# Patient Record
Sex: MALE | Race: WHITE | NOT HISPANIC OR LATINO | Employment: UNEMPLOYED | URBAN - METROPOLITAN AREA
[De-identification: names, ages, dates, MRNs, and addresses within clinical notes are randomized per-mention and may not be internally consistent; named-entity substitution may affect disease eponyms.]

---

## 2017-01-09 ENCOUNTER — EDUCATION (OUTPATIENT)
Dept: HEMATOLOGY/ONCOLOGY | Facility: CLINIC | Age: 57
End: 2017-01-09

## 2017-01-09 DIAGNOSIS — Z52.001 DONOR OF STEM CELL: Primary | ICD-10-CM

## 2017-01-09 NOTE — PROGRESS NOTES
"Spoke with Alexi Noguera, potential donor for Franco Noguera today via telephone. Discussed the fact that his broher... is in need of a stem cell transplant.Alexi said that he was interested in possibly being the donor for his brother's transplant. Briefly discussed the donation process. Explained to Alexi that if he would match his brother, that his commitment to donate is very important and that he has the right to change his mind. Also explained however, that a late decision not to donate can be life threatening to the patient. Explained that he should think seriously about his commitment before he has his tissue typing drawn, and if he feels uncomfortable moving forward, that it is okay for him to say "no." Also informed Alexi that if he is a perfect match for his brother, that we will contact him and ask if he is willing to donate. If he agrees to proceed, we will ask him about his health and schedule a physician appointment and more testing. He will receive a physical examination to be sure it is safe for him to donate and that his donation will provide the best possible outcome for the patient. We will follow medical guidelines that protect his health as a potential donor as well as the health of the transplant patient. Alexi was given the opportunity to ask questions. Alexi would like to have his lo res typing drawn on 01/21/17 at 8:20 am. Appt to be scheduled  "

## 2017-01-21 ENCOUNTER — LAB VISIT (OUTPATIENT)
Dept: LAB | Facility: HOSPITAL | Age: 57
End: 2017-01-21
Attending: INTERNAL MEDICINE
Payer: COMMERCIAL

## 2017-01-21 DIAGNOSIS — Z52.001 DONOR OF STEM CELL: ICD-10-CM

## 2017-01-21 PROCEDURE — 81376 HLA II TYPING 1 LOCUS LR: CPT | Mod: 91,PO

## 2017-01-21 PROCEDURE — 81373 HLA I TYPING 1 LOCUS LR: CPT | Mod: PO

## 2017-01-21 PROCEDURE — 81376 HLA II TYPING 1 LOCUS LR: CPT | Mod: PO

## 2017-01-21 PROCEDURE — 81373 HLA I TYPING 1 LOCUS LR: CPT | Mod: 91,PO

## 2017-01-25 DIAGNOSIS — R60.0 BILATERAL LEG EDEMA: ICD-10-CM

## 2017-01-25 DIAGNOSIS — I10 ESSENTIAL HYPERTENSION: ICD-10-CM

## 2017-01-25 RX ORDER — CHLORTHALIDONE 25 MG/1
TABLET ORAL
Qty: 90 TABLET | Refills: 3 | Status: SHIPPED | OUTPATIENT
Start: 2017-01-25 | End: 2018-07-20

## 2017-02-01 LAB — HLATY INTERPRETATION: NORMAL

## 2017-02-06 LAB
HLA DRB4 1: NORMAL
HLA SSO DNA TYPING CLASS I & II INTERPRETATION: NORMAL
HLA-A 1 SERO. EQUIV: 1
HLA-A 1: NORMAL
HLA-A 2 SERO. EQUIV: 2
HLA-A 2: NORMAL
HLA-B 1 SERO. EQUIV: 7
HLA-B 1: NORMAL
HLA-B 2 SERO. EQUIV: 8
HLA-B 2: NORMAL
HLA-BW 1 SERO. EQUIV: 6
HLA-BW 2 SERO. EQUIV: NORMAL
HLA-C 1: NORMAL
HLA-C 2: NORMAL
HLA-CW 1 SERO. EQUIV: 7
HLA-CW 2 SERO. EQUIV: NORMAL
HLA-DQ 1 SERO. EQUIV: 5
HLA-DQ 2 SERO. EQUIV: 6
HLA-DQB1 1: NORMAL
HLA-DQB1 2: NORMAL
HLA-DRB1 1 SERO. EQUIV: 1
HLA-DRB1 1: NORMAL
HLA-DRB1 2 SERO. EQUIV: 15
HLA-DRB1 2: NORMAL
HLA-DRB3 1: NORMAL
HLA-DRB3 2: NORMAL
HLA-DRB345 1 SERO. EQUIV: 51
HLA-DRB345 2 SERO. EQUIV: NORMAL
HLA-DRB4 2: NORMAL
HLA-DRB5 1: NORMAL
HLA-DRB5 2: NORMAL
SSDQB TESTING DATE: NORMAL
SSDRB TESTING DATE: NORMAL
SSOA TESTING DATE: NORMAL
SSOB TESTING DATE: NORMAL
SSOC TESTING DATE: NORMAL
SSODR TESTING DATE: NORMAL
TYSSO TESTING DATE: NORMAL

## 2017-02-24 ENCOUNTER — LAB VISIT (OUTPATIENT)
Dept: LAB | Facility: HOSPITAL | Age: 57
End: 2017-02-24
Attending: INTERNAL MEDICINE
Payer: MEDICARE

## 2017-02-24 DIAGNOSIS — Z52.001 STEM CELL DONOR: ICD-10-CM

## 2017-02-24 DIAGNOSIS — Z52.001 STEM CELL DONOR: Primary | ICD-10-CM

## 2017-02-24 DIAGNOSIS — D69.6 THROMBOCYTOPENIA: ICD-10-CM

## 2017-02-24 LAB
ALBUMIN SERPL BCP-MCNC: 3.7 G/DL
ALP SERPL-CCNC: 61 U/L
ALT SERPL W/O P-5'-P-CCNC: 24 U/L
ANION GAP SERPL CALC-SCNC: 11 MMOL/L
APTT BLDCRRT: 22.2 SEC
AST SERPL-CCNC: 23 U/L
BASOPHILS # BLD AUTO: 0.01 K/UL
BASOPHILS NFR BLD: 0.1 %
BILIRUB SERPL-MCNC: 0.3 MG/DL
BUN SERPL-MCNC: 20 MG/DL
CALCIUM SERPL-MCNC: 9.3 MG/DL
CHLORIDE SERPL-SCNC: 103 MMOL/L
CO2 SERPL-SCNC: 25 MMOL/L
CREAT SERPL-MCNC: 1.2 MG/DL
DIFFERENTIAL METHOD: ABNORMAL
EOSINOPHIL # BLD AUTO: 0.1 K/UL
EOSINOPHIL NFR BLD: 1.2 %
ERYTHROCYTE [DISTWIDTH] IN BLOOD BY AUTOMATED COUNT: 13.5 %
EST. GFR  (AFRICAN AMERICAN): >60 ML/MIN/1.73 M^2
EST. GFR  (NON AFRICAN AMERICAN): >60 ML/MIN/1.73 M^2
GLUCOSE SERPL-MCNC: 119 MG/DL
HCT VFR BLD AUTO: 40.7 %
HGB BLD-MCNC: 14.4 G/DL
INR PPP: 0.9
LYMPHOCYTES # BLD AUTO: 2.2 K/UL
LYMPHOCYTES NFR BLD: 26.9 %
MAGNESIUM SERPL-MCNC: 2.1 MG/DL
MCH RBC QN AUTO: 30.6 PG
MCHC RBC AUTO-ENTMCNC: 35.4 %
MCV RBC AUTO: 87 FL
MONOCYTES # BLD AUTO: 0.6 K/UL
MONOCYTES NFR BLD: 6.8 %
NEUTROPHILS # BLD AUTO: 5.2 K/UL
NEUTROPHILS NFR BLD: 64.8 %
PHOSPHATE SERPL-MCNC: 2.9 MG/DL
PLATELET # BLD AUTO: 291 K/UL
PMV BLD AUTO: 9.1 FL
POTASSIUM SERPL-SCNC: 3.1 MMOL/L
PROT SERPL-MCNC: 7.8 G/DL
PROTHROMBIN TIME: 10.1 SEC
RBC # BLD AUTO: 4.7 M/UL
SODIUM SERPL-SCNC: 139 MMOL/L
WBC # BLD AUTO: 8.08 K/UL

## 2017-02-24 PROCEDURE — 81380 HLA I TYPING 1 LOCUS HR: CPT | Mod: 91,PO

## 2017-02-24 PROCEDURE — 81382 HLA II TYPING 1 LOC HR: CPT | Mod: 91,PO

## 2017-02-24 PROCEDURE — 83020 HEMOGLOBIN ELECTROPHORESIS: CPT

## 2017-02-24 PROCEDURE — 85060 BLOOD SMEAR INTERPRETATION: CPT | Mod: ,,, | Performed by: PATHOLOGY

## 2017-02-24 PROCEDURE — 85025 COMPLETE CBC W/AUTO DIFF WBC: CPT

## 2017-02-24 PROCEDURE — 81380 HLA I TYPING 1 LOCUS HR: CPT | Mod: PO

## 2017-02-24 PROCEDURE — 85730 THROMBOPLASTIN TIME PARTIAL: CPT

## 2017-02-24 PROCEDURE — 86787 VARICELLA-ZOSTER ANTIBODY: CPT

## 2017-02-24 PROCEDURE — 81382 HLA II TYPING 1 LOC HR: CPT | Mod: PO

## 2017-02-24 PROCEDURE — 86695 HERPES SIMPLEX TYPE 1 TEST: CPT

## 2017-02-24 PROCEDURE — 82955 ASSAY OF G6PD ENZYME: CPT

## 2017-02-24 PROCEDURE — 83735 ASSAY OF MAGNESIUM: CPT

## 2017-02-24 PROCEDURE — 86709 HEPATITIS A IGM ANTIBODY: CPT

## 2017-02-24 PROCEDURE — 80053 COMPREHEN METABOLIC PANEL: CPT

## 2017-02-24 PROCEDURE — 84100 ASSAY OF PHOSPHORUS: CPT

## 2017-02-24 PROCEDURE — 36415 COLL VENOUS BLD VENIPUNCTURE: CPT

## 2017-02-24 PROCEDURE — 85610 PROTHROMBIN TIME: CPT

## 2017-02-27 DIAGNOSIS — Z52.001 STEM CELL DONOR: Primary | ICD-10-CM

## 2017-02-27 LAB
G6PD RBC-CCNT: 9.1 U/G HGB (ref 7–20.5)
HAV IGM SERPL QL IA: NEGATIVE
HGB A2 MFR BLD HPLC: 2.4 %
HGB FRACT BLD ELPH-IMP: NORMAL
HGB FRACT BLD ELPH-IMP: NORMAL
PATH REV BLD -IMP: NORMAL
PATH REV BLD -IMP: NORMAL
VARICELLA INTERPRETATION: POSITIVE
VARICELLA ZOSTER IGG: 4.01 ISR

## 2017-02-28 LAB
CHIDB RECIPIENT: NORMAL
CHIMERISM-DONOR (CHIDB): NORMAL
SPECIMEN TYPE: NORMAL

## 2017-03-01 LAB
HSV1 IGG SERPL QL IA: NEGATIVE
HSV2 IGG SERPL QL IA: POSITIVE

## 2017-03-03 ENCOUNTER — HOSPITAL ENCOUNTER (OUTPATIENT)
Dept: CARDIOLOGY | Facility: CLINIC | Age: 57
Discharge: HOME | End: 2017-03-03
Payer: MEDICARE

## 2017-03-03 ENCOUNTER — HOSPITAL ENCOUNTER (OUTPATIENT)
Dept: RADIOLOGY | Facility: HOSPITAL | Age: 57
Discharge: HOME | End: 2017-03-03
Attending: INTERNAL MEDICINE
Payer: COMMERCIAL

## 2017-03-03 ENCOUNTER — CLINICAL SUPPORT (OUTPATIENT)
Dept: HEMATOLOGY/ONCOLOGY | Facility: CLINIC | Age: 57
End: 2017-03-03
Payer: COMMERCIAL

## 2017-03-03 DIAGNOSIS — Z52.001 STEM CELL DONOR: ICD-10-CM

## 2017-03-03 DIAGNOSIS — Z52.001 STEM CELL DONOR: Primary | ICD-10-CM

## 2017-03-03 DIAGNOSIS — D69.6 THROMBOCYTOPENIA: ICD-10-CM

## 2017-03-03 PROCEDURE — 93010 ELECTROCARDIOGRAM REPORT: CPT | Mod: S$PBB,,, | Performed by: INTERNAL MEDICINE

## 2017-03-03 PROCEDURE — 71020 XR CHEST PA AND LATERAL: CPT | Mod: TC

## 2017-03-03 PROCEDURE — 71020 XR CHEST PA AND LATERAL: CPT | Mod: 26,,, | Performed by: RADIOLOGY

## 2017-03-03 NOTE — PROGRESS NOTES
Vitas and ht/wt from 3/3/17 at 0900:    Ht: 6ft 1in, 184 cm  Wt: 117.4 kg     131/79 BP  77 HR  o2 sat 99% RA

## 2017-03-10 ENCOUNTER — OFFICE VISIT (OUTPATIENT)
Dept: HEMATOLOGY/ONCOLOGY | Facility: CLINIC | Age: 57
End: 2017-03-10
Payer: MEDICARE

## 2017-03-10 VITALS
HEART RATE: 94 BPM | DIASTOLIC BLOOD PRESSURE: 78 MMHG | BODY MASS INDEX: 34.58 KG/M2 | HEIGHT: 72 IN | OXYGEN SATURATION: 94 % | RESPIRATION RATE: 20 BRPM | TEMPERATURE: 98 F | WEIGHT: 255.31 LBS | SYSTOLIC BLOOD PRESSURE: 140 MMHG

## 2017-03-10 DIAGNOSIS — Z52.001 STEM CELL DONOR: Primary | ICD-10-CM

## 2017-03-10 DIAGNOSIS — R76.8 HEPATITIS C ANTIBODY TEST POSITIVE: ICD-10-CM

## 2017-03-10 PROCEDURE — 99999 PR PBB SHADOW E&M-EST. PATIENT-LVL III: CPT | Mod: PBBFAC,,, | Performed by: INTERNAL MEDICINE

## 2017-03-10 PROCEDURE — 99213 OFFICE O/P EST LOW 20 MIN: CPT | Mod: PBBFAC | Performed by: INTERNAL MEDICINE

## 2017-03-10 PROCEDURE — 99205 OFFICE O/P NEW HI 60 MIN: CPT | Mod: S$PBB,,, | Performed by: INTERNAL MEDICINE

## 2017-03-11 NOTE — PROGRESS NOTES
SECTION OF HEMATOLOGY AND BONE MARROW TRANSPLANT  New Patient Visit   03/11/2017  Referred by:  No ref. provider found  Referred for: stem cell donor evaluation     CHIEF COMPLAINT: No chief complaint on file.      HISTORY OF PRESENT ILLNESS:   58 yo male presents for stem cell donor evaluation.  He is a 10/10 hla match for this brother.  He has pmh of htn well controlled on current medications.  He sees an [facility].  He is uptodate on all age appropriate cancer screening. Had colonscopy in 2008 that was normal per patient. We are awaiting report.    Denies any history of autoimmune disease or cancer.  He had a + hep c ab in 2016 but dna pcr was negative.  He denies any high risk sexual behavior or illicit drug use . He does not use tobacco or drink. Denies fever, chills, nightsweats, bleeding, brusing, lymphadenopathy, signs/symptoms of splenomegaly.      PAST MEDICAL HISTORY:   No past medical history on file.    PAST SURGICAL HISTORY:   No past surgical history on file.    PAST SOCIAL HISTORY:   reports that he has never smoked. He does not have any smokeless tobacco history on file. He reports that he does not drink alcohol or use illicit drugs.    FAMILY HISTORY:  Family History   Problem Relation Age of Onset    No Known Problems Mother     No Known Problems Father     No Known Problems Sister     Heart disease Brother     Diabetes Paternal Aunt     Heart disease Paternal Uncle        CURRENT MEDICATIONS:   Current Outpatient Prescriptions   Medication Sig    chlorthalidone (HYGROTEN) 25 MG Tab TAKE 1 TABLET (25 MG TOTAL) BY MOUTH ONCE DAILY.    losartan (COZAAR) 50 MG tablet TAKE 1 TABLET (50 MG TOTAL) BY MOUTH ONCE DAILY.     No current facility-administered medications for this visit.      ALLERGIES:   Review of patient's allergies indicates:  No Known Allergies          REVIEW OF SYSTEMS:   General ROS: negative  Psychological ROS: negative  Ophthalmic ROS: negative  ENT ROS: negative  Allergy  and Immunology ROS: negative  Hematological and Lymphatic ROS: negative  Endocrine ROS: negative  Respiratory ROS: negative  Cardiovascular ROS: negative  Gastrointestinal ROS: negative  Genito-Urinary ROS: negative  Musculoskeletal ROS: negative  Neurological ROS: negative  Dermatological ROS: negative    PHYSICAL EXAM:   Vitals:    03/10/17 1506   BP: (!) 140/78   Pulse: 94   Resp: 20   Temp: 97.8 °F (36.6 °C)       General - well developed, well nourished, no apparent distress  Head & Face - no sinus tenderness  Eyes - normal conjunctivae and lids   ENT - normal external auditory canals and tympanic membranes bilaterally oropharynx clear,  Normal dentition and gums  Neck - normal thyroid  Chest and Lung - normal respiratory effort, clear to auscultation bilaterally   Cardiovascular - RRR with no MGR, normal S1 and S2; no pedal edema  Abdomen -  soft, nontender, no palpable hepatomegaly or splenomegaly  Lymph - no palpable lymphadenopathy  Extremities - unremarkable nails and digits  Heme - no bruising, petechiae, pallor  Skin - no rashes or lesions  Psych - appropriate mood and affect      ECOG Performance Status: (foot note - ECOG PS provided by [employer]cology [employer]) 0 - Asymptomatic    Karnofsky Performance Score:  100%- Normal, No Complaints, No Evidence of Disease  DATA:   HLA SSO DNA Typing - Class I & II   Status:  Final result   Visible to patient:  No (Not Released) Next appt:  03/16/2017 at 08:00 AM in Chemotherapy (INJECTION, [facility] IN[facility]ION) Order: [id number]        1mo ago     HLA SSO DNA Typing Class I & II Interpretation The donor Alexi Noguera (#[id number]) is a HLA ABC/DR/DQ match with the patient Franco Noguera(#[id number]) using low resolution molecular DNA.             Lab Results   Component Value Date    WBC 8.08 02/24/2017    HGB 14.4 02/24/2017    HCT 40.7 02/24/2017    MCV 87 02/24/2017     02/24/2017     Gran #   Date Value Ref Range Status   02/24/2017 5.2 1.8 - 7.7 K/uL Final      Gran%   Date Value Ref Range Status   02/24/2017 64.8 38.0 - 73.0 % Final     Lymph #   Date Value Ref Range Status   02/24/2017 2.2 1.0 - 4.8 K/uL Final     Lymph%   Date Value Ref Range Status   02/24/2017 26.9 18.0 - 48.0 % Final     CMP  Sodium   Date Value Ref Range Status   02/24/2017 139 136 - 145 mmol/L Final     Potassium   Date Value Ref Range Status   02/24/2017 3.1 (L) 3.5 - 5.1 mmol/L Final     Chloride   Date Value Ref Range Status   02/24/2017 103 95 - 110 mmol/L Final     CO2   Date Value Ref Range Status   02/24/2017 25 23 - 29 mmol/L Final     Glucose   Date Value Ref Range Status   02/24/2017 119 (H) 70 - 110 mg/dL Final     BUN, Bld   Date Value Ref Range Status   02/24/2017 20 6 - 20 mg/dL Final     Creatinine   Date Value Ref Range Status   02/24/2017 1.2 0.5 - 1.4 mg/dL Final     Calcium   Date Value Ref Range Status   02/24/2017 9.3 8.7 - 10.5 mg/dL Final     Total Protein   Date Value Ref Range Status   02/24/2017 7.8 6.0 - 8.4 g/dL Final     Albumin   Date Value Ref Range Status   02/24/2017 3.7 3.5 - 5.2 g/dL Final     Total Bilirubin   Date Value Ref Range Status   02/24/2017 0.3 0.1 - 1.0 mg/dL Final     Comment:     For infants and newborns, interpretation of results should be based  on gestational age, weight and in agreement with clinical  observations.  Premature Infant recommended reference ranges:  Up to 24 hours.............<8.0 mg/dL  Up to 48 hours............<12.0 mg/dL  3-5 days..................<15.0 mg/dL  6-29 days.................<15.0 mg/dL       Alkaline Phosphatase   Date Value Ref Range Status   02/24/2017 61 55 - 135 U/L Final     AST   Date Value Ref Range Status   02/24/2017 23 10 - 40 U/L Final     ALT   Date Value Ref Range Status   02/24/2017 24 10 - 44 U/L Final     Anion Gap   Date Value Ref Range Status   02/24/2017 11 8 - 16 mmol/L Final     eGFR if    Date Value Ref Range Status   02/24/2017 >60.0 >60 mL/min/1.73 m^2 Final     eGFR if non     Date Value Ref Range Status   02/24/2017 >60.0 >60 mL/min/1.73 m^2 Final     Comment:     Calculation used to obtain the estimated glomerular filtration  rate (eGFR) is the CKD-EPI equation. Since race is unknown   in our information system, the eGFR values for   -American and Non--American patients are given   for each creatinine result.         Results for KATIE DUNCAN (MRN [id number]) as of 3/11/2017 16:29   Ref. Range 2/24/2017 15:27   Hep A IgM Unknown Negative   HSV 1 IgG Latest Ref Range: Negative  Negative   HSV 2 IgG Latest Ref Range: Negative  Positive (A)   Varicella IgG Latest Ref Range: 0.00 - 0.90 ISR 4.01 (H)   Varicella Interpretation Latest Ref Range: Negative  Positive (A)   Results for KATIE DUNCAN (MRN [id number]) as of 3/11/2017 16:29   Ref. Range 4/30/2016 08:49 5/21/2016 08:16 8/22/2016 12:51   HCV Log Latest Ref Range: <1.08 Log (10) IU/mL   <1.08   HCV Qualitative Result Latest Ref Range: Not detected   Not detected    HCV Quantitative Log Latest Ref Range: <1.08 Log (10) IU/mL  <1.08    HCV Quantitative Result Latest Ref Range: <12 IU/mL  <12    HCV RNA Quant PCR Latest Ref Range: <12 IU/mL   <12   HCV, Qualitative Latest Ref Range: Not detected IU/mL   Not detected   Hepatitis C Ab Unknown Positive (A)     Results for KATIE DUNCAN (MRN [id number]) as of 3/11/2017 16:29   Ref. Range 2/24/2017 15:27   G6PD Quant Latest Ref Range: 7.0 - 20.5 U/g Hgb 9.1   CXR -3/3/17  Findings:    There are no prior studies for comparison at this time.  Cardiomediastinal silhouette is within normal limits.  There is no tracheal abnormalities.  Lungs are clear.  Pulmonary vasculature within normal limits.  There is no bony thorax abnormalities.       Impression          No acute cardiopulmonary processes.     EKG - 3/3/17  Normal sinus rhythm  Normal ECG  ASSESSMENT AND PLAN:   Encounter Diagnoses   Name Primary?    Stem cell donor Yes    Hepatitis C antibody test positive       1)Stem cell donor  -for brother being treated for AML  -healthy stem cell donor with no clear contraindications to proceeding with mobilization and collection   -he is 10/10 HLA match; CMV status and ABO blood type pending; peripheral smear pending; will need to review these prior to signing off on donation   -he has prior hep c + ab however subsequent hep c dna studies were negative signifying cleared infection and no contraindication to donation   -spent 60 minutes on this case, half of which was spent face to face with patient discussing rationale and risks of mobilization (with g-csf), central line placement, and collection; discussed that in rare circumstances <1%, these risks have the potential to be serious and life threatening; he expressed understanding and all written consents obtained  -will need to get 2008 colonoscopy report to confirm he was cleared through 2018    Follow Up: per BMT coordinator  Mohit Peoples MD  Hematology/Oncology/Bone Marrow Transplant

## 2017-03-16 ENCOUNTER — INFUSION (OUTPATIENT)
Dept: INFUSION THERAPY | Facility: HOSPITAL | Age: 57
End: 2017-03-16
Attending: INTERNAL MEDICINE
Payer: MEDICARE

## 2017-03-16 DIAGNOSIS — Z52.001 STEM CELL DONOR: Primary | ICD-10-CM

## 2017-03-16 PROCEDURE — 63600175 PHARM REV CODE 636 W HCPCS: Performed by: NURSE PRACTITIONER

## 2017-03-16 PROCEDURE — 96372 THER/PROPH/DIAG INJ SC/IM: CPT

## 2017-03-16 RX ADMIN — FILGRASTIM 1260 MCG: 480 INJECTION, SOLUTION INTRAVENOUS; SUBCUTANEOUS at 07:03

## 2017-03-16 NOTE — NURSING
0742-Patient arrived ambulatory to the unit for injections.  NAD noted, no complaints from patient.  Assessment performed and no issues identified.  Patient elected to receive subcutaneous injections to abdomen, tolerated well.  Patient encouraged to contact MD with further questions/concerns, verbalized will return to clinic tomorrow for next injection.

## 2017-03-17 ENCOUNTER — INFUSION (OUTPATIENT)
Dept: INFUSION THERAPY | Facility: HOSPITAL | Age: 57
End: 2017-03-17
Attending: INTERNAL MEDICINE
Payer: MEDICARE

## 2017-03-17 DIAGNOSIS — Z52.001 STEM CELL DONOR: Primary | ICD-10-CM

## 2017-03-17 LAB
HLA HI RES SEQUNCE BASED TYPING OCH INTERPRETATION: NORMAL
HLA HI RES SEQUNCE BASED TYPING TEST DATE: NORMAL
HLA-A1 HI RES: NORMAL
HLA-A2 HI RES: NORMAL
HLA-B1 HI RES: NORMAL
HLA-B2 HI RES: NORMAL
HLA-C1 HI RES: NORMAL
HLA-C2 HI RES: NORMAL
HLA-DQB1 1 HI RES: NORMAL
HLA-DQB1 2 HI RES: NORMAL
HLA-DRB1 1 HI RES: NORMAL
HLA-DRB1 2 HI RES: NORMAL
HLA-DRB3 1 HI RES: NORMAL
HLA-DRB3 2 HI RES: NORMAL
HLA-DRB4 1 HI RES: NORMAL
HLA-DRB4 2 HI RES: NORMAL
HLA-DRB5 1 HI RES: NORMAL
HLA-DRB5 2 HI RES: NORMAL

## 2017-03-17 PROCEDURE — 96372 THER/PROPH/DIAG INJ SC/IM: CPT

## 2017-03-17 PROCEDURE — 63600175 PHARM REV CODE 636 W HCPCS: Performed by: NURSE PRACTITIONER

## 2017-03-17 RX ORDER — SODIUM CHLORIDE 9 MG/ML
500 INJECTION, SOLUTION INTRAVENOUS CONTINUOUS
Status: CANCELLED | OUTPATIENT
Start: 2017-03-17

## 2017-03-17 RX ORDER — CEFAZOLIN SODIUM 1 G/50ML
1 SOLUTION INTRAVENOUS
Status: CANCELLED | OUTPATIENT
Start: 2017-03-17 | End: 2017-03-17

## 2017-03-17 RX ADMIN — FILGRASTIM 1260 MCG: 480 INJECTION, SOLUTION INTRAVENOUS; SUBCUTANEOUS at 07:03

## 2017-03-17 NOTE — NURSING
Pt received neupogen via 2 injections in lower R. Abdomen. Tolerated well. Denies paid or joint discomfort from yesterday's injections. Reviewed appointments and saturday protocol. Will RTC tomorrow for next set of injections. No questions at this time.

## 2017-03-18 ENCOUNTER — INFUSION (OUTPATIENT)
Dept: INFUSION THERAPY | Facility: HOSPITAL | Age: 57
End: 2017-03-18
Attending: INTERNAL MEDICINE
Payer: MEDICARE

## 2017-03-18 DIAGNOSIS — Z52.001 STEM CELL DONOR: Primary | ICD-10-CM

## 2017-03-18 PROCEDURE — 63600175 PHARM REV CODE 636 W HCPCS: Performed by: NURSE PRACTITIONER

## 2017-03-18 PROCEDURE — 96372 THER/PROPH/DIAG INJ SC/IM: CPT

## 2017-03-18 RX ADMIN — FILGRASTIM 1260 MCG: 480 INJECTION, SOLUTION INTRAVENOUS; SUBCUTANEOUS at 07:03

## 2017-03-18 NOTE — NURSING
Pt. Here today for 3rd set of neupogen injections. Reports fatigue and aches. Encouraged to notify MD if it continues to worsen to see what he can take for relief, if anything. Pt. States at this time it is tolerable and does not wish to take anything. Received dose via 2 injections today and tolerated. Will RTC tomorrow for next set of injections.

## 2017-03-19 ENCOUNTER — INFUSION (OUTPATIENT)
Dept: INFUSION THERAPY | Facility: HOSPITAL | Age: 57
End: 2017-03-19
Attending: INTERNAL MEDICINE
Payer: COMMERCIAL

## 2017-03-19 DIAGNOSIS — Z52.001 STEM CELL DONOR: Primary | ICD-10-CM

## 2017-03-19 PROCEDURE — 63600175 PHARM REV CODE 636 W HCPCS: Performed by: NURSE PRACTITIONER

## 2017-03-19 PROCEDURE — 96372 THER/PROPH/DIAG INJ SC/IM: CPT

## 2017-03-19 RX ADMIN — FILGRASTIM 1260 MCG: 480 INJECTION, SOLUTION INTRAVENOUS; SUBCUTANEOUS at 08:03

## 2017-03-19 NOTE — NURSING
0908 -- Patient received Neupogen injections (2) in ABD.  Tolerated well.  RTC tomorrow for injections.

## 2017-03-20 ENCOUNTER — INFUSION (OUTPATIENT)
Dept: INFUSION THERAPY | Facility: HOSPITAL | Age: 57
End: 2017-03-20
Attending: INTERNAL MEDICINE
Payer: COMMERCIAL

## 2017-03-20 ENCOUNTER — HOSPITAL ENCOUNTER (OUTPATIENT)
Dept: TRANSFUSION MEDICINE | Facility: HOSPITAL | Age: 57
Discharge: HOME | End: 2017-03-20
Attending: INTERNAL MEDICINE
Payer: COMMERCIAL

## 2017-03-20 ENCOUNTER — HOSPITAL ENCOUNTER (OUTPATIENT)
Dept: INTERVENTIONAL RADIOLOGY/VASCULAR | Facility: HOSPITAL | Age: 57
Discharge: HOME | End: 2017-03-20
Attending: INTERNAL MEDICINE
Payer: COMMERCIAL

## 2017-03-20 VITALS
SYSTOLIC BLOOD PRESSURE: 128 MMHG | TEMPERATURE: 96 F | BODY MASS INDEX: 34.19 KG/M2 | RESPIRATION RATE: 20 BRPM | WEIGHT: 258 LBS | HEART RATE: 88 BPM | DIASTOLIC BLOOD PRESSURE: 81 MMHG | HEIGHT: 73 IN

## 2017-03-20 VITALS
SYSTOLIC BLOOD PRESSURE: 139 MMHG | RESPIRATION RATE: 20 BRPM | OXYGEN SATURATION: 96 % | HEART RATE: 109 BPM | DIASTOLIC BLOOD PRESSURE: 97 MMHG

## 2017-03-20 VITALS
RESPIRATION RATE: 20 BRPM | TEMPERATURE: 98 F | SYSTOLIC BLOOD PRESSURE: 137 MMHG | HEART RATE: 96 BPM | DIASTOLIC BLOOD PRESSURE: 91 MMHG | OXYGEN SATURATION: 96 %

## 2017-03-20 DIAGNOSIS — Z52.001 STEM CELL DONOR: Primary | ICD-10-CM

## 2017-03-20 DIAGNOSIS — Z52.001 STEM CELL DONOR: ICD-10-CM

## 2017-03-20 LAB
ALBUMIN SERPL BCP-MCNC: 3.2 G/DL
ALP SERPL-CCNC: 185 U/L
ALT SERPL W/O P-5'-P-CCNC: 14 U/L
ANION GAP SERPL CALC-SCNC: 12 MMOL/L
ANISOCYTOSIS BLD QL SMEAR: SLIGHT
AST SERPL-CCNC: 20 U/L
BASOPHILS # BLD AUTO: ABNORMAL K/UL
BASOPHILS NFR BLD: 0 %
BILIRUB SERPL-MCNC: 0.5 MG/DL
BUN SERPL-MCNC: 12 MG/DL
CA-I BLDV-SCNC: 1.13 MMOL/L
CALCIUM SERPL-MCNC: 9.8 MG/DL
CHLORIDE SERPL-SCNC: 102 MMOL/L
CO2 SERPL-SCNC: 27 MMOL/L
CREAT SERPL-MCNC: 0.9 MG/DL
DIFFERENTIAL METHOD: ABNORMAL
EOSINOPHIL # BLD AUTO: ABNORMAL K/UL
EOSINOPHIL NFR BLD: 2 %
ERYTHROCYTE [DISTWIDTH] IN BLOOD BY AUTOMATED COUNT: 14.4 %
EST. GFR  (AFRICAN AMERICAN): >60 ML/MIN/1.73 M^2
EST. GFR  (NON AFRICAN AMERICAN): >60 ML/MIN/1.73 M^2
GLUCOSE SERPL-MCNC: 87 MG/DL
HCT VFR BLD AUTO: 40.7 %
HGB BLD-MCNC: 13.6 G/DL
LYMPHOCYTES # BLD AUTO: ABNORMAL K/UL
LYMPHOCYTES NFR BLD: 9 %
MAGNESIUM SERPL-MCNC: 1.6 MG/DL
MCH RBC QN AUTO: 30.2 PG
MCHC RBC AUTO-ENTMCNC: 33.4 %
MCV RBC AUTO: 90 FL
MONOCYTES # BLD AUTO: ABNORMAL K/UL
MONOCYTES NFR BLD: 2 %
NEUTROPHILS NFR BLD: 78 %
NEUTS BAND NFR BLD MANUAL: 9 %
PHOSPHATE SERPL-MCNC: 2.5 MG/DL
PLATELET # BLD AUTO: 125 K/UL
PLATELET BLD QL SMEAR: ABNORMAL
PMV BLD AUTO: 8.7 FL
POTASSIUM SERPL-SCNC: 3.2 MMOL/L
PROT SERPL-MCNC: 6.6 G/DL
RBC # BLD AUTO: 4.51 M/UL
SODIUM SERPL-SCNC: 141 MMOL/L
WBC # BLD AUTO: 31.92 K/UL

## 2017-03-20 PROCEDURE — 77001 FLUOROGUIDE FOR VEIN DEVICE: CPT | Mod: 26,,, | Performed by: RADIOLOGY

## 2017-03-20 PROCEDURE — 77001 FLUOROGUIDE FOR VEIN DEVICE: CPT | Mod: TC

## 2017-03-20 PROCEDURE — C1752 CATH,HEMODIALYSIS,SHORT-TERM: HCPCS

## 2017-03-20 PROCEDURE — 76937 US GUIDE VASCULAR ACCESS: CPT | Mod: 26,,, | Performed by: RADIOLOGY

## 2017-03-20 PROCEDURE — 76937 US GUIDE VASCULAR ACCESS: CPT | Mod: TC

## 2017-03-20 PROCEDURE — 36556 INSERT NON-TUNNEL CV CATH: CPT | Mod: ,,, | Performed by: RADIOLOGY

## 2017-03-20 PROCEDURE — 38205 HARVEST ALLOGENEIC STEM CELL: CPT | Mod: ,,, | Performed by: PATHOLOGY

## 2017-03-20 RX ADMIN — FILGRASTIM 1260 MCG: 480 INJECTION, SOLUTION INTRAVENOUS; SUBCUTANEOUS at 09:03

## 2017-03-20 RX ADMIN — CALCIUM GLUCONATE 4000 MG: 94 INJECTION, SOLUTION INTRAVENOUS at 10:03

## 2017-03-20 NOTE — PROCEDURES
"Radiology Post-Procedure Note    Pre Op Diagnosis: Stem cell donation    Post Op Diagnosis: Same    Procedure: PICC placement    Procedure performed by: Eugenio Diaz MD    Written Informed Consent Obtained: Yes    Specimen Removed: No    Estimated Blood Loss: Minimal    Findings:   Using realtime U/S guidance a brevia catheter was placed into the right Jugular vein with tip of the catheter in the SVC.    Brevia is ready for use.     Williams Del Toro MD ([facility])  Radiology PGY-3  [phone]    "

## 2017-03-20 NOTE — H&P
Radiology History & Physical      SUBJECTIVE:     Chief Complaint: need for central line access for stem cell donation    History of Present Illness:  Alexi Noguera is a 57 y.o. male who presents for brevia placement  No past medical history on file.  No past surgical history on file.    Home Meds:   Prior to Admission medications    Medication Sig Start Date End Date Taking? Authorizing Provider   chlorthalidone (HYGROTEN) 25 MG Tab TAKE 1 TABLET (25 MG TOTAL) BY MOUTH ONCE DAILY. 1/25/17   Derrick Matthews MD   losartan (COZAAR) 50 MG tablet TAKE 1 TABLET (50 MG TOTAL) BY MOUTH ONCE DAILY. 10/28/16   Derrick Matthews MD     Anticoagulants/Antiplatelets: no anticoagulation    Allergies: Review of patient's allergies indicates:  No Known Allergies  Sedation History:  no adverse reactions    Review of Systems:   Hematological: no known coagulopathies  Respiratory: no shortness of breath  Cardiovascular: no chest pain  Gastrointestinal: no abdominal pain  Genito-Urinary: no dysuria  Musculoskeletal: negative  Neurological: no TIA or stroke symptoms         OBJECTIVE:     Vital Signs (Most Recent)  Pulse: 109 (03/20/17 0756)  Resp: 20 (03/20/17 0756)  BP: (!) 139/97 (03/20/17 0756)  SpO2: 96 % (03/20/17 0756)    Physical Exam:  ASA: 1  Mallampati: 2    General: no acute distress  Mental Status: alert and oriented to person, place and time  HEENT: normocephalic, atraumatic  Chest: unlabored breathing  Heart: regular heart rate  Abdomen: nondistended  Extremity: moves all extremities    Laboratory  Lab Results   Component Value Date    INR 1.0 03/20/2017       Lab Results   Component Value Date    WBC 48.54 (H) 03/20/2017    HGB 14.9 03/20/2017    HCT 42.9 03/20/2017    MCV 88 03/20/2017     03/20/2017      Lab Results   Component Value Date     (H) 02/24/2017     02/24/2017    K 3.1 (L) 02/24/2017     02/24/2017    CO2 25 02/24/2017    BUN 20 02/24/2017    CREATININE 1.2 02/24/2017    CALCIUM 9.3  "02/24/2017    MG 2.1 02/24/2017    ALT 24 02/24/2017    AST 23 02/24/2017    ALBUMIN 3.7 02/24/2017    BILITOT 0.3 02/24/2017       ASSESSMENT/PLAN:     Sedation Plan: local  Patient will undergo Brevia placement.    Williams Del Toro MD ([facility])  Radiology PGY-3  [phone]      "

## 2017-03-20 NOTE — IP AVS SNAPSHOT
[facility]  [address]  [facility] [address]  Phone: [phone]           Patient Discharge Instructions     Our goal is to set you up for success. This packet includes information on your condition, medications, and your home care. It will help you to care for yourself so you don't get sicker and need to go back to the hospital.     Please ask your nurse if you have any questions.        There are many details to remember when preparing to leave the hospital. Here is what you will need to do:    1. Take your medicine. If you are prescribed medications, review your Medication List in the following pages. You may have new medications to  at the pharmacy and others that you'll need to stop taking. Review the instructions for how and when to take your medications. Talk with your doctor or nurses if you are unsure of what to do.     2. Go to your follow-up appointments. Specific follow-up information is listed in the following pages. Your may be contacted by a transition nurse or clinical provider about future appointments. Be sure we have all of the phone numbers to reach you, if needed. Please contact your provider's office if you are unable to make an appointment.     3. Watch for warning signs. Your doctor or nurse will give you detailed warning signs to watch for and when to call for assistance. These instructions may also include educational information about your condition. If you experience any of warning signs to your health, call your doctor.               [facility]  Unless otherwise directed by your provider, please contact [facility], our nurse care line that is available for 24/7 assistance.     [phone] (toll-free)    Registered nurses in the [facility] provide clinical advisement, health education, appointment booking, and other advisory services.                    ** Verify the list of medication(s) below is accurate and up  to date. Carry this with you in case of emergency. If your medications have changed, please notify your healthcare provider.             Medication List      TAKE these medications        Additional Info                      chlorthalidone 25 MG Tab   Commonly known as:  HYGROTEN   Quantity:  90 tablet   Refills:  3    Instructions:  TAKE 1 TABLET (25 MG TOTAL) BY MOUTH ONCE DAILY.     Begin Date    AM    Noon    PM    Bedtime       losartan 50 MG tablet   Commonly known as:  COZAAR   Quantity:  90 tablet   Refills:  1    Instructions:  TAKE 1 TABLET (50 MG TOTAL) BY MOUTH ONCE DAILY.     Begin Date    AM    Noon    PM    Bedtime                  Please bring to all follow up appointments:    1. A copy of your discharge instructions.  2. All medicines you are currently taking in their original bottles.  3. Identification and insurance card.    Please arrive 15 minutes ahead of scheduled appointment time.    Please call 24 hours in advance if you must reschedule your appointment and/or time.        Your Scheduled Appointments     Mar 20, 2017  9:00 AM CDT   Infusion 480 Min with JULIAN CHEMO   [facility] ([facility])    [address] [address]   [phone]            Mar 20, 2017  9:00 AM CDT   Procedure with APHERESIS, [address] [facility] ([facility])    [address] [address]   [phone]            Mar 21, 2017  7:30 AM CDT   Infusion 480 Min with BRAEDEN CHEMO   [facility] ([facility])    [address] [address]   [phone]            Apr 29, 2017  8:00 AM CDT   Fasting Lab with [facility] ([place])    [address]  [place] [address]   [phone]            May 05, 2017  1:00 PM CDT   Established Patient Visit with Derrick Matthews MD   [facility] ([facility]    [address] [address]  [place] [address]   [phone]                  Discharge Instructions       [facility] MON[facility] 8 AM- 5PM [phone]. Radiology resident on call [phone].    Discharge References/Attachments     CARING FOR YOUR CENTRAL LINE, DISCHARGE INSTRUCTIONS  (ENGLISH)        Admission Information     Date & Time Provider Department CSN    3/20/2017  7:30 AM Eriberto Peoples MD [facility] [id number]      Care Providers     Provider Role Specialty Primary office phone    Eriberto Peoples MD Attending Provider Hematology and Oncology [phone]      Recent Lab Values     No lab values to display.      Allergies as of 3/20/2017     No Known Allergies      Advance Directives     An advance directive is a document which, in the event you are no longer able to make decisions for yourself, tells your healthcare team what kind of treatment you do or do not want to receive, or who you would like to make those decisions for you.  If you do not currently have an advance directive, [facility] encourages you to create one.  For more information call:  [phone]), [phone] ([phone]),  or log on to [email or web address] for Google Glass.        Language Assistance Services     ATTENTION: Language assistance services are available, free of charge. Please call [phone].      ATENCIÓN: Si habla español, tiene a alonso disposición servicios gratuitos de asistencia lingüística. Llame al [phone].     CHÚ Ý: N?u b?n nói Ti?ng Vi?t, có các d?ch v? h? tr? ngôn ng? mi?n phí dành cho b?n. G?i s? [phone].         [facility] complies with applicable Federal civil rights laws and does not discriminate on the basis of race, color, national origin, age, disability, or sex.

## 2017-03-20 NOTE — PROGRESS NOTES
Brevia placement procedure is now complete. Pt tolerated procedure well. Dressing tithe right side of neck is clean, dry and intact. Discharge instructions reviewed with pt. Pt verbalized understanding. Pt able to ambulate to the lobby with steady. Pt discharged.

## 2017-03-20 NOTE — PLAN OF CARE
Problem: Patient Care Overview (Adult)  Goal: Individualization & Mutuality  Outcome: Ongoing (interventions implemented as appropriate)  0915-Labs , hx, and medications reviewed.  Patient here for injection and for stem cell donation. Assessment completed. Discussed plan of care with patient. Patient in agreement.  Patient set up in patient room with apheresis nurse at bedside.

## 2017-03-20 NOTE — PLAN OF CARE
Problem: Patient Care Overview (Adult)  Goal: Plan of Care Review  0561-Patient tolerated treatment well, central line pulled by apheresis. Per Dr. Peoples- labs reviewed and okay, no need for replacements. Discharged without complaints or S/S of adverse event. AVS given.  Instructed to call provider for any questions or concerns.

## 2017-03-20 NOTE — MR AVS SNAPSHOT
"Patient Information     Patient Name Sex Alexi Brunner Male 1960      Visit Information        Provider Department Dept Phone Center    3/20/2017 9:00 AM [facility], [facility] [phone] Sami Villasenor      Patient Instructions     None      Your Current Medications Are     chlorthalidone (HYGROTEN) 25 MG Tab    losartan (COZAAR) 50 MG tablet      Facility-Administered Medications     calcium gluconate 4,000 mg in sodium chloride 0.9% 460 mL IVPB    filagrastim (NEUPOGEN) injection 1260 mcg kit (300 mcg + 480 mcg + 480 mcg)      Appointments for Next Year     2017  8:00 AM FASTING LAB (15 min.) [facility] - Lab LAB, [place] [facility]    1. Do not eat or drink anything for TEN HOURS (10) PRIOR TO TEST. Do not chew gum or eat candy mints, even those claiming to be sugar free. Water is allowed but do not drink any other fluids 2. Take your regular daily medicines as your doctor has ordered. If you are diabetic, do not take your insulin or other diabetic medication until your blood is drawn and you are ready to eat. Your physician may have special instructions for diabetics. Check with your doctor if you have any questions.3. Alcoholic beverages are not allowed starting at 6:00pm the evening before your appointment.    2017  1:00 PM ESTABLISHED PATIENT SHORT (20 min.) [facility] Derrick Matthews MD    Arrive at check-in approximately 15 minutes before your scheduled appointment time. Bring all outside medical records and imaging, along with a list of your current medications and insurance card.         Default Flowsheet Data (last 24 hours)      Amb Complex Vitals Baron        17 1300 17 1230 17 1200 17 1130 17 1100    Measurements    /78 120/75 121/73 120/80 111/75    Pulse 82 82 85 89 81       17 1035 17 1000 17 0930 17 0915 17 0756    Measurements    Weight   117 kg (258 lb)      Height   6' 1" " (1.854 m)      BSA (Calculated - sq m)   2.45 sq meters      BMI (Calculated)   34.1      BP (!)  90/54 112/68 122/77 (!)  137/91 (!)  139/97    Temp   98.2 °F (36.8 °C) 98.2 °F (36.8 °C)     Pulse 84 87 90 96 109    Resp   20 20 20    SpO2    96 % 96 %            Allergies     No Known Allergies      Medications You Received from 03/19/2017 1551 to 03/20/2017 1551        Date/Time Order Dose Route Action     03/20/2017 0921 filagrastim (NEUPOGEN) injection 1260 mcg kit (300 mcg + 480 mcg + 480 mcg) 1,260 mcg Subcutaneous Given      Current Discharge Medication List     Cannot display discharge medications since this is not an admission.

## 2017-03-20 NOTE — PROGRESS NOTES
Pt ambulatory to Apheresis this am for allo stem cell collection, after catheter placement.  A&O x4.  Voiced no complaints.  Timeout performed by Dr Roa and Apheresis LPN.  R IJ cath patent.  Started at 0930 without difficulty.    Pt stated no pain.  0 on scale.  Meds 4 gm Ca Glu IVPB during collection.  Tx ended at 1325.  Cath flushed with NS only.  Pt tolerated tx well.  Today concludes the HPC collection.  Catheter to be removed per Apheresis RN when post labs result, and to be discharged per Chemo RN.

## 2017-03-21 NOTE — PROCEDURES
[facility]  Pathology  Procedure Note    SUMMARY     Date of Procedure: 3/21/2017     Procedure: Hematopoietic Progenitor Cell Collection    Provider: Nehal Gayle MD     Assisting Provider: None    Pre-Procedure Diagnosis: Stem cell donor  Post-Procedure Diagnosis: Stem cell donor    Follow-up Assessment: 57 year old healthy donor presents for allogeneic stem cell donation via peripheral blood post G-CSF mobilization. A high pre-collection blood count (114.65 CD34) and high mid-run product count (~3 x 10^6 CD34 cells/recipient kg) allowed the collection to be completed in approximately 3h. The collection final count was: TNC - 20.36 x 10^8/kg, CD34 - 7.71 x 10^6/kg, CD3 - 1.77 x 10^8/kg. This exceeds the collection goal of 5 x 10^6 cells/recipient kg. No additional collection days are planned.    Pertinent Laboratory Data:   Complete Blood Count:   Lab Results   Component Value Date    HGB 13.6 (L) 03/20/2017    HCT 40.7 03/20/2017     (L) 03/20/2017    WBC 31.92 (H) 03/20/2017     Basic Metabolic Panel:   Lab Results   Component Value Date     03/20/2017    K 3.2 (L) 03/20/2017     03/20/2017    CO2 27 03/20/2017    GLU 87 03/20/2017    BUN 12 03/20/2017    CREATININE 0.9 03/20/2017    CALCIUM 9.8 03/20/2017    ANIONGAP 12 03/20/2017    ESTGFRAFRICA >60.0 03/20/2017    EGFRNONAA >60.0 03/20/2017       Pertinent Medications: None contraindicated    Review of patient's allergies indicates:  No Known Allergies    Anesthesia: None     Technical Procedures Used: Hematopoietic Progenitor Cell collection: The patient presented to the 5th floor Chemotherapy unit, details about the procedure reviewed. Any interim clinical changes from previous clinic visit - No. All pertinent labs reviewed. Human Progenitor (Stem) Cell Collection initiated by Apheresis Nurse. Current plan is to perform the procedure for 6 hours. Initial laboratory tests collected, results to Oncology Nurse.  Mid-run laboratory tests collected, results to Oncology Nurse. Included CD34 count on collection bag - results to Stem Cell Lab and BMT clinical team. Final laboratory tests collected, results to Oncology Nurse. Red blood cell or platelet transfusion - No.  Date of next procedure N/A.    Description of the Findings of the Procedure:     Please see Apheresis Nurse flowsheet for details.    The patient was evaluated and all clinical and laboratory data relevant to the treatment was reviewed, and a decision was made to proceed with the Apheresis procedure.    I was available to the clinical staff throughout the procedure.    Significant Surgical Tasks Conducted by the Assistant(s): Not applicable  Complications: None  Estimated Blood Loss (EBL): None  Implants: None   Specimens: None    Nehal Gayle MD, PhD  Section of Transfusion Medicine & Histocompatibility  Department of Pathology and Laboratory Medicine  [facility]  [phone] (HLA & Blood Bank Offices)  03/21/2017

## 2017-04-29 ENCOUNTER — LAB VISIT (OUTPATIENT)
Dept: LAB | Facility: HOSPITAL | Age: 57
End: 2017-04-29
Attending: INTERNAL MEDICINE
Payer: COMMERCIAL

## 2017-04-29 DIAGNOSIS — I10 ESSENTIAL HYPERTENSION: ICD-10-CM

## 2017-04-29 DIAGNOSIS — E87.6 HYPOKALEMIA: ICD-10-CM

## 2017-04-29 LAB
ALBUMIN SERPL BCP-MCNC: 3.8 G/DL
ALP SERPL-CCNC: 57 U/L
ALT SERPL W/O P-5'-P-CCNC: 18 U/L
ANION GAP SERPL CALC-SCNC: 11 MMOL/L
AST SERPL-CCNC: 18 U/L
BILIRUB SERPL-MCNC: 0.6 MG/DL
BUN SERPL-MCNC: 18 MG/DL
CALCIUM SERPL-MCNC: 9.3 MG/DL
CHLORIDE SERPL-SCNC: 100 MMOL/L
CO2 SERPL-SCNC: 27 MMOL/L
CREAT SERPL-MCNC: 1 MG/DL
EST. GFR  (AFRICAN AMERICAN): >60 ML/MIN/1.73 M^2
EST. GFR  (NON AFRICAN AMERICAN): >60 ML/MIN/1.73 M^2
GLUCOSE SERPL-MCNC: 93 MG/DL
POTASSIUM SERPL-SCNC: 3.8 MMOL/L
PROT SERPL-MCNC: 8.1 G/DL
SODIUM SERPL-SCNC: 138 MMOL/L

## 2017-04-29 PROCEDURE — 80053 COMPREHEN METABOLIC PANEL: CPT

## 2017-04-29 PROCEDURE — 36415 COLL VENOUS BLD VENIPUNCTURE: CPT | Mod: PO

## 2017-05-18 ENCOUNTER — TELEPHONE (OUTPATIENT)
Dept: FAMILY MEDICINE | Facility: CLINIC | Age: 57
End: 2017-05-18

## 2017-05-18 NOTE — TELEPHONE ENCOUNTER
----- Message from Daniela Meek sent at 5/18/2017  2:42 PM CDT -----  Patient is returning nurse's call/please call patient back at [phone] around 4:30

## 2017-07-25 DIAGNOSIS — I10 ESSENTIAL HYPERTENSION: ICD-10-CM

## 2017-07-25 RX ORDER — LOSARTAN POTASSIUM 50 MG/1
TABLET ORAL
Qty: 90 TABLET | Refills: 1 | OUTPATIENT
Start: 2017-07-25

## 2017-09-07 ENCOUNTER — OFFICE VISIT (OUTPATIENT)
Dept: FAMILY MEDICINE | Facility: CLINIC | Age: 57
End: 2017-09-07
Payer: COMMERCIAL

## 2017-09-07 VITALS
WEIGHT: 265.19 LBS | HEIGHT: 73 IN | BODY MASS INDEX: 35.15 KG/M2 | DIASTOLIC BLOOD PRESSURE: 91 MMHG | HEART RATE: 82 BPM | TEMPERATURE: 98 F | SYSTOLIC BLOOD PRESSURE: 157 MMHG

## 2017-09-07 DIAGNOSIS — S39.012A LUMBAR STRAIN, INITIAL ENCOUNTER: Primary | ICD-10-CM

## 2017-09-07 PROCEDURE — 3080F DIAST BP >= 90 MM HG: CPT | Mod: S$GLB,,, | Performed by: FAMILY MEDICINE

## 2017-09-07 PROCEDURE — 3008F BODY MASS INDEX DOCD: CPT | Mod: S$GLB,,, | Performed by: FAMILY MEDICINE

## 2017-09-07 PROCEDURE — 99999 PR PBB SHADOW E&M-EST. PATIENT-LVL III: CPT | Mod: PBBFAC,,, | Performed by: FAMILY MEDICINE

## 2017-09-07 PROCEDURE — 99214 OFFICE O/P EST MOD 30 MIN: CPT | Mod: S$GLB,,, | Performed by: FAMILY MEDICINE

## 2017-09-07 PROCEDURE — 3077F SYST BP >= 140 MM HG: CPT | Mod: S$GLB,,, | Performed by: FAMILY MEDICINE

## 2017-09-07 RX ORDER — HYDROCODONE BITARTRATE AND ACETAMINOPHEN 5; 325 MG/1; MG/1
1 TABLET ORAL EVERY 6 HOURS PRN
COMMUNITY
End: 2018-07-20

## 2017-09-07 RX ORDER — NAPROXEN 500 MG/1
500 TABLET ORAL 2 TIMES DAILY
COMMUNITY
End: 2018-07-20

## 2017-09-07 RX ORDER — CYCLOBENZAPRINE HCL 10 MG
10 TABLET ORAL 3 TIMES DAILY PRN
COMMUNITY
End: 2018-07-20

## 2017-09-07 NOTE — PROGRESS NOTES
Subjective:       Patient ID: Alexi Noguera is a 57 y.o. male.    Chief Complaint: Back Pain    Back Pain   This is a new problem. Episode onset: 4 days. The problem occurs constantly. The problem has been gradually improving since onset. The pain is present in the sacro-iliac and costovertebral angle. The quality of the pain is described as aching and cramping. The pain does not radiate. The pain is moderate. The symptoms are aggravated by bending and twisting. Pertinent negatives include no abdominal pain, bladder incontinence, bowel incontinence, chest pain, fever, leg pain, numbness or paresis. He has tried NSAIDs, muscle relaxant and analgesics (Seen at  3 days ago and Rx'ed meds) for the symptoms. The treatment provided moderate relief.     Review of Systems   Constitutional: Negative for fever.   Respiratory: Negative for shortness of breath.    Cardiovascular: Negative for chest pain.   Gastrointestinal: Negative for abdominal pain, bowel incontinence and nausea.   Genitourinary: Negative for bladder incontinence.   Musculoskeletal: Positive for back pain.   Skin: Negative for rash.   Neurological: Negative for numbness.   All other systems reviewed and are negative.      Objective:      Physical Exam   Constitutional: He appears well-developed. No distress.   HENT:   Mouth/Throat: Oropharynx is clear and moist.   Neck: Neck supple.   Cardiovascular: Normal rate and regular rhythm.    No murmur heard.  Pulses:       Femoral pulses are 2+ on the right side, and 2+ on the left side.       Popliteal pulses are 2+ on the right side, and 2+ on the left side.        Posterior tibial pulses are 2+ on the right side, and 2+ on the left side.   Pulmonary/Chest: Effort normal and breath sounds normal.   Abdominal: Soft. Bowel sounds are normal. He exhibits no abdominal bruit and no pulsatile midline mass.   Musculoskeletal:        Arms:  Lymphadenopathy:     He has no cervical adenopathy.   Skin: Skin is warm and  dry.       Assessment:       No diagnosis found.    Plan:         Alexi was seen today for back pain.    Diagnoses and all orders for this visit:    Lumbar strain, initial encounter    Continue meds; discussed rest and heat to area TID.

## 2018-04-25 DIAGNOSIS — I10 ESSENTIAL HYPERTENSION: ICD-10-CM

## 2018-04-25 DIAGNOSIS — R60.0 BILATERAL LEG EDEMA: ICD-10-CM

## 2018-04-25 RX ORDER — CHLORTHALIDONE 25 MG/1
TABLET ORAL
Qty: 90 TABLET | Refills: 3 | OUTPATIENT
Start: 2018-04-25

## 2018-06-18 ENCOUNTER — TELEPHONE (OUTPATIENT)
Dept: FAMILY MEDICINE | Facility: CLINIC | Age: 58
End: 2018-06-18

## 2018-06-18 DIAGNOSIS — I10 ESSENTIAL HYPERTENSION: Primary | ICD-10-CM

## 2018-06-18 NOTE — TELEPHONE ENCOUNTER
----- Message from Samia Pittman sent at 6/18/2018  1:46 PM CDT -----  Contact: patient   Patient calling to request orders for blood work. Please advise. Patient is scheduled 7/20/18 and would like to have this done prior to appointment.   Call back   Thanks!

## 2018-07-14 ENCOUNTER — LAB VISIT (OUTPATIENT)
Dept: LAB | Facility: HOSPITAL | Age: 58
End: 2018-07-14
Attending: INTERNAL MEDICINE
Payer: COMMERCIAL

## 2018-07-14 DIAGNOSIS — I10 ESSENTIAL HYPERTENSION: ICD-10-CM

## 2018-07-14 LAB
ALBUMIN SERPL BCP-MCNC: 3.9 G/DL
ALP SERPL-CCNC: 59 U/L
ALT SERPL W/O P-5'-P-CCNC: 20 U/L
ANION GAP SERPL CALC-SCNC: 11 MMOL/L
AST SERPL-CCNC: 18 U/L
BILIRUB SERPL-MCNC: 0.5 MG/DL
BUN SERPL-MCNC: 17 MG/DL
CALCIUM SERPL-MCNC: 9.6 MG/DL
CHLORIDE SERPL-SCNC: 99 MMOL/L
CHOLEST SERPL-MCNC: 251 MG/DL
CHOLEST/HDLC SERPL: 5.5 {RATIO}
CO2 SERPL-SCNC: 29 MMOL/L
CREAT SERPL-MCNC: 1.1 MG/DL
EST. GFR  (AFRICAN AMERICAN): >60 ML/MIN/1.73 M^2
EST. GFR  (NON AFRICAN AMERICAN): >60 ML/MIN/1.73 M^2
GLUCOSE SERPL-MCNC: 99 MG/DL
HDLC SERPL-MCNC: 46 MG/DL
HDLC SERPL: 18.3 %
LDLC SERPL CALC-MCNC: 164.6 MG/DL
NONHDLC SERPL-MCNC: 205 MG/DL
POTASSIUM SERPL-SCNC: 3.7 MMOL/L
PROT SERPL-MCNC: 8.1 G/DL
SODIUM SERPL-SCNC: 139 MMOL/L
TRIGL SERPL-MCNC: 202 MG/DL

## 2018-07-14 PROCEDURE — 80061 LIPID PANEL: CPT

## 2018-07-14 PROCEDURE — 80053 COMPREHEN METABOLIC PANEL: CPT

## 2018-07-14 PROCEDURE — 36415 COLL VENOUS BLD VENIPUNCTURE: CPT | Mod: PO

## 2018-07-18 ENCOUNTER — PATIENT OUTREACH (OUTPATIENT)
Dept: ADMINISTRATIVE | Facility: HOSPITAL | Age: 58
End: 2018-07-18

## 2018-07-20 ENCOUNTER — DOCUMENTATION ONLY (OUTPATIENT)
Dept: FAMILY MEDICINE | Facility: CLINIC | Age: 58
End: 2018-07-20

## 2018-07-20 ENCOUNTER — OFFICE VISIT (OUTPATIENT)
Dept: FAMILY MEDICINE | Facility: CLINIC | Age: 58
End: 2018-07-20
Payer: COMMERCIAL

## 2018-07-20 VITALS
TEMPERATURE: 98 F | HEART RATE: 74 BPM | SYSTOLIC BLOOD PRESSURE: 128 MMHG | OXYGEN SATURATION: 98 % | BODY MASS INDEX: 34.07 KG/M2 | HEIGHT: 73 IN | WEIGHT: 257.06 LBS | RESPIRATION RATE: 16 BRPM | DIASTOLIC BLOOD PRESSURE: 92 MMHG

## 2018-07-20 DIAGNOSIS — E78.5 HYPERLIPIDEMIA, UNSPECIFIED HYPERLIPIDEMIA TYPE: ICD-10-CM

## 2018-07-20 DIAGNOSIS — I10 ESSENTIAL HYPERTENSION: Primary | ICD-10-CM

## 2018-07-20 DIAGNOSIS — E66.9 OBESITY (BMI 30-39.9): ICD-10-CM

## 2018-07-20 PROCEDURE — 99213 OFFICE O/P EST LOW 20 MIN: CPT | Mod: S$GLB,,, | Performed by: INTERNAL MEDICINE

## 2018-07-20 RX ORDER — PRAVASTATIN SODIUM 40 MG/1
40 TABLET ORAL DAILY
Qty: 30 TABLET | Refills: 11 | Status: SHIPPED | OUTPATIENT
Start: 2018-07-20 | End: 2019-07-07 | Stop reason: SDUPTHER

## 2018-07-20 RX ORDER — LOSARTAN POTASSIUM 50 MG/1
50 TABLET ORAL DAILY
Qty: 90 TABLET | Refills: 1 | Status: SHIPPED | OUTPATIENT
Start: 2018-07-20 | End: 2019-01-07 | Stop reason: SDUPTHER

## 2018-07-20 NOTE — PROGRESS NOTES
Subjective:       Patient ID: Alexi Noguera is a 58 y.o. male.    Chief Complaint: Hypertension (labs)    HPI         CHIEF COMPLAINT: Hypertension  HPI:     ONSET:      QUALITY/COURSE:   Unchanged.     INTENSITY/SEVERITY:  Average blood pressure is ? .     MODIFIERS/TREATMENTS:  Taking medications: yes. .High sodium intake: no. alcohol: no      The following symptoms are positive only if BOLDED, otherwise are negative.      SYMPTOMS/RELATED: Possible medication side effects include:   Depression..  . Cough. . Constipation.    REVIEW OF SYMPTOMS: . Weight_loss . Weight_gain . Leg_cramps .Potency_problems .    TARGET ORGAN DAMAGE:: angina/ prior myocardial infarction, chronic kidney disease, heart failure, left ventricular hypertrophy, peripheral artery disease, prior coronary revascularization, retinopathy, stroke. transient ischemic attack.          CHIEF COMPLAINT: Hyperlipidemia. cholesterol screening: no.   HPI:     ONSET:    MODIFIERS/TREATMENTS: . Taking medications:  No . Non-compliance with following diet: no. .     SYMPTOMS/RELATED:Possible medication side effects include:   Myalgia: no.  .     REVIEW OF SYMPTOMS: past weights:   Wt Readings from Last 1 Encounters:   07/20/18 0934 116.6 kg (257 lb 0.9 oz)                                                     Last lipids: total   Lab Results   Component Value Date    CHOL 251 (H) 07/14/2018    CHOL 189 07/18/2015                                                                     HDL   Lab Results   Component Value Date    HDL 46 07/14/2018    HDL 36 (L) 07/18/2015                                                                     LDL   Lab Results   Component Value Date    LDLCALC 164.6 (H) 07/14/2018    LDLCALC 122.0 07/18/2015                                                                     TRIG   Lab Results   Component Value Date    TRIG 202 (H) 07/14/2018    TRIG 155 (H) 07/18/2015                                                                    "        Review of Systems   Eyes: Negative for visual disturbance.   Respiratory: Negative for chest tightness and shortness of breath.    Cardiovascular: Negative for chest pain and leg swelling.   Neurological: Negative for dizziness, syncope, weakness and headaches.   Psychiatric/Behavioral: Negative for dysphoric mood. The patient is not nervous/anxious.          Objective:      Vitals:    07/20/18 0934   BP: (!) 138/94   Pulse: 74   Resp: 16   Temp: 97.7 °F (36.5 °C)   TempSrc: Oral   SpO2: 98%   Weight: 116.6 kg (257 lb 0.9 oz)   Height: 6' 1" (1.854 m)   PainSc: 0-No pain     Physical Exam    The 10-year ASCVD risk score (Matthewsarah GUPTA Jr., et al., 2013) is: 14.8%    Values used to calculate the score:      Age: 58 years      Sex: Male      Is Non- : No      Diabetic: No      Tobacco smoker: No      Systolic Blood Pressure: 138 mmHg      Is BP treated: Yes      HDL Cholesterol: 46 mg/dL      Total Cholesterol: 251 mg/dL    Assessment:       No diagnosis found.      Plan:       There are no diagnoses linked to this encounter.  No Follow-up on file.      "

## 2018-07-20 NOTE — PATIENT INSTRUCTIONS
Lose 5 pounds.  Suggest [facility]    Low-Salt Diet  This diet removes foods that are high in salt. It also limits the amount of salt you use when cooking. It is most often used for people with high blood pressure, edema (fluid retention), and kidney, liver, or heart disease.  Table salt contains the mineral sodium. Your body needs sodium to work normally. But too much sodium can make your health problems worse. Your healthcare provider is recommending a low-salt (also called low-sodium) diet for you. Your total daily allowance of salt is 1,500 to 2,300 milligrams (mg). It is less than 1 teaspoon of table salt. This means you can have only about 500 to 700 mg of sodium at each meal. People with certain health problems should limit salt intake to the lower end of the recommended range.    When you cook, don´t add much salt. If you can cook without using salt, even better. Don´t add salt to your food at the table.  When shopping, read food labels. Salt is often called sodium on the label. Choose foods that are salt-free, low salt, or very low salt. Note that foods with reduced salt may not lower your salt intake enough.    Beans, potatoes, and pasta  Ok: Dry beans, split peas, lentils, potatoes, rice, macaroni, pasta, spaghetti without added salt  Avoid: Potato chips, tortilla chips, and similar products  Breads and cereals  Ok: Low-sodium breads, rolls, cereals, and cakes; low-salt crackers, matzo crackers  Avoid: Salted crackers, pretzels, popcorn, Spanish toast, pancakes, muffins  Dairy  Ok: Milk, chocolate milk, hot chocolate mix, low-salt cheeses, and yogurt  Avoid: Processed cheese and cheese spreads; Roquefort, Camembert, and cottage cheese; buttermilk, instant breakfast drink  Desserts  Ok: Ice cream, frozen yogurt, juice bars, gelatin, cookies and pies, sugar, honey, jelly, hard candy  Avoid: Most pies, cakes and cookies prepared or processed with salt; instant pudding  Drinks  Ok: Tea, coffee, fizzy  (carbonated) drinks, juices  Avoid: Flavored coffees, electrolyte replacement drinks, sports drinks  Meats  Ok: All fresh meat, fish, poultry, low-salt tuna, eggs, egg substitute  Avoid: Smoked, pickled, brine-cured, or salted meats and fish. This includes kelly, chipped beef, corned beef, hot dogs, deli meats, ham, kosher meats, salt pork, sausage, canned tuna, salted codfish, smoked salmon, herring, sardines, or anchovies.  Seasonings and spices  Ok: Most seasonings are okay. Good substitutes for salt include: fresh herb blends, hot sauce, lemon, garlic, rose, vinegar, dry mustard, parsley, cilantro, horseradish, tomato paste, regular margarine, mayonnaise, unsalted butter, cream cheese, vegetable oil, cream, low-salt salad dressing and gravy.  Avoid: Regular ketchup, relishes, pickles, soy sauce, teriyaki sauce, Worcestershire sauce, BBQ sauce, tartar sauce, meat tenderizer, chili sauce, regular gravy, regular salad dressing, salted butter  Soups  Ok: Low-salt soups and broths made with allowed foods  Avoid: Bouillon cubes, soups with smoked or salted meats, regular soup and broth  Vegetables  Ok: Most vegetables are okay; also low-salt tomato and vegetable juices  Avoid: Sauerkraut and other brine-soaked vegetables; pickles and other pickled vegetables; tomato juice, olives  © 6724-1622 [employer]. [address] [place] [address]. All rights reserved. This information is not intended as a substitute for professional medical care. Always follow your healthcare professional's instructions.

## 2018-08-03 ENCOUNTER — CLINICAL SUPPORT (OUTPATIENT)
Dept: FAMILY MEDICINE | Facility: CLINIC | Age: 58
End: 2018-08-03
Payer: COMMERCIAL

## 2018-08-03 VITALS — DIASTOLIC BLOOD PRESSURE: 84 MMHG | HEART RATE: 80 BPM | SYSTOLIC BLOOD PRESSURE: 138 MMHG

## 2018-08-03 NOTE — PROGRESS NOTES
In for blood pressure check.  Last blood pressure at visit was  128/92.  Last medication dose was today.  Blood pressure with home monitor 150/100.  Blood pressure today is 138/84 with pulse of 80.  Needs larger cuff

## 2018-10-13 ENCOUNTER — LAB VISIT (OUTPATIENT)
Dept: LAB | Facility: HOSPITAL | Age: 58
End: 2018-10-13
Attending: INTERNAL MEDICINE
Payer: COMMERCIAL

## 2018-10-13 DIAGNOSIS — E78.5 HYPERLIPIDEMIA, UNSPECIFIED HYPERLIPIDEMIA TYPE: ICD-10-CM

## 2018-10-13 DIAGNOSIS — I10 ESSENTIAL HYPERTENSION: ICD-10-CM

## 2018-10-13 LAB
ALBUMIN SERPL BCP-MCNC: 3.7 G/DL
ALP SERPL-CCNC: 56 U/L
ALT SERPL W/O P-5'-P-CCNC: 16 U/L
ANION GAP SERPL CALC-SCNC: 7 MMOL/L
AST SERPL-CCNC: 19 U/L
BILIRUB SERPL-MCNC: 0.5 MG/DL
BUN SERPL-MCNC: 18 MG/DL
CALCIUM SERPL-MCNC: 9.1 MG/DL
CHLORIDE SERPL-SCNC: 106 MMOL/L
CHOLEST SERPL-MCNC: 158 MG/DL
CHOLEST/HDLC SERPL: 3.9 {RATIO}
CO2 SERPL-SCNC: 27 MMOL/L
CREAT SERPL-MCNC: 1.2 MG/DL
EST. GFR  (AFRICAN AMERICAN): >60 ML/MIN/1.73 M^2
EST. GFR  (NON AFRICAN AMERICAN): >60 ML/MIN/1.73 M^2
GLUCOSE SERPL-MCNC: 99 MG/DL
HDLC SERPL-MCNC: 41 MG/DL
HDLC SERPL: 25.9 %
LDLC SERPL CALC-MCNC: 96.2 MG/DL
NONHDLC SERPL-MCNC: 117 MG/DL
POTASSIUM SERPL-SCNC: 4.2 MMOL/L
PROT SERPL-MCNC: 7.1 G/DL
SODIUM SERPL-SCNC: 140 MMOL/L
TRIGL SERPL-MCNC: 104 MG/DL

## 2018-10-13 PROCEDURE — 36415 COLL VENOUS BLD VENIPUNCTURE: CPT | Mod: PO

## 2018-10-13 PROCEDURE — 80061 LIPID PANEL: CPT

## 2018-10-13 PROCEDURE — 80053 COMPREHEN METABOLIC PANEL: CPT

## 2018-10-19 ENCOUNTER — OFFICE VISIT (OUTPATIENT)
Dept: FAMILY MEDICINE | Facility: CLINIC | Age: 58
End: 2018-10-19
Payer: COMMERCIAL

## 2018-10-19 ENCOUNTER — DOCUMENTATION ONLY (OUTPATIENT)
Dept: FAMILY MEDICINE | Facility: CLINIC | Age: 58
End: 2018-10-19

## 2018-10-19 VITALS
TEMPERATURE: 98 F | HEART RATE: 77 BPM | OXYGEN SATURATION: 96 % | DIASTOLIC BLOOD PRESSURE: 86 MMHG | SYSTOLIC BLOOD PRESSURE: 136 MMHG | BODY MASS INDEX: 34.22 KG/M2 | WEIGHT: 258.19 LBS | HEIGHT: 73 IN

## 2018-10-19 DIAGNOSIS — Z23 NEEDS FLU SHOT: ICD-10-CM

## 2018-10-19 DIAGNOSIS — I10 ESSENTIAL HYPERTENSION: ICD-10-CM

## 2018-10-19 DIAGNOSIS — E78.2 MIXED HYPERLIPIDEMIA: Primary | ICD-10-CM

## 2018-10-19 DIAGNOSIS — J06.9 UPPER RESPIRATORY TRACT INFECTION, UNSPECIFIED TYPE: ICD-10-CM

## 2018-10-19 PROCEDURE — 90471 IMMUNIZATION ADMIN: CPT | Mod: S$GLB,,, | Performed by: NURSE PRACTITIONER

## 2018-10-19 PROCEDURE — 99212 OFFICE O/P EST SF 10 MIN: CPT | Mod: 25,S$GLB,, | Performed by: NURSE PRACTITIONER

## 2018-10-19 PROCEDURE — 90686 IIV4 VACC NO PRSV 0.5 ML IM: CPT | Mod: S$GLB,,, | Performed by: NURSE PRACTITIONER

## 2018-10-19 NOTE — PROGRESS NOTES
Subjective:       Patient ID: Alexi Noguera is a 58 y.o. male.    Chief Complaint: URI    URI    This is a new problem. The current episode started 1 to 4 weeks ago (started about 2 weeks ago). The problem has been gradually improving. There has been no fever. Associated symptoms include coughing. Pertinent negatives include no chest pain, rhinorrhea or sinus pain. Associated symptoms comments: Cough is improving. Treatments tried: otc cough syrup, alkaseltzer cold medicine.  The treatment provided moderate relief.     Cholesterol is chronic and much improved. He is taking pravachol in the AM currently. He denies any myalgias.     Blood pressure is controlled on losartan, he was concerned that it might be the medication that had caused his cough in the past (lisinopril).   Review of Systems   HENT: Negative for rhinorrhea and sinus pain.    Respiratory: Positive for cough.    Cardiovascular: Negative for chest pain.       Objective:     CMP  Sodium   Date Value Ref Range Status   10/13/2018 140 136 - 145 mmol/L Final     Potassium   Date Value Ref Range Status   10/13/2018 4.2 3.5 - 5.1 mmol/L Final     Chloride   Date Value Ref Range Status   10/13/2018 106 95 - 110 mmol/L Final     CO2   Date Value Ref Range Status   10/13/2018 27 23 - 29 mmol/L Final     Glucose   Date Value Ref Range Status   10/13/2018 99 70 - 110 mg/dL Final     BUN, Bld   Date Value Ref Range Status   10/13/2018 18 6 - 20 mg/dL Final     Creatinine   Date Value Ref Range Status   10/13/2018 1.2 0.5 - 1.4 mg/dL Final     Calcium   Date Value Ref Range Status   10/13/2018 9.1 8.7 - 10.5 mg/dL Final     Total Protein   Date Value Ref Range Status   10/13/2018 7.1 6.0 - 8.4 g/dL Final     Albumin   Date Value Ref Range Status   10/13/2018 3.7 3.5 - 5.2 g/dL Final     Total Bilirubin   Date Value Ref Range Status   10/13/2018 0.5 0.1 - 1.0 mg/dL Final     Comment:     For infants and newborns, interpretation of results should be based  on  gestational age, weight and in agreement with clinical  observations.  Premature Infant recommended reference ranges:  Up to 24 hours.............<8.0 mg/dL  Up to 48 hours............<12.0 mg/dL  3-5 days..................<15.0 mg/dL  6-29 days.................<15.0 mg/dL       Alkaline Phosphatase   Date Value Ref Range Status   10/13/2018 56 55 - 135 U/L Final     AST   Date Value Ref Range Status   10/13/2018 19 10 - 40 U/L Final     ALT   Date Value Ref Range Status   10/13/2018 16 10 - 44 U/L Final     Anion Gap   Date Value Ref Range Status   10/13/2018 7 (L) 8 - 16 mmol/L Final     eGFR if    Date Value Ref Range Status   10/13/2018 >60.0 >60 mL/min/1.73 m^2 Final     eGFR if non    Date Value Ref Range Status   10/13/2018 >60.0 >60 mL/min/1.73 m^2 Final     Comment:     Calculation used to obtain the estimated glomerular filtration  rate (eGFR) is the CKD-EPI equation.        Lab Results   Component Value Date    CHOL 158 10/13/2018    CHOL 251 (H) 07/14/2018    CHOL 189 07/18/2015     Lab Results   Component Value Date    HDL 41 10/13/2018    HDL 46 07/14/2018    HDL 36 (L) 07/18/2015     Lab Results   Component Value Date    LDLCALC 96.2 10/13/2018    LDLCALC 164.6 (H) 07/14/2018    LDLCALC 122.0 07/18/2015     Lab Results   Component Value Date    TRIG 104 10/13/2018    TRIG 202 (H) 07/14/2018    TRIG 155 (H) 07/18/2015     Lab Results   Component Value Date    CHOLHDL 25.9 10/13/2018    CHOLHDL 18.3 (L) 07/14/2018    CHOLHDL 19.0 (L) 07/18/2015         Physical Exam   Constitutional: He is oriented to person, place, and time. He appears well-developed and well-nourished. No distress.   HENT:   Head: Normocephalic and atraumatic.   Eyes: Conjunctivae are normal. Right eye exhibits no discharge. Left eye exhibits no discharge. No scleral icterus.   Cardiovascular: Normal rate, regular rhythm and normal heart sounds. Exam reveals no gallop and no friction rub.   No murmur  heard.  Pulmonary/Chest: Effort normal and breath sounds normal. No respiratory distress. He has no wheezes. He has no rales.   Neurological: He is alert and oriented to person, place, and time.   Skin: Skin is warm and dry. He is not diaphoretic.   Psychiatric: He has a normal mood and affect. His behavior is normal.   Nursing note and vitals reviewed.      Assessment:       1. Mixed hyperlipidemia    2. Essential hypertension    3. Upper respiratory tract infection, unspecified type    4. Needs flu shot        Plan:       Mixed hyperlipidemia    Essential hypertension  -     CBC auto differential; Future; Expected date: 10/19/2018  -     TSH; Future; Expected date: 10/19/2018  -     Comprehensive metabolic panel; Future; Expected date: 10/19/2018  -     Microalbumin/creatinine urine ratio; Future; Expected date: 10/19/2018    Upper respiratory tract infection, unspecified type    Needs flu shot  -     Influenza - Quadrivalent (3 years & older) (PF)         Continue losartan daily. Push pravastatin back to around 12 noon when you take it and it will work even better. Drink plenty of fluid and get rest to get over cold. Work on losing 10 pounds in next 3 months.  If not better in 1 week, follow up. Otherwise 3 months with labs prior

## 2018-10-19 NOTE — PATIENT INSTRUCTIONS
Continue losartan daily. Push pravastatin back to around 12 noon when you take it and it will work even better. Drink plenty of fluid and get rest to get over cold. Work on losing 10 pounds in next 3 months.  If not better in 1 week, follow up. Otherwise 3 months

## 2018-10-19 NOTE — PROGRESS NOTES
Health Maintenance Due   Topic Date Due    Zoster Vaccine  02/21/2018    Influenza Vaccine  08/01/2018

## 2019-01-04 ENCOUNTER — TELEPHONE (OUTPATIENT)
Dept: ADMINISTRATIVE | Facility: HOSPITAL | Age: 59
End: 2019-01-04

## 2019-01-04 ENCOUNTER — PATIENT OUTREACH (OUTPATIENT)
Dept: ADMINISTRATIVE | Facility: HOSPITAL | Age: 59
End: 2019-01-04

## 2019-01-07 DIAGNOSIS — I10 ESSENTIAL HYPERTENSION: ICD-10-CM

## 2019-01-07 RX ORDER — LOSARTAN POTASSIUM 50 MG/1
TABLET ORAL
Qty: 90 TABLET | Refills: 1 | Status: SHIPPED | OUTPATIENT
Start: 2019-01-07 | End: 2019-07-07 | Stop reason: SDUPTHER

## 2019-01-12 ENCOUNTER — LAB VISIT (OUTPATIENT)
Dept: LAB | Facility: HOSPITAL | Age: 59
End: 2019-01-12
Attending: NURSE PRACTITIONER
Payer: COMMERCIAL

## 2019-01-12 DIAGNOSIS — I10 ESSENTIAL HYPERTENSION: ICD-10-CM

## 2019-01-12 LAB
ALBUMIN/CREAT UR: 6.3 UG/MG
CREAT UR-MCNC: 112 MG/DL
MICROALBUMIN UR DL<=1MG/L-MCNC: 7 UG/ML

## 2019-01-12 PROCEDURE — 82043 UR ALBUMIN QUANTITATIVE: CPT

## 2019-01-18 ENCOUNTER — OFFICE VISIT (OUTPATIENT)
Dept: FAMILY MEDICINE | Facility: CLINIC | Age: 59
End: 2019-01-18
Payer: COMMERCIAL

## 2019-01-18 ENCOUNTER — DOCUMENTATION ONLY (OUTPATIENT)
Dept: FAMILY MEDICINE | Facility: CLINIC | Age: 59
End: 2019-01-18

## 2019-01-18 VITALS
HEIGHT: 73 IN | SYSTOLIC BLOOD PRESSURE: 140 MMHG | HEART RATE: 85 BPM | OXYGEN SATURATION: 98 % | RESPIRATION RATE: 16 BRPM | TEMPERATURE: 98 F | DIASTOLIC BLOOD PRESSURE: 90 MMHG | BODY MASS INDEX: 33.84 KG/M2 | WEIGHT: 255.31 LBS

## 2019-01-18 DIAGNOSIS — J20.9 ACUTE BRONCHITIS, UNSPECIFIED ORGANISM: ICD-10-CM

## 2019-01-18 DIAGNOSIS — I10 ESSENTIAL HYPERTENSION: Primary | ICD-10-CM

## 2019-01-18 DIAGNOSIS — Z23 NEED FOR 23-POLYVALENT PNEUMOCOCCAL POLYSACCHARIDE VACCINE: ICD-10-CM

## 2019-01-18 PROCEDURE — 90732 PNEUMOCOCCAL POLYSACCHARIDE VACCINE 23-VALENT =>2YO SQ IM: ICD-10-PCS | Mod: S$GLB,,, | Performed by: INTERNAL MEDICINE

## 2019-01-18 PROCEDURE — 90471 PNEUMOCOCCAL POLYSACCHARIDE VACCINE 23-VALENT =>2YO SQ IM: ICD-10-PCS | Mod: S$GLB,,, | Performed by: INTERNAL MEDICINE

## 2019-01-18 PROCEDURE — 99213 PR OFFICE/OUTPT VISIT, EST, LEVL III, 20-29 MIN: ICD-10-PCS | Mod: 25,S$GLB,, | Performed by: INTERNAL MEDICINE

## 2019-01-18 PROCEDURE — 99213 OFFICE O/P EST LOW 20 MIN: CPT | Mod: 25,S$GLB,, | Performed by: INTERNAL MEDICINE

## 2019-01-18 PROCEDURE — 90471 IMMUNIZATION ADMIN: CPT | Mod: S$GLB,,, | Performed by: INTERNAL MEDICINE

## 2019-01-18 PROCEDURE — 90732 PPSV23 VACC 2 YRS+ SUBQ/IM: CPT | Mod: S$GLB,,, | Performed by: INTERNAL MEDICINE

## 2019-01-18 NOTE — PATIENT INSTRUCTIONS
Cool mist vaporizor.  Hot fluids. Hot tea with lemon and honey.    Lose 5 pounds.  Suggest Hailey diet    KT tape for the left shoulder.  Put ice on the shoulder or a smart temp cold pack after you work.    Low-Salt Diet  This diet removes foods that are high in salt. It also limits the amount of salt you use when cooking. It is most often used for people with high blood pressure, edema (fluid retention), and kidney, liver, or heart disease.  Table salt contains the mineral sodium. Your body needs sodium to work normally. But too much sodium can make your health problems worse. Your healthcare provider is recommending a low-salt (also called low-sodium) diet for you. Your total daily allowance of salt is 1,500 to 2,300 milligrams (mg). It is less than 1 teaspoon of table salt. This means you can have only about 500 to 700 mg of sodium at each meal. People with certain health problems should limit salt intake to the lower end of the recommended range.    When you cook, don´t add much salt. If you can cook without using salt, even better. Don´t add salt to your food at the table.  When shopping, read food labels. Salt is often called sodium on the label. Choose foods that are salt-free, low salt, or very low salt. Note that foods with reduced salt may not lower your salt intake enough.    Beans, potatoes, and pasta  Ok: Dry beans, split peas, lentils, potatoes, rice, macaroni, pasta, spaghetti without added salt  Avoid: Potato chips, tortilla chips, and similar products  Breads and cereals  Ok: Low-sodium breads, rolls, cereals, and cakes; low-salt crackers, matzo crackers  Avoid: Salted crackers, pretzels, popcorn, Urdu toast, pancakes, muffins  Dairy  Ok: Milk, chocolate milk, hot chocolate mix, low-salt cheeses, and yogurt  Avoid: Processed cheese and cheese spreads; Roquefort, Camembert, and cottage cheese; buttermilk, instant breakfast drink  Desserts  Ok: Ice cream, frozen yogurt, juice bars, gelatin,  cookies and pies, sugar, honey, jelly, hard candy  Avoid: Most pies, cakes and cookies prepared or processed with salt; instant pudding  Drinks  Ok: Tea, coffee, fizzy (carbonated) drinks, juices  Avoid: Flavored coffees, electrolyte replacement drinks, sports drinks  Meats  Ok: All fresh meat, fish, poultry, low-salt tuna, eggs, egg substitute  Avoid: Smoked, pickled, brine-cured, or salted meats and fish. This includes kelly, chipped beef, corned beef, hot dogs, deli meats, ham, kosher meats, salt pork, sausage, canned tuna, salted codfish, smoked salmon, herring, sardines, or anchovies.  Seasonings and spices  Ok: Most seasonings are okay. Good substitutes for salt include: fresh herb blends, hot sauce, lemon, garlic, rose, vinegar, dry mustard, parsley, cilantro, horseradish, tomato paste, regular margarine, mayonnaise, unsalted butter, cream cheese, vegetable oil, cream, low-salt salad dressing and gravy.  Avoid: Regular ketchup, relishes, pickles, soy sauce, teriyaki sauce, Worcestershire sauce, BBQ sauce, tartar sauce, meat tenderizer, chili sauce, regular gravy, regular salad dressing, salted butter  Soups  Ok: Low-salt soups and broths made with allowed foods  Avoid: Bouillon cubes, soups with smoked or salted meats, regular soup and broth  Vegetables  Ok: Most vegetables are okay; also low-salt tomato and vegetable juices  Avoid: Sauerkraut and other brine-soaked vegetables; pickles and other pickled vegetables; tomato juice, olives  © 0306-7117 [employer]. [address] [place] [address], [place] [address]. All rights reserved. This information is not intended as a substitute for professional medical care. Always follow your healthcare professional's instructions.

## 2019-01-18 NOTE — PROGRESS NOTES
Subjective:       Patient ID: Alexi Noguera is a 58 y.o. male.    Chief Complaint: Hypertension (labs); Cough; and Sinus Problem    HPI     Left shoulder pain, strained at work.  2 wks. Feels good at work. . Hurts when he rests 2/10.  No Numbness or weakness.     CHIEF COMPLAINT: Cough(+).  HPI:     ONSET/TIMIN wk ago    DURATION:               Paroxysmal: no .    QUALITY/COURSE:. better    INTENSITY/SEVERITY: Severity is #  2 (10 point scale)      The following symptoms/statements are positive if BOLD, negative otherwise.      CONTEXT/WHEN:  Tobacco_use. Smokers_in_home. Seasonal_pattern. Allergies/Hayfever. Sinusitis. Irritant_Exposure(smoke/dust/fumes). Exposure_to_others_with_similar_symptoms.        Similar_problems_in_past.   PAST TREATMENT OR EVALUATION:   previous PPD. Recent_previous_chest_x-ray. Recent_antibiotics.  Associated Symptoms:     sputum production: scant. copious. Hemoptysis.  Medical History: Past_pulmonary_infections.  Cardiovascular_disease.chronic_lung_disease.  tuberculosis. Asthma. AIDS. Gastroesophageal_reflux_disease .          CHIEF COMPLAINT: Hypertension  HPI:  Took NyQuil last night    ONSET:      QUALITY/COURSE:   Unchanged.     INTENSITY/SEVERITY:  Average blood pressure is  132/80.     MODIFIERS/TREATMENTS:  Taking medications: yes. .High sodium intake: no. alcohol: no      The following symptoms are positive only if BOLDED, otherwise are negative.      SYMPTOMS/RELATED: Possible medication side effects include:   Depression..  . Cough. . Constipation.    REVIEW OF SYMPTOMS: . Weight_loss . Weight_gain . Leg_cramps .Potency_problems .    TARGET ORGAN DAMAGE:: angina/ prior myocardial infarction, chronic kidney disease, heart failure, left ventricular hypertrophy, peripheral artery disease, prior coronary revascularization, retinopathy, stroke. transient ischemic attack.      Review of Systems   Constitutional: Negative for chills, diaphoresis, fever and unexpected weight  "change.   HENT: Negative for rhinorrhea, sinus pressure and sore throat.    Eyes: Negative for visual disturbance.   Respiratory: Positive for cough. Negative for chest tightness, shortness of breath and wheezing.    Cardiovascular: Negative for chest pain and leg swelling.   Musculoskeletal: Negative for myalgias.   Neurological: Negative for dizziness, syncope, weakness and headaches.   Psychiatric/Behavioral: Negative for dysphoric mood. The patient is not nervous/anxious.          Objective:      Vitals:    01/18/19 1546 01/18/19 1628   BP: (!) 142/90 (!) 140/90   Pulse: 85    Resp: 16    Temp: 98 °F (36.7 °C)    TempSrc: Oral    SpO2: 98%    Weight: 115.8 kg (255 lb 4.7 oz)    Height: 6' 1" (1.854 m)    PainSc: 0-No pain      Physical Exam   Constitutional: He appears well-developed and well-nourished.   HENT:   Right Ear: External ear normal.   Left Ear: External ear normal.   Mouth/Throat: Oropharynx is clear and moist. No oropharyngeal exudate.   Eyes: Pupils are equal, round, and reactive to light.   Cardiovascular: Normal rate, regular rhythm and normal heart sounds. Exam reveals no gallop.   No murmur heard.  Pulmonary/Chest: Effort normal and breath sounds normal. He has no wheezes. He has no rales. He exhibits no tenderness.   Abdominal: Soft. There is no tenderness.   Musculoskeletal: Normal range of motion. He exhibits no edema.   Full range of motion without tenderness of the left shoulder.   Lymphadenopathy:     He has no cervical adenopathy.   Vitals reviewed.        Assessment:       1. Essential hypertension    2. Need for 23-polyvalent pneumococcal polysaccharide vaccine    3. Acute bronchitis, unspecified organism          Plan:       Essential hypertension    Need for 23-polyvalent pneumococcal polysaccharide vaccine  -     Pneumococcal Polysaccharide Vaccine (23 Valent) (SQ/IM)    Acute bronchitis, unspecified organism      Follow-up in about 3 months (around 4/18/2019) for if you are not " better return in 2 weeks.

## 2019-01-18 NOTE — PROGRESS NOTES
Health Maintenance Due   Topic Date Due    Pneumococcal Vaccine (Medium Risk) (1 of 1 - PPSV23) 02/21/1977    Zoster Vaccine  02/21/2018

## 2019-02-04 ENCOUNTER — CLINICAL SUPPORT (OUTPATIENT)
Dept: FAMILY MEDICINE | Facility: CLINIC | Age: 59
End: 2019-02-04
Payer: COMMERCIAL

## 2019-02-04 VITALS — DIASTOLIC BLOOD PRESSURE: 90 MMHG | HEART RATE: 78 BPM | SYSTOLIC BLOOD PRESSURE: 126 MMHG

## 2019-02-04 NOTE — PROGRESS NOTES
Last BP was 140/90 on 1/18/2019. Last dose of BP medication was 2/3/19 PM. At home readings 126/89 yesterday. BP today 126/90 with a pulse of 78.

## 2019-02-18 ENCOUNTER — TELEPHONE (OUTPATIENT)
Dept: FAMILY MEDICINE | Facility: CLINIC | Age: 59
End: 2019-02-18

## 2019-02-18 NOTE — TELEPHONE ENCOUNTER
Spoke with patient explained to him we can not treat the Flu over the phone. I made him an appt next available.

## 2019-02-18 NOTE — TELEPHONE ENCOUNTER
----- Message from Lexie Morrow sent at 2/18/2019  3:18 PM CST -----  Type: Needs Medical Advice    Who Called:  Patient  Symptoms (please be specific):  Body aches/cough/flu symptoms  How long has patient had these symptoms:  3 days  Pharmacy name and phone #:    [id number] - [place] - [address]  [address] [place] [address]  Phone: [phone] Fax: [phone]    Best Call Back Number: [phone] (home)     Additional Information: Stating had the flu for 3 days, all his children have it in the home. Would like a Rx sent for Tamiflu. Please contact to advise

## 2019-02-20 ENCOUNTER — OFFICE VISIT (OUTPATIENT)
Dept: FAMILY MEDICINE | Facility: CLINIC | Age: 59
End: 2019-02-20
Payer: COMMERCIAL

## 2019-02-20 ENCOUNTER — DOCUMENTATION ONLY (OUTPATIENT)
Dept: FAMILY MEDICINE | Facility: CLINIC | Age: 59
End: 2019-02-20

## 2019-02-20 VITALS
TEMPERATURE: 99 F | WEIGHT: 248.88 LBS | OXYGEN SATURATION: 97 % | HEART RATE: 73 BPM | HEIGHT: 73 IN | DIASTOLIC BLOOD PRESSURE: 98 MMHG | SYSTOLIC BLOOD PRESSURE: 144 MMHG | BODY MASS INDEX: 32.98 KG/M2 | RESPIRATION RATE: 16 BRPM

## 2019-02-20 DIAGNOSIS — J20.9 ACUTE BRONCHITIS, UNSPECIFIED ORGANISM: Primary | ICD-10-CM

## 2019-02-20 PROCEDURE — 99213 OFFICE O/P EST LOW 20 MIN: CPT | Mod: S$GLB,,, | Performed by: INTERNAL MEDICINE

## 2019-02-20 PROCEDURE — 99213 PR OFFICE/OUTPT VISIT, EST, LEVL III, 20-29 MIN: ICD-10-PCS | Mod: S$GLB,,, | Performed by: INTERNAL MEDICINE

## 2019-02-20 RX ORDER — BENZONATATE 200 MG/1
200 CAPSULE ORAL 3 TIMES DAILY PRN
Qty: 30 CAPSULE | Refills: 1 | Status: SHIPPED | OUTPATIENT
Start: 2019-02-20 | End: 2020-01-03 | Stop reason: SDUPTHER

## 2019-02-20 RX ORDER — PROMETHAZINE HYDROCHLORIDE AND DEXTROMETHORPHAN HYDROBROMIDE 6.25; 15 MG/5ML; MG/5ML
5 SYRUP ORAL EVERY 6 HOURS PRN
Qty: 200 ML | Refills: 0 | Status: SHIPPED | OUTPATIENT
Start: 2019-02-20 | End: 2019-03-02

## 2019-02-20 NOTE — PROGRESS NOTES
"Subjective:       Patient ID: Alexi Noguera is a 58 y.o. male.    Chief Complaint: Cough; Sore Throat; Chills; and Fever    HPI         CHIEF COMPLAINT: Cough(+).  HPI:     ONSET/TIMINd  ago    DURATION:               Paroxysmal: no .    QUALITY/COURSE:. better    INTENSITY/SEVERITY: Severity is # 8 (10 point scale)      The following symptoms/statements are positive if BOLD, negative otherwise.      CONTEXT/WHEN:  Tobacco_use. Smokers_in_home. Seasonal_pattern. Allergies/Hayfever. Sinusitis. Irritant_Exposure(smoke/dust/fumes). Exposure_to_others_with_similar_symptoms.        Similar_problems_in_past.   PAST TREATMENT OR EVALUATION:   previous PPD. Recent_previous_chest_x-ray. Recent_antibiotics.  Associated Symptoms:     sputum production: scant. copious. Hemoptysis.  Medical History: Past_pulmonary_infections.  Cardiovascular_disease.chronic_lung_disease.  tuberculosis. Asthma. AIDS. Gastroesophageal_reflux_disease .      Review of Systems   Constitutional: Positive for chills, diaphoresis and fever. Negative for unexpected weight change.   HENT: Positive for sore throat. Negative for rhinorrhea and sinus pressure.    Respiratory: Positive for cough. Negative for shortness of breath and wheezing.    Cardiovascular: Negative for chest pain.   Musculoskeletal: Negative for myalgias.         Objective:      Vitals:    19 1444   BP: (!) 144/98   Pulse: 73   Resp: 16   Temp: 98.6 °F (37 °C)   TempSrc: Oral   SpO2: 97%   Weight: 112.9 kg (248 lb 14.4 oz)   Height: 6' 1" (1.854 m)   PainSc: 0-No pain     Physical Exam   Constitutional: He appears well-developed and well-nourished.   HENT:   Right Ear: External ear normal.   Left Ear: External ear normal.   Mouth/Throat: Oropharynx is clear and moist. No oropharyngeal exudate.   Voice is hoarse   Eyes: Pupils are equal, round, and reactive to light.   Cardiovascular: Normal rate, regular rhythm and normal heart sounds. Exam reveals no gallop.   No murmur " heard.  Pulmonary/Chest: Effort normal and breath sounds normal. He has no wheezes. He has no rales. He exhibits no tenderness.   Abdominal: Soft. There is no tenderness.   Musculoskeletal: He exhibits no edema.   Lymphadenopathy:     He has no cervical adenopathy.   Vitals reviewed.        Assessment:       1. Acute bronchitis, unspecified organism          Plan:       Acute bronchitis, unspecified organism  -     promethazine-dextromethorphan (PROMETHAZINE-DM) 6.25-15 mg/5 mL Syrp; Take 5 mLs by mouth every 6 (six) hours as needed.  Dispense: 200 mL; Refill: 0  -     benzonatate (TESSALON) 200 MG capsule; Take 1 capsule (200 mg total) by mouth 3 (three) times daily as needed for Cough.  Dispense: 30 capsule; Refill: 1      Follow-up for if you are not better return in 2 weeks.

## 2019-02-20 NOTE — PATIENT INSTRUCTIONS
Cool mist vaporizor.  Hot fluids. Hot tea with lemon and honey.    For best results take both the Tessalon Perles and Robitussin-SOFIYA

## 2019-03-06 ENCOUNTER — CLINICAL SUPPORT (OUTPATIENT)
Dept: FAMILY MEDICINE | Facility: CLINIC | Age: 59
End: 2019-03-06
Payer: COMMERCIAL

## 2019-03-06 VITALS — SYSTOLIC BLOOD PRESSURE: 142 MMHG | DIASTOLIC BLOOD PRESSURE: 86 MMHG | HEART RATE: 72 BPM

## 2019-03-06 NOTE — PROGRESS NOTES
In for blood pressure check.  Last blood pressure at visit was 144/98 .   Blood pressure at home 120's/70's .  Blood pressure today is 142/86.

## 2019-04-03 ENCOUNTER — PATIENT OUTREACH (OUTPATIENT)
Dept: ADMINISTRATIVE | Facility: HOSPITAL | Age: 59
End: 2019-04-03

## 2019-04-03 NOTE — LETTER
April 3, 2019    Alexi Noguera  [address] [place] [address]             [facility]  [address]  [facility] [address]  Phone: [phone] Dear Jimmy [facility] is committed to your overall health and would like to ensure that you are up to date on your recommended test and/or procedures.   Derrick Matthews MD  has found that your chart shows you may be due for the following:    COLORECTAL CANCER SCREENING      If you have had any of the above done at another facility, please let us know so that we may obtain copies from that facility.  If you have a copy of these records, please provide a copy for us to scan into your chart.  You are welcome to request that the report be faxed to us at  ([phone]).     Otherwise, please schedule these appointments at your earliest convenience by calling [phone] or going to [email or web address].    If you have an upcoming scheduled appointment for the item above, please disregard this letter.    Sincerely,  Your [facility]  MD Adrienne Rivas LPN Clinical Care Coordinator  [facility] [address] [place] [address]  Phone [phone]  Fax [phone]

## 2019-04-03 NOTE — LETTER
April 3, 2019    DR WAN ARLEEN             [facility]  [address]  [facility] [address]  Phone: [phone] April 3, 2019     Patient: Alexi Noguera    YOB: 1960   Date of Visit: 4/3/2019       To Whom It May Concern:      [facility]    We are seeing Alexi Noguera in the clinic today at [facility].  Derrick Matthews MD is their PCP.  She/He has an outstanding lab/procedure at this time when reviewing their chart.  To help with our Health Maintenance records will you please supply the following:      []  Mammogram                                                [x]  Colonoscopy   []  Pap Smear                                                  []  Outside Lab Results   []  Dexa scans                                                   []  Eye Exam   []  Foot Exam                                                     [] Other___________   []  Outside Immunizations                                               Please Fax to [facility] at [phone]          If you have any questions or concerns, please don't hesitate to call.    Sincerely,        Daria Pickett LPN

## 2019-04-05 ENCOUNTER — PATIENT OUTREACH (OUTPATIENT)
Dept: ADMINISTRATIVE | Facility: HOSPITAL | Age: 59
End: 2019-04-05

## 2019-04-17 ENCOUNTER — DOCUMENTATION ONLY (OUTPATIENT)
Dept: FAMILY MEDICINE | Facility: CLINIC | Age: 59
End: 2019-04-17

## 2019-04-17 ENCOUNTER — OFFICE VISIT (OUTPATIENT)
Dept: FAMILY MEDICINE | Facility: CLINIC | Age: 59
End: 2019-04-17
Payer: COMMERCIAL

## 2019-04-17 VITALS
HEIGHT: 73 IN | OXYGEN SATURATION: 97 % | DIASTOLIC BLOOD PRESSURE: 100 MMHG | BODY MASS INDEX: 32.96 KG/M2 | SYSTOLIC BLOOD PRESSURE: 124 MMHG | TEMPERATURE: 98 F | HEART RATE: 72 BPM | WEIGHT: 248.69 LBS | RESPIRATION RATE: 16 BRPM

## 2019-04-17 DIAGNOSIS — E78.2 MIXED HYPERLIPIDEMIA: ICD-10-CM

## 2019-04-17 DIAGNOSIS — S46.812A STRAIN OF LEFT TRAPEZIUS MUSCLE, INITIAL ENCOUNTER: Primary | ICD-10-CM

## 2019-04-17 DIAGNOSIS — I10 ESSENTIAL HYPERTENSION: ICD-10-CM

## 2019-04-17 PROCEDURE — 99214 OFFICE O/P EST MOD 30 MIN: CPT | Mod: S$GLB,,, | Performed by: INTERNAL MEDICINE

## 2019-04-17 PROCEDURE — 99214 PR OFFICE/OUTPT VISIT, EST, LEVL IV, 30-39 MIN: ICD-10-PCS | Mod: S$GLB,,, | Performed by: INTERNAL MEDICINE

## 2019-04-17 RX ORDER — AMLODIPINE BESYLATE 2.5 MG/1
2.5 TABLET ORAL DAILY
Qty: 30 TABLET | Refills: 11 | Status: SHIPPED | OUTPATIENT
Start: 2019-04-17 | End: 2019-12-04

## 2019-04-17 NOTE — PATIENT INSTRUCTIONS
Get a smart temp cold pack.  Get a back buddy jese.  Do the exercise I showed then massaged the shoulder with the back buddy jese then use the smart temp cold pack.    Motrin or Aleve for pain

## 2019-04-17 NOTE — PROGRESS NOTES
Subjective:       Patient ID: Alexi Noguera is a 59 y.o. male.    Chief Complaint: Shoulder Pain (left)    HPI         CHIEF COMPLAINT: Upper_Extremity_Problems. Pain: yes.. Weakness: no . Injury: no .  HPI:    ONSET/TIMING: . Onset 2 weeks  ago.  Gradual. Work related: no.  Similar_problems_in past: no.     DURATION: .   6-8 hours       Paroxysmal: no .    QUALITY/COURSE:  unchanged , happens mainly at night    LOCATION:  Left shoulder  .  . Radiation: no.    INTENSITY/SEVERITY:. Severity is # 6 at night 1 during the day  (10 point scale).    CONTEXT/WHEN: . Date_Expected_Work_Return is . Activity: yes. . Abduction greater than 90 degrees: no.. Inactivity: no      MODIFIERS/TREATMENTS:  Current_Limitations_are.        Taking medications: no.  Physical_Therapy: no .  Chiropractor: no . . Litigation_pending: no. . X-rays: no.. CT: no.. MRI: no.    SYMPTOMS/RELATED: . --Possible medication side effects include:.     The following symptoms are positive if BOLD, negative otherwise.       REVIEW OF SYMPTOMS:   Numbness. Weakness. Muscle_Problems. Fever. Joint_Problems. Swelling. Erythema. Weight_loss.            CHIEF COMPLAINT: Hypertension  HPI:     ONSET:      QUALITY/COURSE:   Unchanged.     INTENSITY/SEVERITY:  Average blood pressure is .     MODIFIERS/TREATMENTS:  Taking medications: yes. .High sodium intake: no. alcohol: no      The following symptoms are positive only if BOLDED, otherwise are negative.      SYMPTOMS/RELATED: Possible medication side effects include:   Depression..  . Cough. . Constipation.    REVIEW OF SYMPTOMS: . Weight_loss . Weight_gain . Leg_cramps .Potency_problems .    TARGET ORGAN DAMAGE:: angina/ prior myocardial infarction, chronic kidney disease, heart failure, left ventricular hypertrophy, peripheral artery disease, prior coronary revascularization, retinopathy, stroke. transient ischemic attack.        CHIEF COMPLAINT: Hyperlipidemia. cholesterol screening: no.   HPI:     ONSET:     "MODIFIERS/TREATMENTS: . Taking medications: yes. . Non-compliance with following diet: no. .     SYMPTOMS/RELATED:Possible medication side effects include:   Myalgia: no.  .     REVIEW OF SYMPTOMS: past weights:   Wt Readings from Last 1 Encounters:   04/17/19 1522 112.8 kg (248 lb 10.9 oz)                                                     Last lipids: total   Lab Results   Component Value Date    CHOL 158 10/13/2018    CHOL 251 (H) 07/14/2018    CHOL 189 07/18/2015                                                                     HDL   Lab Results   Component Value Date    HDL 41 10/13/2018    HDL 46 07/14/2018    HDL 36 (L) 07/18/2015                                                                     LDL   Lab Results   Component Value Date    LDLCALC 96.2 10/13/2018    LDLCALC 164.6 (H) 07/14/2018    LDLCALC 122.0 07/18/2015                                                                     TRIG   Lab Results   Component Value Date    TRIG 104 10/13/2018    TRIG 202 (H) 07/14/2018    TRIG 155 (H) 07/18/2015                                                                         Review of Systems      Objective:      Vitals:    04/17/19 1522   BP: (!) 124/100   Pulse: 72   Resp: 16   Temp: 97.8 °F (36.6 °C)   TempSrc: Oral   SpO2: 97%   Weight: 112.8 kg (248 lb 10.9 oz)   Height: 6' 1" (1.854 m)   PainSc:   6   PainLoc: Shoulder     Physical Exam   Constitutional: He appears well-developed and well-nourished.   Cardiovascular: Normal rate, regular rhythm and normal heart sounds.   Pulmonary/Chest: Effort normal and breath sounds normal.   Abdominal: Soft. There is no tenderness.   Musculoskeletal:   Left upper trapezius is palpably spasm and.  Full range of motion of the neck without any aggravation of the pain. Tinel sign negative at the wrist.  There is no in subscapular tenderness. Full range of motion of the left shoulder with abduction.   Neurological: He is alert.   Psychiatric: He has a normal mood " and affect. His behavior is normal. Thought content normal.   Nursing note and vitals reviewed.        Assessment:       1. Strain of left trapezius muscle, initial encounter    2. Essential hypertension    3. Mixed hyperlipidemia          Plan:       Strain of left trapezius muscle, initial encounter    Essential hypertension  -     Comprehensive metabolic panel; Future; Expected date: 04/17/2019  -     amLODIPine (NORVASC) 2.5 MG tablet; Take 1 tablet (2.5 mg total) by mouth once daily.  Dispense: 30 tablet; Refill: 11    Mixed hyperlipidemia  -     Lipid panel; Future; Expected date: 04/17/2019      Follow up in about 3 months (around 7/17/2019).

## 2019-04-27 ENCOUNTER — LAB VISIT (OUTPATIENT)
Dept: LAB | Facility: HOSPITAL | Age: 59
End: 2019-04-27
Attending: INTERNAL MEDICINE
Payer: COMMERCIAL

## 2019-04-27 DIAGNOSIS — I10 ESSENTIAL HYPERTENSION: ICD-10-CM

## 2019-04-27 DIAGNOSIS — E78.2 MIXED HYPERLIPIDEMIA: ICD-10-CM

## 2019-04-27 LAB
ALBUMIN SERPL BCP-MCNC: 3.7 G/DL (ref 3.5–5.2)
ALP SERPL-CCNC: 47 U/L (ref 55–135)
ALT SERPL W/O P-5'-P-CCNC: 16 U/L (ref 10–44)
ANION GAP SERPL CALC-SCNC: 7 MMOL/L (ref 8–16)
AST SERPL-CCNC: 20 U/L (ref 10–40)
BILIRUB SERPL-MCNC: 0.7 MG/DL (ref 0.1–1)
BUN SERPL-MCNC: 19 MG/DL (ref 8–23)
CALCIUM SERPL-MCNC: 8.9 MG/DL (ref 8.7–10.5)
CHLORIDE SERPL-SCNC: 107 MMOL/L (ref 95–110)
CHOLEST SERPL-MCNC: 139 MG/DL (ref 120–199)
CHOLEST/HDLC SERPL: 3.7 {RATIO} (ref 2–5)
CO2 SERPL-SCNC: 26 MMOL/L (ref 23–29)
CREAT SERPL-MCNC: 1 MG/DL (ref 0.5–1.4)
EST. GFR  (AFRICAN AMERICAN): >60 ML/MIN/1.73 M^2
EST. GFR  (NON AFRICAN AMERICAN): >60 ML/MIN/1.73 M^2
GLUCOSE SERPL-MCNC: 91 MG/DL (ref 70–110)
HDLC SERPL-MCNC: 38 MG/DL (ref 40–75)
HDLC SERPL: 27.3 % (ref 20–50)
LDLC SERPL CALC-MCNC: 85 MG/DL (ref 63–159)
NONHDLC SERPL-MCNC: 101 MG/DL
POTASSIUM SERPL-SCNC: 3.9 MMOL/L (ref 3.5–5.1)
PROT SERPL-MCNC: 7 G/DL (ref 6–8.4)
SODIUM SERPL-SCNC: 140 MMOL/L (ref 136–145)
TRIGL SERPL-MCNC: 80 MG/DL (ref 30–150)

## 2019-04-27 PROCEDURE — 36415 COLL VENOUS BLD VENIPUNCTURE: CPT | Mod: PO

## 2019-04-27 PROCEDURE — 80061 LIPID PANEL: CPT

## 2019-04-27 PROCEDURE — 80053 COMPREHEN METABOLIC PANEL: CPT

## 2019-05-01 ENCOUNTER — CLINICAL SUPPORT (OUTPATIENT)
Dept: FAMILY MEDICINE | Facility: CLINIC | Age: 59
End: 2019-05-01
Payer: COMMERCIAL

## 2019-05-01 ENCOUNTER — TELEPHONE (OUTPATIENT)
Dept: FAMILY MEDICINE | Facility: CLINIC | Age: 59
End: 2019-05-01

## 2019-05-01 DIAGNOSIS — I10 HYPERTENSION, UNSPECIFIED TYPE: Primary | ICD-10-CM

## 2019-05-01 NOTE — TELEPHONE ENCOUNTER
Patient comes in for a nurse BP check. Patient's BP was 144/88 on RA manually with a pulse of 68. Second BP reading was 132/86. Patient did not bring in any home BP logs nor his own BP machine.

## 2019-06-07 ENCOUNTER — TELEPHONE (OUTPATIENT)
Dept: FAMILY MEDICINE | Facility: CLINIC | Age: 59
End: 2019-06-07

## 2019-06-07 NOTE — TELEPHONE ENCOUNTER
----- Message from Rosie Bower sent at 6/7/2019  8:08 AM CDT -----  Type:  Same Day Appointment Request    Caller is requesting a same day appointment.  Caller declined first available appointment listed below.      Name of Caller:  patient  When is the first available appointment?  6/21/19 @ 11 am ( scheduled )   Symptoms:  Pulled shoulder muscle   Best Call Back Number:  [phone]  Additional Information:   Schedule patient on 6/21 and added to wait list - trying to see if he can see doctor today please contact patient directly

## 2019-06-21 ENCOUNTER — OFFICE VISIT (OUTPATIENT)
Dept: FAMILY MEDICINE | Facility: CLINIC | Age: 59
End: 2019-06-21
Payer: COMMERCIAL

## 2019-06-21 VITALS
HEART RATE: 77 BPM | HEIGHT: 73 IN | TEMPERATURE: 98 F | WEIGHT: 247.38 LBS | BODY MASS INDEX: 32.79 KG/M2 | DIASTOLIC BLOOD PRESSURE: 86 MMHG | OXYGEN SATURATION: 99 % | SYSTOLIC BLOOD PRESSURE: 140 MMHG | RESPIRATION RATE: 16 BRPM

## 2019-06-21 DIAGNOSIS — M75.50 SUBSCAPULAR BURSITIS: Primary | ICD-10-CM

## 2019-06-21 PROCEDURE — 99213 PR OFFICE/OUTPT VISIT, EST, LEVL III, 20-29 MIN: ICD-10-PCS | Mod: S$GLB,,, | Performed by: INTERNAL MEDICINE

## 2019-06-21 PROCEDURE — 99213 OFFICE O/P EST LOW 20 MIN: CPT | Mod: S$GLB,,, | Performed by: INTERNAL MEDICINE

## 2019-06-21 RX ORDER — CYCLOBENZAPRINE HCL 10 MG
10 TABLET ORAL NIGHTLY
Qty: 30 TABLET | Refills: 1 | Status: SHIPPED | OUTPATIENT
Start: 2019-06-21 | End: 2019-08-31 | Stop reason: SDUPTHER

## 2019-06-21 RX ORDER — PANTOPRAZOLE SODIUM 40 MG/1
40 TABLET, DELAYED RELEASE ORAL DAILY
Qty: 30 TABLET | Refills: 11 | Status: SHIPPED | OUTPATIENT
Start: 2019-06-21 | End: 2020-01-22 | Stop reason: ALTCHOICE

## 2019-06-21 RX ORDER — NAPROXEN 500 MG/1
500 TABLET ORAL 2 TIMES DAILY
Qty: 60 TABLET | Refills: 0 | Status: SHIPPED | OUTPATIENT
Start: 2019-06-21 | End: 2019-07-18 | Stop reason: SDUPTHER

## 2019-06-21 NOTE — PROGRESS NOTES
"Subjective:       Patient ID: Alexi Noguera is a 59 y.o. male.    Chief Complaint: L shoulder pain    HPI         CHIEF COMPLAINT: Upper_Extremity_Problems. Pain: yes.. Weakness: no . Injury: no .  HPI:    ONSET/TIMING: . Onset  3 weeks ago.  Gradual. Work related: no.  Similar_problems_in past: no.     DURATION: .   Hr       Paroxysmal: no .    QUALITY/COURSE:  Better, not bad during the day but the 7/10 at night    LOCATION:    .  . Radiation: no.    INTENSITY/SEVERITY:. Severity is # 5   (10 point scale).    CONTEXT/WHEN: . Date_Expected_Work_Return is . Activity: yes. . Abduction greater than 90 degrees: no.. Inactivity: no      MODIFIERS/TREATMENTS:  Current_Limitations_are.        Taking medications: no.  Physical_Therapy: no .  Chiropractor: no . . Litigation_pending: no. . X-rays: no.. CT: no.. MRI: no.    SYMPTOMS/RELATED: . --Possible medication side effects include:.     The following symptoms are positive if BOLD, negative otherwise.       REVIEW OF SYMPTOMS:   Numbness. Weakness. Muscle_Problems. Fever. Joint_Problems. Swelling. Erythema. Weight_loss.    Review of Systems      Objective:      Vitals:    06/21/19 1137   BP: (!) 140/86   Pulse: 77   Resp: 16   Temp: 97.8 °F (36.6 °C)   TempSrc: Oral   SpO2: 99%   Weight: 112.2 kg (247 lb 5.7 oz)   Height: 6' 1" (1.854 m)   PainSc:   8   PainLoc: Shoulder     Physical Exam   Constitutional: He appears well-developed and well-nourished.   Cardiovascular: Normal rate, regular rhythm and normal heart sounds.   Pulmonary/Chest: Effort normal and breath sounds normal.   Abdominal: Soft. There is no tenderness.   Musculoskeletal:   Tender over the right interscapular area around T6   Neurological: He is alert.   Psychiatric: He has a normal mood and affect. His behavior is normal. Thought content normal.   Nursing note and vitals reviewed.        Assessment:       1. Subscapular bursitis          Plan:       Subscapular bursitis  -     naproxen (NAPROSYN) 500 " MG tablet; Take 1 tablet (500 mg total) by mouth 2 (two) times daily.  Dispense: 60 tablet; Refill: 0  -     pantoprazole (PROTONIX) 40 MG tablet; Take 1 tablet (40 mg total) by mouth once daily.  Dispense: 30 tablet; Refill: 11  -     Ambulatory Referral to Physical Medicine Rehab  -     cyclobenzaprine (FLEXERIL) 10 MG tablet; Take 1 tablet (10 mg total) by mouth every evening. for 10 days  Dispense: 30 tablet; Refill: 1      Follow up for if you are not better return in one month.

## 2019-07-01 ENCOUNTER — OFFICE VISIT (OUTPATIENT)
Dept: PHYSICAL MEDICINE AND REHAB | Facility: CLINIC | Age: 59
End: 2019-07-01
Payer: COMMERCIAL

## 2019-07-01 VITALS — HEIGHT: 73 IN | BODY MASS INDEX: 32.74 KG/M2 | WEIGHT: 247 LBS

## 2019-07-01 DIAGNOSIS — G89.29 CHRONIC LEFT SHOULDER PAIN: Primary | ICD-10-CM

## 2019-07-01 DIAGNOSIS — R22.1 NECK MASS: ICD-10-CM

## 2019-07-01 DIAGNOSIS — M25.512 CHRONIC LEFT SHOULDER PAIN: Primary | ICD-10-CM

## 2019-07-01 DIAGNOSIS — M79.18 MYOFASCIAL PAIN: ICD-10-CM

## 2019-07-01 DIAGNOSIS — M75.81 RIGHT ROTATOR CUFF TENDINITIS: ICD-10-CM

## 2019-07-01 PROCEDURE — 99999 PR PBB SHADOW E&M-EST. PATIENT-LVL III: CPT | Mod: PBBFAC,,, | Performed by: PHYSICAL MEDICINE & REHABILITATION

## 2019-07-01 PROCEDURE — 20552 NJX 1/MLT TRIGGER POINT 1/2: CPT | Mod: S$GLB,,, | Performed by: PHYSICAL MEDICINE & REHABILITATION

## 2019-07-01 PROCEDURE — 99244 OFF/OP CNSLTJ NEW/EST MOD 40: CPT | Mod: 25,S$GLB,, | Performed by: PHYSICAL MEDICINE & REHABILITATION

## 2019-07-01 PROCEDURE — 99244 PR OFFICE CONSULTATION,LEVEL IV: ICD-10-PCS | Mod: 25,S$GLB,, | Performed by: PHYSICAL MEDICINE & REHABILITATION

## 2019-07-01 PROCEDURE — 99999 PR PBB SHADOW E&M-EST. PATIENT-LVL III: ICD-10-PCS | Mod: PBBFAC,,, | Performed by: PHYSICAL MEDICINE & REHABILITATION

## 2019-07-01 PROCEDURE — 20552 PR INJECT TRIGGER POINT, 1 OR 2: ICD-10-PCS | Mod: S$GLB,,, | Performed by: PHYSICAL MEDICINE & REHABILITATION

## 2019-07-01 NOTE — LETTER
July 1, 2019      Derrick Matthews MD  [address]  [place] [address]           [place]  [address] [place] [address]  [address]  [place] [address]  Phone: [phone]  Fax: [phone]          Patient: Alexi Noguera   MR Number: [id number]   YOB: 1960   Date of Visit: 7/1/2019       Dear Dr. Derrick Matthews:    Thank you for referring Alexi Noguera to me for evaluation. Attached you will find relevant portions of my assessment and plan of care.    If you have questions, please do not hesitate to call me. I look forward to following Alexi Noguera along with you.    Sincerely,    Leighton Winchester MD    Enclosure  CC:  No Recipients    If you would like to receive this communication electronically, please contact [email or web address] or [phone] to request more information on [employer] access.    For providers and/or their staff who would like to refer a patient to [facility], please contact us through our one-stop-shop provider referral line, [facility] [place], at [phone].    If you feel you have received this communication in error or would no longer like to receive these types of communications, please e-mail [email or web address]

## 2019-07-01 NOTE — PROGRESS NOTES
[facility]    Consulting Provider: Dr. Derrick Matthews    CHIEF COMPLAINT:   Chief Complaint   Patient presents with    Shoulder Pain     right shoulder pain     HISTORY OF PRESENT ILLNESS: Alexi Noguera is a 59 y.o. male who presents to me for the 1st time for evaluation and treatment of left shoulder pain.  He reports he has noticed the pain insidiously over the past few months.  He denies any injury or trauma.  He locates the pain most prominently over the upper thoracic and periscapular area.  There is some radiation into the arm but not past the elbow.  He denies any significant numbness or tingling of the left upper extremity.  He rates his pain as an 8 on a scale of 1-10.  He works as a  and  and reports the pain seems to worsen once he returns home after a day of work.  He has been taking Flexeril and naproxen in the past week which seems to help somewhat.  He does have pain at rest.  He reports the past 1 week he has noticed some swelling over the left side of his neck.  He denies any fevers, chills, night sweats.  Feels the area is somewhat firm and tender.  He has had no prior treatment for his issue.    Review of Systems   Constitutional: Negative for fever.   HENT: Negative for drooling.    Eyes: Negative for discharge.   Respiratory: Negative for choking.    Cardiovascular: Negative for chest pain.   Genitourinary: Negative for flank pain.   Skin: Negative for wound.   Allergic/Immunologic: Negative for immunocompromised state.   Neurological: Negative for tremors and syncope.   Psychiatric/Behavioral: Negative for behavioral problems.     Past Medical History:   Past Medical History:   Diagnosis Date    Hyperlipidemia     Hypertension        Past Surgical History: History reviewed. No pertinent surgical history.    Family History:   Family History   Problem Relation Age of Onset    No Known Problems Mother     No Known Problems Father     No Known Problems  Sister     Heart disease Brother     Diabetes Paternal Aunt     Heart disease Paternal Uncle        Medications:   Current Outpatient Medications on File Prior to Visit   Medication Sig Dispense Refill    amLODIPine (NORVASC) 2.5 MG tablet Take 1 tablet (2.5 mg total) by mouth once daily. 30 tablet 11    cyclobenzaprine (FLEXERIL) 10 MG tablet Take 1 tablet (10 mg total) by mouth every evening. for 10 days 30 tablet 1    losartan (COZAAR) 50 MG tablet TAKE 1 TABLET BY MOUTH EVERY DAY 90 tablet 1    naproxen (NAPROSYN) 500 MG tablet Take 1 tablet (500 mg total) by mouth 2 (two) times daily. 60 tablet 0    pantoprazole (PROTONIX) 40 MG tablet Take 1 tablet (40 mg total) by mouth once daily. 30 tablet 11    pravastatin (PRAVACHOL) 40 MG tablet Take 1 tablet (40 mg total) by mouth once daily. 30 tablet 11     No current facility-administered medications on file prior to visit.        Allergies:   Review of patient's allergies indicates:   Allergen Reactions    Lisinopril Other (See Comments)     Dry cough       Social History:   Social History     Socioeconomic History    Marital status: Legally      Spouse name: Not on file    Number of children: Not on file    Years of education: Not on file    Highest education level: Not on file   Occupational History    Not on file   Social Needs    Financial resource strain: Not on file    Food insecurity:     Worry: Not on file     Inability: Not on file    Transportation needs:     Medical: Not on file     Non-medical: Not on file   Tobacco Use    Smoking status: Never Smoker   Substance and Sexual Activity    Alcohol use: No     Alcohol/week: 0.0 oz    Drug use: No    Sexual activity: Not on file   Lifestyle    Physical activity:     Days per week: Not on file     Minutes per session: Not on file    Stress: Not on file   Relationships    Social connections:     Talks on phone: Not on file     Gets together: Not on file     Attends Hoahaoism  "service: Not on file     Active member of club or organization: Not on file     Attends meetings of clubs or organizations: Not on file     Relationship status: Not on file   Other Topics Concern    Not on file   Social History Narrative    Not on file     PHYSICAL EXAMINATION:   General    Vitals:    07/01/19 0733   Weight: 112 kg (247 lb)   Height: 6' 1" (1.854 m)     Constitutional: Oriented to person, place, and time. No apparent distress. Appears well-developed and well-nourished. Pleasant.  HENT:   Head: Normocephalic and atraumatic.   Eyes: Right eye exhibits no discharge. Left eye exhibits no discharge. No scleral icterus.   Pulmonary/Chest: Effort normal. No respiratory distress.   Abdominal: There is no guarding.   Neurological: Alert and oriented to person, place, and time.   Psychiatric: Behavior is normal.   Left Shoulder Exam     Tenderness   The patient is experiencing no tenderness.     Range of Motion   Active abduction: 150   Passive abduction: 160   External rotation: 80   Forward flexion: 160   Internal rotation 90 degrees: 60     Muscle Strength   Abduction: 5/5   Internal rotation: 5/5   External rotation: 5/5   Biceps: 5/5     Tests   Apprehension: negative  Amador test: positive  Cross arm: negative  Impingement: negative  Drop arm: negative  Sulcus: absent    Other   Erythema: absent  Scars: absent  Sensation: normal  Pulse: present     Comments:  Negative empty can test        INSPECTION: There is no swelling, ecchymoses, erythema or gross deformity about shoulder.  There is some slight erythema over the left cervical spine area.  There were no observed breaks in the skin or skin lesions over the cervical spine.  Palpation: There is some tenderness to palpation about the left cervical spine paraspinal musculature.  There is an area of firmness and tenderness over the left cervical paraspinal muscles.   Range of motion: There is approximately 45° of cervical extension with some " discomfort. There is approximately 45° of lateral bending bilaterally. There is approximately 75° of lateral rotation bilaterally.  Neurologic: Spurling's test produces axial neck pain, but no radicular symptoms. Manual muscle testing reveals 5 out of 5 strength throughout bilateral upper extremity's. Tone is normal about the bilateral upper extremity's. Reflexes are 2+ throughout bilateral upper extremities. Bernal's reflex is absent bilaterally.    ASSESSMENT:   1. Chronic left shoulder pain    2. Cervical mass    3. Myofascial pain    4. Right rotator cuff tendinitis      PLAN:     1. Time was spent reviewing the above diagnosis in depth with Alexi today, including acute management and rehabilitation.     2. Mr. Noguera appears to multiple issues at play.  In terms of his shoulder pain, his pain is most prominent over the periscapular musculature/thoracic paraspinals.  There is some tenderness of the area I recommended conservative care in the form of trigger point injections and physical therapy to address this myofascial pain.  The patient was amenable to this plan.  See separate procedure note below for trigger point injection of the left thoracic paraspinal musculature.    3.  He has weakly positive physical exam findings for rotator cuff pathology, however he does not localizes primary pain to the shoulder.  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  We will set him up here at [facility].      4.  In terms of his neck mass, I am not sure wath this is at this time.  For further evaluation of ordered MRI of the cervical spine.    5.  I will plan to contact him by phone once the MRI results returned and we will formulate a new plan at that time.    Procedure Note: Trigger point injections  Time: 09:30  Indications: Pain  Description: After 1 maximal tender point was identified in the left thoracic  "paraspinal musculature, the overlying skin was cleaned with etoh swabs.  This point was injected with 1 cc of 1% Lidocaine after negative aspiration using a 27 g 1.5" needle. A stellate pattern was then used to needle the surrounding area. Needle was removed and areas cleaned. Blood loss minimal.  Complications: None  Disposition: Pt left in good condition with no complaints.    This is a consult from Dr. Derrick Matthews. Please see the "Communications" section of Epic to see how the consulting physician received the report of today's findings and recommendations. If it's an [facility] physician, it will be forwarded to his/her "in basket".    The above note was completed, in part, with the aid of Dragon dictation software/hardware. Translation errors may be present.    "

## 2019-07-03 ENCOUNTER — HOSPITAL ENCOUNTER (OUTPATIENT)
Dept: RADIOLOGY | Facility: HOSPITAL | Age: 59
Discharge: HOME | End: 2019-07-03
Attending: PHYSICAL MEDICINE & REHABILITATION
Payer: COMMERCIAL

## 2019-07-03 DIAGNOSIS — D49.2 CERVICAL SPINE TUMOR: ICD-10-CM

## 2019-07-03 DIAGNOSIS — R22.1 NECK MASS: ICD-10-CM

## 2019-07-03 PROCEDURE — 72141 MRI CERVICAL SPINE WITHOUT CONTRAST: ICD-10-PCS | Mod: 26,,, | Performed by: RADIOLOGY

## 2019-07-03 PROCEDURE — 72141 MRI NECK SPINE W/O DYE: CPT | Mod: TC

## 2019-07-03 PROCEDURE — 72141 MRI NECK SPINE W/O DYE: CPT | Mod: 26,,, | Performed by: RADIOLOGY

## 2019-07-07 DIAGNOSIS — I10 ESSENTIAL HYPERTENSION: ICD-10-CM

## 2019-07-07 DIAGNOSIS — E78.5 HYPERLIPIDEMIA, UNSPECIFIED HYPERLIPIDEMIA TYPE: ICD-10-CM

## 2019-07-08 ENCOUNTER — TELEPHONE (OUTPATIENT)
Dept: PHYSICAL MEDICINE AND REHAB | Facility: CLINIC | Age: 59
End: 2019-07-08

## 2019-07-08 RX ORDER — PRAVASTATIN SODIUM 40 MG/1
TABLET ORAL
Qty: 90 TABLET | Refills: 3 | Status: SHIPPED | OUTPATIENT
Start: 2019-07-08 | End: 2020-01-22

## 2019-07-08 RX ORDER — LOSARTAN POTASSIUM 50 MG/1
TABLET ORAL
Qty: 90 TABLET | Refills: 1 | Status: SHIPPED | OUTPATIENT
Start: 2019-07-08 | End: 2019-08-27 | Stop reason: SDUPTHER

## 2019-07-08 NOTE — TELEPHONE ENCOUNTER
----- Message from Nathaniel Pascual sent at 7/8/2019  3:28 PM CDT -----  Contact: pt  Type:  Patient Returning Call    Who Called:  pt  Who Left Message for Patient:  Malgorzata  Does the patient know what this is regarding?:  Schedule an MRI with contrast.   Best Call Back Number:  [phone]  Additional Information:  Pt gets off of work at 4:30pm. Please call at 4:35pm if possible.

## 2019-07-08 NOTE — TELEPHONE ENCOUNTER
----- Message from Antonette Almanzar RN sent at 7/5/2019  8:58 AM CDT -----  Please see your message from Dr. Winchester.  ----- Message -----  From: Leighton Winchester MD  Sent: 7/3/2019   6:58 PM  To: Ranul[facility] SOLIMAN Staff    His C-spine MRI showed the presence of a mass pressing on the cervical cord.  I called and left a detailed message explaining the results.  The radiologist recommends an MRI with contrast which I have ordered in epic.  I have also ordered a creatinine level prior to getting contrast.  Please contact the patient to help him get scheduled for the MRI and the serum creatinine level.  He also needs to have a Neurosurgery appointment arranged following his MRI.

## 2019-07-08 NOTE — TELEPHONE ENCOUNTER
----- Message from Carrie Kirkpatrick sent at 7/8/2019  3:10 PM CDT -----  Type:  Patient Returning Call    Who Called:  Patient  Who Left Message for Patient:  Malgorzata  Does the patient know what this is regarding?:  MRI  Best Call Back Number:  [phone] (home)

## 2019-07-08 NOTE — TELEPHONE ENCOUNTER
Left msg on machine that orders have been entered for him and if he would like to give us a call to book the mri and the neurosurgery appt please call

## 2019-07-09 ENCOUNTER — TELEPHONE (OUTPATIENT)
Dept: PHYSICAL MEDICINE AND REHAB | Facility: CLINIC | Age: 59
End: 2019-07-09

## 2019-07-09 NOTE — TELEPHONE ENCOUNTER
Scheduled MRI at [facility] close to patient's home left voicemail that I booked the appt. He will also need a creatinine lab prior to the MRI which is in the computer. I left instructions that we only need a call back if that date and time do not work for him/

## 2019-07-09 NOTE — TELEPHONE ENCOUNTER
Spoke with patient advised of time set up for MRI and lab work [facility] to have that done before 530 pm

## 2019-07-09 NOTE — TELEPHONE ENCOUNTER
----- Message from Jaye Vazquez sent at 7/9/2019 12:11 PM CDT -----  Contact: patient  Type:  Patient Returning Call    Who Called:  patient  Who Left Message for Patient:  Malgorzata  Does the patient know what this is regarding?:    Best Call Back Number:  [phone]  Additional Information:

## 2019-07-11 ENCOUNTER — LAB VISIT (OUTPATIENT)
Dept: LAB | Facility: HOSPITAL | Age: 59
End: 2019-07-11
Attending: PHYSICAL MEDICINE & REHABILITATION
Payer: COMMERCIAL

## 2019-07-11 DIAGNOSIS — D49.2 CERVICAL SPINE TUMOR: ICD-10-CM

## 2019-07-11 LAB
CREAT SERPL-MCNC: 1.2 MG/DL (ref 0.5–1.4)
EST. GFR  (AFRICAN AMERICAN): >60 ML/MIN/1.73 M^2
EST. GFR  (NON AFRICAN AMERICAN): >60 ML/MIN/1.73 M^2

## 2019-07-11 PROCEDURE — 82565 ASSAY OF CREATININE: CPT

## 2019-07-11 PROCEDURE — 36415 COLL VENOUS BLD VENIPUNCTURE: CPT | Mod: PO

## 2019-07-16 ENCOUNTER — TELEPHONE (OUTPATIENT)
Dept: PHYSICAL MEDICINE AND REHAB | Facility: CLINIC | Age: 59
End: 2019-07-16

## 2019-07-17 ENCOUNTER — DOCUMENTATION ONLY (OUTPATIENT)
Dept: FAMILY MEDICINE | Facility: CLINIC | Age: 59
End: 2019-07-17

## 2019-07-18 DIAGNOSIS — M75.50 SUBSCAPULAR BURSITIS: ICD-10-CM

## 2019-07-18 RX ORDER — NAPROXEN 500 MG/1
TABLET ORAL
Qty: 60 TABLET | Refills: 0 | Status: SHIPPED | OUTPATIENT
Start: 2019-07-18 | End: 2020-01-22

## 2019-07-31 ENCOUNTER — OFFICE VISIT (OUTPATIENT)
Dept: NEUROSURGERY | Facility: CLINIC | Age: 59
End: 2019-07-31
Payer: COMMERCIAL

## 2019-07-31 ENCOUNTER — TELEPHONE (OUTPATIENT)
Dept: NEUROSURGERY | Facility: CLINIC | Age: 59
End: 2019-07-31

## 2019-07-31 VITALS
DIASTOLIC BLOOD PRESSURE: 98 MMHG | HEART RATE: 96 BPM | SYSTOLIC BLOOD PRESSURE: 177 MMHG | BODY MASS INDEX: 34.04 KG/M2 | WEIGHT: 256.81 LBS | HEIGHT: 73 IN

## 2019-07-31 DIAGNOSIS — G95.9 CERVICAL MYELOPATHY: Primary | ICD-10-CM

## 2019-07-31 DIAGNOSIS — D32.1 MENINGIOMA, SPINAL: ICD-10-CM

## 2019-07-31 PROCEDURE — 99999 PR PBB SHADOW E&M-EST. PATIENT-LVL III: CPT | Mod: PBBFAC,,, | Performed by: NEUROLOGICAL SURGERY

## 2019-07-31 PROCEDURE — 99204 PR OFFICE/OUTPT VISIT, NEW, LEVL IV, 45-59 MIN: ICD-10-PCS | Mod: S$GLB,,, | Performed by: NEUROLOGICAL SURGERY

## 2019-07-31 PROCEDURE — 99999 PR PBB SHADOW E&M-EST. PATIENT-LVL III: ICD-10-PCS | Mod: PBBFAC,,, | Performed by: NEUROLOGICAL SURGERY

## 2019-07-31 PROCEDURE — 99204 OFFICE O/P NEW MOD 45 MIN: CPT | Mod: S$GLB,,, | Performed by: NEUROLOGICAL SURGERY

## 2019-07-31 NOTE — LETTER
July 31, 2019      Leighton Winchester MD  [address] [place] [address] Dr  [address]  [place] [address]           [facility]  [address] [facility] [address] [place] [address]  Phone: [phone]  Fax: [phone]          Patient: Alexi Noguera   MR Number: [id number]   YOB: 1960   Date of Visit: 7/31/2019       Dear Dr. Leighton Winchester:    Thank you for referring Alexi Noguera to me for evaluation. Attached you will find relevant portions of my assessment and plan of care.    If you have questions, please do not hesitate to call me. I look forward to following Alexi Noguera along with you.    Sincerely,    Babita Razo, LPN    Enclosure  CC:  No Recipients    If you would like to receive this communication electronically, please contact [email or web address] or [phone] to request more information on [employer] Link access.    For providers and/or their staff who would like to refer a patient to [facility], please contact us through our one-stop-shop provider referral line, [facility], at [phone].    If you feel you have received this communication in error or would no longer like to receive these types of communications, please e-mail [email or web address]

## 2019-08-06 ENCOUNTER — HOSPITAL ENCOUNTER (OUTPATIENT)
Dept: RADIOLOGY | Facility: HOSPITAL | Age: 59
Discharge: HOME | End: 2019-08-06
Attending: NEUROLOGICAL SURGERY
Payer: COMMERCIAL

## 2019-08-06 DIAGNOSIS — N88.9 CERVICAL LESION: ICD-10-CM

## 2019-08-06 DIAGNOSIS — G95.9 CERVICAL MYELOPATHY: ICD-10-CM

## 2019-08-06 PROCEDURE — 70498 CTA NECK: ICD-10-PCS | Mod: 26,,, | Performed by: RADIOLOGY

## 2019-08-06 PROCEDURE — 70498 CT ANGIOGRAPHY NECK: CPT | Mod: TC,PO

## 2019-08-06 PROCEDURE — 70498 CT ANGIOGRAPHY NECK: CPT | Mod: 26,,, | Performed by: RADIOLOGY

## 2019-08-06 PROCEDURE — 25500020 PHARM REV CODE 255: Mod: PO | Performed by: NEUROLOGICAL SURGERY

## 2019-08-06 RX ADMIN — IOHEXOL 100 ML: 350 INJECTION, SOLUTION INTRAVENOUS at 11:08

## 2019-08-12 NOTE — PROGRESS NOTES
Neurosurgery History & Physical    Patient ID: Alexi Noguera is a 59 y.o. male.    Chief Complaint   Patient presents with    Neurologic Problem     Per patient referral: spine mass. Patient reports some history of back pain but denies any recent.        Review of Systems   Constitutional: Negative for chills, fatigue and fever.   HENT: Negative for drooling, ear pain, hearing loss and trouble swallowing.    Eyes: Negative for photophobia and visual disturbance.   Respiratory: Negative for chest tightness and shortness of breath.    Cardiovascular: Negative for chest pain and leg swelling.   Gastrointestinal: Negative for abdominal pain, nausea and vomiting.   Endocrine: Negative for cold intolerance and heat intolerance.   Genitourinary: Negative for dysuria, frequency, hematuria and urgency.   Musculoskeletal: Positive for neck pain and neck stiffness. Negative for arthralgias, back pain, gait problem and myalgias.   Skin: Negative for color change and wound.   Allergic/Immunologic: Negative for immunocompromised state.   Neurological: Negative for seizures, syncope, facial asymmetry, speech difficulty, weakness, numbness and headaches.   Psychiatric/Behavioral: Negative for agitation, confusion, hallucinations and sleep disturbance.       Past Medical History:   Diagnosis Date    Hyperlipidemia     Hypertension      No past surgical history on file.  Social History     Socioeconomic History    Marital status: Legally      Spouse name: Not on file    Number of children: Not on file    Years of education: Not on file    Highest education level: Not on file   Occupational History    Not on file   Social Needs    Financial resource strain: Not on file    Food insecurity:     Worry: Not on file     Inability: Not on file    Transportation needs:     Medical: Not on file     Non-medical: Not on file   Tobacco Use    Smoking status: Never Smoker   Substance and Sexual Activity    Alcohol use: No  "    Alcohol/week: 0.0 oz    Drug use: No    Sexual activity: Not on file   Lifestyle    Physical activity:     Days per week: Not on file     Minutes per session: Not on file    Stress: Not on file   Relationships    Social connections:     Talks on phone: Not on file     Gets together: Not on file     Attends Mandaen service: Not on file     Active member of club or organization: Not on file     Attends meetings of clubs or organizations: Not on file     Relationship status: Not on file   Other Topics Concern    Not on file   Social History Narrative    Not on file     Family History   Problem Relation Age of Onset    No Known Problems Mother     No Known Problems Father     No Known Problems Sister     Heart disease Brother     Diabetes Paternal Aunt     Heart disease Paternal Uncle      Review of patient's allergies indicates:   Allergen Reactions    Lisinopril Other (See Comments)     Dry cough       Current Outpatient Medications:     amLODIPine (NORVASC) 2.5 MG tablet, Take 1 tablet (2.5 mg total) by mouth once daily., Disp: 30 tablet, Rfl: 11    losartan (COZAAR) 50 MG tablet, TAKE 1 TABLET BY MOUTH EVERY DAY, Disp: 90 tablet, Rfl: 1    pravastatin (PRAVACHOL) 40 MG tablet, TAKE 1 TABLET BY MOUTH EVERY DAY, Disp: 90 tablet, Rfl: 3    naproxen (NAPROSYN) 500 MG tablet, TAKE 1 TABLET BY MOUTH TWICE A DAY, Disp: 60 tablet, Rfl: 0    pantoprazole (PROTONIX) 40 MG tablet, Take 1 tablet (40 mg total) by mouth once daily., Disp: 30 tablet, Rfl: 11    Vitals:    07/31/19 1400   BP: (!) 177/98   Pulse: 96   Weight: 116.5 kg (256 lb 13.4 oz)   Height: 6' 1" (1.854 m)   PainSc: 0-No pain       Physical Exam   Constitutional: He is oriented to person, place, and time. Vital signs are normal. He appears well-developed and well-nourished.   HENT:   Head: Normocephalic and atraumatic.   Right Ear: Hearing normal.   Left Ear: Hearing normal.   Nose: Nose normal.   Mouth/Throat: Uvula is midline.   Eyes: " Pupils are equal, round, and reactive to light. Conjunctivae, EOM and lids are normal.   Neck: Trachea normal, normal range of motion, full passive range of motion without pain and phonation normal. Neck supple.   Cardiovascular: Normal rate, regular rhythm, intact distal pulses and normal pulses.   Pulmonary/Chest: Effort normal and breath sounds normal.   Abdominal: Soft. Normal appearance.   Musculoskeletal: Normal range of motion.   Feet:   Right Foot:   Protective Sensation: 4 sites tested. 4 sites sensed.   Skin Integrity: Negative for ulcer.   Left Foot:   Protective Sensation: 4 sites tested. 4 sites sensed.   Skin Integrity: Negative for ulcer.   Neurological: He is alert and oriented to person, place, and time. He has normal strength and normal reflexes. No cranial nerve deficit or sensory deficit. He has a normal Finger-Nose-Finger Test, a normal Heel to Shin Test, a normal Romberg Test and a normal Tandem Gait Test. He displays a negative Romberg sign. Gait normal.   Reflex Scores:       Tricep reflexes are 2+ on the right side and 2+ on the left side.       Bicep reflexes are 2+ on the right side and 2+ on the left side.       Brachioradialis reflexes are 2+ on the right side and 2+ on the left side.       Patellar reflexes are 2+ on the right side and 2+ on the left side.       Achilles reflexes are 2+ on the right side and 2+ on the left side.  Skin: Skin is warm, dry and intact. Capillary refill takes less than 2 seconds.   Psychiatric: He has a normal mood and affect. His speech is normal and behavior is normal. Judgment and thought content normal. Cognition and memory are normal.   Nursing note and vitals reviewed.      Neurologic Exam     Mental Status   Oriented to person, place, and time.   Follows 3 step commands.   Attention: normal. Concentration: normal.   Speech: speech is normal   Level of consciousness: alert  Knowledge: good.   Able to name object.     Cranial Nerves     CN II   Visual  fields full to confrontation.   Visual acuity: normal  Right visual field deficit: none  Left visual field deficit: none     CN III, IV, VI   Pupils are equal, round, and reactive to light.  Extraocular motions are normal.   CN III: no CN III palsy  CN VI: no CN VI palsy    CN V   Facial sensation intact.   Right facial sensation deficit: none  Left facial sensation deficit: none    CN VII   Facial expression full, symmetric.   Right facial weakness: none  Left facial weakness: none    CN VIII   CN VIII normal.   Hearing: intact    CN IX, X   CN IX normal.   CN X normal.     CN XI   CN XI normal.     CN XII   CN XII normal.     Motor Exam   Muscle bulk: normal  Overall muscle tone: normal  Right arm tone: normal  Left arm tone: normal  Right arm pronator drift: absent  Left arm pronator drift: absent  Right leg tone: normal  Left leg tone: normal    Strength   Strength 5/5 throughout.   Right neck flexion: 5/5  Left neck flexion: 5/5  Right neck extension: 5/5  Left neck extension: 5/5  Right deltoid: 5/5  Left deltoid: 5/5  Right biceps: 5/5  Left biceps: 5/5  Right triceps: 5/5  Left triceps: 5/5  Right wrist flexion: 5/5  Left wrist flexion: 5/5  Right wrist extension: 5/5  Left wrist extension: 5/5  Right interossei: 5/5  Left interossei: 5/5  Right abdominals: 5/5  Left abdominals: 5/5  Right iliopsoas: 5/5  Left iliopsoas: 5/5  Right quadriceps: 5/5  Left quadriceps: 5/5  Right hamstrin/5  Left hamstrin/5  Right glutei: 5/5  Left glutei: 5/5  Right anterior tibial: 5/5  Left anterior tibial: 5/5  Right posterior tibial: 5/5  Left posterior tibial: 5/5  Right peroneal: 5/5  Left peroneal: 5/5  Right gastroc: 5/5  Left gastroc: 5/5    Sensory Exam   Light touch normal.   Right arm light touch: normal  Left arm light touch: normal  Right leg light touch: normal  Left leg light touch: normal  Vibration normal.     Gait, Coordination, and Reflexes     Gait  Gait: normal    Coordination   Romberg:  negative  Finger to nose coordination: normal  Heel to shin coordination: normal  Tandem walking coordination: normal    Tremor   Resting tremor: absent  Intention tremor: absent  Action tremor: absent    Reflexes   Right brachioradialis: 2+  Left brachioradialis: 2+  Right biceps: 2+  Left biceps: 2+  Right triceps: 2+  Left triceps: 2+  Right patellar: 2+  Left patellar: 2+  Right achilles: 2+  Left achilles: 2+  Right : 2+  Left : 2+  Right plantar: normal  Left plantar: normal  Right Bernal: absent  Left Bernal: present  Right ankle clonus: absent  Left ankle clonus: absent        Visit Diagnosis:  Cervical myelopathy  -     CTA Neck; Future; Expected date: 07/31/2019    Cervical lesion  -     CTA Neck; Future; Expected date: 07/31/2019        Provider dictation:  PHQ:  0    The patient is a very pleasant 59-year-old  male presenting today for evaluation of a spine mass that was incidentally found on MRI.  A few weeks ago he had a severe area of swelling on the left side of his neck with a not.  He had immediate pain in his left shoulder blade and pain in the left side of his neck.  However the swelling has resolved but he still has left scapular pain.  He denies weakness.  He denies headaches nausea vomiting fever or chills numbness or tingling gait disturbance.    On physical examination, he has full strength in the upper and lower extremities.  He has a mild Bernal's on the left.  He has 2+ reflexes in the bilateral lower extremities and 1+ reflexes in the uppers.  Otherwise he is neurologically intact there is no gait disturbance.    MRI of the cervical spine demonstrates a contrast-enhancing mass that is intrathecal extramedullary causing compression at C2.  He also has a C5-6 right greater than left disc herniation causing moderate foraminal stenosis.    I reviewed the imaging with the patient as well as with Dr. Thomas.  Both he and I have discussed the case with the patient.  We have  recommended surgical resection of the likely meningioma at C2 with C2 laminectomy.  He will have a CTA with 3D reconstruction preoperative if there is a need for instrumentation.  Patient expressed understanding with all risks benefits of surgical intervention and agrees to proceed with surgery as planned.    This note was done using voice recognition software. Please excuse any errors missed in proof reading.

## 2019-08-19 ENCOUNTER — TELEPHONE (OUTPATIENT)
Dept: NEUROSURGERY | Facility: CLINIC | Age: 59
End: 2019-08-19

## 2019-08-19 NOTE — TELEPHONE ENCOUNTER
Patient would like a call back regarding CTA results.    I also explained to him that we would not be scheduling his surgery until Dr. Thomas returns in September and he expressed understanding.

## 2019-08-21 ENCOUNTER — TELEPHONE (OUTPATIENT)
Dept: NEUROSURGERY | Facility: CLINIC | Age: 59
End: 2019-08-21

## 2019-08-21 NOTE — TELEPHONE ENCOUNTER
Called pt to discuss scheduling an appt with Dr. Lebron, as pt has previously planned to undergo spinal surgery with Dr. Thomas. No answer, LVM with call back number, encourage pt to contact our office to discuss further.

## 2019-08-23 ENCOUNTER — TELEPHONE (OUTPATIENT)
Dept: NEUROSURGERY | Facility: CLINIC | Age: 59
End: 2019-08-23

## 2019-08-23 NOTE — TELEPHONE ENCOUNTER
Pt returned our call regarding scheduling a surgical consult with Dr. Lebron for cervical mass. Dr. Thomas has worked pt up for neurosurgical intervention, appreciative to be seen by Dr. Lebron to continue surgical planning. Pt scheduled on 9/9/19 at [facility]. Pt v/u, appt reminder in the mail.    DISPLAY PLAN FREE TEXT

## 2019-08-27 ENCOUNTER — DOCUMENTATION ONLY (OUTPATIENT)
Dept: FAMILY MEDICINE | Facility: CLINIC | Age: 59
End: 2019-08-27

## 2019-08-27 ENCOUNTER — OFFICE VISIT (OUTPATIENT)
Dept: FAMILY MEDICINE | Facility: CLINIC | Age: 59
End: 2019-08-27
Payer: COMMERCIAL

## 2019-08-27 VITALS
WEIGHT: 258.38 LBS | SYSTOLIC BLOOD PRESSURE: 146 MMHG | TEMPERATURE: 98 F | DIASTOLIC BLOOD PRESSURE: 98 MMHG | HEIGHT: 73 IN | BODY MASS INDEX: 34.24 KG/M2 | OXYGEN SATURATION: 97 % | HEART RATE: 79 BPM | RESPIRATION RATE: 16 BRPM

## 2019-08-27 DIAGNOSIS — D49.2 CERVICAL SPINE TUMOR: ICD-10-CM

## 2019-08-27 DIAGNOSIS — I10 ESSENTIAL HYPERTENSION: Primary | ICD-10-CM

## 2019-08-27 DIAGNOSIS — E78.5 HYPERLIPIDEMIA, UNSPECIFIED HYPERLIPIDEMIA TYPE: ICD-10-CM

## 2019-08-27 PROCEDURE — 90686 IIV4 VACC NO PRSV 0.5 ML IM: CPT | Mod: S$GLB,,, | Performed by: INTERNAL MEDICINE

## 2019-08-27 PROCEDURE — 99213 OFFICE O/P EST LOW 20 MIN: CPT | Mod: 25,S$GLB,, | Performed by: INTERNAL MEDICINE

## 2019-08-27 PROCEDURE — 99213 PR OFFICE/OUTPT VISIT, EST, LEVL III, 20-29 MIN: ICD-10-PCS | Mod: 25,S$GLB,, | Performed by: INTERNAL MEDICINE

## 2019-08-27 PROCEDURE — 90471 FLU VACCINE (QUAD) GREATER THAN OR EQUAL TO 3YO PRESERVATIVE FREE IM: ICD-10-PCS | Mod: S$GLB,,, | Performed by: INTERNAL MEDICINE

## 2019-08-27 PROCEDURE — 90686 FLU VACCINE (QUAD) GREATER THAN OR EQUAL TO 3YO PRESERVATIVE FREE IM: ICD-10-PCS | Mod: S$GLB,,, | Performed by: INTERNAL MEDICINE

## 2019-08-27 PROCEDURE — 90471 IMMUNIZATION ADMIN: CPT | Mod: S$GLB,,, | Performed by: INTERNAL MEDICINE

## 2019-08-27 RX ORDER — LOSARTAN POTASSIUM 50 MG/1
100 TABLET ORAL NIGHTLY
Qty: 180 TABLET | Refills: 1 | Status: SHIPPED | OUTPATIENT
Start: 2019-08-27 | End: 2020-01-07

## 2019-08-27 NOTE — PROGRESS NOTES
Administered flu vaccine IM. Patient tolerated well. No bleeding at insertion site noted. Pain scale 0/10 with injection. 2 patient identifiers used (name, ), aseptic technique maintained.

## 2019-08-27 NOTE — PATIENT INSTRUCTIONS
Lose 5 pounds.  Suggest [facility]      Low-Salt Diet  This diet removes foods that are high in salt. It also limits the amount of salt you use when cooking. It is most often used for people with high blood pressure, edema (fluid retention), and kidney, liver, or heart disease.  Table salt contains the mineral sodium. Your body needs sodium to work normally. But too much sodium can make your health problems worse. Your healthcare provider is recommending a low-salt (also called low-sodium) diet for you. Your total daily allowance of salt is 1,500 to 2,300 milligrams (mg). It is less than 1 teaspoon of table salt. This means you can have only about 500 to 700 mg of sodium at each meal. People with certain health problems should limit salt intake to the lower end of the recommended range.    When you cook, don´t add much salt. If you can cook without using salt, even better. Don´t add salt to your food at the table.  When shopping, read food labels. Salt is often called sodium on the label. Choose foods that are salt-free, low salt, or very low salt. Note that foods with reduced salt may not lower your salt intake enough.    Beans, potatoes, and pasta  Ok: Dry beans, split peas, lentils, potatoes, rice, macaroni, pasta, spaghetti without added salt  Avoid: Potato chips, tortilla chips, and similar products  Breads and cereals  Ok: Low-sodium breads, rolls, cereals, and cakes; low-salt crackers, matzo crackers  Avoid: Salted crackers, pretzels, popcorn, Maltese toast, pancakes, muffins  Dairy  Ok: Milk, chocolate milk, hot chocolate mix, low-salt cheeses, and yogurt  Avoid: Processed cheese and cheese spreads; Roquefort, Camembert, and cottage cheese; buttermilk, instant breakfast drink  Desserts  Ok: Ice cream, frozen yogurt, juice bars, gelatin, cookies and pies, sugar, honey, jelly, hard candy  Avoid: Most pies, cakes and cookies prepared or processed with salt; instant pudding  Drinks  Ok: Tea, coffee, fizzy  (carbonated) drinks, juices  Avoid: Flavored coffees, electrolyte replacement drinks, sports drinks  Meats  Ok: All fresh meat, fish, poultry, low-salt tuna, eggs, egg substitute  Avoid: Smoked, pickled, brine-cured, or salted meats and fish. This includes kelly, chipped beef, corned beef, hot dogs, deli meats, ham, kosher meats, salt pork, sausage, canned tuna, salted codfish, smoked salmon, herring, sardines, or anchovies.  Seasonings and spices  Ok: Most seasonings are okay. Good substitutes for salt include: fresh herb blends, hot sauce, lemon, garlic, rose, vinegar, dry mustard, parsley, cilantro, horseradish, tomato paste, regular margarine, mayonnaise, unsalted butter, cream cheese, vegetable oil, cream, low-salt salad dressing and gravy.  Avoid: Regular ketchup, relishes, pickles, soy sauce, teriyaki sauce, Worcestershire sauce, BBQ sauce, tartar sauce, meat tenderizer, chili sauce, regular gravy, regular salad dressing, salted butter  Soups  Ok: Low-salt soups and broths made with allowed foods  Avoid: Bouillon cubes, soups with smoked or salted meats, regular soup and broth  Vegetables  Ok: Most vegetables are okay; also low-salt tomato and vegetable juices  Avoid: Sauerkraut and other brine-soaked vegetables; pickles and other pickled vegetables; tomato juice, olives  © 0680-8935 [employer]. [address] [place] [address]. All rights reserved. This information is not intended as a substitute for professional medical care. Always follow your healthcare professional's instructions.          Please fill out the patient experience survey.

## 2019-08-27 NOTE — PROGRESS NOTES
"Subjective:      4:01 PM     Patient ID: Alexi Noguera is a 59 y.o. male.    Chief Complaint: Hypertension (no refills needed)    HPI     Patient is due to have a a surgery next month for resection of a mass impinging on the spinal cord at C1      CHIEF COMPLAINT: Hypertension  HPI:     ONSET:      QUALITY/COURSE:   Unchanged.     INTENSITY/SEVERITY:  Average blood pressure is 138/88, but not sure, checked once. .      MODIFIERS/TREATMENTS:  Taking medications: yes. .High sodium intake: no. alcohol: no      The following symptoms are positive only if BOLDED, otherwise are negative.      SYMPTOMS/RELATED: Possible medication side effects include:   Depression..  . Cough. . Constipation.    REVIEW OF SYMPTOMS: . Weight_loss . Weight_gain . Leg_cramps .Potency_problems .    TARGET ORGAN DAMAGE:: angina/ prior myocardial infarction, chronic kidney disease, heart failure, left ventricular hypertrophy, peripheral artery disease, prior coronary revascularization, retinopathy, stroke. transient ischemic attack.      Review of Systems   Eyes: Negative for visual disturbance.   Respiratory: Negative for chest tightness and shortness of breath.    Cardiovascular: Negative for chest pain and leg swelling.   Neurological: Negative for dizziness, syncope, weakness and headaches.   Psychiatric/Behavioral: Negative for dysphoric mood. The patient is not nervous/anxious.          Objective:      Vitals:    08/27/19 1534   BP: (!) 146/98   Pulse: 79   Resp: 16   Temp: 97.9 °F (36.6 °C)   TempSrc: Oral   SpO2: 97%   Weight: 117.2 kg (258 lb 6.1 oz)   Height: 6' 1" (1.854 m)   PainSc: 0-No pain     Physical Exam   Constitutional: He appears well-developed and well-nourished.   Cardiovascular: Normal rate, regular rhythm and normal heart sounds.   Pulmonary/Chest: Effort normal and breath sounds normal.   Abdominal: Soft. There is no tenderness.   Neurological: He is alert.   Psychiatric: He has a normal mood and affect. His behavior " is normal. Thought content normal.   Nursing note and vitals reviewed.        Assessment:       1. Essential hypertension    2. Hyperlipidemia, unspecified hyperlipidemia type    3. Cervical spine tumor          Plan:       Essential hypertension  -     losartan (COZAAR) 50 MG tablet; Take 2 tablets (100 mg total) by mouth every evening.  Dispense: 180 tablet; Refill: 1    Hyperlipidemia, unspecified hyperlipidemia type  -     Lipid panel; Future; Expected date: 08/27/2019    Cervical spine tumor    Other orders  -     Influenza - Quadrivalent (3 years & older) (PF)      Follow up in about 3 months (around 11/27/2019).

## 2019-08-31 DIAGNOSIS — M75.50 SUBSCAPULAR BURSITIS: ICD-10-CM

## 2019-09-03 RX ORDER — CYCLOBENZAPRINE HCL 10 MG
10 TABLET ORAL NIGHTLY
Qty: 30 TABLET | Refills: 1 | Status: SHIPPED | OUTPATIENT
Start: 2019-09-03 | End: 2019-09-13

## 2019-09-04 ENCOUNTER — TELEPHONE (OUTPATIENT)
Dept: NEUROSURGERY | Facility: CLINIC | Age: 59
End: 2019-09-04

## 2019-09-09 ENCOUNTER — OFFICE VISIT (OUTPATIENT)
Dept: SPINE | Facility: CLINIC | Age: 59
End: 2019-09-09
Payer: COMMERCIAL

## 2019-09-09 VITALS
DIASTOLIC BLOOD PRESSURE: 98 MMHG | HEIGHT: 73 IN | WEIGHT: 255.75 LBS | RESPIRATION RATE: 18 BRPM | TEMPERATURE: 98 F | HEART RATE: 73 BPM | BODY MASS INDEX: 33.9 KG/M2 | SYSTOLIC BLOOD PRESSURE: 164 MMHG

## 2019-09-09 DIAGNOSIS — D49.7 INTRADURAL EXTRAMEDULLARY SPINAL TUMOR: Primary | ICD-10-CM

## 2019-09-09 PROCEDURE — 99999 PR PBB SHADOW E&M-EST. PATIENT-LVL III: CPT | Mod: PBBFAC,,, | Performed by: NEUROLOGICAL SURGERY

## 2019-09-09 PROCEDURE — 99214 PR OFFICE/OUTPT VISIT, EST, LEVL IV, 30-39 MIN: ICD-10-PCS | Mod: S$GLB,,, | Performed by: NEUROLOGICAL SURGERY

## 2019-09-09 PROCEDURE — 99999 PR PBB SHADOW E&M-EST. PATIENT-LVL III: ICD-10-PCS | Mod: PBBFAC,,, | Performed by: NEUROLOGICAL SURGERY

## 2019-09-09 PROCEDURE — 99214 OFFICE O/P EST MOD 30 MIN: CPT | Mod: S$GLB,,, | Performed by: NEUROLOGICAL SURGERY

## 2019-09-09 NOTE — LETTER
September 9, 2019      Pop Thomas MD  [address] [facility] [address]  [address]nd [address]  [facility] [address]           [address]  [address], [address]  [facility] [address]  Phone: [phone]  Fax: [phone]          Patient: Alexi Noguera   MR Number: [id number]   YOB: 1960   Date of Visit: 9/9/2019       Dear Dr. Pop Thomas:    Thank you for referring Alexi Noguera to me for evaluation. Attached you will find relevant portions of my assessment and plan of care.    If you have questions, please do not hesitate to call me. I look forward to following Alexi Noguera along with you.    Sincerely,    Eugenio Lebron MD    Enclosure  CC:  No Recipients    If you would like to receive this communication electronically, please contact [email or web address] or [phone] to request more information on [employer] access.    For providers and/or their staff who would like to refer a patient to [facility], please contact us through our one-stop-shop provider referral line, [facility], at [phone].    If you feel you have received this communication in error or would no longer like to receive these types of communications, please e-mail [email or web address]

## 2019-09-09 NOTE — PROGRESS NOTES
Dear Dr. Thomas, Pop LAMB MD,    Thank you for referring this patient to my clinic. The full details of my evaluation will also be forthcoming to you by letter.    CHIEF COMPLAINT:  Consult for a cervical mass.     I, Inder Coon, attest that this documentation has been prepared under the direction and in the presence of Eugenio Lebron MD.    HPI:  Alexi Noguera is a 59 y.o. asymptomatic male with HLD and HTN, who is referred to me by Dr. Thomas, for evaluation of a cervical mass. Patient reports he went to see his PCP who said he should roman for malpractice. He states that he saw a small knot on his neck prompting the physician to get an MRI who realized that he had the mass. Patient denies any symptoms at this time. Denies urinary symptoms, weakness, or paraesthesias. Patient denies any other acute symptoms at this time.    Review of patient's allergies indicates:   Allergen Reactions    Lisinopril Other (See Comments)     Dry cough       Past Medical History:   Diagnosis Date    Hyperlipidemia     Hypertension      History reviewed. No pertinent surgical history.  Family History   Problem Relation Age of Onset    No Known Problems Mother     No Known Problems Father     No Known Problems Sister     Heart disease Brother     Diabetes Paternal Aunt     Heart disease Paternal Uncle      Social History     Tobacco Use    Smoking status: Never Smoker   Substance Use Topics    Alcohol use: No     Alcohol/week: 0.0 oz    Drug use: No        Review of Systems   Constitutional: Negative.    HENT: Negative.    Eyes: Negative.    Respiratory: Negative.    Cardiovascular: Negative.    Gastrointestinal: Negative.    Endocrine: Negative.    Genitourinary: Negative.    Musculoskeletal: Positive for back pain. Negative for gait problem and neck pain.   Skin: Negative.    Allergic/Immunologic: Negative.    Neurological: Negative for weakness, light-headedness, numbness and headaches.   Hematological: Negative.     Psychiatric/Behavioral: Negative.        OBJECTIVE:   Vital Signs:  Temp: 98.1 °F (36.7 °C) (09/09/19 1510)  Pulse: 73 (09/09/19 1510)  Resp: 18 (09/09/19 1510)  BP: (!) 164/98 (09/09/19 1510)    Physical Exam:    Vital signs: All nursing notes and vital signs reviewed -- afebrile, vital signs stable.  Constitutional: Patient sitting comfortably in chair. Appears well developed and well nourished.  Skin: Exposed areas are intact without abnormal markings, rashes or other lesions.  HEENT: Normocephalic. Normal conjunctivae.  Cardiovascular: Normal rate and regular rhythm.  Respiratory: Chest wall rises and falls symmetrically, without signs of respiratory distress.  Abdomen: Soft and non-tender.  Extremities: Warm and without edema. Calves supple, non-tender.  Psych/Behavior: Normal affect.    Neurological:    Mental status: Alert and oriented. Conversational and appropriate.       Cranial Nerves: Grossly intact.     Motor:    Upper:  Deltoids Triceps Biceps WE WF     R 5/5 5/5 5/5 5/5 5/5 5/5    L 5/5 5/5 5/5 5/5 5/5 5/5      Lower:  HF KE KF DF PF EHL    R 5/5 5/5 5/5 5/5 5/5 5/5    L 5/5 5/5 5/5 5/5 5/5 5/5     Sensory: Intact sensation to light touch in all extremities. Romberg negative.    Reflexes:          DTR: 2+ symmetrically throughout.     Bernal's: Negative.     Babinski's: Negative.     Clonus: Negative.   3+ Bilateral patellar reflexes  Cerebellar: Finger-to-nose and rapid alternating movements normal. Gait stable, fluid.    Spine:    Posture: Head well aligned over pelvis in front and side views.  No focal or global spinal deformity visible on inspection. Shoulders and hips even. No obvious leg length discrepancy. No scapula winging.    Bending: Full ROM with forward, back and lateral bending. No rib prominence with forward bend.    Cervical:      ROM: Full with flexion, extension, lateral rotation and ear-to-shoulder bend.      Midline TTP: Negative.     Spurling's test: Negative.      Lhermitte's: Negative.    Thoracic:     Midline TTP: Negative    Lumbar:     Midline TTP: Negative     Straight Leg Test: Negative     Crossed Straight Leg Test: Negative     Sciatic notch tenderness: Negative.    Other:     SI joint TTP: Negative.     Greater trochanter TTP: Negative.     Tenderness with external/internal hip rotation: Negative.    Diagnostic Results:  All imaging was independently reviewed by me.  MRI C-spine, dated 7/15/2019 :  1. Dural based homogenously enhancing lesion in the ventral interdural extramedullary of C2.  2. Slight mass effect on the left ventral cord.    CTA Neck, dated 08/06/2019 :  1. Dominant left vertebral artery.  2. Left vertebral artery that ends in PICA.    ASSESSMENT/PLAN:     Alexi Noguera is a 58 y/o male with an incidentally found intradural extramedullary mass lesion at C2, which is almost certainly a meningioma. I'd first like to rule out mets and drop mets with an MRI brain and CT C A P, and see him back to discuss options. If in fact meningioma, C2 laminoplasty for tumor resection or monitoring with serial scans are appropriate. He will follow up once the images are complete.    The patient understands and agrees with the plan of care. All questions were answered.     1. MRI Brain with and w/o  2. CT AP  3. RTC pending imaging studies    I, Dr. Eugenio Lebron personally performed the services described in this documentation. All medical record entries made by the scribe, Inder Cono, were at my direction and in my presence.  I have reviewed the chart and agree that the record reflects my personal performance and is accurate and complete.      Eugenio Lebron M.D.  Department of Neurosurgery  [facility]

## 2019-09-11 ENCOUNTER — CLINICAL SUPPORT (OUTPATIENT)
Dept: FAMILY MEDICINE | Facility: CLINIC | Age: 59
End: 2019-09-11
Payer: COMMERCIAL

## 2019-09-11 VITALS — HEART RATE: 74 BPM | DIASTOLIC BLOOD PRESSURE: 84 MMHG | SYSTOLIC BLOOD PRESSURE: 118 MMHG

## 2019-09-11 NOTE — PROGRESS NOTES
In for blood pressure check.   Last medication dose was today.   Blood pressure today is 118/84 .  Patient's blood pressure is within normal protocol limits.

## 2019-09-23 ENCOUNTER — TELEPHONE (OUTPATIENT)
Dept: SPINE | Facility: CLINIC | Age: 59
End: 2019-09-23

## 2019-09-30 ENCOUNTER — TELEPHONE (OUTPATIENT)
Dept: UROLOGY | Facility: CLINIC | Age: 59
End: 2019-09-30

## 2019-09-30 ENCOUNTER — OFFICE VISIT (OUTPATIENT)
Dept: SPINE | Facility: CLINIC | Age: 59
End: 2019-09-30
Payer: COMMERCIAL

## 2019-09-30 ENCOUNTER — TELEPHONE (OUTPATIENT)
Dept: NEUROSURGERY | Facility: CLINIC | Age: 59
End: 2019-09-30

## 2019-09-30 VITALS — SYSTOLIC BLOOD PRESSURE: 142 MMHG | HEART RATE: 88 BPM | TEMPERATURE: 98 F | DIASTOLIC BLOOD PRESSURE: 90 MMHG

## 2019-09-30 DIAGNOSIS — D49.7 INTRADURAL EXTRAMEDULLARY SPINAL TUMOR: ICD-10-CM

## 2019-09-30 DIAGNOSIS — C64.2: Primary | ICD-10-CM

## 2019-09-30 PROCEDURE — 99999 PR PBB SHADOW E&M-EST. PATIENT-LVL III: ICD-10-PCS | Mod: PBBFAC,,, | Performed by: NEUROLOGICAL SURGERY

## 2019-09-30 PROCEDURE — 99213 OFFICE O/P EST LOW 20 MIN: CPT | Mod: S$GLB,,, | Performed by: NEUROLOGICAL SURGERY

## 2019-09-30 PROCEDURE — 99999 PR PBB SHADOW E&M-EST. PATIENT-LVL III: CPT | Mod: PBBFAC,,, | Performed by: NEUROLOGICAL SURGERY

## 2019-09-30 PROCEDURE — 99213 PR OFFICE/OUTPT VISIT, EST, LEVL III, 20-29 MIN: ICD-10-PCS | Mod: S$GLB,,, | Performed by: NEUROLOGICAL SURGERY

## 2019-09-30 NOTE — TELEPHONE ENCOUNTER
----- Message from Miriam Mckenzie RN sent at 9/30/2019  3:55 PM CDT -----  Good afternoon,   Dr. Lebron saw this pt in the neurosurgery clinic today, and he has large left kidney mass suspecious for malignant tumor. He also has a spinal mass. Can you help us get him an appt with the next available urologist? It does not have to be Aneta or Togami, just the next available. Thank you so much for your help!    Thanks,  CRISTIAN Cortez

## 2019-09-30 NOTE — PROGRESS NOTES
CHIEF COMPLAINT:  Follow up with new imaging    I, Dieter Roa, attest that this documentation has been prepared under the direction and in the presence of Eugenio Lebron MD.    HPI:  Alexi Noguera is a 59 y.o. asymptomatic male with HLD and HTN, who presents today for follow up evaluation of a cervical mass with new imaging. Pt denies any urinary changes in color or frequency. He also denies any left flank pain or tenderness.       Review of patient's allergies indicates:   Allergen Reactions    Lisinopril Other (See Comments)     Dry cough       Past Medical History:   Diagnosis Date    Hyperlipidemia     Hypertension      No past surgical history on file.  Family History   Problem Relation Age of Onset    No Known Problems Mother     No Known Problems Father     No Known Problems Sister     Heart disease Brother     Diabetes Paternal Aunt     Heart disease Paternal Uncle      Social History     Tobacco Use    Smoking status: Never Smoker   Substance Use Topics    Alcohol use: No     Alcohol/week: 0.0 standard drinks    Drug use: No        Review of Systems   Constitutional: Negative.    HENT: Negative.    Eyes: Negative.    Respiratory: Negative.    Cardiovascular: Negative.    Gastrointestinal: Negative.    Endocrine: Negative.    Genitourinary: Negative.    Musculoskeletal: Negative for back pain, gait problem and neck pain.   Skin: Negative.    Allergic/Immunologic: Negative.    Neurological: Negative for weakness, light-headedness, numbness and headaches.   Hematological: Negative.    Psychiatric/Behavioral: Negative.        OBJECTIVE:   Vital Signs:  Temp: 98.1 °F (36.7 °C) (09/30/19 1507)  Pulse: 88 (09/30/19 1507)  BP: (!) 142/90 (09/30/19 1507)    Physical Exam:    Vital signs: All nursing notes and vital signs reviewed -- afebrile, vital signs stable.  Constitutional: Patient sitting comfortably in chair. Appears well developed and well nourished.  Skin: Exposed areas are intact without  abnormal markings, rashes or other lesions.  HEENT: Normocephalic. Normal conjunctivae.  Cardiovascular: Normal rate and regular rhythm.  Respiratory: Chest wall rises and falls symmetrically, without signs of respiratory distress.  Abdomen: Soft and non-tender.  Extremities: Warm and without edema. Calves supple, non-tender.  Psych/Behavior: Normal affect.    Neurological:    Mental status: Alert and oriented. Conversational and appropriate.       Cranial Nerves: Grossly intact.     Motor:    Upper:  Deltoids Triceps Biceps WE WF     R 5/5 5/5 5/5 5/5 5/5 5/5    L 5/5 5/5 5/5 5/5 5/5 5/5      Lower:  HF KE KF DF PF EHL    R 5/5 5/5 5/5 5/5 5/5 5/5    L 5/5 5/5 5/5 5/5 5/5 5/5     Sensory: Intact sensation to light touch in all extremities. Romberg negative.    Reflexes:          DTR: 3+ bilateral patellar     Bernal's: Negative.     Babinski's: Negative.     Clonus: Negative.    Cerebellar: Finger-to-nose and rapid alternating movements normal. Gait stable, fluid.    Spine:    Posture: Head well aligned over pelvis in front and side views.  No focal or global spinal deformity visible on inspection. Shoulders and hips even. No obvious leg length discrepancy. No scapula winging.    Bending: Full ROM with forward, back and lateral bending. No rib prominence with forward bend.    Cervical:      ROM: Full with flexion, extension, lateral rotation and ear-to-shoulder bend.      Midline TTP: Negative.     Spurling's test: Negative.     Lhermitte's: Negative.    Thoracic:     Midline TTP: Negative    Lumbar:     Midline TTP: Negative     Straight Leg Test: Negative     Crossed Straight Leg Test: Negative     Sciatic notch tenderness: Negative.    Other:     SI joint TTP: Negative.     Greater trochanter TTP: Negative.     Tenderness with external/internal hip rotation: Negative.    Diagnostic Results:  All imaging was independently reviewed by me.    MRI Brain, dated 9/16/2019:  1. No intracranial mass lesions    CT  Chest Abdomen Pelvis, dated 9/16/2019:  1. Large left kidney mass suggestive of primary renal neoplasm    ASSESSMENT/PLAN:     Alexi Noguera is following up for evaluation of his dural based mass lesion at C2. At last visit I initiated workup to rule out metastatic lesion. A large malignant appearing left sided kidney tumor was seen. I still suspect that the C2 lesion is a benign meningioma, but a new MRI C spine is indicated to assess for tumor growth. I will send him to Urology on an urgent basis to complete the evaluation/management of this kidney mass lesion. He will follow up with me shortly after completion of studies and Urology appointment.    The patient understands and agrees with the plan of care. All questions were answered.     1. MRI C-spine W/WO  2. Referral to Urology  3. RTC pending #1-2      I, Dr. Eugenio Lebron personally performed the services described in this documentation. All medical record entries made by the scribe, Dieter Roa, were at my direction and in my presence.  I have reviewed the chart and agree that the record reflects my personal performance and is accurate and complete.      Eugenio Lebron M.D.  Department of Neurosurgery  [facility]      .

## 2019-09-30 NOTE — TELEPHONE ENCOUNTER
Pt scheduled with Dr. Cornell in urology on 10/2/19 at 2:15PM. Called pt to notify him of appt details, pt v/u, very appreciative to be seen so quickly. I let him know that the appt is on the 4th floor of the atrium at [facility]. Provided him with the number of urology is he should have any more questions regarding the location of the urology clinic. Denies further needs at this time.

## 2019-10-02 ENCOUNTER — OFFICE VISIT (OUTPATIENT)
Dept: UROLOGY | Facility: CLINIC | Age: 59
End: 2019-10-02
Payer: COMMERCIAL

## 2019-10-02 VITALS
HEART RATE: 93 BPM | HEIGHT: 73 IN | SYSTOLIC BLOOD PRESSURE: 145 MMHG | WEIGHT: 249.13 LBS | BODY MASS INDEX: 33.02 KG/M2 | DIASTOLIC BLOOD PRESSURE: 92 MMHG

## 2019-10-02 DIAGNOSIS — N28.89 RENAL MASS: Primary | ICD-10-CM

## 2019-10-02 PROCEDURE — 99244 OFF/OP CNSLTJ NEW/EST MOD 40: CPT | Mod: S$GLB,,, | Performed by: UROLOGY

## 2019-10-02 PROCEDURE — 99999 PR PBB SHADOW E&M-EST. PATIENT-LVL III: ICD-10-PCS | Mod: PBBFAC,,, | Performed by: UROLOGY

## 2019-10-02 PROCEDURE — 99999 PR PBB SHADOW E&M-EST. PATIENT-LVL III: CPT | Mod: PBBFAC,,, | Performed by: UROLOGY

## 2019-10-02 PROCEDURE — 99244 PR OFFICE CONSULTATION,LEVEL IV: ICD-10-PCS | Mod: S$GLB,,, | Performed by: UROLOGY

## 2019-10-02 NOTE — PROGRESS NOTES
CHIEF COMPLAINT:    Mr. Noguera is a 59 y.o. male presenting for a consultation at the request of Dr. Lebron. Patient presents with a L renal mass.    PRESENTING ILLNESS:    Alexi Noguera is a 59 y.o. male who was found to have a large L renal mass while being worked up for a lesion at C2.  He underwent a CT chest/abd/pelvis that I reviewed personally.  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  He has never smoked.  No hematuria.  No flank pain.     His son is running for office in [place].    REVIEW OF SYSTEMS:    Alexi Noguera denies headache, blurred vision, fever, nausea, vomiting, chills, abdominal pain, bleeding per rectum, cough, SOB, recent loss of consciousness, recent mental status changes, seizures, dizziness, or upper or lower extremity weakness.    TIFFANY  1. 5  2. 3  3. 4  4. 5  5. 4      PATIENT HISTORY:    Past Medical History:   Diagnosis Date    Hyperlipidemia     Hypertension        History reviewed. No pertinent surgical history.    Family History   Problem Relation Age of Onset    No Known Problems Mother     No Known Problems Father     No Known Problems Sister     Heart disease Brother     Diabetes Paternal Aunt     Heart disease Paternal Uncle        Social History     Socioeconomic History    Marital status: Legally      Spouse name: Not on file    Number of children: Not on file    Years of education: Not on file    Highest education level: Not on file   Occupational History    Not on file   Social Needs    Financial resource strain: Not on file    Food insecurity:     Worry: Not on file     Inability: Not on file    Transportation needs:     Medical: Not on file     Non-medical: Not on file   Tobacco Use    Smoking status: Never Smoker   Substance and Sexual Activity    Alcohol use: No     Alcohol/week: 0.0 standard drinks    Drug use: No    Sexual activity: Not on file   Lifestyle    Physical activity:      Days per week: Not on file     Minutes per session: Not on file    Stress: Not on file   Relationships    Social connections:     Talks on phone: Not on file     Gets together: Not on file     Attends Yazidism service: Not on file     Active member of club or organization: Not on file     Attends meetings of clubs or organizations: Not on file     Relationship status: Not on file   Other Topics Concern    Not on file   Social History Narrative    Not on file       Allergies:  Lisinopril    Medications:    Current Outpatient Medications:     amLODIPine (NORVASC) 2.5 MG tablet, Take 1 tablet (2.5 mg total) by mouth once daily., Disp: 30 tablet, Rfl: 11    losartan (COZAAR) 50 MG tablet, Take 2 tablets (100 mg total) by mouth every evening., Disp: 180 tablet, Rfl: 1    naproxen (NAPROSYN) 500 MG tablet, TAKE 1 TABLET BY MOUTH TWICE A DAY, Disp: 60 tablet, Rfl: 0    pantoprazole (PROTONIX) 40 MG tablet, Take 1 tablet (40 mg total) by mouth once daily., Disp: 30 tablet, Rfl: 11    pravastatin (PRAVACHOL) 40 MG tablet, TAKE 1 TABLET BY MOUTH EVERY DAY, Disp: 90 tablet, Rfl: 3    PHYSICAL EXAMINATION:    The patient generally appears in good health, is appropriately interactive, and is in no apparent distress.     Eyes: anicteric sclerae, moist conjunctivae; no lid-lag; PERRLA     HENT: Atraumatic; oropharynx clear with moist mucous membranes and no mucosal ulcerations;normal hard and soft palate.  No evidence of lymphadenopathy.    Neck: Trachea midline.  No thyromegaly.    Musculoskeletal: No abnormal gait.    Skin: No lesions.    Mental: Cooperative with normal affect.  Is oriented to time, place, and person.    Neuro: Grossly intact.    Chest: Normal inspiratory effort.   No accessory muscles.  No audible wheezes.  Respirations symmetric on inspiration and expiration.    Heart: Regular rhythm.      Abdomen:  Soft, non-tender. No masses or organomegaly. Bladder is not palpable. No evidence of flank  discomfort. No evidence of inguinal hernia.    Genitourinary: The penis is not circumcised with no evidence of plaques or induration. The urethral meatus is normal. The testes, epididymides, and cord structures are normal in size and contour bilaterally. The scrotum is normal in size and contour.    Normal anal sphincter tone. No rectal mass.    The prostate is 40 g. Normal landmarks. Lateral sulci. Median furrow intact.  No nodularity or induration. Seminal vesicles are normal.    Extremities: No clubbing, cyanosis, or edema      LABS:      Lab Results   Component Value Date    PSA 1.4 07/18/2015       IMPRESSION:    Encounter Diagnoses   Name Primary?    Renal mass Yes         PLAN:    1. Went over his CT.  Discussed that he has a large L renal mass with IVC thrombus.  Will consult Dr. Buchanan.    Copy to:

## 2019-10-03 ENCOUNTER — LAB VISIT (OUTPATIENT)
Dept: LAB | Facility: HOSPITAL | Age: 59
End: 2019-10-03
Attending: UROLOGY
Payer: COMMERCIAL

## 2019-10-03 ENCOUNTER — OFFICE VISIT (OUTPATIENT)
Dept: UROLOGY | Facility: CLINIC | Age: 59
End: 2019-10-03
Payer: COMMERCIAL

## 2019-10-03 VITALS
DIASTOLIC BLOOD PRESSURE: 96 MMHG | RESPIRATION RATE: 16 BRPM | HEIGHT: 73 IN | WEIGHT: 249 LBS | SYSTOLIC BLOOD PRESSURE: 148 MMHG | HEART RATE: 78 BPM | BODY MASS INDEX: 33 KG/M2

## 2019-10-03 DIAGNOSIS — N28.89 LEFT RENAL MASS: Primary | ICD-10-CM

## 2019-10-03 DIAGNOSIS — N28.89 LEFT RENAL MASS: ICD-10-CM

## 2019-10-03 LAB
ALBUMIN SERPL BCP-MCNC: 4.3 G/DL (ref 3.5–5.2)
ALP SERPL-CCNC: 68 U/L (ref 55–135)
ALT SERPL W/O P-5'-P-CCNC: 18 U/L (ref 10–44)
ANION GAP SERPL CALC-SCNC: 11 MMOL/L (ref 8–16)
AST SERPL-CCNC: 20 U/L (ref 10–40)
BILIRUB SERPL-MCNC: 0.3 MG/DL (ref 0.1–1)
BUN SERPL-MCNC: 18 MG/DL (ref 8–23)
CALCIUM SERPL-MCNC: 9.8 MG/DL (ref 8.7–10.5)
CHLORIDE SERPL-SCNC: 103 MMOL/L (ref 95–110)
CO2 SERPL-SCNC: 26 MMOL/L (ref 23–29)
CREAT SERPL-MCNC: 1.4 MG/DL (ref 0.5–1.4)
EST. GFR  (AFRICAN AMERICAN): >60 ML/MIN/1.73 M^2
EST. GFR  (NON AFRICAN AMERICAN): 53.8 ML/MIN/1.73 M^2
GLUCOSE SERPL-MCNC: 91 MG/DL (ref 70–110)
POTASSIUM SERPL-SCNC: 4.2 MMOL/L (ref 3.5–5.1)
PROT SERPL-MCNC: 8.1 G/DL (ref 6–8.4)
SODIUM SERPL-SCNC: 140 MMOL/L (ref 136–145)

## 2019-10-03 PROCEDURE — 36415 COLL VENOUS BLD VENIPUNCTURE: CPT

## 2019-10-03 PROCEDURE — 99999 PR PBB SHADOW E&M-EST. PATIENT-LVL III: ICD-10-PCS | Mod: PBBFAC,,, | Performed by: UROLOGY

## 2019-10-03 PROCEDURE — 99214 OFFICE O/P EST MOD 30 MIN: CPT | Mod: S$GLB,,, | Performed by: UROLOGY

## 2019-10-03 PROCEDURE — 99214 PR OFFICE/OUTPT VISIT, EST, LEVL IV, 30-39 MIN: ICD-10-PCS | Mod: S$GLB,,, | Performed by: UROLOGY

## 2019-10-03 PROCEDURE — 99999 PR PBB SHADOW E&M-EST. PATIENT-LVL III: CPT | Mod: PBBFAC,,, | Performed by: UROLOGY

## 2019-10-03 PROCEDURE — 80053 COMPREHEN METABOLIC PANEL: CPT

## 2019-10-03 NOTE — LETTER
October 3, 2019      Todd Cornell MD  [address] [facility] [address]           [facility]  [address]  [facility] [address]  Phone: [phone]          Patient: Alexi Noguera   MR Number: [id number]   YOB: 1960   Date of Visit: 10/3/2019       Dear Dr. Todd Cornell:    Thank you for referring Alexi Noguera to me for evaluation. Attached you will find relevant portions of my assessment and plan of care.    If you have questions, please do not hesitate to call me. I look forward to following Alexi Noguera along with you.    Sincerely,    José Manuel Buchanan MD    Enclosure  CC:  No Recipients    If you would like to receive this communication electronically, please contact [email or web address] or [phone] to request more information on [employer] access.    For providers and/or their staff who would like to refer a patient to [facility], please contact us through our one-stop-shop provider referral line, [facility], at [phone].    If you feel you have received this communication in error or would no longer like to receive these types of communications, please e-mail [email or web address]

## 2019-10-03 NOTE — PROGRESS NOTES
Clinic Note  10/3/2019      Subjective:         Chief Complaint:   HPI  Alexi Noguera is a 59 y.o. male recently noted to have a large left renal mass. Consult from Dr. Cornell.  , lives in [facility]. Also has cervical spine mass. ( meningioma?).    9/9/2019 CT C/A/P W/WO  11.5 cm left mass with vein thrombus, no nodes, no mets. No definite caval involvement.Lungs clear.     Past Medical History:   Diagnosis Date    Hyperlipidemia     Hypertension      Family History   Problem Relation Age of Onset    No Known Problems Mother     No Known Problems Father     No Known Problems Sister     Heart disease Brother     Diabetes Paternal Aunt     Heart disease Paternal Uncle      Social History     Socioeconomic History    Marital status: Legally      Spouse name: Not on file    Number of children: Not on file    Years of education: Not on file    Highest education level: Not on file   Occupational History    Not on file   Social Needs    Financial resource strain: Not on file    Food insecurity:     Worry: Not on file     Inability: Not on file    Transportation needs:     Medical: Not on file     Non-medical: Not on file   Tobacco Use    Smoking status: Never Smoker   Substance and Sexual Activity    Alcohol use: No     Alcohol/week: 0.0 standard drinks    Drug use: No    Sexual activity: Not on file   Lifestyle    Physical activity:     Days per week: Not on file     Minutes per session: Not on file    Stress: Not on file   Relationships    Social connections:     Talks on phone: Not on file     Gets together: Not on file     Attends Roman Catholic service: Not on file     Active member of club or organization: Not on file     Attends meetings of clubs or organizations: Not on file     Relationship status: Not on file   Other Topics Concern    Not on file   Social History Narrative    Not on file     History reviewed. No pertinent surgical history.  Patient Active Problem List  "  Diagnosis    Obesity (BMI 30-39.9)    Essential hypertension    Stem cell donor    Hyperlipidemia    Left renal mass     Review of Systems   Constitutional: Negative for appetite change, chills, fatigue, fever and unexpected weight change.   HENT: Negative for nosebleeds.    Respiratory: Negative for shortness of breath and wheezing.    Cardiovascular: Negative for chest pain, palpitations and leg swelling.   Gastrointestinal: Negative for abdominal distention, abdominal pain, constipation, diarrhea, nausea and vomiting.   Genitourinary: Negative for dysuria and hematuria.   Musculoskeletal: Negative for arthralgias and back pain.   Skin: Negative for pallor.   Neurological: Negative for dizziness, seizures and syncope.   Hematological: Negative for adenopathy.   Psychiatric/Behavioral: Negative for dysphoric mood.         Objective:      BP (!) 148/96   Pulse 78   Resp 16   Ht 6' 1" (1.854 m)   Wt 112.9 kg (249 lb)   BMI 32.85 kg/m²   Estimated body mass index is 32.85 kg/m² as calculated from the following:    Height as of this encounter: 6' 1" (1.854 m).    Weight as of this encounter: 112.9 kg (249 lb).  Physical Exam   Constitutional: He is oriented to person, place, and time. He appears well-developed and well-nourished. No distress.   HENT:   Head: Atraumatic.   Neck: No tracheal deviation present.   Cardiovascular: Normal rate.    Pulmonary/Chest: Effort normal. No respiratory distress. He has no wheezes.   Abdominal: Soft. Bowel sounds are normal. He exhibits no distension and no mass. There is no tenderness. There is no rebound and no guarding.   Neurological: He is alert and oriented to person, place, and time.   Skin: Skin is warm and dry. He is not diaphoretic.     Psychiatric: He has a normal mood and affect. His behavior is normal. Judgment and thought content normal.         Assessment and Plan:           Problem List Items Addressed This Visit     Left renal mass - Primary    "       Follow up:   MRI abdomen W/WO to assess caval involvement  CMP  Hem Onc appointment to rule out cervical lesion being metastatic ( [facility]).  Letter to Dr. Cornell.    José Manuel Buchanan

## 2019-10-07 ENCOUNTER — OFFICE VISIT (OUTPATIENT)
Dept: HEMATOLOGY/ONCOLOGY | Facility: CLINIC | Age: 59
End: 2019-10-07
Payer: COMMERCIAL

## 2019-10-07 VITALS
SYSTOLIC BLOOD PRESSURE: 141 MMHG | BODY MASS INDEX: 33.84 KG/M2 | HEART RATE: 81 BPM | HEIGHT: 73 IN | DIASTOLIC BLOOD PRESSURE: 97 MMHG | WEIGHT: 255.31 LBS | RESPIRATION RATE: 16 BRPM | TEMPERATURE: 98 F

## 2019-10-07 DIAGNOSIS — C64.2 PRIMARY RENAL CELL CARCINOMA OF NATIVE LEFT KIDNEY: Primary | ICD-10-CM

## 2019-10-07 DIAGNOSIS — I10 ESSENTIAL HYPERTENSION: ICD-10-CM

## 2019-10-07 PROCEDURE — 99999 PR PBB SHADOW E&M-EST. PATIENT-LVL III: CPT | Mod: PBBFAC,,, | Performed by: INTERNAL MEDICINE

## 2019-10-07 PROCEDURE — 99215 OFFICE O/P EST HI 40 MIN: CPT | Mod: S$GLB,,, | Performed by: INTERNAL MEDICINE

## 2019-10-07 PROCEDURE — 99999 PR PBB SHADOW E&M-EST. PATIENT-LVL III: ICD-10-PCS | Mod: PBBFAC,,, | Performed by: INTERNAL MEDICINE

## 2019-10-07 PROCEDURE — 99215 PR OFFICE/OUTPT VISIT, EST, LEVL V, 40-54 MIN: ICD-10-PCS | Mod: S$GLB,,, | Performed by: INTERNAL MEDICINE

## 2019-10-07 RX ORDER — CYCLOBENZAPRINE HCL 10 MG
10 TABLET ORAL NIGHTLY
Refills: 1 | COMMUNITY
Start: 2019-10-04 | End: 2019-10-13

## 2019-10-07 NOTE — PROGRESS NOTES
NEW ONCOLOGY VISIT-ESTABLISH CARE.     Reason for visit: New Medical Oncology visit-establish care for newly diagnosed renal cell carcinoma    Best Contact Phone Number(s): [phone] (home)      Cancer/Stage/TNM:   Cancer Staging  No matching staging information was found for the patient.     HPI:   59 y.o. male with PMH HTN presents to oncology clinic for newly diagnosed RCC.  Patient had cervical spine pain that was evaluated with MRI spine.  Found to have a cervical spine lesion concerning for meningioma.  A CT CAP was ordered to ensure this was not a metastasis and found an 11.5 cm mass in L kidney with renal vein thrombosis concerning for RCC.  He has seen Dr. Buchanan with urology who ordered a repeat MRI spine.    Practical Problems Physical Problems   : No Appearance: No   Housing: No Bathing / Dressing: No   Insurance / Financial: No Breathing: No    Transportation: No  Changes in Urination: No    Work / School: No  Constipation: No   Treatment Decisions: Yes  Diarrhea: No     Eating: No    Family Problems Fatigue: No    Dealing with Children: No Feeling Swollen: No    Dealing with Partner: No Fevers: No    Ability to Have Children: No  Getting Around: No    Family Health Issues: No  Indigestion: No     Memory / Concentration: No   Emotional Problems Mouth Sores: No    Depression: No  Nausea: No    Fears: No  Nose Dry / Congested: No    Nervousness: Yes  Pain: No    Sadness: No Sexual: No    Worry: Yes Skin Dry / Itchy: No    Loss of Interest in Usual Activities: No Sleep: No     Substance Abuse: No    Spiritual/Religions Concerns Tingling in Hands / Feet: No   Spritual / Jainism Concerns: No         Other Problems            ROS:   Review of Systems   Constitutional: Negative for activity change, chills, fatigue, fever and unexpected weight change.   HENT: Negative for mouth sores, nosebleeds and sore throat.    Respiratory: Negative for cough, shortness of breath and wheezing.     Cardiovascular: Negative for chest pain, palpitations and leg swelling.   Gastrointestinal: Negative for abdominal distention, abdominal pain and blood in stool.   Endocrine: Negative for cold intolerance and heat intolerance.   Genitourinary: Negative for dysuria, flank pain and frequency.   Musculoskeletal: Negative for arthralgias, joint swelling and myalgias.   Skin: Negative for color change, rash and wound.   Neurological: Negative for dizziness, light-headedness and headaches.   Hematological: Negative for adenopathy. Does not bruise/bleed easily.        Past Medical History:   Past Medical History:   Diagnosis Date    Hyperlipidemia     Hypertension         Past Surgical History:   No past surgical history on file.     Family History:   Family History   Problem Relation Age of Onset    No Known Problems Mother     No Known Problems Father     No Known Problems Sister     Heart disease Brother     Diabetes Paternal Aunt     Heart disease Paternal Uncle         Social History:   Social History     Tobacco Use    Smoking status: Never Smoker   Substance Use Topics    Alcohol use: No     Alcohol/week: 0.0 standard drinks        Allergies:   Review of patient's allergies indicates:   Allergen Reactions    Lisinopril Other (See Comments)     Dry cough        Medications:   Current Outpatient Medications   Medication Sig Dispense Refill    amLODIPine (NORVASC) 2.5 MG tablet Take 1 tablet (2.5 mg total) by mouth once daily. 30 tablet 11    cyclobenzaprine (FLEXERIL) 10 MG tablet Take 10 mg by mouth every evening.  1    losartan (COZAAR) 50 MG tablet Take 2 tablets (100 mg total) by mouth every evening. 180 tablet 1    naproxen (NAPROSYN) 500 MG tablet TAKE 1 TABLET BY MOUTH TWICE A DAY 60 tablet 0    pantoprazole (PROTONIX) 40 MG tablet Take 1 tablet (40 mg total) by mouth once daily. 30 tablet 11    pravastatin (PRAVACHOL) 40 MG tablet TAKE 1 TABLET BY MOUTH EVERY DAY 90 tablet 3     No current  "facility-administered medications for this visit.         Physical Exam:   BP (!) 141/97 (BP Location: Left arm, Patient Position: Sitting, BP Method: Medium (Automatic))   Pulse 81   Temp 97.9 °F (36.6 °C) (Oral)   Resp 16   Ht 6' 1" (1.854 m)   Wt 115.8 kg (255 lb 4.7 oz)   BMI 33.68 kg/m²      ECOG Performance Status: (foot note - ECOG PS provided by Eastern Cooperative Oncology Group) 0 - Asymptomatic    Physical Exam   Constitutional: He is oriented to person, place, and time. He appears well-developed and well-nourished.   HENT:   Head: Normocephalic and atraumatic.   Eyes: Pupils are equal, round, and reactive to light. Conjunctivae and EOM are normal.   Neck: Normal range of motion. Neck supple.   Cardiovascular: Normal rate and regular rhythm. Exam reveals no friction rub.   No murmur heard.  Pulmonary/Chest: Effort normal and breath sounds normal.   Abdominal: Soft. Bowel sounds are normal. There is no tenderness.   Musculoskeletal: Normal range of motion. He exhibits no edema.   Lymphadenopathy:     He has no cervical adenopathy.     He has no axillary adenopathy.   Neurological: He is alert and oriented to person, place, and time.   Skin: Skin is warm and dry.   Psychiatric: He has a normal mood and affect. His behavior is normal.         Labs:     CMP  Sodium   Date Value Ref Range Status   10/03/2019 140 136 - 145 mmol/L Final     Potassium   Date Value Ref Range Status   10/03/2019 4.2 3.5 - 5.1 mmol/L Final     Chloride   Date Value Ref Range Status   10/03/2019 103 95 - 110 mmol/L Final     CO2   Date Value Ref Range Status   10/03/2019 26 23 - 29 mmol/L Final     Glucose   Date Value Ref Range Status   10/03/2019 91 70 - 110 mg/dL Final     BUN, Bld   Date Value Ref Range Status   10/03/2019 18 8 - 23 mg/dL Final     Creatinine   Date Value Ref Range Status   10/03/2019 1.4 0.5 - 1.4 mg/dL Final     Calcium   Date Value Ref Range Status   10/03/2019 9.8 8.7 - 10.5 mg/dL Final     Total " Protein   Date Value Ref Range Status   10/03/2019 8.1 6.0 - 8.4 g/dL Final     Albumin   Date Value Ref Range Status   10/03/2019 4.3 3.5 - 5.2 g/dL Final     Total Bilirubin   Date Value Ref Range Status   10/03/2019 0.3 0.1 - 1.0 mg/dL Final     Comment:     For infants and newborns, interpretation of results should be based  on gestational age, weight and in agreement with clinical  observations.  Premature Infant recommended reference ranges:  Up to 24 hours.............<8.0 mg/dL  Up to 48 hours............<12.0 mg/dL  3-5 days..................<15.0 mg/dL  6-29 days.................<15.0 mg/dL       Alkaline Phosphatase   Date Value Ref Range Status   10/03/2019 68 55 - 135 U/L Final     AST   Date Value Ref Range Status   10/03/2019 20 10 - 40 U/L Final     ALT   Date Value Ref Range Status   10/03/2019 18 10 - 44 U/L Final     Anion Gap   Date Value Ref Range Status   10/03/2019 11 8 - 16 mmol/L Final     eGFR if    Date Value Ref Range Status   10/03/2019 >60.0 >60 mL/min/1.73 m^2 Final     eGFR if non    Date Value Ref Range Status   10/03/2019 53.8 (A) >60 mL/min/1.73 m^2 Final     Comment:     Calculation used to obtain the estimated glomerular filtration  rate (eGFR) is the CKD-EPI equation.          Imaging: I have personally reviewed patient's CT imaging showing large L renal mass with renal vein thrombosis, but not in IVC.  CT Chest Abdomen Pelvis W W/O Contrast (XPD)  Narrative: EXAMINATION:  CT CHEST ABDOMEN PELVIS W W/O CONTRAST (XPD)    CLINICAL HISTORY:  spine tumor; Neoplasm of unspecified behavior of endocrine glands and other parts of nervous system    TECHNIQUE:  Axial images were obtained of the chest, abdomen and pelvis from the thoracic inlet through the symphysis pubis before and after the administration of intravenous contrast.    COMPARISON:  No relevant prior imaging examinations are available for correlation.    FINDINGS:  Mediastinum, hilar and  axillary regions:    There are calcifications in the left anterior descending coronary artery.  There are no enlarged lymph nodes in the mediastinal, hilar or axillary regions.  There are no pleural or pericardial effusions.    Lungs:    There is a linear focus of atelectasis or parenchymal scarring in the inferior lingula portion of the left upper lobe.    There is also mild degree of atelectasis or parenchymal scarring in the posteromedial aspect of the right lower lobe.    Abdomen and pelvis:    The liver is diffusely diminished in attenuation consistent with fibrofatty change.  The spleen, pancreas and adrenal glands are unremarkable.    There is a 1.5 cm calculus in the neck of the gallbladder.  The right kidney enhances normally.    There is a large heterogeneous mass in the left kidney measuring 11.5 x 9.6 x 10.9 cm in greatest dimension.    There is distension of the left renal vein with heterogeneous soft tissue density in the left renal vein extending to the level of the inferior vena cava suggestive of tumor thrombus.    There are calcifications in the abdominal aorta and its branch vessels.  The appendix is normal.  The urinary bladder is unremarkable.    There is herniation of mesenteric fat into the inguinal canals bilaterally.    Calcifications are noted in the prostate gland which is enlarged causing mass effect on the base of the urinary bladder.    There are phleboliths in the left hemipelvis.    Diverticulosis is noted of the colon there is scattered fecal material in the colon.  There are mildly prominent lymph nodes in the left para-aortic region.    Varices are noted in the left upper quadrant lateral and medial to the left kidney.    There is a small cyst in the anterior aspect of the midportion of the left kidney.    Delayed images:    There is contrast excretion into both renal pelvocaliceal systems and contrast accumulation in the urinary bladder.    Osseous structures:    There is  osteophytic spurring along the ventral aspect of the left sacroiliac joint.  There is bilateral spondylolysis at the L5 level with slight anterolisthesis of L5 with respect to S1.  Impression: Large mass involving the left kidney measuring at least 11.5 cm in greatest dimension with soft tissue tumor thrombus extending into the left renal vein and the junction of the renal vein and inferior vena cava as described above.  This is suggestive of a primary renal neoplasm.    Small left renal cyst.    Diffuse fatty change throughout the liver.    Diverticulosis of the colon.    Varices in the left upper quadrant.    Mildly prominent lymph nodes in the left para-aortic region.    Gallstone.    This report was called to the ordering physician on call (Dr. Cat) at 20:06 on September 16, 2018.    Electronically signed by: Alberto Mata MD  Date:    09/16/2019  Time:    20:09  MRI Brain W WO Contrast  Narrative: EXAMINATION:  MRI BRAIN W WO CONTRAST    CLINICAL HISTORY:  intradural tumor; Neoplasm of unspecified behavior of endocrine glands and other parts of nervous system    TECHNIQUE:  Brain studied using sagittal and axial T1, axial T2, FLAIR, diffusion weighted images and axial and coronal T1W images with gadolinium.    COMPARISON:  None    FINDINGS:  The ventricular system and cortical sulci are normal size.  No intracranial space-occupying mass, mass effect or abnormally enhancing lesion is seen.    Several small focal nonspecific T2 hyperintensities are present in white matter and may reflect tiny nonspecific areas of gliosis or minimal microvascular ischemic change.    Noted as seen on the sagittal and coronal post gadolinium images is enhancing intraspinal intradural extramedullary mass within the ventral aspect of the central spinal canal at the C1-2 level at and to the left of midline which is better delineated on dedicated MRI cervical spine 07/15/2019.  Please refer to report of that exam for  findings.  Impression: Several small focal nonspecific T2 hyperintensities are evident in the cerebral white matter and may reflect tiny nonspecific areas of gliosis or minimal microvascular ischemic change.    Intraspinal intradural extramedullary mass within the ventral aspect of the central spinal canal C1-2 level noted as demonstrated on prior MRI cervical spine 07/15/2019.    Electronically signed by: Antonette Gomez MD  Date:    09/16/2019  Time:    15:57            Diagnoses:       1. Primary renal cell carcinoma of native left kidney    2. Essential hypertension          Assessment and Plan:         1. Stage III RCC:   -Sites of Disease: L kidney, L renal vein   Discussed with patient that cervical lesion is unlikely metastasis from RCC, but a repeat MRI is important to ensure stability before proceeding with RCC treatment.  Discussed with Dr. Buchanan, plan will be for upfront resection if MRI spine stable.  We will follow patient closely after surgery for recurrence.  No indication for systemic treatment at this time.    2. HTN  Well controlled on current regimen    3. Pain Assessment:   0/10          he will return to clinic 3 months after surgery for follow up CT scans, but knows to call in the interim if symptoms change or should a problem arise.    Acosta Pham MD  Medical Oncology [facility]

## 2019-10-10 ENCOUNTER — OFFICE VISIT (OUTPATIENT)
Dept: UROLOGY | Facility: CLINIC | Age: 59
End: 2019-10-10
Payer: COMMERCIAL

## 2019-10-10 VITALS — HEART RATE: 73 BPM | DIASTOLIC BLOOD PRESSURE: 106 MMHG | SYSTOLIC BLOOD PRESSURE: 164 MMHG

## 2019-10-10 DIAGNOSIS — C64.2 PRIMARY RENAL CELL CARCINOMA OF NATIVE LEFT KIDNEY: Primary | ICD-10-CM

## 2019-10-10 PROCEDURE — 99214 OFFICE O/P EST MOD 30 MIN: CPT | Mod: S$GLB,,, | Performed by: UROLOGY

## 2019-10-10 PROCEDURE — 99999 PR PBB SHADOW E&M-EST. PATIENT-LVL II: ICD-10-PCS | Mod: PBBFAC,,, | Performed by: UROLOGY

## 2019-10-10 PROCEDURE — 99214 PR OFFICE/OUTPT VISIT, EST, LEVL IV, 30-39 MIN: ICD-10-PCS | Mod: S$GLB,,, | Performed by: UROLOGY

## 2019-10-10 PROCEDURE — 99999 PR PBB SHADOW E&M-EST. PATIENT-LVL II: CPT | Mod: PBBFAC,,, | Performed by: UROLOGY

## 2019-10-10 NOTE — PROGRESS NOTES
Clinic Note  10/10/2019      Subjective:         Chief Complaint:   HPI  Alexi Noguera is a 59 y.o. male recently noted to have a large left renal mass. Consult from Dr. Cornell.  , lives in [facility]. Also has cervical spine mass. ( meningioma?).     9/9/2019 CT C/A/P W/WO  11.5 cm left mass with vein thrombus, no nodes, no mets. No definite caval involvement.Lungs clear.     MRI Abd- thrombus extends to vena cava without extension, several small nodes.  MRI spine- stable lesion, most consistent with meningioma.       Past Medical History:   Diagnosis Date    Hyperlipidemia     Hypertension      Family History   Problem Relation Age of Onset    No Known Problems Mother     No Known Problems Father     No Known Problems Sister     Heart disease Brother     Diabetes Paternal Aunt     Heart disease Paternal Uncle      Social History     Socioeconomic History    Marital status: Legally      Spouse name: Not on file    Number of children: Not on file    Years of education: Not on file    Highest education level: Not on file   Occupational History    Not on file   Social Needs    Financial resource strain: Not on file    Food insecurity:     Worry: Not on file     Inability: Not on file    Transportation needs:     Medical: Not on file     Non-medical: Not on file   Tobacco Use    Smoking status: Never Smoker   Substance and Sexual Activity    Alcohol use: No     Alcohol/week: 0.0 standard drinks    Drug use: No    Sexual activity: Not on file   Lifestyle    Physical activity:     Days per week: Not on file     Minutes per session: Not on file    Stress: Not on file   Relationships    Social connections:     Talks on phone: Not on file     Gets together: Not on file     Attends Sikh service: Not on file     Active member of club or organization: Not on file     Attends meetings of clubs or organizations: Not on file     Relationship status: Not on file   Other Topics  "Concern    Not on file   Social History Narrative    Not on file     History reviewed. No pertinent surgical history.  Patient Active Problem List   Diagnosis    Obesity (BMI 30-39.9)    Essential hypertension    Stem cell donor    Hyperlipidemia    Primary renal cell carcinoma of native left kidney     Review of Systems   Constitutional: Negative for appetite change, chills, fatigue, fever and unexpected weight change.   HENT: Negative for nosebleeds.    Respiratory: Negative for shortness of breath and wheezing.    Cardiovascular: Negative for chest pain, palpitations and leg swelling.   Gastrointestinal: Negative for abdominal distention, abdominal pain, constipation, diarrhea, nausea and vomiting.   Musculoskeletal: Negative for arthralgias and back pain.   Skin: Negative for pallor.   Neurological: Negative for dizziness, seizures and syncope.   Hematological: Negative for adenopathy.   Psychiatric/Behavioral: Negative for dysphoric mood.         Objective:      BP (!) 164/106   Pulse 73   Estimated body mass index is 33.68 kg/m² as calculated from the following:    Height as of 10/7/19: 6' 1" (1.854 m).    Weight as of 10/7/19: 115.8 kg (255 lb 4.7 oz).  Physical Exam      Assessment and Plan:           Problem List Items Addressed This Visit     Primary renal cell carcinoma of native left kidney - Primary          Follow up:   1. Stage III RCC:   -Sites of Disease: L kidney, L renal vein   Discussed with patient that cervical lesion is unlikely metastasis from RCC. Discussed options: neoadjuvant targeted therapy +/- immunotherpay vs resection. Discussed nephrectomy with caval thrombectomy for large left tumor. Discussed open approach, RENETTA plane block, recuperation, complications.  I spent 25 minutes with the patient of which more than half was spent in direct consultation with the patient in regards to our treatment and plan.     The patient will meet with our  Juanita today to find a date for " surgery and begin preparation for surgery. Will also receive our handout on NPO guidelines and preop hydration. Patient will also receive information and education on preoperative skin preparation and postop wound care.     José Manuel Buchanan

## 2019-10-17 ENCOUNTER — TELEPHONE (OUTPATIENT)
Dept: NEUROSURGERY | Facility: CLINIC | Age: 59
End: 2019-10-17

## 2019-10-18 ENCOUNTER — TELEPHONE (OUTPATIENT)
Dept: HEMATOLOGY/ONCOLOGY | Facility: CLINIC | Age: 59
End: 2019-10-18

## 2019-10-18 NOTE — TELEPHONE ENCOUNTER
----- Message from Sunita Sy sent at 10/18/2019 12:33 PM CDT -----  Contact: pt   Pt called to nurse Sage have some questions about scheduling Surgery   Callback#[phone]  Thank You  NANCY Sy

## 2019-10-18 NOTE — TELEPHONE ENCOUNTER
Spoke with patient. He is calling to see if renee was able to get him a surgery date with dr oliva. Nurse reviewed chart and did not see a date scheduled yet. Nurse will follow up with renee on Monday--and will be in touch with the patient then. He voiced understanding.      Message routed to renee and dr oliva's staff

## 2019-10-21 ENCOUNTER — OFFICE VISIT (OUTPATIENT)
Dept: SPINE | Facility: CLINIC | Age: 59
End: 2019-10-21
Payer: COMMERCIAL

## 2019-10-21 VITALS — DIASTOLIC BLOOD PRESSURE: 103 MMHG | TEMPERATURE: 98 F | HEART RATE: 86 BPM | SYSTOLIC BLOOD PRESSURE: 183 MMHG

## 2019-10-21 DIAGNOSIS — D49.7 INTRADURAL EXTRAMEDULLARY SPINAL TUMOR: Primary | ICD-10-CM

## 2019-10-21 PROCEDURE — 99999 PR PBB SHADOW E&M-EST. PATIENT-LVL III: ICD-10-PCS | Mod: PBBFAC,,, | Performed by: NEUROLOGICAL SURGERY

## 2019-10-21 PROCEDURE — 99999 PR PBB SHADOW E&M-EST. PATIENT-LVL III: CPT | Mod: PBBFAC,,, | Performed by: NEUROLOGICAL SURGERY

## 2019-10-21 PROCEDURE — 99212 OFFICE O/P EST SF 10 MIN: CPT | Mod: S$GLB,,, | Performed by: NEUROLOGICAL SURGERY

## 2019-10-21 PROCEDURE — 99212 PR OFFICE/OUTPT VISIT, EST, LEVL II, 10-19 MIN: ICD-10-PCS | Mod: S$GLB,,, | Performed by: NEUROLOGICAL SURGERY

## 2019-10-21 NOTE — PROGRESS NOTES
CHIEF COMPLAINT:  Follow up after imaging and Urology consult    I, Dieter Roa, attest that this documentation has been prepared under the direction and in the presence of Eugenio Lebron MD.    HPI:  Alexi Noguera is a 59 y.o.  asymptomatic male with HLD and HTN, who presents today for follow up evaluation of a cervical mass with new imaging. Pt visited Urology and later Heme/Onc with a new diagnosis of stage 3 renal cell carcinoma in the left kidney. Today pt reports that he is planning to schedule surgery with urology for possibly November. He denies any new symptoms.       Review of patient's allergies indicates:   Allergen Reactions    Lisinopril Other (See Comments)     Dry cough       Past Medical History:   Diagnosis Date    Hyperlipidemia     Hypertension      History reviewed. No pertinent surgical history.  Family History   Problem Relation Age of Onset    No Known Problems Mother     No Known Problems Father     No Known Problems Sister     Heart disease Brother     Diabetes Paternal Aunt     Heart disease Paternal Uncle      Social History     Tobacco Use    Smoking status: Never Smoker   Substance Use Topics    Alcohol use: No     Alcohol/week: 0.0 standard drinks    Drug use: No        Review of Systems   Constitutional: Negative.    HENT: Negative.    Eyes: Negative.    Respiratory: Negative.    Cardiovascular: Negative.    Gastrointestinal: Negative.    Endocrine: Negative.    Genitourinary: Negative.    Musculoskeletal: Negative for back pain, gait problem and neck pain.   Skin: Negative.    Allergic/Immunologic: Negative.    Neurological: Negative for weakness, light-headedness, numbness and headaches.   Hematological: Negative.    Psychiatric/Behavioral: Negative.        OBJECTIVE:   Vital Signs:  Temp: 97.9 °F (36.6 °C) (10/21/19 1013)  Pulse: 93 (10/21/19 1013)  BP: (!) 178/97 (10/21/19 1013)    Physical Exam:    Vital signs: All nursing notes and vital signs reviewed --  afebrile, vital signs stable.  Constitutional: Patient sitting comfortably in chair. Appears well developed and well nourished.  Skin: Exposed areas are intact without abnormal markings, rashes or other lesions.  HEENT: Normocephalic. Normal conjunctivae.  Cardiovascular: Normal rate and regular rhythm.  Respiratory: Chest wall rises and falls symmetrically, without signs of respiratory distress.  Abdomen: Soft and non-tender.  Extremities: Warm and without edema. Calves supple, non-tender.  Psych/Behavior: Normal affect.    Neurological:    Mental status: Alert and oriented. Conversational and appropriate.       Cranial Nerves: Grossly intact.     Motor:    Upper:  Deltoids Triceps Biceps WE WF     R 5/5 5/5 5/5 5/5 5/5 5/5    L 5/5 5/5 5/5 5/5 5/5 5/5      Lower:  HF KE KF DF PF EHL    R 5/5 5/5 5/5 5/5 5/5 5/5    L 5/5 5/5 5/5 5/5 5/5 5/5     Sensory: Intact sensation to light touch in all extremities. Romberg negative.    Reflexes:          DTR: 3+ bilateral patellar     Bernal's: Negative.     Babinski's: Negative.     Clonus: Negative.    Cerebellar: Finger-to-nose and rapid alternating movements normal. Gait stable, fluid.    Spine:    Posture: Head well aligned over pelvis in front and side views.  No focal or global spinal deformity visible on inspection. Shoulders and hips even. No obvious leg length discrepancy. No scapula winging.    Bending: Full ROM with forward, back and lateral bending. No rib prominence with forward bend.    Cervical:      ROM: Full with flexion, extension, lateral rotation and ear-to-shoulder bend.      Midline TTP: Negative.     Spurling's test: Negative.     Lhermitte's: Negative.    Thoracic:     Midline TTP: Negative    Lumbar:     Midline TTP: Negative     Straight Leg Test: Negative     Crossed Straight Leg Test: Negative     Sciatic notch tenderness: Negative.    Other:     SI joint TTP: Negative.     Greater trochanter TTP: Negative.     Tenderness with  external/internal hip rotation: Negative.    Diagnostic Results:  All imaging was independently reviewed by me.    MRI C-spine, dated 10/8/2019:  1. Stable C2 intradural extramedullary tumor    ASSESSMENT/PLAN:     Alexi Noguera has a new diagnosis of RCC for which surgery is planned soon. His MRI C spine shows no tumor growth at C2, which is consistent with my suspicion of a separate benign meningioma. I will order one more surveillance MRI looking for growth in 6 months.    The patient understands and agrees with the plan of care. All questions were answered.     1. RTC 6 months with MRI C-spine W/WO      I, Dr. Eugenio Lebron personally performed the services described in this documentation. All medical record entries made by the scribe, Dieter oRa, were at my direction and in my presence.  I have reviewed the chart and agree that the record reflects my personal performance and is accurate and complete.      Eugenio Lebron M.D.  Department of Neurosurgery  [facility]      .

## 2019-10-22 ENCOUNTER — TELEPHONE (OUTPATIENT)
Dept: HEMATOLOGY/ONCOLOGY | Facility: CLINIC | Age: 59
End: 2019-10-22

## 2019-10-22 NOTE — TELEPHONE ENCOUNTER
Left message letting patient know that I do not schedule surgery for Dr. Buchanan. He would need to reach out to that office and discuss this with them. Also let him know I would send this message to Dr. Buchanan's office.

## 2019-10-22 NOTE — TELEPHONE ENCOUNTER
----- Message from Jonathan De Jesus sent at 10/22/2019 12:13 PM CDT -----  Contact: Patient  Staff Message     Caller name: Pt    Reason for call: Pt wants to speak with Sage in regards to coordinating a date for surgery with Dr Buchanan.        Communication Preference: [phone]    Additional Information:

## 2019-11-01 ENCOUNTER — TELEPHONE (OUTPATIENT)
Dept: UROLOGY | Facility: CLINIC | Age: 59
End: 2019-11-01

## 2019-11-01 DIAGNOSIS — I82.220 INFERIOR VENA CAVAL THROMBOSIS: ICD-10-CM

## 2019-11-01 DIAGNOSIS — C64.2 PRIMARY RENAL CELL CARCINOMA OF NATIVE LEFT KIDNEY: Primary | ICD-10-CM

## 2019-11-14 ENCOUNTER — TELEPHONE (OUTPATIENT)
Dept: CARDIOTHORACIC SURGERY | Facility: CLINIC | Age: 59
End: 2019-11-14

## 2019-11-14 NOTE — TELEPHONE ENCOUNTER
----- Message from Juanita Hathaway MA sent at 11/14/2019 10:27 AM CST -----  Please don't for get to add your doc to this case.

## 2019-11-26 DIAGNOSIS — Z01.818 PRE-OP TESTING: Primary | ICD-10-CM

## 2019-11-26 NOTE — ANESTHESIA PAT ROS NOTE
11/26/2019  Alexi Noguera is a 59 y.o., male.      Pre-op Assessment         Review of Systems         Anesthesia Assessment: Preoperative EQUATION    Planned Procedure: Procedure(s) (LRB):  Nephrectomy OPEN (Left)  Requested Anesthesia Type:General  Surgeon: José Manuel Buchanan MD  Service: Urology  Known or anticipated Date of Surgery:12/9/2019    Surgeon notes: reviewed    Electronic QUestionnaire Assessment completed via nurse interview with patient.        Triage considerations:     The patient has no apparent active cardiac condition (No unstable coronary Syndrome such as severe unstable angina or recent [<1 month] myocardial infarction, decompensated CHF, severe valvular   disease or significant arrhythmia)    Previous anesthesia records:Not available    Last PCP note: 3-6 months ago , within [facility] , APPOINTMENT 12/4  Subspecialty notes: Hematology/Oncology, Neurosurgery    Other important co-morbidities:  Obesity (BMI 30-39.9)   Essential hypertension   Stem cell donor   Hyperlipidemia   Primary renal cell carcinoma of native left kidney       Tests already available:  10/3 [facility]   EKG 12-lead   Order: [id number]   Status:  Final result   Visible to patient:  No (Not Released) Next appt:  12/04/2019 at 09:00 AM in Urology ( [facility]) Dx:  Stem cell donor      Narrative   Performed by: [employer]   Test Reason : Z52.001  Blood Pressure : **/** mmHG  Vent. Rate : 072 BPM     Atrial Rate : 072 BPM     P-R Int : 198 ms          QRS Dur : 112 ms      QT Int : 398 ms       P-R-T Axes : 051 065 022 degrees     QTc Int : 435 ms    Normal sinus rhythm  Normal ECG  When compared with ECG of 10-JUL-2015 09:55,  No significant change was found  Confirmed by FLOR MCGREGOR MD ([id number]) on 3/3/2017 2:52:13 PM           MRI Abdomen W WO Contrast   Order: [id number]   Status:  Final result   Visible to patient:   No (Not Released)   Next appt:  12/04/2019 at 09:00 AM in Urology ( [facility])   Dx:  Left renal mass   Details     Reading Physician Reading Date Result Priority   Alberto Mata MD 10/8/2019 Routine      Narrative     EXAMINATION:  MRI ABDOMEN W WO CONTRAST    CLINICAL HISTORY:  Renal mass, normal renal function;left renal mass with vein thrombus, assess for caval involvement and cephalad extent.; Other specified disorders of kidney and ureter    TECHNIQUE:  A series of coronal and axial imaging sequences were obtained of the abdomen before and after the administration intravenous contrast.    COMPARISON:  This examination was correlated with a prior CT scan of the abdomen and pelvis from September 16, 2019.    FINDINGS:  Lower thorax:    The visualized portions of the lung bases are clear.  There are no pleural effusions.    Abdomen:    The visualized portion of the liver is normal in signal intensity.  The spleen and pancreas are unremarkable.  The adrenal glands are not enlarged.    There is a 1.4 cm low signal intensity calculus in the neck of the gallbladder.  The gallbladder wall is not abnormally thickened.    There is a small cyst in the lateral aspect of mid to upper portion of the right kidney.    There is a large mass involving the left kidney measuring at least 13.5 x 8 cm in greatest dimension with extension of tumor thrombus into the left renal vein which is distended and into the inferior vena cava at the level of the renal vein.    The abdominal aorta is normal in caliber.  There is no free fluid in the upper abdomen.    There are mildly prominent lymph nodes in the para-aortic region at the level of the renal hilum.    Osseous structures:    The visualized osseous structures are intact.      Impression       Large mass involving the left kidney with extension of tumor thrombus into a distended left renal vein and into the inferior vena cava at the level of the left renal  vein.  This is compatible with a primary renal neoplasm.    Small cyst involving the right kidney.    1.4 cm gallstone.    Mildly prominent lymph nodes in the para-aortic region at the level of the renal hilum.      Electronically signed by: Alberto Mata MD  Date: 10/08/2019  Time: 12:14           MRI Cervical Spine W WO Cont   Order: [id number]   Status:  Final result   Visible to patient:  No (Not Released) Next appt:  12/04/2019 at 09:00 AM in Urology ( [facility]) Dx:  Intradural extramedullary spinal tumo...   Details     Reading Physician Reading Date Result Priority   Antonette Gomez MD 10/8/2019 Routine      Narrative     EXAMINATION:  MRI CERVICAL SPINE W WO CONTRAST    CLINICAL HISTORY:  spine tumor;  Malignant neoplasm of left kidney, except renal pelvis    TECHNIQUE:  Cervical spine studied using sagittal and axial T1, T2 and T1 W images with gadolinium and fat suppression technique.    COMPARISON:  Comparison MRI cervical spine with contrast 07/15/2019 and MRI cervical spine without contrast 07/03/2019.    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  Effacement of the adjacent left ventral aspect of the thecal sac and moderate deformity of the left ventral margin of the adjacent cord noted unchanged.  No associated bony abnormality is seen of the C2 vertebral segment.    No other abnormal intraspinal enhancement is seen.  The remainder of the cervical cord maintains a normal contour and signal.    Cervical alignment is anatomic.  Degenerative desiccation and loss of height of the C5-6 and C6-7 discs noted with desiccation C2-3 through C4-5.    C5-6, mild posterior spondylosis and bulging of the disc are present with uncinate hypertrophy and  associated severe right and moderate left foraminal stenosis.    C6-7, mild posterior spondylosis and bulging of the disc are present asymmetric to the left posteriorly with mild uncinate hypertrophy and mild foraminal narrowing questioned.    No disc herniation, overt canal stenosis or foraminal compromise appreciated remaining cervical or visualized upper thoracic disc levels.    No focal marrow lesion appreciated.      Impression       No appreciable change diffusely enhancing intradural extramedullary mass within the left ventral aspect central spinal canal at the C2 level.  Primary considerations remain meningioma and nerve sheath tumor although possibility of metastasis remains given provided history of primary neoplasm.      Electronically signed by: Antonette Gomez MD  Date: 10/08/2019  Time: 11:48              Optimization:  Anesthesia Preop Clinic Assessment  Indicated    Medical Opinion Indicated          Plan:    Testing:  Hematology Profile and T&S 12/4   Pre-anesthesia  visit       Visit focus: concerns in complex and/or prolonged anesthesia     Consultation:Patient's PCP for a statement of optimization      Patient  has previously scheduled Medical Appointment:12/4  Patient is medically cleared for surgery     Preoperative clearance         Electronically signed by Derrick Matthews MD at 12/4/2019  4:00 P    Navigation: Tests Scheduled.              Consults scheduled.             Results will be tracked by Preop Clinic.

## 2019-12-02 ENCOUNTER — TELEPHONE (OUTPATIENT)
Dept: PREADMISSION TESTING | Facility: HOSPITAL | Age: 59
End: 2019-12-02

## 2019-12-02 NOTE — TELEPHONE ENCOUNTER
----- Message from Shyanne Perea RN sent at 11/26/2019 11:56 AM CST -----  NEEDS LAB, POC  SURG 12/9

## 2019-12-03 ENCOUNTER — TELEPHONE (OUTPATIENT)
Dept: PREADMISSION TESTING | Facility: HOSPITAL | Age: 59
End: 2019-12-03

## 2019-12-04 ENCOUNTER — ANESTHESIA EVENT (OUTPATIENT)
Dept: SURGERY | Facility: HOSPITAL | Age: 59
DRG: 656 | End: 2019-12-04
Payer: COMMERCIAL

## 2019-12-04 ENCOUNTER — RESEARCH ENCOUNTER (OUTPATIENT)
Dept: UROLOGY | Facility: CLINIC | Age: 59
End: 2019-12-04

## 2019-12-04 ENCOUNTER — HOSPITAL ENCOUNTER (OUTPATIENT)
Dept: PREADMISSION TESTING | Facility: HOSPITAL | Age: 59
Discharge: HOME | End: 2019-12-04
Attending: ANESTHESIOLOGY
Payer: COMMERCIAL

## 2019-12-04 ENCOUNTER — OFFICE VISIT (OUTPATIENT)
Dept: FAMILY MEDICINE | Facility: CLINIC | Age: 59
End: 2019-12-04
Payer: COMMERCIAL

## 2019-12-04 ENCOUNTER — DOCUMENTATION ONLY (OUTPATIENT)
Dept: FAMILY MEDICINE | Facility: CLINIC | Age: 59
End: 2019-12-04

## 2019-12-04 ENCOUNTER — OFFICE VISIT (OUTPATIENT)
Dept: UROLOGY | Facility: CLINIC | Age: 59
End: 2019-12-04
Payer: COMMERCIAL

## 2019-12-04 VITALS
SYSTOLIC BLOOD PRESSURE: 160 MMHG | RESPIRATION RATE: 18 BRPM | HEART RATE: 88 BPM | OXYGEN SATURATION: 98 % | WEIGHT: 260 LBS | HEIGHT: 73 IN | BODY MASS INDEX: 34.46 KG/M2 | TEMPERATURE: 99 F | DIASTOLIC BLOOD PRESSURE: 84 MMHG

## 2019-12-04 VITALS
DIASTOLIC BLOOD PRESSURE: 94 MMHG | BODY MASS INDEX: 34.57 KG/M2 | WEIGHT: 260.81 LBS | HEIGHT: 73 IN | HEART RATE: 84 BPM | RESPIRATION RATE: 16 BRPM | SYSTOLIC BLOOD PRESSURE: 148 MMHG | OXYGEN SATURATION: 97 % | TEMPERATURE: 98 F

## 2019-12-04 DIAGNOSIS — N28.89 LEFT KIDNEY MASS: ICD-10-CM

## 2019-12-04 DIAGNOSIS — N28.89 LEFT RENAL MASS: ICD-10-CM

## 2019-12-04 DIAGNOSIS — I10 ESSENTIAL HYPERTENSION: ICD-10-CM

## 2019-12-04 DIAGNOSIS — Z01.818 PREOPERATIVE CLEARANCE: Primary | ICD-10-CM

## 2019-12-04 PROCEDURE — 99213 OFFICE O/P EST LOW 20 MIN: CPT | Mod: S$GLB,,, | Performed by: INTERNAL MEDICINE

## 2019-12-04 PROCEDURE — 93010 ELECTROCARDIOGRAM REPORT: CPT | Mod: S$GLB,,, | Performed by: INTERNAL MEDICINE

## 2019-12-04 PROCEDURE — 93005 EKG 12-LEAD: ICD-10-PCS | Mod: S$GLB,,, | Performed by: INTERNAL MEDICINE

## 2019-12-04 PROCEDURE — 99499 UNLISTED E&M SERVICE: CPT | Mod: S$GLB,,, | Performed by: UROLOGY

## 2019-12-04 PROCEDURE — 93010 EKG 12-LEAD: ICD-10-PCS | Mod: S$GLB,,, | Performed by: INTERNAL MEDICINE

## 2019-12-04 PROCEDURE — 99499 NO LOS: ICD-10-PCS | Mod: S$GLB,,, | Performed by: UROLOGY

## 2019-12-04 PROCEDURE — 93005 ELECTROCARDIOGRAM TRACING: CPT | Mod: S$GLB,,, | Performed by: INTERNAL MEDICINE

## 2019-12-04 PROCEDURE — 99213 PR OFFICE/OUTPT VISIT, EST, LEVL III, 20-29 MIN: ICD-10-PCS | Mod: S$GLB,,, | Performed by: INTERNAL MEDICINE

## 2019-12-04 RX ORDER — ACETAMINOPHEN 500 MG
1000 TABLET ORAL
Status: CANCELLED | OUTPATIENT
Start: 2019-12-04

## 2019-12-04 RX ORDER — HEPARIN SODIUM 5000 [USP'U]/ML
5000 INJECTION, SOLUTION INTRAVENOUS; SUBCUTANEOUS EVERY 8 HOURS
Status: CANCELLED | OUTPATIENT
Start: 2019-12-04

## 2019-12-04 RX ORDER — SODIUM CHLORIDE 9 MG/ML
INJECTION, SOLUTION INTRAVENOUS CONTINUOUS
Status: CANCELLED | OUTPATIENT
Start: 2019-12-04

## 2019-12-04 RX ORDER — KETOROLAC TROMETHAMINE 30 MG/ML
30 INJECTION, SOLUTION INTRAMUSCULAR; INTRAVENOUS
Status: CANCELLED | OUTPATIENT
Start: 2019-12-04 | End: 2019-12-07

## 2019-12-04 RX ORDER — PREGABALIN 25 MG/1
150 CAPSULE ORAL
Status: CANCELLED | OUTPATIENT
Start: 2019-12-04 | End: 2019-12-04

## 2019-12-04 RX ORDER — AMLODIPINE BESYLATE 5 MG/1
5 TABLET ORAL DAILY
Qty: 30 TABLET | Refills: 11 | Status: SHIPPED | OUTPATIENT
Start: 2019-12-04 | End: 2020-01-07

## 2019-12-04 NOTE — PROGRESS NOTES
Urology ([facility]) H&P for upcoming procedure  Staff:  José Manuel Buchanan MD    Is this patient in a research study?  Yes    CC: left renal mass    HPI:  Alexi Noguera is a 59 y.o. male with a left renal mass.     He was found to have a large L renal mass extending into the IVC while being worked up for a lesion at C2 (felt to be most consistent with a meningioma.) MRI confirmed extension into the IVC.    PMH is otherwise notable for HTN and HLD. No prior abdominal surgeries.    He has never smoked.  No hematuria.  Mild left flank pain.    ROS: Negative except for as stated above    Past Medical History:   Diagnosis Date    Hyperlipidemia     Hypertension        No past surgical history on file.    Social History     Socioeconomic History    Marital status: Legally      Spouse name: Not on file    Number of children: Not on file    Years of education: Not on file    Highest education level: Not on file   Occupational History    Not on file   Social Needs    Financial resource strain: Not on file    Food insecurity:     Worry: Not on file     Inability: Not on file    Transportation needs:     Medical: Not on file     Non-medical: Not on file   Tobacco Use    Smoking status: Never Smoker   Substance and Sexual Activity    Alcohol use: No     Alcohol/week: 0.0 standard drinks    Drug use: No    Sexual activity: Not on file   Lifestyle    Physical activity:     Days per week: Not on file     Minutes per session: Not on file    Stress: Not on file   Relationships    Social connections:     Talks on phone: Not on file     Gets together: Not on file     Attends Christianity service: Not on file     Active member of club or organization: Not on file     Attends meetings of clubs or organizations: Not on file     Relationship status: Not on file   Other Topics Concern    Not on file   Social History Narrative    Not on file       Family History   Problem Relation Age of Onset    No Known Problems  Mother     No Known Problems Father     No Known Problems Sister     Heart disease Brother     Diabetes Paternal Aunt     Heart disease Paternal Uncle        Review of patient's allergies indicates:   Allergen Reactions    Lisinopril Other (See Comments)     Dry cough       Current Outpatient Medications on File Prior to Visit   Medication Sig Dispense Refill    amLODIPine (NORVASC) 2.5 MG tablet Take 1 tablet (2.5 mg total) by mouth once daily. 30 tablet 11    losartan (COZAAR) 50 MG tablet Take 2 tablets (100 mg total) by mouth every evening. 180 tablet 1    naproxen (NAPROSYN) 500 MG tablet TAKE 1 TABLET BY MOUTH TWICE A DAY 60 tablet 0    pantoprazole (PROTONIX) 40 MG tablet Take 1 tablet (40 mg total) by mouth once daily. 30 tablet 11    pravastatin (PRAVACHOL) 40 MG tablet TAKE 1 TABLET BY MOUTH EVERY DAY 90 tablet 3     No current facility-administered medications on file prior to visit.        Anticoagulation:  No    Physical Exam:  weight 260 lb/117 kg  BMI 34    AAOx4, NAD, WDWN  NC/AT, EOMI, PER, sclerae anicteric, speech normal, tongue midline  Nl effort, CTAB  RRR  Soft, non-tender, non-distended,   Scars: none  No palpable masses    Labs:    Urine dipstick today shows negative for all components.    BUN/Cr/eGFR (date: 10/3/19): 18/1.4/53.8    H/H (date: 12/4/19): 14.4/45    LFTs (date 10/3/19) - AST 20, ALT 18, ALP 68, Ca 9.8    Lab Results   Component Value Date    PSA 1.4 07/18/2015       Imaging:   CT CAP (date 9/16/19) - left renal mass, 11 cm, enhancing, extending into left renal vein and into inferior vena cava; below hepatics.  1 left renal artery/ies, 1 left renal veins, questionable few slightly enlarged left paraaortic nodes; no obvious pulmonary involvement    MRI 10/8/19 - large left renal mass, extending into left renal vein and into IVC at the level of the renal vein; no involvement of hepatics; questionable slightly enlarged left paraaortic nodes    Assessment: Alexi Noguera  is a 59 y.o. male with left renal mass, suspicious for [id number] RCC    Plan:     1. To OR on 12/9/19 for left open radical nephrectomy and caval thrombectomy  2. Consents signed   3. I have explained the risk, benefits, and alternatives of the procedure in detail. The patient voices understanding and all questions have been answered. The patient agrees to proceed as planned.   4. The risks and benefits of participating in our clinical trial have been discussed and the patient has consented for the research study here at [facility].   5.  Type and cross ordered preoperatively. Will prepare 4 units.  6. Will get clearance from patient's PCP, Dr. Derrick Matthews.  7. RENETTA catheters with surgery.   8. After discharge, postop appointment at 3 weeks.    Juan David Crow MD

## 2019-12-04 NOTE — PROGRESS NOTES
"Subjective:      3:44 PM     Patient ID: Alexi Noguera is a 59 y.o. male.    Chief Complaint: surgical clearance (kidney,no refills needed)    HPI       C.C..  Surgical Clearance    Surgery:    Left nephrectomy and vena caval thrombectomy   Date of Surgery:     12/09/2019 by .      Patient has  tolerated anesthesia without problems in the past   Yes    Hx of cad:   no  Chest pain in last 6 mo: no  Hx of lung disease :no   Pt is a smoker: no  Hx of DVT: no  Hx of PE: no    Meds and allergies reviewed.     Functional status:  Good.       A      Review of Systems      Objective:      Vitals:    12/04/19 1525   BP: (!) 148/94   Pulse: 84   Resp: 16   Temp: 98.2 °F (36.8 °C)   TempSrc: Oral   SpO2: 97%   Weight: 118.3 kg (260 lb 12.9 oz)   Height: 6' 1" (1.854 m)   PainSc: 0-No pain     Physical Exam   Constitutional: He appears well-developed and well-nourished.   Cardiovascular: Normal rate, regular rhythm and normal heart sounds.   Pulmonary/Chest: Effort normal and breath sounds normal.   Abdominal: Soft. There is no tenderness.   Neurological: He is alert.   Psychiatric: He has a normal mood and affect. His behavior is normal. Thought content normal.   Nursing note and vitals reviewed.      EKG normal  Assessment:       1. Preoperative clearance    2. Left kidney mass    3. Essential hypertension          Plan:   Patient is medically cleared for surgery    Preoperative clearance    Left kidney mass  -     CBC auto differential; Future; Expected date: 12/04/2019  -     Comprehensive metabolic panel; Future; Expected date: 12/04/2019    Essential hypertension  -     amLODIPine (NORVASC) 5 MG tablet; Take 1 tablet (5 mg total) by mouth once daily.  Dispense: 30 tablet; Refill: 11  -     Lipid panel; Future; Expected date: 12/04/2019      Follow up in about 3 months (around 3/4/2020).      "

## 2019-12-04 NOTE — H&P (VIEW-ONLY)
Urology ([facility]) H&P for upcoming procedure  Staff:  José Manuel Buchanan MD    Is this patient in a research study?  Yes    CC: left renal mass    HPI:  Alexi Noguera is a 59 y.o. male with a left renal mass.     He was found to have a large L renal mass extending into the IVC while being worked up for a lesion at C2 (felt to be most consistent with a meningioma.) MRI confirmed extension into the IVC.    PMH is otherwise notable for HTN and HLD. No prior abdominal surgeries.    He has never smoked.  No hematuria.  Mild left flank pain.    ROS: Negative except for as stated above    Past Medical History:   Diagnosis Date    Hyperlipidemia     Hypertension        No past surgical history on file.    Social History     Socioeconomic History    Marital status: Legally      Spouse name: Not on file    Number of children: Not on file    Years of education: Not on file    Highest education level: Not on file   Occupational History    Not on file   Social Needs    Financial resource strain: Not on file    Food insecurity:     Worry: Not on file     Inability: Not on file    Transportation needs:     Medical: Not on file     Non-medical: Not on file   Tobacco Use    Smoking status: Never Smoker   Substance and Sexual Activity    Alcohol use: No     Alcohol/week: 0.0 standard drinks    Drug use: No    Sexual activity: Not on file   Lifestyle    Physical activity:     Days per week: Not on file     Minutes per session: Not on file    Stress: Not on file   Relationships    Social connections:     Talks on phone: Not on file     Gets together: Not on file     Attends [employer] service: Not on file     Active member of club or organization: Not on file     Attends meetings of clubs or organizations: Not on file     Relationship status: Not on file   Other Topics Concern    Not on file   Social History Narrative    Not on file       Family History   Problem Relation Age of Onset    No Known Problems  Mother     No Known Problems Father     No Known Problems Sister     Heart disease Brother     Diabetes Paternal Aunt     Heart disease Paternal Uncle        Review of patient's allergies indicates:   Allergen Reactions    Lisinopril Other (See Comments)     Dry cough       Current Outpatient Medications on File Prior to Visit   Medication Sig Dispense Refill    amLODIPine (NORVASC) 2.5 MG tablet Take 1 tablet (2.5 mg total) by mouth once daily. 30 tablet 11    losartan (COZAAR) 50 MG tablet Take 2 tablets (100 mg total) by mouth every evening. 180 tablet 1    naproxen (NAPROSYN) 500 MG tablet TAKE 1 TABLET BY MOUTH TWICE A DAY 60 tablet 0    pantoprazole (PROTONIX) 40 MG tablet Take 1 tablet (40 mg total) by mouth once daily. 30 tablet 11    pravastatin (PRAVACHOL) 40 MG tablet TAKE 1 TABLET BY MOUTH EVERY DAY 90 tablet 3     No current facility-administered medications on file prior to visit.        Anticoagulation:  No    Physical Exam:  weight 260 lb/117 kg  BMI 34    AAOx4, NAD, WDWN  NC/AT, EOMI, PER, sclerae anicteric, speech normal, tongue midline  Nl effort, CTAB  RRR  Soft, non-tender, non-distended,   Scars: none  No palpable masses    Labs:    Urine dipstick today shows negative for all components.    BUN/Cr/eGFR (date: 10/3/19): 18/1.4/53.8    H/H (date: 12/4/19): 14.4/45    LFTs (date 10/3/19) - AST 20, ALT 18, ALP 68, Ca 9.8    Lab Results   Component Value Date    PSA 1.4 07/18/2015       Imaging:   CT CAP (date 9/16/19) - left renal mass, 11 cm, enhancing, extending into left renal vein and into inferior vena cava; below hepatics.  1 left renal artery/ies, 1 left renal veins, questionable few slightly enlarged left paraaortic nodes; no obvious pulmonary involvement    MRI 10/8/19 - large left renal mass, extending into left renal vein and into IVC at the level of the renal vein; no involvement of hepatics; questionable slightly enlarged left paraaortic nodes    Assessment: Alexi Noguera  is a 59 y.o. male with left renal mass, suspicious for [id number] RCC    Plan:     1. To OR on 12/9/19 for left open radical nephrectomy and caval thrombectomy  2. Consents signed   3. I have explained the risk, benefits, and alternatives of the procedure in detail. The patient voices understanding and all questions have been answered. The patient agrees to proceed as planned.   4. The risks and benefits of participating in our clinical trial have been discussed and the patient has consented for the research study here at [facility].   5.  Type and cross ordered preoperatively. Will prepare 4 units.  6. Will get clearance from patient's PCP, Dr. Derrick Matthews.  7. RENETTA catheters with surgery.   8. After discharge, postop appointment at 3 weeks.    Juan David Crow MD

## 2019-12-04 NOTE — PROGRESS NOTES
Participant was consented and the informed consent process was conducted by Juan David Crow. Please see scanned documents in Media tab refecting the consenting process.

## 2019-12-04 NOTE — DISCHARGE INSTRUCTIONS
Anesthesia: General Anesthesia     You are watched continuously during your procedure by your anesthesia provider.     Youre due to have surgery. During surgery, youll be given medicine called anesthesia or anesthetic. This will keep you comfortable and pain-free. Your anesthesia provider will use general anesthesia.  What is general anesthesia?  General anesthesia puts you into a state like deep sleep. It goes into the bloodstream (IV anesthetics), into the lungs (gas anesthetics), or both. You feel nothing during the procedure. You will not remember it. During the procedure, the anesthesia provider monitors you continuously. He or she checks your heart rate and rhythm, blood pressure, breathing, and blood oxygen.  · IV anesthetics. IV anesthetics are given through an IV line in your arm. Theyre often given first. This is so you are asleep before a gas anesthetic is started. Some kinds of IV anesthetics relieve pain. Others relax you. Your doctor will decide which kind is best in your case.  · Gas anesthetics. Gas anesthetics are breathed into the lungs. They are often used to keep you asleep. They can be given through a facemask or a tube placed in your larynx or trachea (breathing tube).  ? If you have a facemask, your anesthesia provider will most likely place it over your nose and mouth while youre still awake. Youll breathe oxygen through the mask as your IV anesthetic is started. Gas anesthetic may be added through the mask.  ? If you have a tube in the larynx or trachea, it will be inserted into your throat after youre asleep.  Anesthesia tools and medicines  You will likely have:  · IV anesthetics. These are put into an IV line into your bloodstream.  · Gas anesthetics. You breathe these anesthetics into your lungs, where they pass into your bloodstream.  · Pulse oximeter. This is a small clip that is attached to the end of your finger. This measures your blood oxygen level.  · Electrocardiography  leads (electrodes). These are small sticky pads that are placed on your chest. They record your heart rate and rhythm.  · Blood pressure cuff. This reads your blood pressure.  Risks and possible complications  General anesthesia has some risks. These include:  · Breathing problems  · Nausea and vomiting  · Sore throat or hoarseness (usually temporary)  · Allergic reaction to the anesthetic  · Irregular heartbeat (rare)  · Cardiac arrest (rare)   Anesthesia safety  · Follow all instructions you are given for how long not to eat or drink before your procedure.  · Be sure your doctor knows what medicines and drugs you take. This includes over-the-counter medicines, herbs, supplements, alcohol or other drugs. You will be asked when those were last taken.  · Have an adult family member or friend drive you home after the procedure.  · For the first 24 hours after your surgery:  ? Do not drive or use heavy equipment.  ? Do not make important decisions or sign legal documents. If important decisions or signing legal documents is necessary during the first 24 hours after surgery, have a trusted family member or spouse act on your behalf.  ? Avoid alcohol.  ? Have a responsible adult stay with you. He or she can watch for problems and help keep you safe.  Date Last Reviewed: 12/1/2016  © 2324-3468 [employer]. [address] [place] [address], [place] [address]. All rights reserved. This information is not intended as a substitute for professional medical care. Always follow your healthcare professional's instructions

## 2019-12-06 ENCOUNTER — TELEPHONE (OUTPATIENT)
Dept: UROLOGY | Facility: CLINIC | Age: 59
End: 2019-12-06

## 2019-12-06 NOTE — TELEPHONE ENCOUNTER
Called pt to confirm arrival time of 6am for procedure on 12/9/2019. Gave pt NPO instructions and gave pt opportunity to ask questions. Pt verbalized understanding.

## 2019-12-09 ENCOUNTER — RESEARCH ENCOUNTER (OUTPATIENT)
Dept: UROLOGY | Facility: CLINIC | Age: 59
End: 2019-12-09

## 2019-12-09 ENCOUNTER — ANESTHESIA (OUTPATIENT)
Dept: SURGERY | Facility: HOSPITAL | Age: 59
DRG: 656 | End: 2019-12-09
Payer: COMMERCIAL

## 2019-12-09 ENCOUNTER — HOSPITAL ENCOUNTER (INPATIENT)
Facility: HOSPITAL | Age: 59
LOS: 6 days | Discharge: HOME HEALTH | DRG: 656 | End: 2019-12-15
Attending: UROLOGY | Admitting: UROLOGY
Payer: COMMERCIAL

## 2019-12-09 DIAGNOSIS — N28.89 LEFT RENAL MASS: ICD-10-CM

## 2019-12-09 DIAGNOSIS — C64.2 PRIMARY RENAL CELL CARCINOMA OF NATIVE LEFT KIDNEY: ICD-10-CM

## 2019-12-09 LAB
ALLENS TEST: ABNORMAL
ALLENS TEST: ABNORMAL
ANION GAP SERPL CALC-SCNC: 16 MMOL/L (ref 8–16)
APTT BLDCRRT: 24.7 SEC (ref 21–32)
APTT BLDCRRT: <21 SEC (ref 21–32)
BASOPHILS # BLD AUTO: 0.01 K/UL (ref 0–0.2)
BASOPHILS # BLD AUTO: 0.02 K/UL (ref 0–0.2)
BASOPHILS # BLD AUTO: 0.03 K/UL (ref 0–0.2)
BASOPHILS NFR BLD: 0.1 % (ref 0–1.9)
BASOPHILS NFR BLD: 0.1 % (ref 0–1.9)
BASOPHILS NFR BLD: 0.2 % (ref 0–1.9)
BLD PROD TYP BPU: NORMAL
BLOOD UNIT EXPIRATION DATE: NORMAL
BLOOD UNIT TYPE CODE: 6200
BLOOD UNIT TYPE: NORMAL
BUN SERPL-MCNC: 17 MG/DL (ref 8–23)
CALCIUM SERPL-MCNC: 8.2 MG/DL (ref 8.7–10.5)
CHLORIDE SERPL-SCNC: 108 MMOL/L (ref 95–110)
CO2 SERPL-SCNC: 15 MMOL/L (ref 23–29)
CODING SYSTEM: NORMAL
CREAT SERPL-MCNC: 1.4 MG/DL (ref 0.5–1.4)
DELSYS: ABNORMAL
DIFFERENTIAL METHOD: ABNORMAL
DISPENSE STATUS: NORMAL
EOSINOPHIL # BLD AUTO: 0 K/UL (ref 0–0.5)
EOSINOPHIL NFR BLD: 0 % (ref 0–8)
EOSINOPHIL NFR BLD: 0.1 % (ref 0–8)
EOSINOPHIL NFR BLD: 0.1 % (ref 0–8)
ERYTHROCYTE [DISTWIDTH] IN BLOOD BY AUTOMATED COUNT: 13.6 % (ref 11.5–14.5)
ERYTHROCYTE [DISTWIDTH] IN BLOOD BY AUTOMATED COUNT: 13.7 % (ref 11.5–14.5)
ERYTHROCYTE [DISTWIDTH] IN BLOOD BY AUTOMATED COUNT: 13.7 % (ref 11.5–14.5)
ERYTHROCYTE [SEDIMENTATION RATE] IN BLOOD BY WESTERGREN METHOD: 14 MM/H
EST. GFR  (AFRICAN AMERICAN): >60 ML/MIN/1.73 M^2
EST. GFR  (NON AFRICAN AMERICAN): 53.8 ML/MIN/1.73 M^2
FIO2: 100
GLUCOSE SERPL-MCNC: 136 MG/DL (ref 70–110)
GLUCOSE SERPL-MCNC: 142 MG/DL (ref 70–110)
GLUCOSE SERPL-MCNC: 165 MG/DL (ref 70–110)
GLUCOSE SERPL-MCNC: 168 MG/DL (ref 70–110)
GLUCOSE SERPL-MCNC: 168 MG/DL (ref 70–110)
GLUCOSE SERPL-MCNC: 222 MG/DL (ref 70–110)
GLUCOSE SERPL-MCNC: 226 MG/DL (ref 70–110)
GLUCOSE SERPL-MCNC: 256 MG/DL (ref 70–110)
HCO3 UR-SCNC: 16.3 MMOL/L (ref 24–28)
HCO3 UR-SCNC: 17.1 MMOL/L (ref 24–28)
HCO3 UR-SCNC: 17.6 MMOL/L (ref 24–28)
HCO3 UR-SCNC: 20.7 MMOL/L (ref 24–28)
HCO3 UR-SCNC: 21.7 MMOL/L (ref 24–28)
HCO3 UR-SCNC: 22 MMOL/L (ref 24–28)
HCO3 UR-SCNC: 22.3 MMOL/L (ref 24–28)
HCO3 UR-SCNC: 22.4 MMOL/L (ref 24–28)
HCT VFR BLD AUTO: 27.9 % (ref 40–54)
HCT VFR BLD AUTO: 29.2 % (ref 40–54)
HCT VFR BLD AUTO: 30 % (ref 40–54)
HCT VFR BLD CALC: 24 %PCV (ref 36–54)
HCT VFR BLD CALC: 24 %PCV (ref 36–54)
HCT VFR BLD CALC: 26 %PCV (ref 36–54)
HCT VFR BLD CALC: 27 %PCV (ref 36–54)
HCT VFR BLD CALC: 28 %PCV (ref 36–54)
HCT VFR BLD CALC: 30 %PCV (ref 36–54)
HCT VFR BLD CALC: 31 %PCV (ref 36–54)
HCT VFR BLD CALC: 31 %PCV (ref 36–54)
HGB BLD-MCNC: 8.9 G/DL (ref 14–18)
HGB BLD-MCNC: 9.3 G/DL (ref 14–18)
HGB BLD-MCNC: 9.7 G/DL (ref 14–18)
IMM GRANULOCYTES # BLD AUTO: 0.08 K/UL (ref 0–0.04)
IMM GRANULOCYTES NFR BLD AUTO: 0.4 % (ref 0–0.5)
IMM GRANULOCYTES NFR BLD AUTO: 0.4 % (ref 0–0.5)
IMM GRANULOCYTES NFR BLD AUTO: 0.5 % (ref 0–0.5)
INR PPP: 1.2 (ref 0.8–1.2)
INR PPP: 1.4 (ref 0.8–1.2)
LACTATE SERPL-SCNC: 6.1 MMOL/L (ref 0.5–2.2)
LACTATE SERPL-SCNC: 7.2 MMOL/L (ref 0.5–2.2)
LACTATE SERPL-SCNC: 7.4 MMOL/L (ref 0.5–2.2)
LDH SERPL L TO P-CCNC: 6.61 MMOL/L (ref 0.36–1.25)
LYMPHOCYTES # BLD AUTO: 1 K/UL (ref 1–4.8)
LYMPHOCYTES # BLD AUTO: 1.3 K/UL (ref 1–4.8)
LYMPHOCYTES # BLD AUTO: 1.4 K/UL (ref 1–4.8)
LYMPHOCYTES NFR BLD: 6.4 % (ref 18–48)
LYMPHOCYTES NFR BLD: 7 % (ref 18–48)
LYMPHOCYTES NFR BLD: 7.5 % (ref 18–48)
MAGNESIUM SERPL-MCNC: 2.2 MG/DL (ref 1.6–2.6)
MCH RBC QN AUTO: 29.4 PG (ref 27–31)
MCH RBC QN AUTO: 29.5 PG (ref 27–31)
MCH RBC QN AUTO: 29.5 PG (ref 27–31)
MCHC RBC AUTO-ENTMCNC: 31.8 G/DL (ref 32–36)
MCHC RBC AUTO-ENTMCNC: 31.9 G/DL (ref 32–36)
MCHC RBC AUTO-ENTMCNC: 32.3 G/DL (ref 32–36)
MCV RBC AUTO: 91 FL (ref 82–98)
MCV RBC AUTO: 92 FL (ref 82–98)
MCV RBC AUTO: 92 FL (ref 82–98)
MODE: ABNORMAL
MONOCYTES # BLD AUTO: 1 K/UL (ref 0.3–1)
MONOCYTES # BLD AUTO: 1.1 K/UL (ref 0.3–1)
MONOCYTES # BLD AUTO: 1.2 K/UL (ref 0.3–1)
MONOCYTES NFR BLD: 6.3 % (ref 4–15)
MONOCYTES NFR BLD: 6.4 % (ref 4–15)
MONOCYTES NFR BLD: 6.5 % (ref 4–15)
NEUTROPHILS # BLD AUTO: 13.3 K/UL (ref 1.8–7.7)
NEUTROPHILS # BLD AUTO: 15.4 K/UL (ref 1.8–7.7)
NEUTROPHILS # BLD AUTO: 15.5 K/UL (ref 1.8–7.7)
NEUTROPHILS NFR BLD: 85.4 % (ref 38–73)
NEUTROPHILS NFR BLD: 86 % (ref 38–73)
NEUTROPHILS NFR BLD: 86.6 % (ref 38–73)
NRBC BLD-RTO: 0 /100 WBC
PCO2 BLDA: 40.2 MMHG (ref 35–45)
PCO2 BLDA: 41.4 MMHG (ref 35–45)
PCO2 BLDA: 42.6 MMHG (ref 35–45)
PCO2 BLDA: 43 MMHG (ref 35–45)
PCO2 BLDA: 43.1 MMHG (ref 35–45)
PCO2 BLDA: 44.8 MMHG (ref 35–45)
PCO2 BLDA: 45.3 MMHG (ref 35–45)
PCO2 BLDA: 46.7 MMHG (ref 35–45)
PEEP: 5
PH SMN: 7.15 [PH] (ref 7.35–7.45)
PH SMN: 7.21 [PH] (ref 7.35–7.45)
PH SMN: 7.22 [PH] (ref 7.35–7.45)
PH SMN: 7.3 [PH] (ref 7.35–7.45)
PH SMN: 7.3 [PH] (ref 7.35–7.45)
PH SMN: 7.31 [PH] (ref 7.35–7.45)
PH SMN: 7.32 [PH] (ref 7.35–7.45)
PH SMN: 7.34 [PH] (ref 7.35–7.45)
PHOSPHATE SERPL-MCNC: 6.2 MG/DL (ref 2.7–4.5)
PLATELET # BLD AUTO: 242 K/UL (ref 150–350)
PLATELET # BLD AUTO: 263 K/UL (ref 150–350)
PLATELET # BLD AUTO: 280 K/UL (ref 150–350)
PMV BLD AUTO: 9.3 FL (ref 9.2–12.9)
PMV BLD AUTO: 9.4 FL (ref 9.2–12.9)
PMV BLD AUTO: 9.4 FL (ref 9.2–12.9)
PO2 BLDA: 114 MMHG (ref 80–100)
PO2 BLDA: 120 MMHG (ref 80–100)
PO2 BLDA: 134 MMHG (ref 80–100)
PO2 BLDA: 145 MMHG (ref 80–100)
PO2 BLDA: 153 MMHG (ref 80–100)
PO2 BLDA: 161 MMHG (ref 80–100)
PO2 BLDA: 171 MMHG (ref 80–100)
PO2 BLDA: 235 MMHG (ref 80–100)
POC BE: -10 MMOL/L
POC BE: -11 MMOL/L
POC BE: -13 MMOL/L
POC BE: -3 MMOL/L
POC BE: -4 MMOL/L
POC BE: -4 MMOL/L
POC BE: -5 MMOL/L
POC BE: -5 MMOL/L
POC IONIZED CALCIUM: 0.93 MMOL/L (ref 1.06–1.42)
POC IONIZED CALCIUM: 0.94 MMOL/L (ref 1.06–1.42)
POC IONIZED CALCIUM: 0.98 MMOL/L (ref 1.06–1.42)
POC IONIZED CALCIUM: 1.07 MMOL/L (ref 1.06–1.42)
POC IONIZED CALCIUM: 1.1 MMOL/L (ref 1.06–1.42)
POC IONIZED CALCIUM: 1.14 MMOL/L (ref 1.06–1.42)
POC IONIZED CALCIUM: 1.22 MMOL/L (ref 1.06–1.42)
POC IONIZED CALCIUM: 1.74 MMOL/L (ref 1.06–1.42)
POC SATURATED O2: 100 % (ref 95–100)
POC SATURATED O2: 98 % (ref 95–100)
POC SATURATED O2: 99 % (ref 95–100)
POC TCO2: 18 MMOL/L (ref 23–27)
POC TCO2: 18 MMOL/L (ref 23–27)
POC TCO2: 19 MMOL/L (ref 23–27)
POC TCO2: 22 MMOL/L (ref 23–27)
POC TCO2: 23 MMOL/L (ref 23–27)
POC TCO2: 23 MMOL/L (ref 23–27)
POC TCO2: 24 MMOL/L (ref 23–27)
POC TCO2: 24 MMOL/L (ref 23–27)
POTASSIUM BLD-SCNC: 3.3 MMOL/L (ref 3.5–5.1)
POTASSIUM BLD-SCNC: 4.1 MMOL/L (ref 3.5–5.1)
POTASSIUM BLD-SCNC: 4.2 MMOL/L (ref 3.5–5.1)
POTASSIUM BLD-SCNC: 4.3 MMOL/L (ref 3.5–5.1)
POTASSIUM BLD-SCNC: 4.3 MMOL/L (ref 3.5–5.1)
POTASSIUM BLD-SCNC: 4.6 MMOL/L (ref 3.5–5.1)
POTASSIUM BLD-SCNC: 4.6 MMOL/L (ref 3.5–5.1)
POTASSIUM BLD-SCNC: 5.3 MMOL/L (ref 3.5–5.1)
POTASSIUM SERPL-SCNC: 4.7 MMOL/L (ref 3.5–5.1)
PROTHROMBIN TIME: 12.2 SEC (ref 9–12.5)
PROTHROMBIN TIME: 13.8 SEC (ref 9–12.5)
PS: 10
RBC # BLD AUTO: 3.02 M/UL (ref 4.6–6.2)
RBC # BLD AUTO: 3.16 M/UL (ref 4.6–6.2)
RBC # BLD AUTO: 3.29 M/UL (ref 4.6–6.2)
SAMPLE: ABNORMAL
SITE: ABNORMAL
SITE: ABNORMAL
SODIUM BLD-SCNC: 137 MMOL/L (ref 136–145)
SODIUM BLD-SCNC: 137 MMOL/L (ref 136–145)
SODIUM BLD-SCNC: 138 MMOL/L (ref 136–145)
SODIUM BLD-SCNC: 138 MMOL/L (ref 136–145)
SODIUM BLD-SCNC: 139 MMOL/L (ref 136–145)
SODIUM BLD-SCNC: 140 MMOL/L (ref 136–145)
SODIUM SERPL-SCNC: 139 MMOL/L (ref 136–145)
SP02: 100
TRANS ERYTHROCYTES VOL PATIENT: NORMAL ML
VT: 550
WBC # BLD AUTO: 15.31 K/UL (ref 3.9–12.7)
WBC # BLD AUTO: 18.02 K/UL (ref 3.9–12.7)
WBC # BLD AUTO: 18.05 K/UL (ref 3.9–12.7)

## 2019-12-09 PROCEDURE — 36620 INSERTION CATHETER ARTERY: CPT | Mod: 59,,, | Performed by: ANESTHESIOLOGY

## 2019-12-09 PROCEDURE — 83605 ASSAY OF LACTIC ACID: CPT | Mod: 91

## 2019-12-09 PROCEDURE — 85610 PROTHROMBIN TIME: CPT | Mod: 91

## 2019-12-09 PROCEDURE — 27000221 HC OXYGEN, UP TO 24 HOURS

## 2019-12-09 PROCEDURE — 84132 ASSAY OF SERUM POTASSIUM: CPT

## 2019-12-09 PROCEDURE — 88307 PR  SURG PATH,LEVEL V: ICD-10-PCS | Mod: 26,,, | Performed by: PATHOLOGY

## 2019-12-09 PROCEDURE — P9045 ALBUMIN (HUMAN), 5%, 250 ML: HCPCS | Mod: JG | Performed by: NURSE ANESTHETIST, CERTIFIED REGISTERED

## 2019-12-09 PROCEDURE — 82330 ASSAY OF CALCIUM: CPT

## 2019-12-09 PROCEDURE — 44005 PR FREEING BOWEL ADHESION,ENTEROLYSIS: ICD-10-PCS | Mod: ,,, | Performed by: SURGERY

## 2019-12-09 PROCEDURE — 84100 ASSAY OF PHOSPHORUS: CPT

## 2019-12-09 PROCEDURE — 82803 BLOOD GASES ANY COMBINATION: CPT

## 2019-12-09 PROCEDURE — 25000003 PHARM REV CODE 250

## 2019-12-09 PROCEDURE — 85014 HEMATOCRIT: CPT

## 2019-12-09 PROCEDURE — 63600175 PHARM REV CODE 636 W HCPCS: Performed by: STUDENT IN AN ORGANIZED HEALTH CARE EDUCATION/TRAINING PROGRAM

## 2019-12-09 PROCEDURE — 94002 VENT MGMT INPAT INIT DAY: CPT

## 2019-12-09 PROCEDURE — 50230 PR REMV KIDNEY,RADICAL: ICD-10-PCS | Mod: LT,,, | Performed by: UROLOGY

## 2019-12-09 PROCEDURE — 27201037 HC PRESSURE MONITORING SET UP

## 2019-12-09 PROCEDURE — 37799 UNLISTED PX VASCULAR SURGERY: CPT

## 2019-12-09 PROCEDURE — P9021 RED BLOOD CELLS UNIT: HCPCS

## 2019-12-09 PROCEDURE — 85610 PROTHROMBIN TIME: CPT

## 2019-12-09 PROCEDURE — 64463 PVB THORACIC CONT INFUSION: CPT | Performed by: STUDENT IN AN ORGANIZED HEALTH CARE EDUCATION/TRAINING PROGRAM

## 2019-12-09 PROCEDURE — 83735 ASSAY OF MAGNESIUM: CPT

## 2019-12-09 PROCEDURE — 63600175 PHARM REV CODE 636 W HCPCS: Performed by: UROLOGY

## 2019-12-09 PROCEDURE — 27200966 HC CLOSED SUCTION SYSTEM

## 2019-12-09 PROCEDURE — 85730 THROMBOPLASTIN TIME PARTIAL: CPT

## 2019-12-09 PROCEDURE — 76942 ECHO GUIDE FOR BIOPSY: CPT | Performed by: STUDENT IN AN ORGANIZED HEALTH CARE EDUCATION/TRAINING PROGRAM

## 2019-12-09 PROCEDURE — 85025 COMPLETE CBC W/AUTO DIFF WBC: CPT | Mod: 91

## 2019-12-09 PROCEDURE — 85730 THROMBOPLASTIN TIME PARTIAL: CPT | Mod: 91

## 2019-12-09 PROCEDURE — 36000709 HC OR TIME LEV III EA ADD 15 MIN: Performed by: UROLOGY

## 2019-12-09 PROCEDURE — 27201423 OPTIME MED/SURG SUP & DEVICES STERILE SUPPLY: Performed by: UROLOGY

## 2019-12-09 PROCEDURE — D9220A PRA ANESTHESIA: ICD-10-PCS | Mod: ,,, | Performed by: ANESTHESIOLOGY

## 2019-12-09 PROCEDURE — 84295 ASSAY OF SERUM SODIUM: CPT

## 2019-12-09 PROCEDURE — 25000003 PHARM REV CODE 250: Performed by: STUDENT IN AN ORGANIZED HEALTH CARE EDUCATION/TRAINING PROGRAM

## 2019-12-09 PROCEDURE — 83605 ASSAY OF LACTIC ACID: CPT

## 2019-12-09 PROCEDURE — 25000003 PHARM REV CODE 250: Performed by: ANESTHESIOLOGY

## 2019-12-09 PROCEDURE — 99291 CRITICAL CARE FIRST HOUR: CPT | Mod: ,,, | Performed by: ANESTHESIOLOGY

## 2019-12-09 PROCEDURE — 36000708 HC OR TIME LEV III 1ST 15 MIN: Performed by: UROLOGY

## 2019-12-09 PROCEDURE — 34502 PR RECONSTRUCT, VENA CAVA: ICD-10-PCS | Mod: ,,, | Performed by: SURGERY

## 2019-12-09 PROCEDURE — 25000003 PHARM REV CODE 250: Performed by: NURSE ANESTHETIST, CERTIFIED REGISTERED

## 2019-12-09 PROCEDURE — 99900035 HC TECH TIME PER 15 MIN (STAT)

## 2019-12-09 PROCEDURE — 37000009 HC ANESTHESIA EA ADD 15 MINS: Performed by: UROLOGY

## 2019-12-09 PROCEDURE — D9220A PRA ANESTHESIA: Mod: ,,, | Performed by: ANESTHESIOLOGY

## 2019-12-09 PROCEDURE — 94761 N-INVAS EAR/PLS OXIMETRY MLT: CPT

## 2019-12-09 PROCEDURE — 34502 RECONSTRUCT VENA CAVA: CPT | Mod: ,,, | Performed by: SURGERY

## 2019-12-09 PROCEDURE — 63600175 PHARM REV CODE 636 W HCPCS: Performed by: ANESTHESIOLOGY

## 2019-12-09 PROCEDURE — 63600175 PHARM REV CODE 636 W HCPCS: Performed by: NURSE ANESTHETIST, CERTIFIED REGISTERED

## 2019-12-09 PROCEDURE — 88307 TISSUE EXAM BY PATHOLOGIST: CPT | Mod: 26,,, | Performed by: PATHOLOGY

## 2019-12-09 PROCEDURE — 37000008 HC ANESTHESIA 1ST 15 MINUTES: Performed by: UROLOGY

## 2019-12-09 PROCEDURE — 80048 BASIC METABOLIC PNL TOTAL CA: CPT

## 2019-12-09 PROCEDURE — 86920 COMPATIBILITY TEST SPIN: CPT

## 2019-12-09 PROCEDURE — 99291 PR CRITICAL CARE, E/M 30-74 MINUTES: ICD-10-PCS | Mod: ,,, | Performed by: ANESTHESIOLOGY

## 2019-12-09 PROCEDURE — 64463 PVB THORACIC CONT INFUSION: CPT | Mod: 59,50,, | Performed by: ANESTHESIOLOGY

## 2019-12-09 PROCEDURE — 20000000 HC ICU ROOM

## 2019-12-09 PROCEDURE — 99900026 HC AIRWAY MAINTENANCE (STAT)

## 2019-12-09 PROCEDURE — 64463 ERECTOR SPINAE PLANE CONTINUOUS CATHETER: ICD-10-PCS | Mod: 59,50,, | Performed by: ANESTHESIOLOGY

## 2019-12-09 PROCEDURE — 50230 REMOVAL KIDNEY OPEN RADICAL: CPT | Mod: LT,,, | Performed by: UROLOGY

## 2019-12-09 PROCEDURE — 36415 COLL VENOUS BLD VENIPUNCTURE: CPT

## 2019-12-09 PROCEDURE — S0020 INJECTION, BUPIVICAINE HYDRO: HCPCS | Performed by: ANESTHESIOLOGY

## 2019-12-09 PROCEDURE — 36620 PR INSERT CATH,ART,PERCUT,SHORTTERM: ICD-10-PCS | Mod: 59,,, | Performed by: ANESTHESIOLOGY

## 2019-12-09 PROCEDURE — 44005 FREEING OF BOWEL ADHESION: CPT | Mod: ,,, | Performed by: SURGERY

## 2019-12-09 PROCEDURE — 88307 TISSUE EXAM BY PATHOLOGIST: CPT | Performed by: PATHOLOGY

## 2019-12-09 PROCEDURE — 63600175 PHARM REV CODE 636 W HCPCS

## 2019-12-09 RX ORDER — ONDANSETRON 2 MG/ML
INJECTION INTRAMUSCULAR; INTRAVENOUS
Status: DISCONTINUED | OUTPATIENT
Start: 2019-12-09 | End: 2019-12-09

## 2019-12-09 RX ORDER — PROPOFOL 10 MG/ML
VIAL (ML) INTRAVENOUS
Status: DISCONTINUED | OUTPATIENT
Start: 2019-12-09 | End: 2019-12-09

## 2019-12-09 RX ORDER — ACETAMINOPHEN 500 MG
1000 TABLET ORAL
Status: COMPLETED | OUTPATIENT
Start: 2019-12-09 | End: 2019-12-09

## 2019-12-09 RX ORDER — MAGNESIUM SULFATE HEPTAHYDRATE 40 MG/ML
4 INJECTION, SOLUTION INTRAVENOUS
Status: DISCONTINUED | OUTPATIENT
Start: 2019-12-09 | End: 2019-12-13 | Stop reason: ALTCHOICE

## 2019-12-09 RX ORDER — PREGABALIN 50 MG/1
150 CAPSULE ORAL NIGHTLY
Status: DISCONTINUED | OUTPATIENT
Start: 2019-12-09 | End: 2019-12-15 | Stop reason: HOSPADM

## 2019-12-09 RX ORDER — PREGABALIN 150 MG/1
150 CAPSULE ORAL
Status: COMPLETED | OUTPATIENT
Start: 2019-12-09 | End: 2019-12-09

## 2019-12-09 RX ORDER — MAGNESIUM SULFATE HEPTAHYDRATE 40 MG/ML
2 INJECTION, SOLUTION INTRAVENOUS
Status: DISCONTINUED | OUTPATIENT
Start: 2019-12-09 | End: 2019-12-13 | Stop reason: ALTCHOICE

## 2019-12-09 RX ORDER — ACETAMINOPHEN 10 MG/ML
1000 INJECTION, SOLUTION INTRAVENOUS ONCE
Status: COMPLETED | OUTPATIENT
Start: 2019-12-09 | End: 2019-12-09

## 2019-12-09 RX ORDER — CEFAZOLIN SODIUM 1 G/3ML
2 INJECTION, POWDER, FOR SOLUTION INTRAMUSCULAR; INTRAVENOUS
Status: COMPLETED | OUTPATIENT
Start: 2019-12-09 | End: 2019-12-09

## 2019-12-09 RX ORDER — ACETAMINOPHEN 500 MG
1000 TABLET ORAL EVERY 6 HOURS
Status: DISCONTINUED | OUTPATIENT
Start: 2019-12-09 | End: 2019-12-11

## 2019-12-09 RX ORDER — HEPARIN SODIUM 5000 [USP'U]/ML
5000 INJECTION, SOLUTION INTRAVENOUS; SUBCUTANEOUS EVERY 8 HOURS
Status: DISCONTINUED | OUTPATIENT
Start: 2019-12-09 | End: 2019-12-09 | Stop reason: HOSPADM

## 2019-12-09 RX ORDER — MORPHINE SULFATE 2 MG/ML
2 INJECTION, SOLUTION INTRAMUSCULAR; INTRAVENOUS
Status: DISCONTINUED | OUTPATIENT
Start: 2019-12-09 | End: 2019-12-10

## 2019-12-09 RX ORDER — NOREPINEPHRINE BITARTRATE/D5W 4MG/250ML
PLASTIC BAG, INJECTION (ML) INTRAVENOUS
Status: COMPLETED
Start: 2019-12-09 | End: 2019-12-09

## 2019-12-09 RX ORDER — POTASSIUM CHLORIDE 29.8 MG/ML
80 INJECTION INTRAVENOUS
Status: DISCONTINUED | OUTPATIENT
Start: 2019-12-09 | End: 2019-12-13 | Stop reason: ALTCHOICE

## 2019-12-09 RX ORDER — KETOROLAC TROMETHAMINE 30 MG/ML
30 INJECTION, SOLUTION INTRAMUSCULAR; INTRAVENOUS
Status: DISCONTINUED | OUTPATIENT
Start: 2019-12-09 | End: 2019-12-09 | Stop reason: HOSPADM

## 2019-12-09 RX ORDER — ONDANSETRON 2 MG/ML
4 INJECTION INTRAMUSCULAR; INTRAVENOUS EVERY 12 HOURS PRN
Status: DISCONTINUED | OUTPATIENT
Start: 2019-12-09 | End: 2019-12-15 | Stop reason: HOSPADM

## 2019-12-09 RX ORDER — POTASSIUM CHLORIDE 14.9 MG/ML
60 INJECTION INTRAVENOUS
Status: DISCONTINUED | OUTPATIENT
Start: 2019-12-09 | End: 2019-12-13 | Stop reason: ALTCHOICE

## 2019-12-09 RX ORDER — PROPOFOL 10 MG/ML
20 INJECTION, EMULSION INTRAVENOUS DAILY
Status: DISCONTINUED | OUTPATIENT
Start: 2019-12-09 | End: 2019-12-10

## 2019-12-09 RX ORDER — EPINEPHRINE 0.1 MG/ML
INJECTION INTRAVENOUS
Status: DISCONTINUED | OUTPATIENT
Start: 2019-12-09 | End: 2019-12-09

## 2019-12-09 RX ORDER — PROPOFOL 10 MG/ML
INJECTION, EMULSION INTRAVENOUS
Status: COMPLETED
Start: 2019-12-09 | End: 2019-12-09

## 2019-12-09 RX ORDER — AMLODIPINE BESYLATE 2.5 MG/1
2.5 TABLET ORAL DAILY
Status: ON HOLD | COMMUNITY
End: 2019-12-12

## 2019-12-09 RX ORDER — EPHEDRINE SULFATE 50 MG/ML
INJECTION, SOLUTION INTRAVENOUS
Status: DISCONTINUED | OUTPATIENT
Start: 2019-12-09 | End: 2019-12-09

## 2019-12-09 RX ORDER — BUPIVACAINE HYDROCHLORIDE 7.5 MG/ML
INJECTION, SOLUTION EPIDURAL; RETROBULBAR
Status: COMPLETED | OUTPATIENT
Start: 2019-12-09 | End: 2019-12-09

## 2019-12-09 RX ORDER — OXYCODONE HYDROCHLORIDE 10 MG/1
10 TABLET ORAL
Status: DISCONTINUED | OUTPATIENT
Start: 2019-12-09 | End: 2019-12-10

## 2019-12-09 RX ORDER — ROPIVACAINE HYDROCHLORIDE 2 MG/ML
2 INJECTION, SOLUTION EPIDURAL; INFILTRATION; PERINEURAL CONTINUOUS
Status: DISCONTINUED | OUTPATIENT
Start: 2019-12-09 | End: 2019-12-15 | Stop reason: HOSPADM

## 2019-12-09 RX ORDER — SODIUM CHLORIDE 9 MG/ML
INJECTION, SOLUTION INTRAVENOUS CONTINUOUS
Status: DISCONTINUED | OUTPATIENT
Start: 2019-12-09 | End: 2019-12-10

## 2019-12-09 RX ORDER — MIDAZOLAM HYDROCHLORIDE 1 MG/ML
INJECTION, SOLUTION INTRAMUSCULAR; INTRAVENOUS
Status: DISCONTINUED | OUTPATIENT
Start: 2019-12-09 | End: 2019-12-09

## 2019-12-09 RX ORDER — OXYCODONE HYDROCHLORIDE 5 MG/1
5 TABLET ORAL
Status: DISCONTINUED | OUTPATIENT
Start: 2019-12-09 | End: 2019-12-10

## 2019-12-09 RX ORDER — NOREPINEPHRINE BITARTRATE/D5W 4MG/250ML
0.02 PLASTIC BAG, INJECTION (ML) INTRAVENOUS CONTINUOUS
Status: DISCONTINUED | OUTPATIENT
Start: 2019-12-09 | End: 2019-12-11

## 2019-12-09 RX ORDER — POTASSIUM CHLORIDE 29.8 MG/ML
40 INJECTION INTRAVENOUS
Status: DISCONTINUED | OUTPATIENT
Start: 2019-12-09 | End: 2019-12-13 | Stop reason: ALTCHOICE

## 2019-12-09 RX ORDER — MIDAZOLAM HYDROCHLORIDE 1 MG/ML
0.5 INJECTION INTRAMUSCULAR; INTRAVENOUS
Status: DISCONTINUED | OUTPATIENT
Start: 2019-12-09 | End: 2019-12-09 | Stop reason: HOSPADM

## 2019-12-09 RX ORDER — PHENYLEPHRINE HYDROCHLORIDE 10 MG/ML
INJECTION INTRAVENOUS
Status: DISCONTINUED | OUTPATIENT
Start: 2019-12-09 | End: 2019-12-09

## 2019-12-09 RX ORDER — PANTOPRAZOLE SODIUM 40 MG/10ML
40 INJECTION, POWDER, LYOPHILIZED, FOR SOLUTION INTRAVENOUS DAILY
Status: DISCONTINUED | OUTPATIENT
Start: 2019-12-10 | End: 2019-12-11

## 2019-12-09 RX ORDER — FENTANYL CITRATE 50 UG/ML
INJECTION, SOLUTION INTRAMUSCULAR; INTRAVENOUS
Status: DISCONTINUED | OUTPATIENT
Start: 2019-12-09 | End: 2019-12-09

## 2019-12-09 RX ORDER — ALBUMIN HUMAN 50 G/1000ML
SOLUTION INTRAVENOUS CONTINUOUS PRN
Status: DISCONTINUED | OUTPATIENT
Start: 2019-12-09 | End: 2019-12-09

## 2019-12-09 RX ORDER — KETAMINE HCL IN 0.9 % NACL 50 MG/5 ML
SYRINGE (ML) INTRAVENOUS
Status: DISCONTINUED | OUTPATIENT
Start: 2019-12-09 | End: 2019-12-09

## 2019-12-09 RX ORDER — ROCURONIUM BROMIDE 10 MG/ML
INJECTION, SOLUTION INTRAVENOUS
Status: DISCONTINUED | OUTPATIENT
Start: 2019-12-09 | End: 2019-12-09

## 2019-12-09 RX ORDER — PROPOFOL 10 MG/ML
5 INJECTION, EMULSION INTRAVENOUS CONTINUOUS
Status: DISCONTINUED | OUTPATIENT
Start: 2019-12-09 | End: 2019-12-10

## 2019-12-09 RX ORDER — DEXAMETHASONE SODIUM PHOSPHATE 4 MG/ML
INJECTION, SOLUTION INTRA-ARTICULAR; INTRALESIONAL; INTRAMUSCULAR; INTRAVENOUS; SOFT TISSUE
Status: DISCONTINUED | OUTPATIENT
Start: 2019-12-09 | End: 2019-12-09

## 2019-12-09 RX ORDER — LIDOCAINE HCL/PF 100 MG/5ML
SYRINGE (ML) INTRAVENOUS
Status: DISCONTINUED | OUTPATIENT
Start: 2019-12-09 | End: 2019-12-09

## 2019-12-09 RX ORDER — SODIUM CHLORIDE 0.9 % (FLUSH) 0.9 %
10 SYRINGE (ML) INJECTION
Status: DISCONTINUED | OUTPATIENT
Start: 2019-12-09 | End: 2019-12-15 | Stop reason: HOSPADM

## 2019-12-09 RX ORDER — FENTANYL CITRATE 50 UG/ML
25 INJECTION, SOLUTION INTRAMUSCULAR; INTRAVENOUS EVERY 5 MIN PRN
Status: DISCONTINUED | OUTPATIENT
Start: 2019-12-09 | End: 2019-12-09 | Stop reason: HOSPADM

## 2019-12-09 RX ADMIN — EPINEPHRINE 30 MCG: 0.1 INJECTION, SOLUTION ENDOTRACHEAL; INTRACARDIAC; INTRAVENOUS at 03:12

## 2019-12-09 RX ADMIN — SODIUM CHLORIDE, SODIUM GLUCONATE, SODIUM ACETATE, POTASSIUM CHLORIDE, MAGNESIUM CHLORIDE, SODIUM PHOSPHATE, DIBASIC, AND POTASSIUM PHOSPHATE: .53; .5; .37; .037; .03; .012; .00082 INJECTION, SOLUTION INTRAVENOUS at 09:12

## 2019-12-09 RX ADMIN — NOREPINEPHRINE BITARTRATE 0.06 MCG/KG/MIN: 1 INJECTION, SOLUTION, CONCENTRATE INTRAVENOUS at 02:12

## 2019-12-09 RX ADMIN — ROCURONIUM BROMIDE 20 MG: 10 INJECTION, SOLUTION INTRAVENOUS at 01:12

## 2019-12-09 RX ADMIN — Medication 0.18 MCG/KG/MIN: at 05:12

## 2019-12-09 RX ADMIN — PHENYLEPHRINE HYDROCHLORIDE 100 MCG: 10 INJECTION INTRAVENOUS at 08:12

## 2019-12-09 RX ADMIN — SODIUM CHLORIDE, SODIUM LACTATE, POTASSIUM CHLORIDE, AND CALCIUM CHLORIDE 1000 ML: .6; .31; .03; .02 INJECTION, SOLUTION INTRAVENOUS at 10:12

## 2019-12-09 RX ADMIN — DEXAMETHASONE SODIUM PHOSPHATE 8 MG: 4 INJECTION, SOLUTION INTRAMUSCULAR; INTRAVENOUS at 08:12

## 2019-12-09 RX ADMIN — SODIUM CHLORIDE, SODIUM GLUCONATE, SODIUM ACETATE, POTASSIUM CHLORIDE, MAGNESIUM CHLORIDE, SODIUM PHOSPHATE, DIBASIC, AND POTASSIUM PHOSPHATE: .53; .5; .37; .037; .03; .012; .00082 INJECTION, SOLUTION INTRAVENOUS at 10:12

## 2019-12-09 RX ADMIN — CEFAZOLIN 2 G: 330 INJECTION, POWDER, FOR SOLUTION INTRAMUSCULAR; INTRAVENOUS at 12:12

## 2019-12-09 RX ADMIN — FENTANYL CITRATE 25 MCG: 50 INJECTION, SOLUTION INTRAMUSCULAR; INTRAVENOUS at 04:12

## 2019-12-09 RX ADMIN — FENTANYL CITRATE 100 MCG: 50 INJECTION, SOLUTION INTRAMUSCULAR; INTRAVENOUS at 08:12

## 2019-12-09 RX ADMIN — SODIUM CHLORIDE, SODIUM GLUCONATE, SODIUM ACETATE, POTASSIUM CHLORIDE, MAGNESIUM CHLORIDE, SODIUM PHOSPHATE, DIBASIC, AND POTASSIUM PHOSPHATE: .53; .5; .37; .037; .03; .012; .00082 INJECTION, SOLUTION INTRAVENOUS at 01:12

## 2019-12-09 RX ADMIN — PROPOFOL 35 MCG/KG/MIN: 10 INJECTION, EMULSION INTRAVENOUS at 05:12

## 2019-12-09 RX ADMIN — CALCIUM CHLORIDE 1000 MG: 100 INJECTION, SOLUTION INTRAVENOUS at 04:12

## 2019-12-09 RX ADMIN — PROPOFOL 200 MG: 10 INJECTION, EMULSION INTRAVENOUS at 08:12

## 2019-12-09 RX ADMIN — EPINEPHRINE 10 MCG: 0.1 INJECTION, SOLUTION ENDOTRACHEAL; INTRACARDIAC; INTRAVENOUS at 02:12

## 2019-12-09 RX ADMIN — SODIUM CHLORIDE, SODIUM LACTATE, POTASSIUM CHLORIDE, AND CALCIUM CHLORIDE 1000 ML: .6; .31; .03; .02 INJECTION, SOLUTION INTRAVENOUS at 09:12

## 2019-12-09 RX ADMIN — EPHEDRINE SULFATE 5 MG: 50 INJECTION, SOLUTION INTRAMUSCULAR; INTRAVENOUS; SUBCUTANEOUS at 09:12

## 2019-12-09 RX ADMIN — SODIUM CHLORIDE, SODIUM GLUCONATE, SODIUM ACETATE, POTASSIUM CHLORIDE, MAGNESIUM CHLORIDE, SODIUM PHOSPHATE, DIBASIC, AND POTASSIUM PHOSPHATE: .53; .5; .37; .037; .03; .012; .00082 INJECTION, SOLUTION INTRAVENOUS at 08:12

## 2019-12-09 RX ADMIN — ROCURONIUM BROMIDE 20 MG: 10 INJECTION, SOLUTION INTRAVENOUS at 09:12

## 2019-12-09 RX ADMIN — EPHEDRINE SULFATE 10 MG: 50 INJECTION, SOLUTION INTRAMUSCULAR; INTRAVENOUS; SUBCUTANEOUS at 02:12

## 2019-12-09 RX ADMIN — KETOROLAC TROMETHAMINE 30 MG: 30 INJECTION, SOLUTION INTRAMUSCULAR; INTRAVENOUS at 06:12

## 2019-12-09 RX ADMIN — SODIUM CHLORIDE: 0.9 INJECTION, SOLUTION INTRAVENOUS at 06:12

## 2019-12-09 RX ADMIN — FENTANYL CITRATE 50 MCG: 50 INJECTION, SOLUTION INTRAMUSCULAR; INTRAVENOUS at 09:12

## 2019-12-09 RX ADMIN — CEFAZOLIN 2 G: 330 INJECTION, POWDER, FOR SOLUTION INTRAMUSCULAR; INTRAVENOUS at 08:12

## 2019-12-09 RX ADMIN — LIDOCAINE HYDROCHLORIDE 100 MG: 20 INJECTION, SOLUTION INTRAVENOUS at 08:12

## 2019-12-09 RX ADMIN — EPHEDRINE SULFATE 15 MG: 50 INJECTION, SOLUTION INTRAMUSCULAR; INTRAVENOUS; SUBCUTANEOUS at 02:12

## 2019-12-09 RX ADMIN — CALCIUM CHLORIDE 500 MG: 100 INJECTION, SOLUTION INTRAVENOUS at 11:12

## 2019-12-09 RX ADMIN — EPINEPHRINE 20 MCG: 0.1 INJECTION, SOLUTION ENDOTRACHEAL; INTRACARDIAC; INTRAVENOUS at 02:12

## 2019-12-09 RX ADMIN — MIDAZOLAM HYDROCHLORIDE 1 MG: 1 INJECTION, SOLUTION INTRAMUSCULAR; INTRAVENOUS at 08:12

## 2019-12-09 RX ADMIN — SODIUM CHLORIDE, SODIUM LACTATE, POTASSIUM CHLORIDE, AND CALCIUM CHLORIDE 1000 ML: .6; .31; .03; .02 INJECTION, SOLUTION INTRAVENOUS at 05:12

## 2019-12-09 RX ADMIN — SODIUM CHLORIDE, SODIUM GLUCONATE, SODIUM ACETATE, POTASSIUM CHLORIDE, MAGNESIUM CHLORIDE, SODIUM PHOSPHATE, DIBASIC, AND POTASSIUM PHOSPHATE: .53; .5; .37; .037; .03; .012; .00082 INJECTION, SOLUTION INTRAVENOUS at 11:12

## 2019-12-09 RX ADMIN — PHENYLEPHRINE HYDROCHLORIDE 200 MCG: 10 INJECTION INTRAVENOUS at 08:12

## 2019-12-09 RX ADMIN — ROPIVACAINE HYDROCHLORIDE 2 ML/HR: 2 INJECTION, SOLUTION EPIDURAL; INFILTRATION at 05:12

## 2019-12-09 RX ADMIN — HEPARIN SODIUM 5000 UNITS: 5000 INJECTION, SOLUTION INTRAVENOUS; SUBCUTANEOUS at 07:12

## 2019-12-09 RX ADMIN — PREGABALIN 150 MG: 75 CAPSULE ORAL at 10:12

## 2019-12-09 RX ADMIN — SODIUM CHLORIDE, SODIUM LACTATE, POTASSIUM CHLORIDE, AND CALCIUM CHLORIDE 1000 ML: .6; .31; .03; .02 INJECTION, SOLUTION INTRAVENOUS at 11:12

## 2019-12-09 RX ADMIN — FENTANYL CITRATE 50 MCG: 50 INJECTION INTRAMUSCULAR; INTRAVENOUS at 07:12

## 2019-12-09 RX ADMIN — FENTANYL CITRATE 50 MCG: 50 INJECTION, SOLUTION INTRAMUSCULAR; INTRAVENOUS at 02:12

## 2019-12-09 RX ADMIN — ACETAMINOPHEN 1000 MG: 10 INJECTION, SOLUTION INTRAVENOUS at 06:12

## 2019-12-09 RX ADMIN — ROCURONIUM BROMIDE 20 MG: 10 INJECTION, SOLUTION INTRAVENOUS at 12:12

## 2019-12-09 RX ADMIN — ALBUMIN (HUMAN): 12.5 SOLUTION INTRAVENOUS at 11:12

## 2019-12-09 RX ADMIN — ACETAMINOPHEN 1000 MG: 500 TABLET ORAL at 06:12

## 2019-12-09 RX ADMIN — Medication 10 MG: at 09:12

## 2019-12-09 RX ADMIN — SODIUM CHLORIDE, SODIUM GLUCONATE, SODIUM ACETATE, POTASSIUM CHLORIDE, MAGNESIUM CHLORIDE, SODIUM PHOSPHATE, DIBASIC, AND POTASSIUM PHOSPHATE: .53; .5; .37; .037; .03; .012; .00082 INJECTION, SOLUTION INTRAVENOUS at 02:12

## 2019-12-09 RX ADMIN — MIDAZOLAM HYDROCHLORIDE 2 MG: 1 INJECTION, SOLUTION INTRAMUSCULAR; INTRAVENOUS at 07:12

## 2019-12-09 RX ADMIN — ROCURONIUM BROMIDE 20 MG: 10 INJECTION, SOLUTION INTRAVENOUS at 08:12

## 2019-12-09 RX ADMIN — ACETAMINOPHEN 1000 MG: 500 TABLET ORAL at 07:12

## 2019-12-09 RX ADMIN — PROPOFOL 40 MCG/KG/MIN: 10 INJECTION, EMULSION INTRAVENOUS at 08:12

## 2019-12-09 RX ADMIN — ROCURONIUM BROMIDE 20 MG: 10 INJECTION, SOLUTION INTRAVENOUS at 10:12

## 2019-12-09 RX ADMIN — PREGABALIN 150 MG: 150 CAPSULE ORAL at 06:12

## 2019-12-09 RX ADMIN — ROCURONIUM BROMIDE 20 MG: 10 INJECTION, SOLUTION INTRAVENOUS at 11:12

## 2019-12-09 RX ADMIN — SODIUM CHLORIDE, SODIUM LACTATE, POTASSIUM CHLORIDE, AND CALCIUM CHLORIDE 1000 ML: .6; .31; .03; .02 INJECTION, SOLUTION INTRAVENOUS at 07:12

## 2019-12-09 RX ADMIN — SUGAMMADEX 400 MG: 100 INJECTION, SOLUTION INTRAVENOUS at 04:12

## 2019-12-09 RX ADMIN — ROCURONIUM BROMIDE 50 MG: 10 INJECTION, SOLUTION INTRAVENOUS at 08:12

## 2019-12-09 RX ADMIN — SODIUM CHLORIDE 0.25 MCG/KG/MIN: 9 INJECTION, SOLUTION INTRAVENOUS at 08:12

## 2019-12-09 RX ADMIN — Medication 30 MG: at 08:12

## 2019-12-09 RX ADMIN — CALCIUM CHLORIDE 1000 MG: 100 INJECTION, SOLUTION INTRAVENOUS at 01:12

## 2019-12-09 RX ADMIN — ROCURONIUM BROMIDE 20 MG: 10 INJECTION, SOLUTION INTRAVENOUS at 02:12

## 2019-12-09 RX ADMIN — ONDANSETRON 4 MG: 2 INJECTION INTRAMUSCULAR; INTRAVENOUS at 04:12

## 2019-12-09 RX ADMIN — Medication 10 MG: at 12:12

## 2019-12-09 RX ADMIN — BUPIVACAINE HYDROCHLORIDE 30 ML: 7.5 INJECTION, SOLUTION EPIDURAL; RETROBULBAR at 08:12

## 2019-12-09 RX ADMIN — SODIUM CHLORIDE, SODIUM LACTATE, POTASSIUM CHLORIDE, AND CALCIUM CHLORIDE 1000 ML: .6; .31; .03; .02 INJECTION, SOLUTION INTRAVENOUS at 08:12

## 2019-12-09 NOTE — NURSING
"Report received from Anesthesia. Pt transported to SICU [id number] with portable telemetryAmbubag" in use. Pt connected to ICU monitor Ventilator. SICU called and made aware of patient arrival. New orders received and implemented. Pt assessed, immediate needs met. Family brought to bedside, updated on the patient's current condition and PoC for remainder of shift. Family also given ICU Welcome packet and educated on visiting hours. All questions answered, emotional support provided.     Admit Skin Note: No skin break down noted at this time.     "

## 2019-12-09 NOTE — PLAN OF CARE
12/09/19 1421   Discharge Assessment   Assessment Type Discharge Planning Assessment   Confirmed/corrected address and phone number on facesheet? Yes   Assessment information obtained from? Medical Record   Expected Length of Stay (days) 4   Communicated expected length of stay with patient/caregiver yes   Prior to hospitilization cognitive status: Alert/Oriented   Prior to hospitalization functional status: Independent   Current cognitive status: Unable to Assess  (Pt is currently in OR)   Current Functional Status: Completely Dependent   Facility Arrived From: home for planned surgery   Lives With other (see comments)  (Pt is legally  on face sheet. TBD)   Able to Return to Prior Arrangements yes   Is patient able to care for self after discharge? Unable to determine at this time (comments)   Who are your caregiver(s) and their phone number(s)? дмитрий Gregory . [phone]   Patient's perception of discharge disposition home or selfcare   Readmission Within the Last 30 Days no previous admission in last 30 days   Patient currently being followed by outpatient case management? No   Patient currently receives any other outside agency services? No   Equipment Currently Used at Home none   Do you have any problems affording any of your prescribed medications? TBD   Is the patient taking medications as prescribed? yes   Does the patient have transportation home? Yes   Transportation Anticipated family or friend will provide   Does the patient receive services at the [facility]mad[facility] Clinic? No  (IVC thrombus pt may require AC on discharge)   Discharge Plan A Home with family   Discharge Plan B Home with family;Home Health   DME Needed Upon Discharge  other (see comments)  (TBD)   Patient/Family in Agreement with Plan unable to assess  (Pt is currently in OR)     Pt admitted today for Open Radical Left Nephrectomy with IVC thrombus removal.  Assessment completed per chart review. Will follow up with patient and  caregiver upon arrival to floor. Pt lives in [place], [place]. Has family support. Was independent with ADL's prior to admission.        [id number] - [place], [place] - [address]  [address]  [place] [address]  Phone: [phone] Fax: [phone]    PCP  Derrick Matthews MD   [phone] phone  [phone] fax    Payor: [employer] / Plan: [employer] / Product Type: Commercial /     Current insurance plan per auth/cert does not require clinical authorization.

## 2019-12-09 NOTE — INTERVAL H&P NOTE
The patient has been examined and the H&P has been reviewed:    I concur with the findings and no changes have occurred since H&P was written.     To OR today for left nephrectomy with IVC thrombectomy     Anesthesia/Surgery risks, benefits and alternative options discussed and understood by patient/family.          Active Hospital Problems    Diagnosis  POA    Left renal mass [N28.89]  Yes      Resolved Hospital Problems   No resolved problems to display.

## 2019-12-09 NOTE — BRIEF OP NOTE
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

## 2019-12-09 NOTE — ANESTHESIA PREPROCEDURE EVALUATION
12/09/2019  Alexi Noguera is a 59 y.o., male.    Anesthesia Evaluation    I have reviewed the Patient Summary Reports.     I have reviewed the Nursing Notes.   I have reviewed the Medications.     Review of Systems  Anesthesia Hx:  No problems with previous Anesthesia Denies Hx of Anesthetic complications  Denies Family Hx of Anesthesia complications.   Denies Personal Hx of Anesthesia complications.   Social:  Non-Smoker, No Alcohol Use    Cardiovascular:   Hypertension, well controlled    Renal/:   Chronic Renal Disease renal calculi        Physical Exam  General:  Well nourished, Obesity    Airway/Jaw/Neck:  Airway Findings: Mouth Opening: Normal Tongue: Normal  General Airway Assessment: Adult  Mallampati: II  TM Distance: Normal, at least 6 cm  Jaw/Neck Findings:  Neck ROM: Normal ROM      Dental:  Dental Findings: In tact   Chest/Lungs:  Chest/Lungs Findings: Clear to auscultation, Normal Respiratory Rate     Heart/Vascular:  Heart Findings: Rate: Normal  Rhythm: Regular Rhythm        Mental Status:  Mental Status Findings:  Cooperative, Alert and Oriented         Anesthesia Plan  Type of Anesthesia, risks & benefits discussed:  Anesthesia Type:  general, regional  Patient's Preference:   Intra-op Monitoring Plan: standard ASA monitors and arterial line  Intra-op Monitoring Plan Comments:   Post Op Pain Control Plan: multimodal analgesia, IV/PO Opioids PRN and per primary service following discharge from PACU  Post Op Pain Control Plan Comments:   Induction:   IV  Beta Blocker:  Patient is not currently on a Beta-Blocker (No further documentation required).       Informed Consent: Patient understands risks and agrees with Anesthesia plan.  Questions answered. Anesthesia consent signed with patient.  ASA Score: 3     Day of Surgery Review of History & Physical:    H&P update referred to the surgeon.          Ready For Surgery From Anesthesia Perspective.

## 2019-12-09 NOTE — ANESTHESIA PROCEDURE NOTES
Arterial    Diagnosis: Left kidney tumor  Doctor requesting consult: óscar    Patient location during procedure: done in OR  Procedure start time: 12/9/2019 8:21 AM  Timeout: 12/9/2019 8:21 AM  Procedure end time: 12/9/2019 8:27 AM    Staffing  Authorizing Provider: Nomi Garrison MD  Performing Provider: Nomi Garrison MD    Anesthesiologist was present at the time of the procedure.    Preanesthetic Checklist  Completed: patient identified, site marked, surgical consent, pre-op evaluation, timeout performed, IV checked, risks and benefits discussed, monitors and equipment checked and anesthesia consent givenArterial  Skin Prep: chlorhexidine gluconate  Local Infiltration: none  Orientation: right  Location: radial  Catheter Size: 20 G  Catheter placement by Anatomical landmarks. Heme positive aspiration all ports.Insertion Attempts: 2  Assessment  Dressing: secured with tape and tegaderm  Patient: Tolerated well

## 2019-12-09 NOTE — ANESTHESIA PROCEDURE NOTES
Erector Spinae Plane Continuous Catheter    Patient location during procedure: pre-op   Block not for primary anesthetic.  Reason for block: at surgeon's request and post-op pain management   Post-op Pain Location: Abdominal pain  Start time: 12/9/2019 7:30 AM  Timeout: 12/9/2019 7:29 AM   End time: 12/9/2019 8:00 AM    Staffing  Authorizing Provider: Henrry Burch MD  Performing Provider: Joe Rodriguez MD    Preanesthetic Checklist  Completed: patient identified, site marked, surgical consent, pre-op evaluation, timeout performed, IV checked, risks and benefits discussed and monitors and equipment checked  Peripheral Block  Patient position: sitting  Prep: ChloraPrep  Patient monitoring: heart rate, cardiac monitor, continuous pulse ox, continuous capnometry and frequent blood pressure checks  Block type: erector spinae plane (Erector Spinae Plane)  Laterality: bilateral  Injection technique: continuous  Location: T8-9  Needle  Needle type: Tuohy   Needle gauge: 17 G  Needle length: 3.5 in  Needle localization: anatomical landmarks and ultrasound guidance  Catheter type: spring wound  Catheter size: 19 G  Test dose: lidocaine 1.5% with Epi 1-to-200,000 and negative   -ultrasound image captured on disc.  Assessment  Injection assessment: negative aspiration, negative parasthesia and local visualized surrounding nerve  Paresthesia pain: none  Heart rate change: no  Slow fractionated injection: yes  Additional Notes  Patient tolerated very well.  Vital signs stable.  See [facility] RN charting for vital signs. Administered 30mL 0.375% bupivacaine bilaterally.

## 2019-12-09 NOTE — TRANSFER OF CARE
Anesthesia Transfer of Care Note    Patient: Alexi Noguera    Procedure(s) Performed: Procedure(s) (LRB):  Nephrectomy OPEN (Left)  LYSIS, ADHESIONS (N/A)  THROMBECTOMY, VENA CAVA  THROMBECTOMY, VENA CAVA  REPAIR of IVC    Patient location: PACU    Anesthesia Type: general    Transport from OR: Transported from OR intubated on 100% O2 by AMBU with assisted ventilation. Upon arrival to PACU/ICU, patient attached to ventilator and auscultated to confirm bilateral breath sounds and adequate TV. Continuous ECG monitoring in transport. Continuous SpO2 monitoring in transport. Continuos invasive BP monitoring in transport    Post pain: adequate analgesia    Post assessment: no apparent anesthetic complications    Post vital signs: stable    Level of consciousness: sedated    Nausea/Vomiting: no nausea/vomiting    Complications: other (see comments), Intraoperative blood loss. 7 liters     Transfer of care protocol was followedComments: Patient transferred to ICU in stable condition Intubated and on Levophed. Report given to RN and Physician.       Last vitals:   Visit Vitals  /80 (BP Location: Right arm, Patient Position: Sitting)   Pulse 102   Temp (P) 36.7 °C (98.1 °F) (Oral)   Resp 19   Wt 117.9 kg (260 lb)   SpO2 100%   BMI 34.30 kg/m²

## 2019-12-09 NOTE — CARE UPDATE
Pt came up from the OR s/p nephrectomy intubated with a 7.5 ETT secured 23cm at the lip. Pt was placed on the vent at the documented settings. Post op ABG obtained. Will continue to monitor.

## 2019-12-09 NOTE — OP NOTE
[facility]  Surgery Department  Operative Note       Date of Procedure: 12/9/2019       Surgeon(s):    Panel 2:     * Tr Jorgensen MD - Primary    Pre-Operative Diagnosis:   1. Primary renal cell carcinoma of native left kidney [C64.2]  2. 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 Same     Anesthesia: General    Operative Findings:   1. Intraoperative consult to mobilize pancreatic tail / spleen    Procedures:  1. Enterolysis    Estimated Blood Loss (EBL):  Per urology note     Specimen(s):    Specimen (12h ago, onward)    None                 Indications:  Alexi Noguera presents for the above procedures.  Risks and benefits were reviewed including bleeding, infection, damage to local structures, need for additional procedures, death, and imponderables.  He understands and gave informed consent to proceed.    Details:  This patient was undergoing left nephrectomy for a locally advanced renal mass with caval thrombosis by Dr. Buchanan and his team.  I was asked to join and help with the mobilization of the pancreas and spleen.     The field was examined.  The left colon had been mobilized and some saprophytic vessels coursing over the left kidney had been ligated.  This patient was quite deep and the exposure was difficult.  We entered the lesser sac through the gastrocolic ligament and  the stomach from the pancreatic tail.  There were significant retrogastric adhesions which were carefully divided until we had the body and tail of the pancreas exposed.  The pancreas and spleen were elevated superiorly off the left kidney, placed under laps and retracted gently with the Bookwalter.  We did not have to mobilize the spleen out of its bed.  Lateral retroperitoneal attachments of the kidney were taken with the Ligasure up to the level of the left adrenal.  The case was turned back over to Dr. Buchanan at this point.      Attestation: I performed the  procedure.

## 2019-12-09 NOTE — OP NOTE
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    Obesity (BMI 30-39.9) 05/05/2016    Essential hypertension 05/05/2016         Post-Op Diagnosis: same    Procedure(s) Performed:   left radical nephrectomy with cavotomy and IVC thrombectomy- open    Reason for 22 modifier:  This case took 300% the expected time due to multiple factors, including:  Extremely difficult left hilar dissection due to adherent tissue planes, very large saprophytic veins surrounding the entire kidney, obesity causing the surgical field to be very deep within the abdomen and retroperitoneum, and adhesions at the upper pole of the kidney near the pancreas and spleen necessitating intraoperative consultation by surgical oncology.      Specimen(s):  1.  left kidney and caval thrombus    Staff Surgeon: José Manuel Buchanan MD    Assistant Surgeon: Petar Saravia MD; Iesha Irizarry MD    Surgical consultants:  Dr. Jorgensen-- surgical oncology;  Dr. Mcclure-- vascular surgery    Anesthesia: General endotracheal anesthesia    Indications: Alexi Noguera is a 59 y.o. male HTN and HLD with incidentally discovered left renal mass with level I caval tumor thrombus concerning for cT3b N0 M0 renal cell carcinoma.        Findings:   - large left renal mass with caval thrombus with tip palpable in IVC within 1-2 cm of renal vein ostium  - left renal hilar dissection difficult due to the tissue being very adherent in this location  - upper pole adherent in area of pancreas and spleen, required intraoperative consultation by Dr. Jorgensen with surgical oncology   - very large saprophytic veins surrounding the entire  kidney, anastomosing with splenic vein as well as gonadal vein    Estimated Blood Loss: 7 Liters    Fluids:  9 Liters cyrstalloid, 1 Liter albumin    Blood product:  4 u PRBC    Drains:   16 fr larsen catheter    Procedure in Detail: After informed consent was obtained, the patient was brought to the operating suite and placed in the supine position. SCDs were applied and working. General anesthesia was administered. A larsen cathter was placed in the standard fashion. The patient was prepped and draped in the usual sterile fashion. Timeout was performed and preoperative antibiotics were confirmed.     A midline laparotomy was created.  The line of Toldt was incised and the left colon was dissected away from Gerota's fascia and the retroperitoneum.  The gonadal vein and ureter were transected with ligasure.  We then began the dissection of the kidney itself, beginning at the lower pole.  This was very difficult due to numerous saprophytic veins surrounding the kidney, most prominently at the lateral aspect of the upper pole.  Some of these required division with a stapler with vascular loads, others were ligated and divided, some were taken with ligasure.  We proceeded to the hilar dissection, although this was very difficult and time consuming given the extent of adhesion in this location.  We were eventually able to separate the artery from the vein, and fire a stapler with a vascular load across the artery, ligating and dividing it.  This was hemostatic.  The upper pole was very adherent, in the area of the large saprophytic veins, as well as the pancreas and spleen. At this point we consulted Dr. Jorgensen with surgical oncology for assistance in mobilizing the pancreas away from the kidney.  Please see his operative note for details.  Once the pancreas was safely away from the upper pole of the kidney, and the upper pole was mobilized, the remainder of the attachments of the kidney in the retroperitoneum were  divided.      At this point, a large hole was created in the colonic mesentery through which the left kidney was delivered.  The left renal vein was then further dissected until the IVC was identified and exposed.  The renal vein was adherent to the aorta, and had to be carefully dissected away from this structure.  The tumor thrombus was palpable with the tip in the IVC within 1-2 cm of the renal vein ostium.      At this point, Dr. Mcclure, the vascular surgeon, was called in.  The following was performed by him:  The suprarenal IVC, infrarenal IVC, and contralateral renal veins were dissected, and Ayanna tourniquets were placed around the infrarenal IVC and contralateral renal vein.   The tourniquets were then tightened down, a vascular clamp was placed around the suprarenal IVC, and a cavotomy was created with an 11 blade scalpel at the left renal vein ostium. This was extended using Gu scissors until the renal vein was completely  from the cava, and the tumor thrombus was removed from the cava intact.  The cavotomy was then repaired with running 3-0 prolene.  There was significant bleeding during this portion of the procedure, as expected.  Blood and fluids were administered by anesthesia, and pressors were initiated.  Once the caval repair was complete, the clamps were removed. The repair was hemostatic.      The kidney and tumor thrombus was removed en bloc and sent to pathology.      The bowel was run.  There was one small area on the ileum with a minor serosal tear. This was oversewn with interrupted 3-0 silk.  The hole in the colonic mesentery was repaired with interrupted absorbable sutures.      The retroperitoneum was then inspected and hemostasis was achieved.  Surgicel and zachariah were applied.  The descending colon was replaced over the left retroperitoneum.  All laps were removed and all counts were correct.  The abdomen was closed with 0 looped PDS on the rectus fascia, running 3-0  vicryl deep dermal, and running 4-0 monocryl subcuticular. Dermabond was applied.     The patient was transferred to the SICU intubated and on pressors, although pressors had been weaned significantly during closing.      Disposition:  The patient will be managed by critical care surgery in the SICU.  They will wean pressors as tolerated, and wean to extubate.  There will be ongoing resuscitative efforts.     Petar Saravia MD

## 2019-12-10 LAB
ALBUMIN SERPL BCP-MCNC: 2 G/DL (ref 3.5–5.2)
ALLENS TEST: ABNORMAL
ALP SERPL-CCNC: 21 U/L (ref 55–135)
ALT SERPL W/O P-5'-P-CCNC: 19 U/L (ref 10–44)
ANION GAP SERPL CALC-SCNC: 5 MMOL/L (ref 8–16)
ANION GAP SERPL CALC-SCNC: 7 MMOL/L (ref 8–16)
APTT BLDCRRT: 23.4 SEC (ref 21–32)
APTT BLDCRRT: 24.6 SEC (ref 21–32)
APTT BLDCRRT: 25 SEC (ref 21–32)
AST SERPL-CCNC: 35 U/L (ref 10–40)
BASOPHILS # BLD AUTO: 0 K/UL (ref 0–0.2)
BASOPHILS # BLD AUTO: 0.01 K/UL (ref 0–0.2)
BASOPHILS NFR BLD: 0 % (ref 0–1.9)
BASOPHILS NFR BLD: 0.1 % (ref 0–1.9)
BILIRUB SERPL-MCNC: 0.3 MG/DL (ref 0.1–1)
BUN SERPL-MCNC: 15 MG/DL (ref 8–23)
BUN SERPL-MCNC: 22 MG/DL (ref 8–23)
CA-I BLDV-SCNC: 1.08 MMOL/L (ref 1.06–1.42)
CA-I BLDV-SCNC: 1.1 MMOL/L (ref 1.06–1.42)
CALCIUM SERPL-MCNC: 6.9 MG/DL (ref 8.7–10.5)
CALCIUM SERPL-MCNC: 7.3 MG/DL (ref 8.7–10.5)
CHLORIDE SERPL-SCNC: 108 MMOL/L (ref 95–110)
CHLORIDE SERPL-SCNC: 109 MMOL/L (ref 95–110)
CO2 SERPL-SCNC: 19 MMOL/L (ref 23–29)
CO2 SERPL-SCNC: 22 MMOL/L (ref 23–29)
CREAT SERPL-MCNC: 1.2 MG/DL (ref 0.5–1.4)
CREAT SERPL-MCNC: 1.5 MG/DL (ref 0.5–1.4)
DELSYS: ABNORMAL
DIFFERENTIAL METHOD: ABNORMAL
EOSINOPHIL # BLD AUTO: 0 K/UL (ref 0–0.5)
EOSINOPHIL NFR BLD: 0 % (ref 0–8)
ERYTHROCYTE [DISTWIDTH] IN BLOOD BY AUTOMATED COUNT: 13.8 % (ref 11.5–14.5)
ERYTHROCYTE [DISTWIDTH] IN BLOOD BY AUTOMATED COUNT: 13.9 % (ref 11.5–14.5)
ERYTHROCYTE [DISTWIDTH] IN BLOOD BY AUTOMATED COUNT: 13.9 % (ref 11.5–14.5)
ERYTHROCYTE [DISTWIDTH] IN BLOOD BY AUTOMATED COUNT: 14.2 % (ref 11.5–14.5)
ERYTHROCYTE [SEDIMENTATION RATE] IN BLOOD BY WESTERGREN METHOD: 16 MM/H
EST. GFR  (AFRICAN AMERICAN): 57.3 ML/MIN/1.73 M^2
EST. GFR  (AFRICAN AMERICAN): >60 ML/MIN/1.73 M^2
EST. GFR  (NON AFRICAN AMERICAN): 49.5 ML/MIN/1.73 M^2
EST. GFR  (NON AFRICAN AMERICAN): >60 ML/MIN/1.73 M^2
FIO2: 30
FIO2: 40
FIO2: 40
FLOW: 13
FLOW: 5
GLUCOSE SERPL-MCNC: 137 MG/DL (ref 70–110)
GLUCOSE SERPL-MCNC: 168 MG/DL (ref 70–110)
HCO3 UR-SCNC: 20.1 MMOL/L (ref 24–28)
HCO3 UR-SCNC: 21 MMOL/L (ref 24–28)
HCO3 UR-SCNC: 22.4 MMOL/L (ref 24–28)
HCO3 UR-SCNC: 23.6 MMOL/L (ref 24–28)
HCT VFR BLD AUTO: 23 % (ref 40–54)
HCT VFR BLD AUTO: 24.7 % (ref 40–54)
HCT VFR BLD AUTO: 25.6 % (ref 40–54)
HCT VFR BLD AUTO: 26.3 % (ref 40–54)
HGB BLD-MCNC: 7.7 G/DL (ref 14–18)
HGB BLD-MCNC: 8.2 G/DL (ref 14–18)
HGB BLD-MCNC: 8.3 G/DL (ref 14–18)
HGB BLD-MCNC: 8.4 G/DL (ref 14–18)
IMM GRANULOCYTES # BLD AUTO: 0.03 K/UL (ref 0–0.04)
IMM GRANULOCYTES # BLD AUTO: 0.04 K/UL (ref 0–0.04)
IMM GRANULOCYTES # BLD AUTO: 0.04 K/UL (ref 0–0.04)
IMM GRANULOCYTES # BLD AUTO: 0.09 K/UL (ref 0–0.04)
IMM GRANULOCYTES NFR BLD AUTO: 0.3 % (ref 0–0.5)
IMM GRANULOCYTES NFR BLD AUTO: 0.3 % (ref 0–0.5)
IMM GRANULOCYTES NFR BLD AUTO: 0.4 % (ref 0–0.5)
IMM GRANULOCYTES NFR BLD AUTO: 0.5 % (ref 0–0.5)
INR PPP: 1 (ref 0.8–1.2)
INR PPP: 1.1 (ref 0.8–1.2)
INR PPP: 1.1 (ref 0.8–1.2)
LACTATE SERPL-SCNC: 1.9 MMOL/L (ref 0.5–2.2)
LACTATE SERPL-SCNC: 2.2 MMOL/L (ref 0.5–2.2)
LACTATE SERPL-SCNC: 2.7 MMOL/L (ref 0.5–2.2)
LACTATE SERPL-SCNC: 3.2 MMOL/L (ref 0.5–2.2)
LACTATE SERPL-SCNC: 3.4 MMOL/L (ref 0.5–2.2)
LACTATE SERPL-SCNC: 4.9 MMOL/L (ref 0.5–2.2)
LDH SERPL L TO P-CCNC: 3 MMOL/L (ref 0.36–1.25)
LYMPHOCYTES # BLD AUTO: 1.2 K/UL (ref 1–4.8)
LYMPHOCYTES # BLD AUTO: 1.2 K/UL (ref 1–4.8)
LYMPHOCYTES # BLD AUTO: 1.3 K/UL (ref 1–4.8)
LYMPHOCYTES # BLD AUTO: 1.6 K/UL (ref 1–4.8)
LYMPHOCYTES NFR BLD: 11.9 % (ref 18–48)
LYMPHOCYTES NFR BLD: 12.2 % (ref 18–48)
LYMPHOCYTES NFR BLD: 9.6 % (ref 18–48)
LYMPHOCYTES NFR BLD: 9.9 % (ref 18–48)
MAGNESIUM SERPL-MCNC: 1.6 MG/DL (ref 1.6–2.6)
MCH RBC QN AUTO: 29 PG (ref 27–31)
MCH RBC QN AUTO: 29.3 PG (ref 27–31)
MCH RBC QN AUTO: 29.8 PG (ref 27–31)
MCH RBC QN AUTO: 29.8 PG (ref 27–31)
MCHC RBC AUTO-ENTMCNC: 31.9 G/DL (ref 32–36)
MCHC RBC AUTO-ENTMCNC: 32.4 G/DL (ref 32–36)
MCHC RBC AUTO-ENTMCNC: 33.2 G/DL (ref 32–36)
MCHC RBC AUTO-ENTMCNC: 33.5 G/DL (ref 32–36)
MCV RBC AUTO: 89 FL (ref 82–98)
MCV RBC AUTO: 90 FL (ref 82–98)
MCV RBC AUTO: 91 FL (ref 82–98)
MCV RBC AUTO: 91 FL (ref 82–98)
MODE: ABNORMAL
MONOCYTES # BLD AUTO: 0.8 K/UL (ref 0.3–1)
MONOCYTES # BLD AUTO: 0.8 K/UL (ref 0.3–1)
MONOCYTES # BLD AUTO: 0.9 K/UL (ref 0.3–1)
MONOCYTES # BLD AUTO: 1 K/UL (ref 0.3–1)
MONOCYTES NFR BLD: 6 % (ref 4–15)
MONOCYTES NFR BLD: 7.1 % (ref 4–15)
MONOCYTES NFR BLD: 7.6 % (ref 4–15)
MONOCYTES NFR BLD: 8.4 % (ref 4–15)
NEUTROPHILS # BLD AUTO: 14.2 K/UL (ref 1.8–7.7)
NEUTROPHILS # BLD AUTO: 7.7 K/UL (ref 1.8–7.7)
NEUTROPHILS # BLD AUTO: 8.5 K/UL (ref 1.8–7.7)
NEUTROPHILS # BLD AUTO: 9.9 K/UL (ref 1.8–7.7)
NEUTROPHILS NFR BLD: 79 % (ref 38–73)
NEUTROPHILS NFR BLD: 80.6 % (ref 38–73)
NEUTROPHILS NFR BLD: 82.1 % (ref 38–73)
NEUTROPHILS NFR BLD: 83.8 % (ref 38–73)
NRBC BLD-RTO: 0 /100 WBC
PCO2 BLDA: 35.8 MMHG (ref 35–45)
PCO2 BLDA: 37.1 MMHG (ref 35–45)
PCO2 BLDA: 39.2 MMHG (ref 35–45)
PCO2 BLDA: 39.2 MMHG (ref 35–45)
PEEP: 5
PEEP: 5
PH SMN: 7.34 [PH] (ref 7.35–7.45)
PH SMN: 7.34 [PH] (ref 7.35–7.45)
PH SMN: 7.39 [PH] (ref 7.35–7.45)
PH SMN: 7.4 [PH] (ref 7.35–7.45)
PHOSPHATE SERPL-MCNC: 3.2 MG/DL (ref 2.7–4.5)
PLATELET # BLD AUTO: 188 K/UL (ref 150–350)
PLATELET # BLD AUTO: 215 K/UL (ref 150–350)
PLATELET # BLD AUTO: 225 K/UL (ref 150–350)
PLATELET # BLD AUTO: 238 K/UL (ref 150–350)
PMV BLD AUTO: 9.5 FL (ref 9.2–12.9)
PMV BLD AUTO: 9.6 FL (ref 9.2–12.9)
PMV BLD AUTO: 9.7 FL (ref 9.2–12.9)
PMV BLD AUTO: 9.7 FL (ref 9.2–12.9)
PO2 BLDA: 61 MMHG (ref 80–100)
PO2 BLDA: 69 MMHG (ref 80–100)
PO2 BLDA: 76 MMHG (ref 80–100)
PO2 BLDA: 80 MMHG (ref 80–100)
POC BE: -1 MMOL/L
POC BE: -2 MMOL/L
POC BE: -5 MMOL/L
POC BE: -6 MMOL/L
POC SATURATED O2: 89 % (ref 95–100)
POC SATURATED O2: 93 % (ref 95–100)
POC SATURATED O2: 95 % (ref 95–100)
POC SATURATED O2: 96 % (ref 95–100)
POC TCO2: 21 MMOL/L (ref 23–27)
POC TCO2: 22 MMOL/L (ref 23–27)
POC TCO2: 23 MMOL/L (ref 23–27)
POC TCO2: 25 MMOL/L (ref 23–27)
POCT GLUCOSE: 155 MG/DL (ref 70–110)
POTASSIUM SERPL-SCNC: 4.5 MMOL/L (ref 3.5–5.1)
POTASSIUM SERPL-SCNC: 4.9 MMOL/L (ref 3.5–5.1)
PROT SERPL-MCNC: 3.5 G/DL (ref 6–8.4)
PROTHROMBIN TIME: 10.6 SEC (ref 9–12.5)
PROTHROMBIN TIME: 11.2 SEC (ref 9–12.5)
PROTHROMBIN TIME: 11.4 SEC (ref 9–12.5)
PS: 10
PS: 10
RBC # BLD AUTO: 2.58 M/UL (ref 4.6–6.2)
RBC # BLD AUTO: 2.75 M/UL (ref 4.6–6.2)
RBC # BLD AUTO: 2.83 M/UL (ref 4.6–6.2)
RBC # BLD AUTO: 2.9 M/UL (ref 4.6–6.2)
SAMPLE: ABNORMAL
SITE: ABNORMAL
SODIUM SERPL-SCNC: 135 MMOL/L (ref 136–145)
SODIUM SERPL-SCNC: 135 MMOL/L (ref 136–145)
SP02: 90
SP02: 97
SP02: 97
SPONT RATE: 18
VT: 550
WBC # BLD AUTO: 10.51 K/UL (ref 3.9–12.7)
WBC # BLD AUTO: 12.08 K/UL (ref 3.9–12.7)
WBC # BLD AUTO: 16.95 K/UL (ref 3.9–12.7)
WBC # BLD AUTO: 9.71 K/UL (ref 3.9–12.7)

## 2019-12-10 PROCEDURE — 85610 PROTHROMBIN TIME: CPT | Mod: 91

## 2019-12-10 PROCEDURE — 99291 CRITICAL CARE FIRST HOUR: CPT | Mod: ,,, | Performed by: ANESTHESIOLOGY

## 2019-12-10 PROCEDURE — 63600175 PHARM REV CODE 636 W HCPCS: Performed by: STUDENT IN AN ORGANIZED HEALTH CARE EDUCATION/TRAINING PROGRAM

## 2019-12-10 PROCEDURE — 84100 ASSAY OF PHOSPHORUS: CPT

## 2019-12-10 PROCEDURE — 94003 VENT MGMT INPAT SUBQ DAY: CPT

## 2019-12-10 PROCEDURE — 83735 ASSAY OF MAGNESIUM: CPT

## 2019-12-10 PROCEDURE — 63600175 PHARM REV CODE 636 W HCPCS: Performed by: ANESTHESIOLOGY

## 2019-12-10 PROCEDURE — 94761 N-INVAS EAR/PLS OXIMETRY MLT: CPT

## 2019-12-10 PROCEDURE — 85730 THROMBOPLASTIN TIME PARTIAL: CPT | Mod: 91

## 2019-12-10 PROCEDURE — 83605 ASSAY OF LACTIC ACID: CPT | Mod: 91

## 2019-12-10 PROCEDURE — 25000003 PHARM REV CODE 250: Performed by: STUDENT IN AN ORGANIZED HEALTH CARE EDUCATION/TRAINING PROGRAM

## 2019-12-10 PROCEDURE — 85730 THROMBOPLASTIN TIME PARTIAL: CPT

## 2019-12-10 PROCEDURE — 99231 SBSQ HOSP IP/OBS SF/LOW 25: CPT | Mod: ,,, | Performed by: ANESTHESIOLOGY

## 2019-12-10 PROCEDURE — 20000000 HC ICU ROOM

## 2019-12-10 PROCEDURE — 82330 ASSAY OF CALCIUM: CPT

## 2019-12-10 PROCEDURE — 85610 PROTHROMBIN TIME: CPT

## 2019-12-10 PROCEDURE — 27200188 HC TRANSDUCER, ART ADULT/PEDS

## 2019-12-10 PROCEDURE — 25000242 PHARM REV CODE 250 ALT 637 W/ HCPCS

## 2019-12-10 PROCEDURE — 99900035 HC TECH TIME PER 15 MIN (STAT)

## 2019-12-10 PROCEDURE — 27200966 HC CLOSED SUCTION SYSTEM

## 2019-12-10 PROCEDURE — 80053 COMPREHEN METABOLIC PANEL: CPT

## 2019-12-10 PROCEDURE — 99900026 HC AIRWAY MAINTENANCE (STAT)

## 2019-12-10 PROCEDURE — 27100171 HC OXYGEN HIGH FLOW UP TO 24 HOURS

## 2019-12-10 PROCEDURE — 99291 PR CRITICAL CARE, E/M 30-74 MINUTES: ICD-10-PCS | Mod: ,,, | Performed by: ANESTHESIOLOGY

## 2019-12-10 PROCEDURE — 27000221 HC OXYGEN, UP TO 24 HOURS

## 2019-12-10 PROCEDURE — 83605 ASSAY OF LACTIC ACID: CPT

## 2019-12-10 PROCEDURE — C9113 INJ PANTOPRAZOLE SODIUM, VIA: HCPCS | Performed by: STUDENT IN AN ORGANIZED HEALTH CARE EDUCATION/TRAINING PROGRAM

## 2019-12-10 PROCEDURE — 94640 AIRWAY INHALATION TREATMENT: CPT

## 2019-12-10 PROCEDURE — 36415 COLL VENOUS BLD VENIPUNCTURE: CPT

## 2019-12-10 PROCEDURE — 80048 BASIC METABOLIC PNL TOTAL CA: CPT

## 2019-12-10 PROCEDURE — 99900017 HC EXTUBATION W/PARAMETERS (STAT)

## 2019-12-10 PROCEDURE — 37799 UNLISTED PX VASCULAR SURGERY: CPT

## 2019-12-10 PROCEDURE — 82803 BLOOD GASES ANY COMBINATION: CPT

## 2019-12-10 PROCEDURE — 99231 PR SUBSEQUENT HOSPITAL CARE,LEVL I: ICD-10-PCS | Mod: ,,, | Performed by: ANESTHESIOLOGY

## 2019-12-10 PROCEDURE — 85025 COMPLETE CBC W/AUTO DIFF WBC: CPT | Mod: 91

## 2019-12-10 PROCEDURE — 27100092 HC HIGH FLOW DELIVERY CANNULA

## 2019-12-10 RX ORDER — HYDROMORPHONE HCL IN 0.9% NACL 6 MG/30 ML
PATIENT CONTROLLED ANALGESIA SYRINGE INTRAVENOUS CONTINUOUS
Status: DISCONTINUED | OUTPATIENT
Start: 2019-12-10 | End: 2019-12-11

## 2019-12-10 RX ORDER — NALOXONE HCL 0.4 MG/ML
0.02 VIAL (ML) INJECTION
Status: DISCONTINUED | OUTPATIENT
Start: 2019-12-10 | End: 2019-12-15 | Stop reason: HOSPADM

## 2019-12-10 RX ORDER — HEPARIN SODIUM 5000 [USP'U]/ML
5000 INJECTION, SOLUTION INTRAVENOUS; SUBCUTANEOUS EVERY 8 HOURS
Status: DISCONTINUED | OUTPATIENT
Start: 2019-12-10 | End: 2019-12-15 | Stop reason: HOSPADM

## 2019-12-10 RX ORDER — IPRATROPIUM BROMIDE AND ALBUTEROL SULFATE 2.5; .5 MG/3ML; MG/3ML
SOLUTION RESPIRATORY (INHALATION)
Status: COMPLETED
Start: 2019-12-10 | End: 2019-12-10

## 2019-12-10 RX ORDER — IPRATROPIUM BROMIDE AND ALBUTEROL SULFATE 2.5; .5 MG/3ML; MG/3ML
3 SOLUTION RESPIRATORY (INHALATION) ONCE
Status: DISCONTINUED | OUTPATIENT
Start: 2019-12-10 | End: 2019-12-11

## 2019-12-10 RX ORDER — METHOCARBAMOL 500 MG/1
500 TABLET, FILM COATED ORAL 4 TIMES DAILY
Status: DISCONTINUED | OUTPATIENT
Start: 2019-12-10 | End: 2019-12-15 | Stop reason: HOSPADM

## 2019-12-10 RX ADMIN — CALCIUM GLUCONATE 1 G: 98 INJECTION, SOLUTION INTRAVENOUS at 08:12

## 2019-12-10 RX ADMIN — PANTOPRAZOLE SODIUM 40 MG: 40 INJECTION, POWDER, FOR SOLUTION INTRAVENOUS at 09:12

## 2019-12-10 RX ADMIN — ROPIVACAINE HYDROCHLORIDE 2 ML/HR: 2 INJECTION, SOLUTION EPIDURAL; INFILTRATION at 12:12

## 2019-12-10 RX ADMIN — SODIUM CHLORIDE, SODIUM LACTATE, POTASSIUM CHLORIDE, AND CALCIUM CHLORIDE 1000 ML: .6; .31; .03; .02 INJECTION, SOLUTION INTRAVENOUS at 04:12

## 2019-12-10 RX ADMIN — OXYCODONE HYDROCHLORIDE 10 MG: 10 TABLET ORAL at 12:12

## 2019-12-10 RX ADMIN — SODIUM CHLORIDE, SODIUM LACTATE, POTASSIUM CHLORIDE, AND CALCIUM CHLORIDE 1000 ML: .6; .31; .03; .02 INJECTION, SOLUTION INTRAVENOUS at 07:12

## 2019-12-10 RX ADMIN — CALCIUM GLUCONATE 1 G: 98 INJECTION, SOLUTION INTRAVENOUS at 10:12

## 2019-12-10 RX ADMIN — MORPHINE SULFATE 2 MG: 2 INJECTION, SOLUTION INTRAMUSCULAR; INTRAVENOUS at 12:12

## 2019-12-10 RX ADMIN — Medication 0.06 MCG/KG/MIN: at 12:12

## 2019-12-10 RX ADMIN — ONDANSETRON 4 MG: 2 INJECTION INTRAMUSCULAR; INTRAVENOUS at 10:12

## 2019-12-10 RX ADMIN — HEPARIN SODIUM 5000 UNITS: 5000 INJECTION, SOLUTION INTRAVENOUS; SUBCUTANEOUS at 09:12

## 2019-12-10 RX ADMIN — PROPOFOL 40 MCG/KG/MIN: 10 INJECTION, EMULSION INTRAVENOUS at 03:12

## 2019-12-10 RX ADMIN — ACETAMINOPHEN 1000 MG: 500 TABLET ORAL at 12:12

## 2019-12-10 RX ADMIN — MAGNESIUM SULFATE IN WATER 2 G: 40 INJECTION, SOLUTION INTRAVENOUS at 05:12

## 2019-12-10 RX ADMIN — IPRATROPIUM BROMIDE AND ALBUTEROL SULFATE 3 ML: .5; 3 SOLUTION RESPIRATORY (INHALATION) at 03:12

## 2019-12-10 RX ADMIN — HEPARIN SODIUM 5000 UNITS: 5000 INJECTION, SOLUTION INTRAVENOUS; SUBCUTANEOUS at 02:12

## 2019-12-10 RX ADMIN — SODIUM CHLORIDE, SODIUM LACTATE, POTASSIUM CHLORIDE, AND CALCIUM CHLORIDE 1000 ML: .6; .31; .03; .02 INJECTION, SOLUTION INTRAVENOUS at 02:12

## 2019-12-10 RX ADMIN — PREGABALIN 150 MG: 75 CAPSULE ORAL at 08:12

## 2019-12-10 RX ADMIN — METHOCARBAMOL TABLETS 500 MG: 500 TABLET, COATED ORAL at 08:12

## 2019-12-10 RX ADMIN — ACETAMINOPHEN 1000 MG: 500 TABLET ORAL at 05:12

## 2019-12-10 RX ADMIN — ACETAMINOPHEN 1000 MG: 500 TABLET ORAL at 11:12

## 2019-12-10 RX ADMIN — VASOPRESSIN 0.04 UNITS/MIN: 20 INJECTION INTRAVENOUS at 05:12

## 2019-12-10 RX ADMIN — PROPOFOL 50 MCG/KG/MIN: 10 INJECTION, EMULSION INTRAVENOUS at 12:12

## 2019-12-10 RX ADMIN — MORPHINE SULFATE 2 MG: 2 INJECTION, SOLUTION INTRAMUSCULAR; INTRAVENOUS at 10:12

## 2019-12-10 RX ADMIN — Medication: at 02:12

## 2019-12-10 RX ADMIN — METHOCARBAMOL TABLETS 500 MG: 500 TABLET, COATED ORAL at 05:12

## 2019-12-10 RX ADMIN — PROPOFOL 40 MCG/KG/MIN: 10 INJECTION, EMULSION INTRAVENOUS at 06:12

## 2019-12-10 NOTE — HPI
59 YOM with HTN and HLD with incidentally discovered left renal mass with level I caval tumor thrombus taken to the OR 12/9/2019 left radical nephrectomy (adrenal sparing) and IVC thrombectomy.      Surgery was successful, although blood loss was 7 liters, with a significant portion of that occurring during the cavotomy portion.  The patient was continuously resuscitated in the OR, receiving 9 L cyrstalloid, 1 L albumin, and 4 u PRBC. He was left intubated on transfer to the SICU.

## 2019-12-10 NOTE — SUBJECTIVE & OBJECTIVE
Interval History/Significant Events: Received total of 9L of crystalloids overnight. Extubated to HFNC. Lactates trending down to 1.9. Continues to require norepi @ 0.08    Follow-up For: Procedure(s) (LRB):  Nephrectomy OPEN (Left)  LYSIS, ADHESIONS (N/A)  THROMBECTOMY, VENA CAVA  THROMBECTOMY, VENA CAVA  REPAIR of IVC    Post-Operative Day: 1 Day Post-Op    Objective:     Vital Signs (Most Recent):  Temp: 97 °F (36.1 °C) (12/10/19 1100)  Pulse: (!) 120 (12/10/19 1600)  Resp: (!) 22 (12/10/19 1530)  BP: (!) 100/57 (12/10/19 1600)  SpO2: 95 % (12/10/19 1600) Vital Signs (24h Range):  Temp:  [97 °F (36.1 °C)-98.7 °F (37.1 °C)] 97 °F (36.1 °C)  Pulse:  [] 120  Resp:  [0-52] 22  SpO2:  [91 %-100 %] 95 %  BP: ()/(50-71) 100/57  Arterial Line BP: ()/(39-67) 107/62     Weight: 132.4 kg (291 lb 14.2 oz)  Body mass index is 38.51 kg/m².      Intake/Output Summary (Last 24 hours) at 12/10/2019 1608  Last data filed at 12/10/2019 1600  Gross per 24 hour   Intake 10873 ml   Output 3265 ml   Net 9466 ml       Physical Exam   Constitutional: He is oriented to person, place, and time. He appears well-developed and well-nourished.   HENT:   Head: Normocephalic and atraumatic.   Eyes: Pupils are equal, round, and reactive to light.   Neck: Neck supple. No tracheal deviation present. No thyromegaly present.   Cardiovascular: Normal rate, regular rhythm and intact distal pulses.   No murmur heard.  Pulmonary/Chest: Tachypnea noted. He has no decreased breath sounds. He has no wheezes.   Abdominal: Soft. He exhibits no distension and no mass.   Surgical incision vertical midline of abdomen, without drainage, fluctuance or ecchymosis.     Musculoskeletal: He exhibits edema.   Neurological: He is alert and oriented to person, place, and time.   Skin: Skin is warm and dry. He is not diaphoretic. No erythema. No pallor.   Psychiatric: He has a normal mood and affect. His behavior is normal.   Nursing note and vitals  reviewed.        Lines/Drains/Airways     Central Venous Catheter Line                 Trialysis (Dialysis) Catheter 03/20/17 0850 right internal jugular 995 days         Percutaneous Central Line Insertion/Assessment - triple lumen  12/09/19 1830 right internal jugular less than 1 day          Drain                 Urethral Catheter 12/09/19 0822 Non-latex 16 Fr. 1 day         NG/OG Tube 12/09/19 1900 Center mouth less than 1 day          Epidural Line                 Perineural Analgesia/Anesthesia Assessment (using dermatomes) 12/09/19 0730 1 day          Arterial Line                 Arterial Line 12/09/19 0821 Right Radial 1 day          Peripheral Intravenous Line                 Peripheral IV - Single Lumen 12/09/19 1704 18 G Left Hand less than 1 day                Significant Labs:    CBC/Anemia Profile:  Recent Labs   Lab 12/09/19  2337 12/10/19  0334 12/10/19  0736   WBC 12.08 10.51 9.71   HGB 8.4* 8.3* 7.7*   HCT 26.3* 25.6* 23.0*    215 188   MCV 91 91 89   RDW 13.9 13.8 13.9        Chemistries:  Recent Labs   Lab 12/09/19  1646 12/10/19  0334    135*   K 4.7 4.9    109   CO2 15* 19*   BUN 17 15   CREATININE 1.4 1.2   CALCIUM 8.2* 6.9*   ALBUMIN  --  2.0*   PROT  --  3.5*   BILITOT  --  0.3   ALKPHOS  --  21*   ALT  --  19   AST  --  35   MG 2.2  --    PHOS 6.2*  --        ABGs:   Recent Labs   Lab 12/10/19  1551   PH 7.337*   PCO2 39.2   HCO3 21.0*   POCSATURATED 89*   BE -5     Lactic Acid:   Recent Labs   Lab 12/10/19  0502 12/10/19  0646 12/10/19  0915   LACTATE 2.7* 2.2 1.9     POCT Glucose:   Recent Labs   Lab 12/10/19  0754   POCTGLUCOSE 155*     All pertinent labs within the past 24 hours have been reviewed.      Significant Imaging:  I have reviewed all pertinent imaging results/findings within the past 24 hours.

## 2019-12-10 NOTE — HPI
Mr. Alexi Noguera is a 59 year old man with pmhx of HTN, HLD and no previous surgical history that is admitted to Surgical ICU after undergoing a radical L nephrectomy with intra-inferior vena cava mass removal.  Surgery was complicated by a 7 liter blood loss intraoperatively, per anesthesia roughly 4 liters of blood were lost rapidly towards the latter part of the procedure.  He received 4 units of pRBC, 9 liters of crystalloid and 1 liter of albumin intraoperatively.  He arrives to the surgical ICU intubated on levo and vaso pressor drips.  Has sense been extubated and off of pressors, clinically improving daily.

## 2019-12-10 NOTE — SUBJECTIVE & OBJECTIVE
Follow-up For: Procedure(s) (LRB):  Nephrectomy OPEN (Left)  LYSIS, ADHESIONS (N/A)  THROMBECTOMY, VENA CAVA  THROMBECTOMY, VENA CAVA  REPAIR of IVC    Post-Operative Day: Day of Surgery     Past Medical History:   Diagnosis Date    Hyperlipidemia     Hypertension        History reviewed. No pertinent surgical history.    Review of patient's allergies indicates:   Allergen Reactions    Lisinopril Other (See Comments)     Dry cough       Family History     Problem Relation (Age of Onset)    Diabetes Paternal Aunt    Heart disease Brother, Paternal Uncle    No Known Problems Mother, Father, Sister        Tobacco Use    Smoking status: Never Smoker   Substance and Sexual Activity    Alcohol use: No     Alcohol/week: 0.0 standard drinks    Drug use: No    Sexual activity: Not on file      Review of Systems   Unable to perform ROS: Intubated     Objective:     Vital Signs (Most Recent):  Temp: 97.9 °F (36.6 °C) (12/09/19 1900)  Pulse: 81 (12/09/19 1845)  Resp: (!) 21 (12/09/19 1845)  BP: (!) 82/52 (12/09/19 1700)  SpO2: 99 % (12/09/19 1845) Vital Signs (24h Range):  Temp:  [97.9 °F (36.6 °C)-98.6 °F (37 °C)] 97.9 °F (36.6 °C)  Pulse:  [] 81  Resp:  [17-23] 21  SpO2:  [95 %-100 %] 99 %  BP: ()/() 82/52  Arterial Line BP: ()/(39-61) 116/61     Weight: 117.9 kg (260 lb)  Body mass index is 34.3 kg/m².      Intake/Output Summary (Last 24 hours) at 12/9/2019 1938  Last data filed at 12/9/2019 1900  Gross per 24 hour   Intake 58444 ml   Output 8353 ml   Net 2525 ml       Physical Exam   Constitutional: He appears well-developed and well-nourished.   HENT:   Head: Normocephalic and atraumatic.   Eyes: Right eye exhibits no discharge. Left eye exhibits no discharge.   Neck: Neck supple. No tracheal deviation present. No thyromegaly present.   Cardiovascular: Normal rate, regular rhythm and intact distal pulses.   No murmur heard.  Pulmonary/Chest: He has no wheezes.   Mechanical breath sounds on  ventilation.     Abdominal: Soft. He exhibits no distension and no mass.   Surgical incision vertical midline of abdomen, without drainage, fluctuance or ecchymosis.     Neurological:   Sedated and intubated    Skin: Skin is warm and dry. He is not diaphoretic. No erythema. No pallor.       Vents:  Vent Mode: SIMV (12/09/19 1646)  Ventilator Initiated: Yes (12/09/19 1641)  Set Rate: 14 bmp (12/09/19 1646)  Vt Set: 550 mL (12/09/19 1646)  Pressure Support: 10 cmH20 (12/09/19 1646)  PEEP/CPAP: 5 cmH20 (12/09/19 1646)  Oxygen Concentration (%): 50 (12/09/19 1845)  Peak Airway Pressure: 14 cmH2O (12/09/19 1646)  Plateau Pressure: 0 cmH20 (12/09/19 1646)  Total Ve: 11.6 mL (12/09/19 1646)  F/VT Ratio<105 (RSBI): (!) 25.71 (12/09/19 1641)    Lines/Drains/Airways     Central Venous Catheter Line                 Trialysis (Dialysis) Catheter 03/20/17 0850 right internal jugular 994 days         Percutaneous Central Line Insertion/Assessment - triple lumen  12/09/19 1830 right internal jugular less than 1 day          Drain                 Urethral Catheter 12/09/19 0822 Non-latex 16 Fr. less than 1 day          Airway                 Airway - Non-Surgical 12/09/19 0820 Endotracheal Tube less than 1 day          Epidural Line                 Perineural Analgesia/Anesthesia Assessment (using dermatomes) 12/09/19 0730 less than 1 day          Arterial Line                 Arterial Line 12/09/19 0821 Right Radial less than 1 day          Peripheral Intravenous Line                 Peripheral IV - Single Lumen 10/08/19 0945 24 G Right Antecubital 62 days         Peripheral IV - Single Lumen 12/09/19 0646 18 G Left Forearm less than 1 day         Peripheral IV - Single Lumen 12/09/19 1704 18 G Left Hand less than 1 day                Significant Labs:    CBC/Anemia Profile:  Recent Labs   Lab 12/09/19  1538 12/09/19  1547 12/09/19  1646 12/09/19 1646   WBC 18.02*  --  18.05*  --    HGB 9.3*  --  9.7*  --    HCT 29.2* 24* 30.0*  26*     --  280  --    MCV 92  --  91  --    RDW 13.7  --  13.7  --         Chemistries:  Recent Labs   Lab 12/09/19  1646      K 4.7      CO2 15*   BUN 17   CREATININE 1.4   CALCIUM 8.2*   MG 2.2   PHOS 6.2*       All pertinent labs within the past 24 hours have been reviewed.    Significant Imaging: I have reviewed all pertinent imaging results/findings within the past 24 hours.

## 2019-12-10 NOTE — ANESTHESIA POST-OP PAIN MANAGEMENT
Acute Pain Service Progress Note    Alexi Noguera is a 59 y.o., male, [id number].    Surgery:  Nephrectomy OPEN (Left Abdomen) - 4hrs GEN WITH REGIONAL      THROMBECTOMY, VENA CAVA (Chest)      LYSIS, ADHESIONS (N/A Abdomen)      THROMBECTOMY, VENA CAVA (Chest)      REPAIR of IVC (Abdomen)    Post Op Day #: 1    Catheter type: perineural  RENETTA bilat    Infusion type: Ropivacaine 0.2%  2 basal 8 bolus    Problem List:    Active Hospital Problems    Diagnosis  POA    Left renal mass [N28.89]  Yes      Resolved Hospital Problems   No resolved problems to display.       Subjective:    Patient sedated, intubated unable to answer questions.     Objective:    Catheters clean, dry, intact         Vitals   Vitals:    12/10/19 1130   BP:    Pulse: 102   Resp: (!) 52   Temp:         Labs    No results displayed because visit has over 200 results.           Meds   Current Facility-Administered Medications   Medication Dose Route Frequency Provider Last Rate Last Dose    0.9%  NaCl infusion   Intravenous Continuous Juan David Crow MD   Stopped at 12/09/19 0832    acetaminophen tablet 1,000 mg  1,000 mg Oral Q6H Joe Rodriguez MD   Stopped at 12/10/19 0000    calcium gluconate 1g in dextrose 5% 100mL (ready to mix system)  1 g Intravenous PRN Stepan Benjamin, DO   1 g at 12/10/19 1020    calcium gluconate 1g in dextrose 5% 100mL (ready to mix system)  1 g Intravenous PRN Stepan Benjamin, DO        calcium gluconate 1g in dextrose 5% 100mL (ready to mix system)  1 g Intravenous PRN Stepan Benjamin, DO        heparin (porcine) injection 5,000 Units  5,000 Units Subcutaneous Q8H Alexsander Bravo MD        magnesium sulfate 2g in water 50mL IVPB (premix)  2 g Intravenous PRN Stepan Benjamin, DO        magnesium sulfate 2g in water 50mL IVPB (premix)  4 g Intravenous PRN Stepan Benjamin, DO        morphine injection 2 mg  2 mg Intravenous Q1H PRN Joe Rodriguez MD        morphine injection 2 mg  2 mg Intravenous  Q1H PRN Joe Rodriguez MD   2 mg at 12/10/19 1028    norepinephrine 4 mg in dextrose 5% 250 mL infusion (premix) (titrating)  0.02 mcg/kg/min Intravenous Continuous Erin wKon MD 30.9 mL/hr at 12/10/19 1100 0.07 mcg/kg/min at 12/10/19 1100    ondansetron injection 4 mg  4 mg Intravenous Q12H PRN Joe Rodriguez MD   4 mg at 12/10/19 1032    oxyCODONE immediate release tablet 10 mg  10 mg Oral Q3H PRN Joe Rodriguez MD        oxyCODONE immediate release tablet 5 mg  5 mg Oral Q3H PRN Joe Rodriguez MD        pantoprazole injection 40 mg  40 mg Intravenous Daily Stepan Benjamin DO   40 mg at 12/10/19 0906    potassium chloride 40 mEq in 100 mL IVPB (FOR CENTRAL LINE ADMINISTRATION ONLY)  40 mEq Intravenous PRN Stepan Benjamin DO        And    potassium chloride 20 mEq in 100 mL IVPB (FOR CENTRAL LINE ADMINISTRATION ONLY)  60 mEq Intravenous PRN Stepan Benjamin DO        And    potassium chloride 40 mEq in 100 mL IVPB (FOR CENTRAL LINE ADMINISTRATION ONLY)  80 mEq Intravenous PRN Stepan Benjamin DO        pregabalin capsule 150 mg  150 mg Oral QHS Joe Rodriguez MD   150 mg at 12/09/19 2200    promethazine (PHENERGAN) 6.25 mg in dextrose 5 % 50 mL IVPB  6.25 mg Intravenous Q6H PRN Joe Rodriguez MD        propofol (DIPRIVAN) 10 mg/mL infusion  20 mcg/kg/min Intravenous Daily Stepan Benjamin DO        propofol (DIPRIVAN) 10 mg/mL infusion  5 mcg/kg/min Intravenous Continuous Chandan Segura MD   Stopped at 12/10/19 0900    ropivacaine (PF) 2 mg/ml (0.2%) infusion  2 mL/hr Perineural Continuous Joe Rodriguez MD 2 mL/hr at 12/09/19 1759 2 mL/hr at 12/09/19 1759    ropivacaine (PF) 2 mg/ml (0.2%) infusion  2 mL/hr Perineural Continuous Joe Rodriguez MD 2 mL/hr at 12/09/19 1759 2 mL/hr at 12/09/19 1759    sodium chloride 0.9% flush 10 mL  10 mL Intravenous PRN Stepan Benjamin,         sodium phosphate 15 mmol in dextrose 5 % 250 mL IVPB  15 mmol  Intravenous PRN Stepan Benjamin,         sodium phosphate 20.01 mmol in dextrose 5 % 250 mL IVPB  20.01 mmol Intravenous PRN Stepan Benjamin, DO        sodium phosphate 30 mmol in dextrose 5 % 250 mL IVPB  30 mmol Intravenous PRN Stepan Benjamin, DO                Assessment:     Pain control unable to assess    Plan:     Patient doing well, continue present treatment.    Evaluator Venu Wright

## 2019-12-10 NOTE — PROGRESS NOTES
[facility]  Urology  Progress Note    Patient Name: Alexi Noguera  MRN: [id number]  Admission Date: 12/9/2019  Hospital Length of Stay: 1 days  Code Status: Full Code   Attending Provider: José Manuel Buchanan MD   Primary Care Physician: Derrick Matthews MD    Subjective:     HPI:  59 YOM with HTN and HLD with incidentally discovered left renal mass with level I caval tumor thrombus taken to the OR 12/9/2019 left radical nephrectomy (adrenal sparing) and IVC thrombectomy.      Surgery was successful, although blood loss was 7 liters, with a significant portion of that occurring during the cavotomy portion.  The patient was continuously resuscitated in the OR, receiving 9 L cyrstalloid, 1 L albumin, and 4 u PRBC. He was left intubated on transfer to the SICU.         Interval History:   7L blood loss yesterday  Received 4 units PRBCs, 9L crystalloid, 1L albumin intraop yesterday  10L crystalloid overnight  Remains intubated, on 50% and 5 peep  On 0.06 levo this AM     Review of Systems  Objective:     Temp:  [97.6 °F (36.4 °C)-98.5 °F (36.9 °C)] 98.5 °F (36.9 °C)  Pulse:  [] 92  Resp:  [16-23] 18  SpO2:  [95 %-100 %] 100 %  BP: ()/() 85/56  Arterial Line BP: ()/(39-67) 113/67     Body mass index is 38.51 kg/m².           Drains     Drain                 Trialysis (Dialysis) Catheter 03/20/17 0850 right internal jugular 994 days         NG/OG Tube 12/09/19 1900 Center mouth less than 1 day         Urethral Catheter 12/09/19 0822 Non-latex 16 Fr. less than 1 day                Physical Exam   Nursing note and vitals reviewed.  Constitutional: He appears well-developed and well-nourished.   HENT:   Head: Normocephalic and atraumatic.   Intubated sedated   Eyes: Conjunctivae are normal.   Cardiovascular: Normal rate.    Pulmonary/Chest: No accessory muscle usage.   Intubated   Abdominal: Soft. He exhibits no distension. There is no tenderness.   Midline incision CDI   Genitourinary:    Genitourinary Comments: Copeland with clear yellow urine   Musculoskeletal: He exhibits no edema.   Neurological:   Sedated   Skin: Skin is warm and dry.         Significant Labs:    BMP:  Recent Labs   Lab 12/09/19  1646 12/10/19  0334    135*   K 4.7 4.9    109   CO2 15* 19*   BUN 17 15   CREATININE 1.4 1.2   CALCIUM 8.2* 6.9*       CBC:   Recent Labs   Lab 12/09/19  2022 12/09/19  2337 12/10/19  0334   WBC 15.31* 12.08 10.51   HGB 8.9* 8.4* 8.3*   HCT 27.9* 26.3* 25.6*    225 215       Coagulation:   Recent Labs   Lab 12/09/19  1505 12/09/19  1646 12/09/19  2337   INR 1.4* 1.2 1.1   APTT 24.7 <21.0 25.0     Urine Studies: No results for input(s): COLORU, APPEARANCEUA, PHUR, SPECGRAV, PROTEINUA, GLUCUA, KETONESU, BILIRUBINUA, OCCULTUA, NITRITE, UROBILINOGEN, LEUKOCYTESUR, RBCUA, WBCUA, BACTERIA, SQUAMEPITHEL, HYALINECASTS in the last 168 hours.    Invalid input(s): WRIGHTSUR  All pertinent labs results from the past 24 hours have been reviewed.    Significant Imaging:  All pertinent imaging results/findings from the past 24 hours have been reviewed.                  Assessment/Plan:     Left renal mass  ICU Care per SICU  Lactate trending down  Wean levo as tolerated, goal MAP >65  SBT today if possible  Maintain Copeland catheter  Trend CBCs  SCDs, TEDS, recommend adding chemical DVT prophylaxis          VTE Risk Mitigation (From admission, onward)         Ordered     Place sequential compression device  Until discontinued      12/09/19 1653     IP VTE HIGH RISK PATIENT  Once      12/09/19 1653     Place sequential compression device  Until discontinued      12/09/19 0548                Alexsander Bravo MD  Urology  [facility]

## 2019-12-10 NOTE — ASSESSMENT & PLAN NOTE
ICU Care per SICU  Lactate trending down  Wean levo as tolerated, goal MAP >65  SBT today if possible  Maintain Copeland catheter  Trend CBCs  SCDs, TEDS, recommend adding chemical DVT prophylaxis

## 2019-12-10 NOTE — SUBJECTIVE & OBJECTIVE
Interval History:   7L blood loss yesterday  Received 4 units PRBCs, 9L crystalloid, 1L albumin intraop yesterday  10L crystalloid overnight  Remains intubated, on 50% and 5 peep  On 0.06 levo this AM     Review of Systems  Objective:     Temp:  [97.6 °F (36.4 °C)-98.5 °F (36.9 °C)] 98.5 °F (36.9 °C)  Pulse:  [] 92  Resp:  [16-23] 18  SpO2:  [95 %-100 %] 100 %  BP: ()/() 85/56  Arterial Line BP: ()/(39-67) 113/67     Body mass index is 38.51 kg/m².           Drains     Drain                 Trialysis (Dialysis) Catheter 03/20/17 0850 right internal jugular 994 days         NG/OG Tube 12/09/19 1900 Center mouth less than 1 day         Urethral Catheter 12/09/19 0822 Non-latex 16 Fr. less than 1 day                Physical Exam   Nursing note and vitals reviewed.  Constitutional: He appears well-developed and well-nourished.   HENT:   Head: Normocephalic and atraumatic.   Intubated sedated   Eyes: Conjunctivae are normal.   Cardiovascular: Normal rate.    Pulmonary/Chest: No accessory muscle usage.   Intubated   Abdominal: Soft. He exhibits no distension. There is no tenderness.   Midline incision CDI   Genitourinary:   Genitourinary Comments: [place] with clear yellow urine   Musculoskeletal: He exhibits no edema.   Neurological:   Sedated   Skin: Skin is warm and dry.         Significant Labs:    BMP:  Recent Labs   Lab 12/09/19  1646 12/10/19  0334    135*   K 4.7 4.9    109   CO2 15* 19*   BUN 17 15   CREATININE 1.4 1.2   CALCIUM 8.2* 6.9*       CBC:   Recent Labs   Lab 12/09/19  2022 12/09/19  2337 12/10/19  0334   WBC 15.31* 12.08 10.51   HGB 8.9* 8.4* 8.3*   HCT 27.9* 26.3* 25.6*    225 215       Coagulation:   Recent Labs   Lab 12/09/19  1505 12/09/19  1646 12/09/19  2337   INR 1.4* 1.2 1.1   APTT 24.7 <21.0 25.0     Urine Studies: No results for input(s): COLORU, APPEARANCEUA, PHUR, SPECGRAV, PROTEINUA, GLUCUA, KETONESU, BILIRUBINUA, OCCULTUA, NITRITE, UROBILINOGEN,  LEUKOCYTESUR, RBCUA, WBCUA, BACTERIA, SQUAMEPITHEL, HYALINECASTS in the last 168 hours.    Invalid input(s): WRIGHTSUR  All pertinent labs results from the past 24 hours have been reviewed.    Significant Imaging:  All pertinent imaging results/findings from the past 24 hours have been reviewed.

## 2019-12-10 NOTE — ANESTHESIA POSTPROCEDURE EVALUATION
Anesthesia Post Evaluation    Patient: Alexi Noguera    Procedure(s) Performed: Procedure(s) (LRB):  Nephrectomy OPEN (Left)  LYSIS, ADHESIONS (N/A)  THROMBECTOMY, VENA CAVA  THROMBECTOMY, VENA CAVA  REPAIR of IVC    Final Anesthesia Type: general    Patient location during evaluation: ICU  Patient participation: Yes- Able to Participate  Level of consciousness: awake and alert  Post-procedure vital signs: reviewed and stable  Pain management: adequate  Airway patency: patent    PONV status at discharge: No PONV  Anesthetic complications: no      Cardiovascular status: blood pressure returned to baseline  Respiratory status: unassisted and nasal cannula  Hydration status: euvolemic  Follow-up not needed.          Vitals Value Taken Time   /62 12/10/2019  1:02 PM   Temp 36.1 °C (97 °F) 12/10/2019 11:00 AM   Pulse 105 12/10/2019  1:17 PM   Resp 0 12/10/2019  1:17 PM   SpO2 95 % 12/10/2019  1:17 PM   Vitals shown include unvalidated device data.      No case tracking events are documented in the log.      Pain/Radha Score: Pain Rating Prior to Med Admin: 10 (12/10/2019 12:53 PM)  Pain Rating Post Med Admin: 4 (12/10/2019 10:58 AM)  Radha Score: 9 (12/9/2019  8:00 AM)

## 2019-12-10 NOTE — PROGRESS NOTES
IRB# [id number]  [facility] Renal Protocol  Date: December 9,2019  Specimen was retrieved from OR 24  Specimen was brought to pathology where a slice of normal and tumor was taken per [facility] protocol and brought to Dr. Penny's lab.      My Aleman RN

## 2019-12-10 NOTE — CARE UPDATE
SBT passed and cuff leak present. Pt extubated per MD order to 3 L nasal cannula. Respiratory effort unlabored and pt tolerating well at this time. Will continue to monitor closely.

## 2019-12-10 NOTE — OP NOTE
DATE OF PROCEDURE:  12/09/2019    PREOPERATIVE DIAGNOSIS:  Left renal cell carcinoma with extension into the IVC.    POSTOPERATIVE DIAGNOSIS:  Left renal cell carcinoma with extension into the IVC.    PROCEDURE PERFORMED:  Caval thrombectomy and primary repair, pararenal cava.    SURGEON:  KATERINE Atkins III, M.D.    ASSISTANT:  José Manuel Buchanan M.D.    ANESTHESIA:  General.    INDICATIONS:  A 59-year-old male with a left-sided renal cell carcinoma with   tumor thrombus extending into the left renal vein and slightly protruding into   the cava.    PROCEDURE IN DETAIL:  See Dr. Buchanan's note for the beginning of this surgery.    At that time I scrubbed in, the renal vein was partially exposed, but I was not   able to feel the end of the thrombus through the initial exposure.    Therefore, Dr. Buchanan's team mobilized the right colon and eviscerated the small   bowel superiorly providing better exposure of the cava.  That being said, the   area of the suprarenal cava expose was very limited and tight.  This was slotted   for clamp placement.  The right renal vein was encircled with umbilical tape   and Rumel as was the infrarenal cava.  The tumor thrombus could be palpated.  It   is extending into the cava by approximately 1 cm.  The real tourniquet to the   IVC and right renal vein were taken down, and a Zanger clamp was used to clamp   the suprarenal cava.  A longitudinal venotomy was performed at the level of the   left renal vein.  There was fairly significant venous bleeding that had to be   controlled with suction through this portion of the case.  The left renal vein   was sharply divided anteriorly and then with some difficulty posteriorly   underneath the tumor and the left renal vein and tumor were successfully removed   in their entirety.    The caval venotomy was then closed with a 3-0 running Prolene suture, starting   on each end.  After this was completed, flow was restored and there was good    hemostasis with no need for further sutures.    See Dr. Buchanan's operative note for the remainder of the case.      WS/IN  dd: 12/09/2019 15:35:20 (CST)  td: 12/09/2019 18:42:47 (CST)  Doc ID   #[id number]  Job ID #[id number]    CC:

## 2019-12-10 NOTE — PROGRESS NOTES
[facility]  Critical Care - Surgery  Progress Note    Patient Name: Alexi Noguera  MRN: [id number]  Admission Date: 12/9/2019  Hospital Length of Stay: 1 days  Code Status: Full Code  Attending Provider: José Manuel Buchanan MD  Primary Care Provider: Derrick Matthews MD   Principal Problem: Primary renal cell carcinoma of native left kidney    Subjective:     Hospital/ICU Course:  12/9 - Arrives to surgical ICU intubated on levo @ 0.3, CVC placed. Resuscitated with additional 7L of crystalloids.    Interval History/Significant Events: Received total of 9L of crystalloids overnight. Extubated to HFNC. Lactates trending down to 1.9. Continues to require norepi @ 0.08    Follow-up For: Procedure(s) (LRB):  Nephrectomy OPEN (Left)  LYSIS, ADHESIONS (N/A)  THROMBECTOMY, VENA CAVA  THROMBECTOMY, VENA CAVA  REPAIR of IVC    Post-Operative Day: 1 Day Post-Op    Objective:     Vital Signs (Most Recent):  Temp: 97 °F (36.1 °C) (12/10/19 1100)  Pulse: (!) 120 (12/10/19 1600)  Resp: (!) 22 (12/10/19 1530)  BP: (!) 100/57 (12/10/19 1600)  SpO2: 95 % (12/10/19 1600) Vital Signs (24h Range):  Temp:  [97 °F (36.1 °C)-98.7 °F (37.1 °C)] 97 °F (36.1 °C)  Pulse:  [] 120  Resp:  [0-52] 22  SpO2:  [91 %-100 %] 95 %  BP: ()/(50-71) 100/57  Arterial Line BP: ()/(39-67) 107/62     Weight: 132.4 kg (291 lb 14.2 oz)  Body mass index is 38.51 kg/m².      Intake/Output Summary (Last 24 hours) at 12/10/2019 1608  Last data filed at 12/10/2019 1600  Gross per 24 hour   Intake 22258 ml   Output 3265 ml   Net 9466 ml       Physical Exam   Constitutional: He is oriented to person, place, and time. He appears well-developed and well-nourished.   HENT:   Head: Normocephalic and atraumatic.   Eyes: Pupils are equal, round, and reactive to light.   Neck: Neck supple. No tracheal deviation present. No thyromegaly present.   Cardiovascular: Normal rate, regular rhythm and intact distal pulses.   No murmur  heard.  Pulmonary/Chest: Tachypnea noted. He has no decreased breath sounds. He has no wheezes.   Abdominal: Soft. He exhibits no distension and no mass.   Surgical incision vertical midline of abdomen, without drainage, fluctuance or ecchymosis.     Musculoskeletal: He exhibits edema.   Neurological: He is alert and oriented to person, place, and time.   Skin: Skin is warm and dry. He is not diaphoretic. No erythema. No pallor.   Psychiatric: He has a normal mood and affect. His behavior is normal.   Nursing note and vitals reviewed.        Lines/Drains/Airways     Central Venous Catheter Line                 Trialysis (Dialysis) Catheter 03/20/17 0850 right internal jugular 995 days         Percutaneous Central Line Insertion/Assessment - triple lumen  12/09/19 1830 right internal jugular less than 1 day          Drain                 Urethral Catheter 12/09/19 0822 Non-latex 16 Fr. 1 day         NG/OG Tube 12/09/19 1900 Center mouth less than 1 day          Epidural Line                 Perineural Analgesia/Anesthesia Assessment (using dermatomes) 12/09/19 0730 1 day          Arterial Line                 Arterial Line 12/09/19 0821 Right Radial 1 day          Peripheral Intravenous Line                 Peripheral IV - Single Lumen 12/09/19 1704 18 G Left Hand less than 1 day                Significant Labs:    CBC/Anemia Profile:  Recent Labs   Lab 12/09/19  2337 12/10/19  0334 12/10/19  0736   WBC 12.08 10.51 9.71   HGB 8.4* 8.3* 7.7*   HCT 26.3* 25.6* 23.0*    215 188   MCV 91 91 89   RDW 13.9 13.8 13.9        Chemistries:  Recent Labs   Lab 12/09/19  1646 12/10/19  0334    135*   K 4.7 4.9    109   CO2 15* 19*   BUN 17 15   CREATININE 1.4 1.2   CALCIUM 8.2* 6.9*   ALBUMIN  --  2.0*   PROT  --  3.5*   BILITOT  --  0.3   ALKPHOS  --  21*   ALT  --  19   AST  --  35   MG 2.2  --    PHOS 6.2*  --        ABGs:   Recent Labs   Lab 12/10/19  1551   PH 7.337*   PCO2 39.2   HCO3 21.0*   POCSATURATED  89*   BE -5     Lactic Acid:   Recent Labs   Lab 12/10/19  0502 12/10/19  0646 12/10/19  0915   LACTATE 2.7* 2.2 1.9     POCT Glucose:   Recent Labs   Lab 12/10/19  0754   POCTGLUCOSE 155*     All pertinent labs within the past 24 hours have been reviewed.      Significant Imaging:  I have reviewed all pertinent imaging results/findings within the past 24 hours.    Assessment/Plan:     * Primary renal cell carcinoma of native left kidney  60 yo man admitted to SICU s/p radical left nephrectomy and IVC thrombectomy complicated by significant EBL of 7L.      Neuro:  - Pain: bilateral RENETTA catheters and hydromorphone PCA; APS following  - AAO x4    Pulm:  - Extubated to HFNC  - Wean O2 as tolerated    CV:  - Drips: Levo @ 0.08; will add vasopressor if needed  - Wean pressors as tolerated  - s/p fluid resuscitation with total 18L of crystalloids, 1L of albumin, and 4 units PRBC  - Lactate normalized    Renal:  - s/p L nephrectomy with significant blood loss and fluid shift  - Trend BUN/Cr  - Monitor UOP    FEN/GI:  - Passed bedside swallow  - Clear liquids; ADAT   - Replace lytes PRN    Hem:  - H/H stable  - No signs of active bleeding    ID:  - Afebrile, no leukocytosis  - Monitor for signs of infection    Endo:  - Accuchecks  - No issues      PPx:  - Famotidine  - SQH      Dispo:   - Continued SICU care    Primary: Urology         Critical care was time spent personally by me on the following activities: development of treatment plan with patient or surrogate and bedside caregivers, discussions with consultants, evaluation of patient's response to treatment, examination of patient, ordering and performing treatments and interventions, ordering and review of laboratory studies, ordering and review of radiographic studies, pulse oximetry, re-evaluation of patient's condition.  This critical care time did not overlap with that of any other provider or involve time for any procedures.       Erin Kwon MD  Critical  Care - Surgery  [facility]

## 2019-12-10 NOTE — PROGRESS NOTES
Called to pt bedside.  RN was ambulating pt which caused increased SOB and wheezing.  Gave pt a one time DUONeb tx.  MD approved and will put in a PRN tx for the pt.  Will continue to monitor

## 2019-12-10 NOTE — PROGRESS NOTES
Notified Dr. Babin and Dr. Kwon of Levophed at 0.1 mcg/kg/min, abg results, pt placed on 10L HF, uop 10cc.  Pt oob to chair, oriented x3, following commands.  Denies sob.

## 2019-12-10 NOTE — ASSESSMENT & PLAN NOTE
60 yo man admitted to SICU s/p radical left nephrectomy and IVC mass removal with significant blood loss.    #Neuro  -Sedated on propofol and precedex gtt  -No other acute issues.  -Wean sedation in morning AT    #Pulm  -Intubated  -Vent settings AC 450tv, 5PEEP, 40% FiO2  -Wean vent AT in morning    #CV  -Hemodynamically unstable 2/2 significant blood loss.  -On levophed gtt, weaning down.  0.26 on arrival, down to 0.06.  -Wean pressors at  -Q4H CBC.  Replace blood products as necessary.  -Fluid resuscitation.  10 additional liters overnight.    -Trend lactate q2H until <2    #FENGI  -NPO  -Replace lytes PRN    #Renal  -S/p nephrectomy with significant blood loss and fluid shift.  -Q4H BMP overnight.  Well space out in morning given stability.    #ID  -No acute issues  -Trend CBC    Endo  -Accuchecks achs     Ppx:  Protonix, SCD's.    Dispo: Continued SICU care.

## 2019-12-10 NOTE — H&P
[facility]  Critical Care - Surgery  History & Physical    Patient Name: Alexi Noguera  MRN: [id number]  Admission Date: 12/9/2019  Code Status: Full Code  Attending Physician: José Manuel Buchanan MD   Primary Care Provider: Derrick Matthews MD   Principal Problem: <principal problem not specified>    Subjective:     HPI:  Mr. Alexi Noguera is a 59 year old man with pmhx of HTN, HLD and no previous surgical history that is admitted to Surgical ICU after undergoing a radical L nephrectomy with intra-inferior vena cava mass removal.  Surgery was complicated by a 7 liter blood loss intraoperatively, per anesthesia roughly 4 liters of blood were lost rapidly towards the latter part of the procedure.  He received 4 units of pRBC, 9 liters of crystalloid and 1 liter of albumin intraoperatively.  He arrives to the surgical ICU intubated on levo and vaso pressor drips.      Hospital/ICU Course:  12/9 - Arrives to surgical ICU intubated on pressor drips, hemodynamically unstable.      Follow-up For: Procedure(s) (LRB):  Nephrectomy OPEN (Left)  LYSIS, ADHESIONS (N/A)  THROMBECTOMY, VENA CAVA  THROMBECTOMY, VENA CAVA  REPAIR of IVC    Post-Operative Day: Day of Surgery     Past Medical History:   Diagnosis Date    Hyperlipidemia     Hypertension        History reviewed. No pertinent surgical history.    Review of patient's allergies indicates:   Allergen Reactions    Lisinopril Other (See Comments)     Dry cough       Family History     Problem Relation (Age of Onset)    Diabetes Paternal Aunt    Heart disease Brother, Paternal Uncle    No Known Problems Mother, Father, Sister        Tobacco Use    Smoking status: Never Smoker   Substance and Sexual Activity    Alcohol use: No     Alcohol/week: 0.0 standard drinks    Drug use: No    Sexual activity: Not on file      Review of Systems   Unable to perform ROS: Intubated     Objective:     Vital Signs (Most Recent):  Temp: 97.9 °F (36.6 °C) (12/09/19  1900)  Pulse: 81 (12/09/19 1845)  Resp: (!) 21 (12/09/19 1845)  BP: (!) 82/52 (12/09/19 1700)  SpO2: 99 % (12/09/19 1845) Vital Signs (24h Range):  Temp:  [97.9 °F (36.6 °C)-98.6 °F (37 °C)] 97.9 °F (36.6 °C)  Pulse:  [] 81  Resp:  [17-23] 21  SpO2:  [95 %-100 %] 99 %  BP: ()/() 82/52  Arterial Line BP: ()/(39-61) 116/61     Weight: 117.9 kg (260 lb)  Body mass index is 34.3 kg/m².      Intake/Output Summary (Last 24 hours) at 12/9/2019 1938  Last data filed at 12/9/2019 1900  Gross per 24 hour   Intake 29218 ml   Output 8353 ml   Net 2525 ml       Physical Exam   Constitutional: He appears well-developed and well-nourished.   HENT:   Head: Normocephalic and atraumatic.   Eyes: Right eye exhibits no discharge. Left eye exhibits no discharge.   Neck: Neck supple. No tracheal deviation present. No thyromegaly present.   Cardiovascular: Normal rate, regular rhythm and intact distal pulses.   No murmur heard.  Pulmonary/Chest: He has no wheezes.   Mechanical breath sounds on ventilation.     Abdominal: Soft. He exhibits no distension and no mass.   Surgical incision vertical midline of abdomen, without drainage, fluctuance or ecchymosis.     Neurological:   Sedated and intubated    Skin: Skin is warm and dry. He is not diaphoretic. No erythema. No pallor.       Vents:  Vent Mode: SIMV (12/09/19 1646)  Ventilator Initiated: Yes (12/09/19 1641)  Set Rate: 14 bmp (12/09/19 1646)  Vt Set: 550 mL (12/09/19 1646)  Pressure Support: 10 cmH20 (12/09/19 1646)  PEEP/CPAP: 5 cmH20 (12/09/19 1646)  Oxygen Concentration (%): 50 (12/09/19 1845)  Peak Airway Pressure: 14 cmH2O (12/09/19 1646)  Plateau Pressure: 0 cmH20 (12/09/19 1646)  Total Ve: 11.6 mL (12/09/19 1646)  F/VT Ratio<105 (RSBI): (!) 25.71 (12/09/19 1641)    Lines/Drains/Airways     Central Venous Catheter Line                 Trialysis (Dialysis) Catheter 03/20/17 0850 right internal jugular 994 days         Percutaneous Central Line  Insertion/Assessment - triple lumen  12/09/19 1830 right internal jugular less than 1 day          Drain                 Urethral Catheter 12/09/19 0822 Non-latex 16 Fr. less than 1 day          Airway                 Airway - Non-Surgical 12/09/19 0820 Endotracheal Tube less than 1 day          Epidural Line                 Perineural Analgesia/Anesthesia Assessment (using dermatomes) 12/09/19 0730 less than 1 day          Arterial Line                 Arterial Line 12/09/19 0821 Right Radial less than 1 day          Peripheral Intravenous Line                 Peripheral IV - Single Lumen 10/08/19 0945 24 G Right Antecubital 62 days         Peripheral IV - Single Lumen 12/09/19 0646 18 G Left Forearm less than 1 day         Peripheral IV - Single Lumen 12/09/19 1704 18 G Left Hand less than 1 day                Significant Labs:    CBC/Anemia Profile:  Recent Labs   Lab 12/09/19  1538 12/09/19  1547 12/09/19  1646 12/09/19  1646   WBC 18.02*  --  18.05*  --    HGB 9.3*  --  9.7*  --    HCT 29.2* 24* 30.0* 26*     --  280  --    MCV 92  --  91  --    RDW 13.7  --  13.7  --         Chemistries:  Recent Labs   Lab 12/09/19  1646      K 4.7      CO2 15*   BUN 17   CREATININE 1.4   CALCIUM 8.2*   MG 2.2   PHOS 6.2*       All pertinent labs within the past 24 hours have been reviewed.    Significant Imaging: I have reviewed all pertinent imaging results/findings within the past 24 hours.    Assessment/Plan:     Left renal mass  60 yo man admitted to SICU s/p radical left nephrectomy and IVC mass removal with significant blood loss.    #Neuro  -Sedated on propofol and precedex gtt  -No other acute issues.  -Wean sedation in morning AT    #Pulm  -Intubated  -Vent settings AC 450tv, 5PEEP, 40% FiO2  -Wean vent AT in morning    #CV  -Hemodynamically unstable 2/2 significant blood loss.  -On levophed gtt, weaning down.  0.26 on arrival, down to 0.06.  -Wean pressors at  -Q4H CBC.  Replace blood products as  necessary.  -Fluid resuscitation.  10 additional liters overnight.    -Trend lactate q2H until <2    #FENGI  -NPO  -Replace lytes PRN    #Renal  -S/p nephrectomy with significant blood loss and fluid shift.  -Q4H BMP overnight.  Well space out in morning given stability.    #ID  -No acute issues  -Trend CBC    Endo  -Accuchecks achs     Ppx:  Protonix, SCD's.    Dispo: Continued SICU care.             Critical care was time spent personally by me on the following activities: development of treatment plan with patient or surrogate and bedside caregivers, discussions with consultants, evaluation of patient's response to treatment, examination of patient, ordering and performing treatments and interventions, ordering and review of laboratory studies, ordering and review of radiographic studies, pulse oximetry, re-evaluation of patient's condition.  This critical care time did not overlap with that of any other provider or involve time for any procedures.     Stepan Benjamin,   Critical Care - Surgery  [facility]

## 2019-12-10 NOTE — HOSPITAL COURSE
12/9 - Arrives to surgical ICU intubated on levo @ 0.3, CVC placed. Resuscitated with additional 7L of crystalloids.  12/11 - Overnight pt H&H dropped two points, received 2u pRBC.  Otherwise NAEON.  Pt remains extubated, now off of pressors.  Pt able to ambulate and sit up in chair at bedside.

## 2019-12-10 NOTE — ASSESSMENT & PLAN NOTE
60 yo man admitted to SICU s/p radical left nephrectomy and IVC thrombectomy complicated by significant EBL of 7L.      Neuro:  - Pain: bilateral RENETTA catheters and hydromorphone PCA; APS following  - AAO x4    Pulm:  - Extubated to HFNC  - Wean O2 as tolerated    CV:  - Drips: Levo @ 0.08; will add vasopressor if needed  - Wean pressors as tolerated  - s/p fluid resuscitation with total 18L of crystalloids, 1L of albumin, and 4 units PRBC  - Lactate normalized    Renal:  - s/p L nephrectomy with significant blood loss and fluid shift  - Trend BUN/Cr  - Monitor UOP    FEN/GI:  - Passed bedside swallow  - Clear liquids; ADAT   - Replace lytes PRN    Hem:  - H/H stable  - No signs of active bleeding    ID:  - Afebrile, no leukocytosis  - Monitor for signs of infection    Endo:  - Accuchecks  - No issues      PPx:  - Famotidine  - SQH      Dispo:   - Continued SICU care    Primary: Urology

## 2019-12-11 LAB
ABO + RH BLD: NORMAL
ALBUMIN SERPL BCP-MCNC: 2 G/DL (ref 3.5–5.2)
ALBUMIN SERPL BCP-MCNC: 2.1 G/DL (ref 3.5–5.2)
ALLENS TEST: NORMAL
ALP SERPL-CCNC: 26 U/L (ref 55–135)
ALP SERPL-CCNC: 31 U/L (ref 55–135)
ALT SERPL W/O P-5'-P-CCNC: 22 U/L (ref 10–44)
ALT SERPL W/O P-5'-P-CCNC: 22 U/L (ref 10–44)
ANION GAP SERPL CALC-SCNC: 4 MMOL/L (ref 8–16)
ANION GAP SERPL CALC-SCNC: 5 MMOL/L (ref 8–16)
ANION GAP SERPL CALC-SCNC: 5 MMOL/L (ref 8–16)
APTT BLDCRRT: 25.6 SEC (ref 21–32)
AST SERPL-CCNC: 55 U/L (ref 10–40)
AST SERPL-CCNC: 57 U/L (ref 10–40)
BASOPHILS # BLD AUTO: 0.01 K/UL (ref 0–0.2)
BASOPHILS # BLD AUTO: 0.01 K/UL (ref 0–0.2)
BASOPHILS # BLD AUTO: 0.02 K/UL (ref 0–0.2)
BASOPHILS # BLD AUTO: 0.02 K/UL (ref 0–0.2)
BASOPHILS # BLD AUTO: ABNORMAL K/UL (ref 0–0.2)
BASOPHILS NFR BLD: 0 % (ref 0–1.9)
BASOPHILS NFR BLD: 0.1 % (ref 0–1.9)
BASOPHILS NFR BLD: 0.2 % (ref 0–1.9)
BILIRUB SERPL-MCNC: 0.4 MG/DL (ref 0.1–1)
BILIRUB SERPL-MCNC: 0.4 MG/DL (ref 0.1–1)
BLD GP AB SCN CELLS X3 SERPL QL: NORMAL
BLD PROD TYP BPU: NORMAL
BLOOD UNIT EXPIRATION DATE: NORMAL
BLOOD UNIT TYPE CODE: 6200
BLOOD UNIT TYPE: NORMAL
BUN SERPL-MCNC: 23 MG/DL (ref 8–23)
BUN SERPL-MCNC: 25 MG/DL (ref 8–23)
BUN SERPL-MCNC: 26 MG/DL (ref 8–23)
CA-I BLDV-SCNC: 1 MMOL/L (ref 1.06–1.42)
CA-I BLDV-SCNC: 1.06 MMOL/L (ref 1.06–1.42)
CALCIUM SERPL-MCNC: 7.3 MG/DL (ref 8.7–10.5)
CALCIUM SERPL-MCNC: 7.3 MG/DL (ref 8.7–10.5)
CALCIUM SERPL-MCNC: 7.4 MG/DL (ref 8.7–10.5)
CHLORIDE SERPL-SCNC: 106 MMOL/L (ref 95–110)
CHLORIDE SERPL-SCNC: 107 MMOL/L (ref 95–110)
CHLORIDE SERPL-SCNC: 108 MMOL/L (ref 95–110)
CO2 SERPL-SCNC: 23 MMOL/L (ref 23–29)
CO2 SERPL-SCNC: 24 MMOL/L (ref 23–29)
CO2 SERPL-SCNC: 25 MMOL/L (ref 23–29)
CODING SYSTEM: NORMAL
CREAT SERPL-MCNC: 1.2 MG/DL (ref 0.5–1.4)
CREAT SERPL-MCNC: 1.4 MG/DL (ref 0.5–1.4)
CREAT SERPL-MCNC: 1.6 MG/DL (ref 0.5–1.4)
DELSYS: NORMAL
DIFFERENTIAL METHOD: ABNORMAL
DISPENSE STATUS: NORMAL
EOSINOPHIL # BLD AUTO: 0 K/UL (ref 0–0.5)
EOSINOPHIL # BLD AUTO: ABNORMAL K/UL (ref 0–0.5)
EOSINOPHIL NFR BLD: 0 % (ref 0–8)
EOSINOPHIL NFR BLD: 0.1 % (ref 0–8)
EOSINOPHIL NFR BLD: 0.1 % (ref 0–8)
EOSINOPHIL NFR BLD: 0.2 % (ref 0–8)
EOSINOPHIL NFR BLD: 0.2 % (ref 0–8)
ERYTHROCYTE [DISTWIDTH] IN BLOOD BY AUTOMATED COUNT: 14.2 % (ref 11.5–14.5)
ERYTHROCYTE [DISTWIDTH] IN BLOOD BY AUTOMATED COUNT: 14.2 % (ref 11.5–14.5)
ERYTHROCYTE [DISTWIDTH] IN BLOOD BY AUTOMATED COUNT: 14.5 % (ref 11.5–14.5)
ERYTHROCYTE [DISTWIDTH] IN BLOOD BY AUTOMATED COUNT: 14.6 % (ref 11.5–14.5)
ERYTHROCYTE [DISTWIDTH] IN BLOOD BY AUTOMATED COUNT: 14.6 % (ref 11.5–14.5)
ERYTHROCYTE [SEDIMENTATION RATE] IN BLOOD BY WESTERGREN METHOD: 20 MM/H
EST. GFR  (AFRICAN AMERICAN): 53 ML/MIN/1.73 M^2
EST. GFR  (AFRICAN AMERICAN): >60 ML/MIN/1.73 M^2
EST. GFR  (AFRICAN AMERICAN): >60 ML/MIN/1.73 M^2
EST. GFR  (NON AFRICAN AMERICAN): 45.8 ML/MIN/1.73 M^2
EST. GFR  (NON AFRICAN AMERICAN): 53.8 ML/MIN/1.73 M^2
EST. GFR  (NON AFRICAN AMERICAN): >60 ML/MIN/1.73 M^2
FIO2: 50
FLOW: 30
GLUCOSE SERPL-MCNC: 103 MG/DL (ref 70–110)
GLUCOSE SERPL-MCNC: 113 MG/DL (ref 70–110)
GLUCOSE SERPL-MCNC: 118 MG/DL (ref 70–110)
HCO3 UR-SCNC: 25.1 MMOL/L (ref 24–28)
HCT VFR BLD AUTO: 19.8 % (ref 40–54)
HCT VFR BLD AUTO: 20.1 % (ref 40–54)
HCT VFR BLD AUTO: 21.7 % (ref 40–54)
HCT VFR BLD AUTO: 22 % (ref 40–54)
HCT VFR BLD AUTO: 22.2 % (ref 40–54)
HGB BLD-MCNC: 6.4 G/DL (ref 14–18)
HGB BLD-MCNC: 6.4 G/DL (ref 14–18)
HGB BLD-MCNC: 6.9 G/DL (ref 14–18)
HGB BLD-MCNC: 7 G/DL (ref 14–18)
HGB BLD-MCNC: 7.2 G/DL (ref 14–18)
HYPOCHROMIA BLD QL SMEAR: ABNORMAL
HYPOCHROMIA BLD QL SMEAR: ABNORMAL
IMM GRANULOCYTES # BLD AUTO: 0.08 K/UL (ref 0–0.04)
IMM GRANULOCYTES # BLD AUTO: 0.09 K/UL (ref 0–0.04)
IMM GRANULOCYTES # BLD AUTO: ABNORMAL K/UL (ref 0–0.04)
IMM GRANULOCYTES NFR BLD AUTO: 0.6 % (ref 0–0.5)
IMM GRANULOCYTES NFR BLD AUTO: ABNORMAL % (ref 0–0.5)
INR PPP: 1 (ref 0.8–1.2)
LYMPHOCYTES # BLD AUTO: 0.7 K/UL (ref 1–4.8)
LYMPHOCYTES # BLD AUTO: 0.8 K/UL (ref 1–4.8)
LYMPHOCYTES # BLD AUTO: 1.1 K/UL (ref 1–4.8)
LYMPHOCYTES # BLD AUTO: 1.1 K/UL (ref 1–4.8)
LYMPHOCYTES # BLD AUTO: ABNORMAL K/UL (ref 1–4.8)
LYMPHOCYTES NFR BLD: 5.4 % (ref 18–48)
LYMPHOCYTES NFR BLD: 5.4 % (ref 18–48)
LYMPHOCYTES NFR BLD: 7 % (ref 18–48)
LYMPHOCYTES NFR BLD: 7.8 % (ref 18–48)
LYMPHOCYTES NFR BLD: 8.2 % (ref 18–48)
MAGNESIUM SERPL-MCNC: 2.1 MG/DL (ref 1.6–2.6)
MAGNESIUM SERPL-MCNC: 2.2 MG/DL (ref 1.6–2.6)
MAGNESIUM SERPL-MCNC: 2.2 MG/DL (ref 1.6–2.6)
MCH RBC QN AUTO: 28.8 PG (ref 27–31)
MCH RBC QN AUTO: 28.9 PG (ref 27–31)
MCH RBC QN AUTO: 29 PG (ref 27–31)
MCH RBC QN AUTO: 29.3 PG (ref 27–31)
MCH RBC QN AUTO: 29.5 PG (ref 27–31)
MCHC RBC AUTO-ENTMCNC: 31.8 G/DL (ref 32–36)
MCHC RBC AUTO-ENTMCNC: 32.3 G/DL (ref 32–36)
MCHC RBC AUTO-ENTMCNC: 32.4 G/DL (ref 32–36)
MCV RBC AUTO: 90 FL (ref 82–98)
MCV RBC AUTO: 90 FL (ref 82–98)
MCV RBC AUTO: 91 FL (ref 82–98)
MODE: NORMAL
MONOCYTES # BLD AUTO: 0.7 K/UL (ref 0.3–1)
MONOCYTES # BLD AUTO: 1 K/UL (ref 0.3–1)
MONOCYTES # BLD AUTO: 1 K/UL (ref 0.3–1)
MONOCYTES # BLD AUTO: 1.1 K/UL (ref 0.3–1)
MONOCYTES # BLD AUTO: ABNORMAL K/UL (ref 0.3–1)
MONOCYTES NFR BLD: 5 % (ref 4–15)
MONOCYTES NFR BLD: 5.4 % (ref 4–15)
MONOCYTES NFR BLD: 6.3 % (ref 4–15)
MONOCYTES NFR BLD: 7.5 % (ref 4–15)
MONOCYTES NFR BLD: 7.8 % (ref 4–15)
NEUTROPHILS # BLD AUTO: 11.4 K/UL (ref 1.8–7.7)
NEUTROPHILS # BLD AUTO: 11.4 K/UL (ref 1.8–7.7)
NEUTROPHILS # BLD AUTO: 12 K/UL (ref 1.8–7.7)
NEUTROPHILS # BLD AUTO: 13.2 K/UL (ref 1.8–7.7)
NEUTROPHILS NFR BLD: 83.5 % (ref 38–73)
NEUTROPHILS NFR BLD: 83.6 % (ref 38–73)
NEUTROPHILS NFR BLD: 87.4 % (ref 38–73)
NEUTROPHILS NFR BLD: 88 % (ref 38–73)
NEUTROPHILS NFR BLD: 88.2 % (ref 38–73)
NRBC BLD-RTO: 0 /100 WBC
PCO2 BLDA: 39.5 MMHG (ref 35–45)
PH SMN: 7.41 [PH] (ref 7.35–7.45)
PHOSPHATE SERPL-MCNC: 2.3 MG/DL (ref 2.7–4.5)
PHOSPHATE SERPL-MCNC: 3.2 MG/DL (ref 2.7–4.5)
PLATELET # BLD AUTO: 140 K/UL (ref 150–350)
PLATELET # BLD AUTO: 144 K/UL (ref 150–350)
PLATELET # BLD AUTO: 145 K/UL (ref 150–350)
PLATELET # BLD AUTO: 152 K/UL (ref 150–350)
PLATELET # BLD AUTO: 161 K/UL (ref 150–350)
PMV BLD AUTO: 10 FL (ref 9.2–12.9)
PMV BLD AUTO: 10 FL (ref 9.2–12.9)
PMV BLD AUTO: 9.4 FL (ref 9.2–12.9)
PMV BLD AUTO: 9.4 FL (ref 9.2–12.9)
PMV BLD AUTO: 9.5 FL (ref 9.2–12.9)
PO2 BLDA: 80 MMHG (ref 80–100)
POC BE: 0 MMOL/L
POC SATURATED O2: 96 % (ref 95–100)
POC TCO2: 26 MMOL/L (ref 23–27)
POCT GLUCOSE: 104 MG/DL (ref 70–110)
POCT GLUCOSE: 99 MG/DL (ref 70–110)
POLYCHROMASIA BLD QL SMEAR: ABNORMAL
POLYCHROMASIA BLD QL SMEAR: ABNORMAL
POTASSIUM SERPL-SCNC: 4.1 MMOL/L (ref 3.5–5.1)
POTASSIUM SERPL-SCNC: 4.6 MMOL/L (ref 3.5–5.1)
POTASSIUM SERPL-SCNC: 4.8 MMOL/L (ref 3.5–5.1)
POTASSIUM SERPL-SCNC: 4.9 MMOL/L (ref 3.5–5.1)
PROT SERPL-MCNC: 4.2 G/DL (ref 6–8.4)
PROT SERPL-MCNC: 4.3 G/DL (ref 6–8.4)
PROTHROMBIN TIME: 10.5 SEC (ref 9–12.5)
RBC # BLD AUTO: 2.17 M/UL (ref 4.6–6.2)
RBC # BLD AUTO: 2.21 M/UL (ref 4.6–6.2)
RBC # BLD AUTO: 2.4 M/UL (ref 4.6–6.2)
RBC # BLD AUTO: 2.42 M/UL (ref 4.6–6.2)
RBC # BLD AUTO: 2.46 M/UL (ref 4.6–6.2)
SAMPLE: NORMAL
SITE: NORMAL
SODIUM SERPL-SCNC: 135 MMOL/L (ref 136–145)
SODIUM SERPL-SCNC: 135 MMOL/L (ref 136–145)
SODIUM SERPL-SCNC: 137 MMOL/L (ref 136–145)
SP02: 97
TRANS ERYTHROCYTES VOL PATIENT: NORMAL ML
WBC # BLD AUTO: 12.94 K/UL (ref 3.9–12.7)
WBC # BLD AUTO: 13.67 K/UL (ref 3.9–12.7)
WBC # BLD AUTO: 13.69 K/UL (ref 3.9–12.7)
WBC # BLD AUTO: 14.37 K/UL (ref 3.9–12.7)
WBC # BLD AUTO: 15.13 K/UL (ref 3.9–12.7)

## 2019-12-11 PROCEDURE — 85025 COMPLETE CBC W/AUTO DIFF WBC: CPT | Mod: 91

## 2019-12-11 PROCEDURE — 99291 CRITICAL CARE FIRST HOUR: CPT | Mod: ,,, | Performed by: ANESTHESIOLOGY

## 2019-12-11 PROCEDURE — 27100171 HC OXYGEN HIGH FLOW UP TO 24 HOURS

## 2019-12-11 PROCEDURE — 20000000 HC ICU ROOM

## 2019-12-11 PROCEDURE — 63600175 PHARM REV CODE 636 W HCPCS: Performed by: STUDENT IN AN ORGANIZED HEALTH CARE EDUCATION/TRAINING PROGRAM

## 2019-12-11 PROCEDURE — 80048 BASIC METABOLIC PNL TOTAL CA: CPT

## 2019-12-11 PROCEDURE — 83735 ASSAY OF MAGNESIUM: CPT

## 2019-12-11 PROCEDURE — 82330 ASSAY OF CALCIUM: CPT | Mod: 91

## 2019-12-11 PROCEDURE — 25000003 PHARM REV CODE 250: Performed by: STUDENT IN AN ORGANIZED HEALTH CARE EDUCATION/TRAINING PROGRAM

## 2019-12-11 PROCEDURE — 84132 ASSAY OF SERUM POTASSIUM: CPT

## 2019-12-11 PROCEDURE — 84100 ASSAY OF PHOSPHORUS: CPT | Mod: 91

## 2019-12-11 PROCEDURE — 86920 COMPATIBILITY TEST SPIN: CPT

## 2019-12-11 PROCEDURE — P9021 RED BLOOD CELLS UNIT: HCPCS

## 2019-12-11 PROCEDURE — 37799 UNLISTED PX VASCULAR SURGERY: CPT

## 2019-12-11 PROCEDURE — 99900035 HC TECH TIME PER 15 MIN (STAT)

## 2019-12-11 PROCEDURE — 36430 TRANSFUSION BLD/BLD COMPNT: CPT

## 2019-12-11 PROCEDURE — 82330 ASSAY OF CALCIUM: CPT

## 2019-12-11 PROCEDURE — 86901 BLOOD TYPING SEROLOGIC RH(D): CPT

## 2019-12-11 PROCEDURE — 85610 PROTHROMBIN TIME: CPT

## 2019-12-11 PROCEDURE — 80053 COMPREHEN METABOLIC PANEL: CPT | Mod: 91

## 2019-12-11 PROCEDURE — C9113 INJ PANTOPRAZOLE SODIUM, VIA: HCPCS | Performed by: STUDENT IN AN ORGANIZED HEALTH CARE EDUCATION/TRAINING PROGRAM

## 2019-12-11 PROCEDURE — 83735 ASSAY OF MAGNESIUM: CPT | Mod: 91

## 2019-12-11 PROCEDURE — 84100 ASSAY OF PHOSPHORUS: CPT

## 2019-12-11 PROCEDURE — 80053 COMPREHEN METABOLIC PANEL: CPT

## 2019-12-11 PROCEDURE — 82803 BLOOD GASES ANY COMBINATION: CPT

## 2019-12-11 PROCEDURE — 94761 N-INVAS EAR/PLS OXIMETRY MLT: CPT

## 2019-12-11 PROCEDURE — 27100092 HC HIGH FLOW DELIVERY CANNULA

## 2019-12-11 PROCEDURE — 85730 THROMBOPLASTIN TIME PARTIAL: CPT

## 2019-12-11 PROCEDURE — 99291 PR CRITICAL CARE, E/M 30-74 MINUTES: ICD-10-PCS | Mod: ,,, | Performed by: ANESTHESIOLOGY

## 2019-12-11 RX ORDER — ACETAMINOPHEN 325 MG/1
650 TABLET ORAL EVERY 6 HOURS
Status: DISCONTINUED | OUTPATIENT
Start: 2019-12-11 | End: 2019-12-15 | Stop reason: HOSPADM

## 2019-12-11 RX ORDER — MORPHINE SULFATE 2 MG/ML
2 INJECTION, SOLUTION INTRAMUSCULAR; INTRAVENOUS
Status: DISCONTINUED | OUTPATIENT
Start: 2019-12-11 | End: 2019-12-15 | Stop reason: HOSPADM

## 2019-12-11 RX ORDER — HYDROCODONE BITARTRATE AND ACETAMINOPHEN 500; 5 MG/1; MG/1
TABLET ORAL
Status: DISCONTINUED | OUTPATIENT
Start: 2019-12-11 | End: 2019-12-12

## 2019-12-11 RX ORDER — OXYCODONE HYDROCHLORIDE 5 MG/1
5 TABLET ORAL
Status: DISCONTINUED | OUTPATIENT
Start: 2019-12-11 | End: 2019-12-15 | Stop reason: HOSPADM

## 2019-12-11 RX ORDER — OXYCODONE HYDROCHLORIDE 5 MG/1
5 TABLET ORAL
Status: DISCONTINUED | OUTPATIENT
Start: 2019-12-11 | End: 2019-12-11

## 2019-12-11 RX ORDER — FUROSEMIDE 10 MG/ML
40 INJECTION INTRAMUSCULAR; INTRAVENOUS ONCE
Status: COMPLETED | OUTPATIENT
Start: 2019-12-11 | End: 2019-12-11

## 2019-12-11 RX ORDER — MORPHINE SULFATE 2 MG/ML
4 INJECTION, SOLUTION INTRAMUSCULAR; INTRAVENOUS
Status: DISCONTINUED | OUTPATIENT
Start: 2019-12-11 | End: 2019-12-11

## 2019-12-11 RX ORDER — PANTOPRAZOLE SODIUM 40 MG/1
40 TABLET, DELAYED RELEASE ORAL DAILY
Status: DISCONTINUED | OUTPATIENT
Start: 2019-12-12 | End: 2019-12-15 | Stop reason: HOSPADM

## 2019-12-11 RX ORDER — ACETAMINOPHEN 325 MG/1
650 TABLET ORAL EVERY 8 HOURS
Status: DISCONTINUED | OUTPATIENT
Start: 2019-12-11 | End: 2019-12-11

## 2019-12-11 RX ORDER — MORPHINE SULFATE 2 MG/ML
4 INJECTION, SOLUTION INTRAMUSCULAR; INTRAVENOUS
Status: DISCONTINUED | OUTPATIENT
Start: 2019-12-11 | End: 2019-12-15 | Stop reason: HOSPADM

## 2019-12-11 RX ORDER — OXYCODONE HYDROCHLORIDE 10 MG/1
10 TABLET ORAL
Status: DISCONTINUED | OUTPATIENT
Start: 2019-12-11 | End: 2019-12-11

## 2019-12-11 RX ORDER — AMOXICILLIN 250 MG
1 CAPSULE ORAL 2 TIMES DAILY
Status: DISCONTINUED | OUTPATIENT
Start: 2019-12-11 | End: 2019-12-12

## 2019-12-11 RX ORDER — MORPHINE SULFATE 2 MG/ML
2 INJECTION, SOLUTION INTRAMUSCULAR; INTRAVENOUS
Status: DISCONTINUED | OUTPATIENT
Start: 2019-12-11 | End: 2019-12-11

## 2019-12-11 RX ORDER — OXYCODONE HYDROCHLORIDE 10 MG/1
10 TABLET ORAL
Status: DISCONTINUED | OUTPATIENT
Start: 2019-12-11 | End: 2019-12-15 | Stop reason: HOSPADM

## 2019-12-11 RX ADMIN — CALCIUM GLUCONATE 1 G: 98 INJECTION, SOLUTION INTRAVENOUS at 10:12

## 2019-12-11 RX ADMIN — HEPARIN SODIUM 5000 UNITS: 5000 INJECTION, SOLUTION INTRAVENOUS; SUBCUTANEOUS at 09:12

## 2019-12-11 RX ADMIN — METHOCARBAMOL TABLETS 500 MG: 500 TABLET, COATED ORAL at 08:12

## 2019-12-11 RX ADMIN — SENNOSIDES AND DOCUSATE SODIUM 1 TABLET: 8.6; 5 TABLET ORAL at 08:12

## 2019-12-11 RX ADMIN — METHOCARBAMOL TABLETS 500 MG: 500 TABLET, COATED ORAL at 12:12

## 2019-12-11 RX ADMIN — OXYCODONE HYDROCHLORIDE 10 MG: 10 TABLET ORAL at 06:12

## 2019-12-11 RX ADMIN — HEPARIN SODIUM 5000 UNITS: 5000 INJECTION, SOLUTION INTRAVENOUS; SUBCUTANEOUS at 05:12

## 2019-12-11 RX ADMIN — OXYCODONE HYDROCHLORIDE 10 MG: 10 TABLET ORAL at 12:12

## 2019-12-11 RX ADMIN — ACETAMINOPHEN 1000 MG: 500 TABLET ORAL at 05:12

## 2019-12-11 RX ADMIN — ROPIVACAINE HYDROCHLORIDE 2 ML/HR: 2 INJECTION, SOLUTION EPIDURAL; INFILTRATION at 10:12

## 2019-12-11 RX ADMIN — SODIUM PHOSPHATE, MONOBASIC, MONOHYDRATE 15 MMOL: 276; 142 INJECTION, SOLUTION INTRAVENOUS at 06:12

## 2019-12-11 RX ADMIN — HEPARIN SODIUM 5000 UNITS: 5000 INJECTION, SOLUTION INTRAVENOUS; SUBCUTANEOUS at 02:12

## 2019-12-11 RX ADMIN — CALCIUM GLUCONATE 1 G: 98 INJECTION, SOLUTION INTRAVENOUS at 06:12

## 2019-12-11 RX ADMIN — MORPHINE SULFATE 2 MG: 2 INJECTION, SOLUTION INTRAMUSCULAR; INTRAVENOUS at 01:12

## 2019-12-11 RX ADMIN — METHOCARBAMOL TABLETS 500 MG: 500 TABLET, COATED ORAL at 05:12

## 2019-12-11 RX ADMIN — ACETAMINOPHEN 650 MG: 325 TABLET ORAL at 02:12

## 2019-12-11 RX ADMIN — FUROSEMIDE 40 MG: 10 INJECTION, SOLUTION INTRAMUSCULAR; INTRAVENOUS at 05:12

## 2019-12-11 RX ADMIN — PREGABALIN 150 MG: 75 CAPSULE ORAL at 08:12

## 2019-12-11 RX ADMIN — SENNOSIDES AND DOCUSATE SODIUM 1 TABLET: 8.6; 5 TABLET ORAL at 02:12

## 2019-12-11 RX ADMIN — ACETAMINOPHEN 650 MG: 325 TABLET ORAL at 11:12

## 2019-12-11 RX ADMIN — PANTOPRAZOLE SODIUM 40 MG: 40 INJECTION, POWDER, FOR SOLUTION INTRAVENOUS at 08:12

## 2019-12-11 NOTE — SUBJECTIVE & OBJECTIVE
Interval History:     Extubated yesterday  Weaned off pressors  OOBTC yesterday  Pain controlled with PNCs, has only pressed button on PCA 3 times  Tolerating clear liquids without nausea or emesis  Passing flatus, no BM  NG tube removed  Hemoglobin dropped this AM slightly to 6.4, transfusing 2 u PRBC  No further fluid administration overnight  No issues with larsen    Review of Systems  Objective:     Temp:  [97 °F (36.1 °C)-99.3 °F (37.4 °C)] 98.3 °F (36.8 °C)  Pulse:  [] 99  Resp:  [0-52] 25  SpO2:  [85 %-100 %] 97 %  BP: ()/(53-80) 125/67  Arterial Line BP: ()/(44-92) 126/69     Body mass index is 39.64 kg/m².    Date 12/11/19 0700 - 12/12/19 0659   Shift 9424-8443 0741-8820 3571-2420 24 Hour Total   INTAKE   P.O. 100   100   Shift Total(mL/kg) 100(0.7)   100(0.7)   OUTPUT   Urine(mL/kg/hr) 180   180   Shift Total(mL/kg) 180(1.3)   180(1.3)   Weight (kg) 136.3 136.3 136.3 136.3          Drains     Drain                 Trialysis (Dialysis) Catheter 03/20/17 0850 right internal jugular 996 days         Urethral Catheter 12/09/19 0822 Non-latex 16 Fr. 2 days                Physical Exam   Constitutional: He is oriented to person, place, and time. He appears well-developed and well-nourished. No distress.   HENT:   Head: Normocephalic and atraumatic.   R IJ central line   Neck: Normal range of motion.   Cardiovascular: Normal rate.    Pulmonary/Chest: Effort normal. No respiratory distress.   Abdominal: Soft. He exhibits distension. There is tenderness. There is no guarding.   Incision c/d/i  Appropriately tender to palpation  Slightly distended  [place] draining clear yellow urine   Musculoskeletal: Normal range of motion. He exhibits edema.   Neurological: He is alert and oriented to person, place, and time.   Skin: Skin is warm and dry. He is not diaphoretic.     Psychiatric: He has a normal mood and affect.       Significant Labs:    BMP:  Recent Labs   Lab 12/10/19  1554 12/11/19  0301  12/11/19  0411   * 135* 135*   K 4.5 4.9 4.8    106 107   CO2 22* 24 23   BUN 22 26* 25*   CREATININE 1.5* 1.6* 1.4   CALCIUM 7.3* 7.3* 7.3*       CBC:   Recent Labs   Lab 12/10/19  1301 12/11/19  0300 12/11/19 0411   WBC 16.95* 14.37* 13.67*   HGB 8.2* 6.4* 6.4*   HCT 24.7* 20.1* 19.8*    152 161       All pertinent labs results from the past 24 hours have been reviewed.    Significant Imaging:  All pertinent imaging results/findings from the past 24 hours have been reviewed.

## 2019-12-11 NOTE — PROGRESS NOTES
[facility]  Urology  Progress Note    Patient Name: Alexi Noguera  MRN: [id number]  Admission Date: 12/9/2019  Hospital Length of Stay: 2 days  Code Status: Full Code   Attending Provider: José Manuel Buchanan MD   Primary Care Physician: Derrick Matthews MD    Subjective:     HPI:  59 YOM with HTN and HLD with incidentally discovered left renal mass with level I caval tumor thrombus taken to the OR 12/9/2019 left radical nephrectomy (adrenal sparing) and IVC thrombectomy.      Surgery was successful, although blood loss was 7 liters, with a significant portion of that occurring during the cavotomy portion.  The patient was continuously resuscitated in the OR, receiving 9 L cyrstalloid, 1 L albumin, and 4 u PRBC. He was left intubated on transfer to the SICU.         Interval History:     Extubated yesterday  Weaned off pressors  OOBTC yesterday  Pain controlled with PNCs, has only pressed button on PCA 3 times  Tolerating clear liquids without nausea or emesis  Passing flatus, no BM  NG tube removed  Hemoglobin dropped this AM slightly to 6.4, transfusing 2 u PRBC  No further fluid administration overnight  No issues with larsen    Review of Systems  Objective:     Temp:  [97 °F (36.1 °C)-99.3 °F (37.4 °C)] 98.3 °F (36.8 °C)  Pulse:  [] 99  Resp:  [0-52] 25  SpO2:  [85 %-100 %] 97 %  BP: ()/(53-80) 125/67  Arterial Line BP: ()/(44-92) 126/69     Body mass index is 39.64 kg/m².    Date 12/11/19 0700 - 12/12/19 0659   Shift 6817-4680 1503-8006 3881-8309 24 Hour Total   INTAKE   P.O. 100   100   Shift Total(mL/kg) 100(0.7)   100(0.7)   OUTPUT   Urine(mL/kg/hr) 180   180   Shift Total(mL/kg) 180(1.3)   180(1.3)   Weight (kg) 136.3 136.3 136.3 136.3          Drains     Drain                 Trialysis (Dialysis) Catheter 03/20/17 0850 right internal jugular 996 days         Urethral Catheter 12/09/19 0822 Non-latex 16 Fr. 2 days                Physical Exam   Constitutional: He is oriented  to person, place, and time. He appears well-developed and well-nourished. No distress.   HENT:   Head: Normocephalic and atraumatic.   R IJ central line   Neck: Normal range of motion.   Cardiovascular: Normal rate.    Pulmonary/Chest: Effort normal. No respiratory distress.   Abdominal: Soft. He exhibits distension. There is tenderness. There is no guarding.   Incision c/d/i  Appropriately tender to palpation  Slightly distended  [place] draining clear yellow urine   Musculoskeletal: Normal range of motion. He exhibits edema.   Neurological: He is alert and oriented to person, place, and time.   Skin: Skin is warm and dry. He is not diaphoretic.     Psychiatric: He has a normal mood and affect.       Significant Labs:    BMP:  Recent Labs   Lab 12/10/19  1554 12/11/19  0300 12/11/19  0411   * 135* 135*   K 4.5 4.9 4.8    106 107   CO2 22* 24 23   BUN 22 26* 25*   CREATININE 1.5* 1.6* 1.4   CALCIUM 7.3* 7.3* 7.3*       CBC:   Recent Labs   Lab 12/10/19  1301 12/11/19  0300 12/11/19  0411   WBC 16.95* 14.37* 13.67*   HGB 8.2* 6.4* 6.4*   HCT 24.7* 20.1* 19.8*    152 161       All pertinent labs results from the past 24 hours have been reviewed.    Significant Imaging:  All pertinent imaging results/findings from the past 24 hours have been reviewed.                  Assessment/Plan:     Left renal mass      Neuro  -- pain control per Anesthesia pain management, currently with perineural catheters and PCA  -- likely can d/c PCA and switch to oral agents PRN    ENT  -- N/A    Pulmonary  -- on comfort flow, CXR clearing, hopefully will wean as patient diureses     Cardiac  -- HDS off pressors  -- follow lactate    ID  -- no infection suspected at this time    Heme  -- transfusing 2 u PRBC today for post-op anemia  -- do not suspect significant bleeding, patient still leveling out from intraoperative blood loss and resuscitation    Renal  -- Cr stabilizing at 1.4 s/p left radical nephrectomy  -- trend  BMP    GI/FEN  -- okay for regular diet    Endo  -- per ICU    GI PPx:  PPI  DVT:  Sub-Q heparin  Dispo:  Continue SICU care, likely stepdown tomorrow if stable     Code Status: Full Code                VTE Risk Mitigation (From admission, onward)         Ordered     heparin (porcine) injection 5,000 Units  Every 8 hours      12/10/19 0810     Place sequential compression device  Until discontinued      12/09/19 1653     IP VTE HIGH RISK PATIENT  Once      12/09/19 1653     Place sequential compression device  Until discontinued      12/09/19 0548                Petar Saravia MD  Urology  [facility]

## 2019-12-11 NOTE — PLAN OF CARE
"Dx: Primary renal cell carcinoma of native left kidney    Shift Events: Desaturation early in shift. Pt placed on comfort flow. Vaso and levo off.     Neuro: AAO x4, Follows Commands and Moves All Extremities    Vital Signs: /77 (BP Location: Left arm, Patient Position: Lying)   Pulse 99   Temp 99.3 °F (37.4 °C) (Oral)   Resp (!) 26   Ht 6' 1" (1.854 m)   Wt (!) 136.3 kg (300 lb 7.8 oz)   SpO2 95%   BMI 39.64 kg/m²   CVPs 11, 14, 15  Comfort flow 30L, 60%    Diet: Sips with Meds and Clear Liquid    Gtts: PCA, KVO    Urine Output: Urinary Catheter 30-85 cc/hour    Drains: None    Perineural catheters (L&R): both infusing ropivacaine at 2cc/hr with an 8cc bolus every hour      Labs/Accuchecks: Daily labs. Electrolytes replaced as needed.    Skin: No skin tears or pressure injuries noted. Repositioned frequently.      "

## 2019-12-11 NOTE — ASSESSMENT & PLAN NOTE
58 yo man admitted to SICU s/p radical left nephrectomy and IVC thrombectomy complicated by significant EBL of 7L.      Neuro:  - APS following, transition to oral opioid today from PCA.    - AAO x4    Pulm:  - Extubated to HFNC  - Wean O2 as tolerated      CV:  - Off all pressors now.    - s/p fluid resuscitation with total 18L of crystalloids, 1L of albumin, and 4 units PRBC  - Lactate normalized    Renal:  - s/p L nephrectomy with significant blood loss and fluid shift  - Trend BUN/Cr.  Creatinine 1.4 today.    - Monitor UOP, continues to be stable.      FEN/GI:  - Passed bedside swallow  - Clear liquids; ADAT   - Replace lytes PRN    Hem:  - H/H stable  - No signs of active bleeding    ID:  - Afebrile, no leukocytosis  - Monitor for signs of infection    Endo:  - Accuchecks  - No issues      PPx:  - Famotidine  - SQH      Dispo:   - Continued SICU care, possible step down tomorrow.      Primary: Urology

## 2019-12-11 NOTE — ANESTHESIA POST-OP PAIN MANAGEMENT
Acute Pain Service Progress Note    Alexi Noguera is a 59 y.o., male, [id number].    Surgery:  Nephrectomy OPEN (Left Abdomen) - 4hrs GEN WITH REGIONAL      THROMBECTOMY, VENA CAVA (Chest)      LYSIS, ADHESIONS (N/A Abdomen)      THROMBECTOMY, VENA CAVA (Chest)      REPAIR of IVC (Abdomen)    Post Op Day #: 2    Catheter type: perineural  RENETTA bilat    Infusion type: Ropivacaine 0.2%  2 basal 8 bolus    Problem List:    Active Hospital Problems    Diagnosis  POA    *Primary renal cell carcinoma of native left kidney [C64.2]  Yes    Left renal mass [N28.89]  Yes      Resolved Hospital Problems   No resolved problems to display.       Subjective:    Patient reports good pain control, 5/10 at rest with 8/10 with movement with minimal opiate use. He reports he was able to work with PT. He reports pushing his PCA button only 3-4 times.    Objective:    Catheters clean, dry, intact         Vitals   Vitals:    12/11/19 0800   BP: 125/67   Pulse: 99   Resp: (!) 25   Temp:         Labs    No results displayed because visit has over 200 results.           Meds   Current Facility-Administered Medications   Medication Dose Route Frequency Provider Last Rate Last Dose    0.9%  NaCl infusion (for blood administration)   Intravenous Q24H PRN Erin Kwon MD        acetaminophen tablet 650 mg  650 mg Oral Q8H Venu Wright MD        calcium gluconate 1g in dextrose 5% 100mL (ready to mix system)  1 g Intravenous PRN Stepan Benjamin DO   1 g at 12/11/19 0621    calcium gluconate 1g in dextrose 5% 100mL (ready to mix system)  1 g Intravenous PRN Stepan Benjamin DO        calcium gluconate 1g in dextrose 5% 100mL (ready to mix system)  1 g Intravenous PRN Stepan Benjamin DO        heparin (porcine) injection 5,000 Units  5,000 Units Subcutaneous Q8H Alexsander Bravo MD   5,000 Units at 12/11/19 0516    HYDROmorphone PCA in 0.9 % NaCl 6 Mg/30 mL (0.2 mg/mL)   Intravenous Continuous Venu Wright MD         magnesium sulfate 2g in water 50mL IVPB (premix)  2 g Intravenous PRN Stepan Benjamin DO   2 g at 12/10/19 1720    magnesium sulfate 2g in water 50mL IVPB (premix)  4 g Intravenous PRN Stepan Benjamin DO        methocarbamol tablet 500 mg  500 mg Oral QID Venu Wright MD   500 mg at 12/11/19 0801    naloxone 0.4 mg/mL injection 0.02 mg  0.02 mg Intravenous PRN Venu Wright MD        norepinephrine 4 mg in dextrose 5% 250 mL infusion (premix) (titrating)  0.02 mcg/kg/min Intravenous Continuous Erin Kwon MD   Stopped at 12/10/19 2317    ondansetron injection 4 mg  4 mg Intravenous Q12H PRN Joe Rodriguez MD   4 mg at 12/10/19 1032    pantoprazole injection 40 mg  40 mg Intravenous Daily Stepan Benjamin DO   40 mg at 12/11/19 0801    potassium chloride 40 mEq in 100 mL IVPB (FOR CENTRAL LINE ADMINISTRATION ONLY)  40 mEq Intravenous PRN Stepan Benjamin DO        And    potassium chloride 20 mEq in 100 mL IVPB (FOR CENTRAL LINE ADMINISTRATION ONLY)  60 mEq Intravenous PRN Stepan Benjamin DO        And    potassium chloride 40 mEq in 100 mL IVPB (FOR CENTRAL LINE ADMINISTRATION ONLY)  80 mEq Intravenous PRN Stepan Benjamin DO        pregabalin capsule 150 mg  150 mg Oral QHS Joe Rodriguez MD   150 mg at 12/10/19 2018    promethazine (PHENERGAN) 6.25 mg in dextrose 5 % 50 mL IVPB  6.25 mg Intravenous Q6H PRN Joe Rodriguez MD        ropivacaine (PF) 2 mg/ml (0.2%) infusion  2 mL/hr Perineural Continuous Joe Rodriguez MD 2 mL/hr at 12/10/19 1250 2 mL/hr at 12/10/19 1250    ropivacaine (PF) 2 mg/ml (0.2%) infusion  2 mL/hr Perineural Continuous Joe Rodriguez MD 2 mL/hr at 12/09/19 1759 2 mL/hr at 12/09/19 1759    sodium chloride 0.9% flush 10 mL  10 mL Intravenous PRN Stepan M. Sourav, DO        sodium phosphate 15 mmol in dextrose 5 % 250 mL IVPB  15 mmol Intravenous PRN Stepan Benjamin, DO        sodium phosphate 20.01 mmol in dextrose 5 % 250 mL IVPB   20.01 mmol Intravenous PRN Stepan Benjamin,         sodium phosphate 30 mmol in dextrose 5 % 250 mL IVPB  30 mmol Intravenous PRN Stepan Benjamin DO        vasopressin (PITRESSIN) 0.2 Units/mL in dextrose 5 % 100 mL infusion  0.04 Units/min Intravenous Continuous Erin Kwon MD   Stopped at 12/10/19 2000            Assessment:     Pain control adequate    Plan:    - d/c PCA: used 3 times over last 12 hours for 0.6 mg hydromorphone. Given such low opiate requirements, will add on oxys 5/10/15s for breakthrough pain and D/C PCA.  Reordered APAP 650 mg q 6 hours OTC. Continue multimodals. Continue ESPs.      Evaluator Venu Wright

## 2019-12-11 NOTE — ADDENDUM NOTE
Addendum  created 12/11/19 0916 by Venu Wright MD    Order list changed, Order sets accessed, Sign clinical note

## 2019-12-11 NOTE — PROGRESS NOTES
[facility]  Critical Care - Surgery  Progress Note    Patient Name: Alexi Noguera  MRN: [id number]  Admission Date: 12/9/2019  Hospital Length of Stay: 2 days  Code Status: Full Code  Attending Provider: José Manuel Buchanan MD  Primary Care Provider: Derrick Matthews MD   Principal Problem: Primary renal cell carcinoma of native left kidney    Subjective:     Hospital/ICU Course:  12/9 - Arrives to surgical ICU intubated on levo @ 0.3, CVC placed. Resuscitated with additional 7L of crystalloids.  12/11 - Overnight pt H&H dropped two points, received 2u pRBC.  Otherwise NAEON.  Pt remains extubated, now off of pressors.  Pt able to ambulate and sit up in chair at bedside.      Interval History/Significant Events: Received total of 9L of crystalloids overnight. Extubated to HFNC. Lactates trending down to 1.9. Continues to require norepi @ 0.08    Follow-up For: Procedure(s) (LRB):  Nephrectomy OPEN (Left)  LYSIS, ADHESIONS (N/A)  THROMBECTOMY, VENA CAVA  THROMBECTOMY, VENA CAVA  REPAIR of IVC    Post-Operative Day: 1 Day Post-Op    Objective:     Vital Signs (Most Recent):  Temp: 98.3 °F (36.8 °C) (12/11/19 1345)  Pulse: 94 (12/11/19 1445)  Resp: (!) 22 (12/11/19 1445)  BP: 102/72 (12/11/19 1200)  SpO2: 98 % (12/11/19 1445) Vital Signs (24h Range):  Temp:  [97.7 °F (36.5 °C)-99.3 °F (37.4 °C)] 98.3 °F (36.8 °C)  Pulse:  [] 94  Resp:  [12-37] 22  SpO2:  [85 %-100 %] 98 %  BP: (102-139)/(59-80) 102/72  Arterial Line BP: ()/(44-92) 112/55     Weight: (!) 136.3 kg (300 lb 7.8 oz)  Body mass index is 39.64 kg/m².      Intake/Output Summary (Last 24 hours) at 12/11/2019 1611  Last data filed at 12/11/2019 1500  Gross per 24 hour   Intake 2201 ml   Output 1600 ml   Net 601 ml       Physical Exam   Constitutional: He is oriented to person, place, and time. He appears well-developed and well-nourished.   HENT:   Head: Normocephalic and atraumatic.   Eyes: Pupils are equal, round, and reactive to  light.   Neck: Neck supple. No tracheal deviation present. No thyromegaly present.   Cardiovascular: Normal rate, regular rhythm and intact distal pulses.   No murmur heard.  Pulmonary/Chest: Tachypnea noted. He has no decreased breath sounds. He has no wheezes.   Abdominal: Soft. He exhibits no distension and no mass.   Surgical incision vertical midline of abdomen, without drainage, fluctuance or ecchymosis.     Musculoskeletal: He exhibits edema.   Neurological: He is alert and oriented to person, place, and time.   Skin: Skin is warm and dry. He is not diaphoretic. No erythema. No pallor.   Psychiatric: He has a normal mood and affect. His behavior is normal.   Nursing note and vitals reviewed.        Lines/Drains/Airways     Central Venous Catheter Line                 Trialysis (Dialysis) Catheter 03/20/17 0850 right internal jugular 996 days         Percutaneous Central Line Insertion/Assessment - triple lumen  12/09/19 1830 right internal jugular 1 day          Drain                 Urethral Catheter 12/09/19 0822 Non-latex 16 Fr. 2 days          Epidural Line                 Perineural Analgesia/Anesthesia Assessment (using dermatomes) 12/09/19 0730 2 days         Perineural Analgesia/Anesthesia Assessment (using dermatomes) 12/09/19 0730 2 days          Arterial Line                 Arterial Line 12/09/19 0821 Right Radial 2 days          Peripheral Intravenous Line                 Peripheral IV - Single Lumen 12/09/19 1704 18 G Left Hand 1 day                Significant Labs:    CBC/Anemia Profile:  Recent Labs   Lab 12/11/19  0300 12/11/19  0411 12/11/19  1533   WBC 14.37* 13.67* 15.13*   HGB 6.4* 6.4* 7.2*   HCT 20.1* 19.8* 22.2*    161 140*   MCV 91 91 90   RDW 14.2 14.2 14.6*        Chemistries:  Recent Labs   Lab 12/09/19  1646 12/10/19  0334 12/10/19  1554 12/11/19  0300 12/11/19  0411    135* 135* 135* 135*   K 4.7 4.9 4.5 4.9 4.8    109 108 106 107   CO2 15* 19* 22* 24 23   BUN  17 15 22 26* 25*   CREATININE 1.4 1.2 1.5* 1.6* 1.4   CALCIUM 8.2* 6.9* 7.3* 7.3* 7.3*   ALBUMIN  --  2.0*  --  2.1* 2.0*   PROT  --  3.5*  --  4.3* 4.2*   BILITOT  --  0.3  --  0.4 0.4   ALKPHOS  --  21*  --  26* 31*   ALT  --  19  --  22 22   AST  --  35  --  57* 55*   MG 2.2  --  1.6 2.1  --    PHOS 6.2*  --  3.2 3.2  --        ABGs:   Recent Labs   Lab 12/11/19  1033   PH 7.410   PCO2 39.5   HCO3 25.1   POCSATURATED 96   BE 0     Lactic Acid:   Recent Labs   Lab 12/10/19  0646 12/10/19  0915 12/10/19  1606   LACTATE 2.2 1.9 3.4*     POCT Glucose:   Recent Labs   Lab 12/10/19  0754 12/11/19  1033 12/11/19  1538   POCTGLUCOSE 155* 99 104     All pertinent labs within the past 24 hours have been reviewed.      Significant Imaging:  I have reviewed all pertinent imaging results/findings within the past 24 hours.    Assessment/Plan:     * Primary renal cell carcinoma of native left kidney  58 yo man admitted to SICU s/p radical left nephrectomy and IVC thrombectomy complicated by significant EBL of 7L.      Neuro:  - APS following, transition to oral opioid today from PCA.    - AAO x4    Pulm:  - Extubated to HFNC  - Wean O2 as tolerated      CV:  - Off all pressors now.    - s/p fluid resuscitation with total 18L of crystalloids, 1L of albumin, and 4 units PRBC  - Lactate normalized    Renal:  - s/p L nephrectomy with significant blood loss and fluid shift  - Trend BUN/Cr.  Creatinine 1.4 today.    - Monitor UOP, continues to be stable.      FEN/GI:  - Passed bedside swallow  - Clear liquids; ADAT   - Replace lytes PRN    Hem:  - H/H stable  - No signs of active bleeding    ID:  - Afebrile, no leukocytosis  - Monitor for signs of infection    Endo:  - Accuchecks  - No issues      PPx:  - Famotidine  - SQH      Dispo:   - Continued SICU care, possible step down tomorrow.      Primary: Urology         Stepan Benjamin DO  Critical Care - Surgery  [facility]

## 2019-12-11 NOTE — SUBJECTIVE & OBJECTIVE
Interval History/Significant Events: Received total of 9L of crystalloids overnight. Extubated to HFNC. Lactates trending down to 1.9. Continues to require norepi @ 0.08    Follow-up For: Procedure(s) (LRB):  Nephrectomy OPEN (Left)  LYSIS, ADHESIONS (N/A)  THROMBECTOMY, VENA CAVA  THROMBECTOMY, VENA CAVA  REPAIR of IVC    Post-Operative Day: 1 Day Post-Op    Objective:     Vital Signs (Most Recent):  Temp: 98.3 °F (36.8 °C) (12/11/19 1345)  Pulse: 94 (12/11/19 1445)  Resp: (!) 22 (12/11/19 1445)  BP: 102/72 (12/11/19 1200)  SpO2: 98 % (12/11/19 1445) Vital Signs (24h Range):  Temp:  [97.7 °F (36.5 °C)-99.3 °F (37.4 °C)] 98.3 °F (36.8 °C)  Pulse:  [] 94  Resp:  [12-37] 22  SpO2:  [85 %-100 %] 98 %  BP: (102-139)/(59-80) 102/72  Arterial Line BP: ()/(44-92) 112/55     Weight: (!) 136.3 kg (300 lb 7.8 oz)  Body mass index is 39.64 kg/m².      Intake/Output Summary (Last 24 hours) at 12/11/2019 1611  Last data filed at 12/11/2019 1500  Gross per 24 hour   Intake 2201 ml   Output 1600 ml   Net 601 ml       Physical Exam   Constitutional: He is oriented to person, place, and time. He appears well-developed and well-nourished.   HENT:   Head: Normocephalic and atraumatic.   Eyes: Pupils are equal, round, and reactive to light.   Neck: Neck supple. No tracheal deviation present. No thyromegaly present.   Cardiovascular: Normal rate, regular rhythm and intact distal pulses.   No murmur heard.  Pulmonary/Chest: Tachypnea noted. He has no decreased breath sounds. He has no wheezes.   Abdominal: Soft. He exhibits no distension and no mass.   Surgical incision vertical midline of abdomen, without drainage, fluctuance or ecchymosis.     Musculoskeletal: He exhibits edema.   Neurological: He is alert and oriented to person, place, and time.   Skin: Skin is warm and dry. He is not diaphoretic. No erythema. No pallor.   Psychiatric: He has a normal mood and affect. His behavior is normal.   Nursing note and vitals  reviewed.        Lines/Drains/Airways     Central Venous Catheter Line                 Trialysis (Dialysis) Catheter 03/20/17 0850 right internal jugular 996 days         Percutaneous Central Line Insertion/Assessment - triple lumen  12/09/19 1830 right internal jugular 1 day          Drain                 Urethral Catheter 12/09/19 0822 Non-latex 16 Fr. 2 days          Epidural Line                 Perineural Analgesia/Anesthesia Assessment (using dermatomes) 12/09/19 0730 2 days         Perineural Analgesia/Anesthesia Assessment (using dermatomes) 12/09/19 0730 2 days          Arterial Line                 Arterial Line 12/09/19 0821 Right Radial 2 days          Peripheral Intravenous Line                 Peripheral IV - Single Lumen 12/09/19 1704 18 G Left Hand 1 day                Significant Labs:    CBC/Anemia Profile:  Recent Labs   Lab 12/11/19  0300 12/11/19  0411 12/11/19  1533   WBC 14.37* 13.67* 15.13*   HGB 6.4* 6.4* 7.2*   HCT 20.1* 19.8* 22.2*    161 140*   MCV 91 91 90   RDW 14.2 14.2 14.6*        Chemistries:  Recent Labs   Lab 12/09/19  1646 12/10/19  0334 12/10/19  1554 12/11/19  0300 12/11/19  0411    135* 135* 135* 135*   K 4.7 4.9 4.5 4.9 4.8    109 108 106 107   CO2 15* 19* 22* 24 23   BUN 17 15 22 26* 25*   CREATININE 1.4 1.2 1.5* 1.6* 1.4   CALCIUM 8.2* 6.9* 7.3* 7.3* 7.3*   ALBUMIN  --  2.0*  --  2.1* 2.0*   PROT  --  3.5*  --  4.3* 4.2*   BILITOT  --  0.3  --  0.4 0.4   ALKPHOS  --  21*  --  26* 31*   ALT  --  19  --  22 22   AST  --  35  --  57* 55*   MG 2.2  --  1.6 2.1  --    PHOS 6.2*  --  3.2 3.2  --        ABGs:   Recent Labs   Lab 12/11/19  1033   PH 7.410   PCO2 39.5   HCO3 25.1   POCSATURATED 96   BE 0     Lactic Acid:   Recent Labs   Lab 12/10/19  0646 12/10/19  0915 12/10/19  1606   LACTATE 2.2 1.9 3.4*     POCT Glucose:   Recent Labs   Lab 12/10/19  0754 12/11/19  1033 12/11/19  1538   POCTGLUCOSE 155* 99 104     All pertinent labs within the past 24 hours  have been reviewed.      Significant Imaging:  I have reviewed all pertinent imaging results/findings within the past 24 hours.

## 2019-12-11 NOTE — ASSESSMENT & PLAN NOTE
Neuro  -- pain control per Anesthesia pain management, currently with perineural catheters and PCA  -- likely can d/c PCA and switch to oral agents PRN    ENT  -- N/A    Pulmonary  -- on comfort flow, CXR clearing, hopefully will wean as patient diureses     Cardiac  -- HDS off pressors  -- follow lactate    ID  -- no infection suspected at this time    Heme  -- transfusing 2 u PRBC today for post-op anemia  -- do not suspect significant bleeding, patient still leveling out from intraoperative blood loss and resuscitation    Renal  -- Cr stabilizing at 1.4 s/p left radical nephrectomy  -- trend BMP    GI/FEN  -- okay for regular diet    Endo  -- per ICU    GI PPx:  PPI  DVT:  Sub-Q heparin  Dispo:  Continue SICU care, likely stepdown tomorrow if stable     Code Status: Full Code

## 2019-12-12 LAB
ALBUMIN SERPL BCP-MCNC: 1.9 G/DL (ref 3.5–5.2)
ALBUMIN SERPL BCP-MCNC: 1.9 G/DL (ref 3.5–5.2)
ALBUMIN SERPL BCP-MCNC: 2 G/DL (ref 3.5–5.2)
ALP SERPL-CCNC: 36 U/L (ref 55–135)
ALP SERPL-CCNC: 49 U/L (ref 55–135)
ALP SERPL-CCNC: 62 U/L (ref 55–135)
ALT SERPL W/O P-5'-P-CCNC: 21 U/L (ref 10–44)
ALT SERPL W/O P-5'-P-CCNC: 23 U/L (ref 10–44)
ALT SERPL W/O P-5'-P-CCNC: 24 U/L (ref 10–44)
ANION GAP SERPL CALC-SCNC: 3 MMOL/L (ref 8–16)
ANION GAP SERPL CALC-SCNC: 5 MMOL/L (ref 8–16)
ANION GAP SERPL CALC-SCNC: 7 MMOL/L (ref 8–16)
ANISOCYTOSIS BLD QL SMEAR: SLIGHT
APTT BLDCRRT: 25.9 SEC (ref 21–32)
AST SERPL-CCNC: 40 U/L (ref 10–40)
AST SERPL-CCNC: 45 U/L (ref 10–40)
AST SERPL-CCNC: 46 U/L (ref 10–40)
BASOPHILS # BLD AUTO: 0 K/UL (ref 0–0.2)
BASOPHILS # BLD AUTO: 0.01 K/UL (ref 0–0.2)
BASOPHILS NFR BLD: 0 % (ref 0–1.9)
BASOPHILS NFR BLD: 0.1 % (ref 0–1.9)
BILIRUB SERPL-MCNC: 0.3 MG/DL (ref 0.1–1)
BILIRUB SERPL-MCNC: 0.4 MG/DL (ref 0.1–1)
BILIRUB SERPL-MCNC: 0.7 MG/DL (ref 0.1–1)
BUN SERPL-MCNC: 18 MG/DL (ref 8–23)
BUN SERPL-MCNC: 21 MG/DL (ref 8–23)
BUN SERPL-MCNC: 22 MG/DL (ref 8–23)
CA-I BLDV-SCNC: 1.02 MMOL/L (ref 1.06–1.42)
CALCIUM SERPL-MCNC: 7.5 MG/DL (ref 8.7–10.5)
CALCIUM SERPL-MCNC: 7.5 MG/DL (ref 8.7–10.5)
CALCIUM SERPL-MCNC: 7.8 MG/DL (ref 8.7–10.5)
CHLORIDE SERPL-SCNC: 101 MMOL/L (ref 95–110)
CHLORIDE SERPL-SCNC: 102 MMOL/L (ref 95–110)
CHLORIDE SERPL-SCNC: 103 MMOL/L (ref 95–110)
CO2 SERPL-SCNC: 26 MMOL/L (ref 23–29)
CO2 SERPL-SCNC: 27 MMOL/L (ref 23–29)
CO2 SERPL-SCNC: 29 MMOL/L (ref 23–29)
CREAT SERPL-MCNC: 1.2 MG/DL (ref 0.5–1.4)
DIFFERENTIAL METHOD: ABNORMAL
DIFFERENTIAL METHOD: ABNORMAL
EOSINOPHIL # BLD AUTO: 0.1 K/UL (ref 0–0.5)
EOSINOPHIL # BLD AUTO: 0.1 K/UL (ref 0–0.5)
EOSINOPHIL NFR BLD: 0.5 % (ref 0–8)
EOSINOPHIL NFR BLD: 0.8 % (ref 0–8)
ERYTHROCYTE [DISTWIDTH] IN BLOOD BY AUTOMATED COUNT: 14.7 % (ref 11.5–14.5)
ERYTHROCYTE [DISTWIDTH] IN BLOOD BY AUTOMATED COUNT: 14.9 % (ref 11.5–14.5)
EST. GFR  (AFRICAN AMERICAN): >60 ML/MIN/1.73 M^2
EST. GFR  (NON AFRICAN AMERICAN): >60 ML/MIN/1.73 M^2
GIANT PLATELETS BLD QL SMEAR: PRESENT
GLUCOSE SERPL-MCNC: 145 MG/DL (ref 70–110)
GLUCOSE SERPL-MCNC: 146 MG/DL (ref 70–110)
GLUCOSE SERPL-MCNC: 93 MG/DL (ref 70–110)
HCT VFR BLD AUTO: 22.5 % (ref 40–54)
HCT VFR BLD AUTO: 23 % (ref 40–54)
HGB BLD-MCNC: 7.3 G/DL (ref 14–18)
HGB BLD-MCNC: 7.5 G/DL (ref 14–18)
HYPOCHROMIA BLD QL SMEAR: ABNORMAL
IMM GRANULOCYTES # BLD AUTO: 0.06 K/UL (ref 0–0.04)
IMM GRANULOCYTES # BLD AUTO: 0.07 K/UL (ref 0–0.04)
IMM GRANULOCYTES NFR BLD AUTO: 0.6 % (ref 0–0.5)
IMM GRANULOCYTES NFR BLD AUTO: 0.6 % (ref 0–0.5)
INR PPP: 0.9 (ref 0.8–1.2)
LYMPHOCYTES # BLD AUTO: 0.7 K/UL (ref 1–4.8)
LYMPHOCYTES # BLD AUTO: 0.7 K/UL (ref 1–4.8)
LYMPHOCYTES NFR BLD: 6.5 % (ref 18–48)
LYMPHOCYTES NFR BLD: 6.9 % (ref 18–48)
MAGNESIUM SERPL-MCNC: 2 MG/DL (ref 1.6–2.6)
MAGNESIUM SERPL-MCNC: 2.1 MG/DL (ref 1.6–2.6)
MAGNESIUM SERPL-MCNC: 2.2 MG/DL (ref 1.6–2.6)
MCH RBC QN AUTO: 29 PG (ref 27–31)
MCH RBC QN AUTO: 29.1 PG (ref 27–31)
MCHC RBC AUTO-ENTMCNC: 32.4 G/DL (ref 32–36)
MCHC RBC AUTO-ENTMCNC: 32.6 G/DL (ref 32–36)
MCV RBC AUTO: 89 FL (ref 82–98)
MCV RBC AUTO: 90 FL (ref 82–98)
MONOCYTES # BLD AUTO: 0.6 K/UL (ref 0.3–1)
MONOCYTES # BLD AUTO: 0.7 K/UL (ref 0.3–1)
MONOCYTES NFR BLD: 5.8 % (ref 4–15)
MONOCYTES NFR BLD: 6.5 % (ref 4–15)
NEUTROPHILS # BLD AUTO: 9.1 K/UL (ref 1.8–7.7)
NEUTROPHILS # BLD AUTO: 9.4 K/UL (ref 1.8–7.7)
NEUTROPHILS NFR BLD: 85.2 % (ref 38–73)
NEUTROPHILS NFR BLD: 86.5 % (ref 38–73)
NRBC BLD-RTO: 0 /100 WBC
NRBC BLD-RTO: 0 /100 WBC
PHOSPHATE SERPL-MCNC: 2.1 MG/DL (ref 2.7–4.5)
PHOSPHATE SERPL-MCNC: 2.5 MG/DL (ref 2.7–4.5)
PHOSPHATE SERPL-MCNC: 2.6 MG/DL (ref 2.7–4.5)
PLATELET # BLD AUTO: 160 K/UL (ref 150–350)
PLATELET # BLD AUTO: 167 K/UL (ref 150–350)
PLATELET BLD QL SMEAR: ABNORMAL
PMV BLD AUTO: 9.6 FL (ref 9.2–12.9)
PMV BLD AUTO: 9.8 FL (ref 9.2–12.9)
POIKILOCYTOSIS BLD QL SMEAR: SLIGHT
POLYCHROMASIA BLD QL SMEAR: ABNORMAL
POTASSIUM SERPL-SCNC: 3.4 MMOL/L (ref 3.5–5.1)
POTASSIUM SERPL-SCNC: 3.5 MMOL/L (ref 3.5–5.1)
POTASSIUM SERPL-SCNC: 4.1 MMOL/L (ref 3.5–5.1)
PROT SERPL-MCNC: 4.7 G/DL (ref 6–8.4)
PROT SERPL-MCNC: 5.1 G/DL (ref 6–8.4)
PROT SERPL-MCNC: 5.2 G/DL (ref 6–8.4)
PROTHROMBIN TIME: 9.6 SEC (ref 9–12.5)
RBC # BLD AUTO: 2.51 M/UL (ref 4.6–6.2)
RBC # BLD AUTO: 2.59 M/UL (ref 4.6–6.2)
SODIUM SERPL-SCNC: 133 MMOL/L (ref 136–145)
SODIUM SERPL-SCNC: 134 MMOL/L (ref 136–145)
SODIUM SERPL-SCNC: 136 MMOL/L (ref 136–145)
WBC # BLD AUTO: 10.65 K/UL (ref 3.9–12.7)
WBC # BLD AUTO: 10.85 K/UL (ref 3.9–12.7)

## 2019-12-12 PROCEDURE — 25000003 PHARM REV CODE 250: Performed by: STUDENT IN AN ORGANIZED HEALTH CARE EDUCATION/TRAINING PROGRAM

## 2019-12-12 PROCEDURE — 97166 OT EVAL MOD COMPLEX 45 MIN: CPT

## 2019-12-12 PROCEDURE — 85610 PROTHROMBIN TIME: CPT

## 2019-12-12 PROCEDURE — 63600175 PHARM REV CODE 636 W HCPCS: Performed by: STUDENT IN AN ORGANIZED HEALTH CARE EDUCATION/TRAINING PROGRAM

## 2019-12-12 PROCEDURE — 20000000 HC ICU ROOM

## 2019-12-12 PROCEDURE — 27100171 HC OXYGEN HIGH FLOW UP TO 24 HOURS

## 2019-12-12 PROCEDURE — 85025 COMPLETE CBC W/AUTO DIFF WBC: CPT | Mod: 91

## 2019-12-12 PROCEDURE — 83735 ASSAY OF MAGNESIUM: CPT

## 2019-12-12 PROCEDURE — 99291 CRITICAL CARE FIRST HOUR: CPT | Mod: ,,, | Performed by: ANESTHESIOLOGY

## 2019-12-12 PROCEDURE — 99900035 HC TECH TIME PER 15 MIN (STAT)

## 2019-12-12 PROCEDURE — 85730 THROMBOPLASTIN TIME PARTIAL: CPT

## 2019-12-12 PROCEDURE — 27100092 HC HIGH FLOW DELIVERY CANNULA

## 2019-12-12 PROCEDURE — 80053 COMPREHEN METABOLIC PANEL: CPT

## 2019-12-12 PROCEDURE — 97161 PT EVAL LOW COMPLEX 20 MIN: CPT

## 2019-12-12 PROCEDURE — 99291 PR CRITICAL CARE, E/M 30-74 MINUTES: ICD-10-PCS | Mod: ,,, | Performed by: ANESTHESIOLOGY

## 2019-12-12 PROCEDURE — 84100 ASSAY OF PHOSPHORUS: CPT | Mod: 91

## 2019-12-12 PROCEDURE — 82330 ASSAY OF CALCIUM: CPT

## 2019-12-12 PROCEDURE — 94761 N-INVAS EAR/PLS OXIMETRY MLT: CPT

## 2019-12-12 PROCEDURE — 27000221 HC OXYGEN, UP TO 24 HOURS

## 2019-12-12 PROCEDURE — 97535 SELF CARE MNGMENT TRAINING: CPT

## 2019-12-12 PROCEDURE — 83735 ASSAY OF MAGNESIUM: CPT | Mod: 91

## 2019-12-12 PROCEDURE — 80053 COMPREHEN METABOLIC PANEL: CPT | Mod: 91

## 2019-12-12 RX ORDER — FUROSEMIDE 10 MG/ML
40 INJECTION INTRAMUSCULAR; INTRAVENOUS ONCE
Status: COMPLETED | OUTPATIENT
Start: 2019-12-12 | End: 2019-12-12

## 2019-12-12 RX ORDER — AMLODIPINE BESYLATE 5 MG/1
5 TABLET ORAL DAILY
Status: DISCONTINUED | OUTPATIENT
Start: 2019-12-12 | End: 2019-12-15 | Stop reason: HOSPADM

## 2019-12-12 RX ORDER — POTASSIUM CHLORIDE 20 MEQ/1
40 TABLET, EXTENDED RELEASE ORAL ONCE
Status: COMPLETED | OUTPATIENT
Start: 2019-12-12 | End: 2019-12-12

## 2019-12-12 RX ORDER — PRAVASTATIN SODIUM 40 MG/1
40 TABLET ORAL NIGHTLY
Status: DISCONTINUED | OUTPATIENT
Start: 2019-12-12 | End: 2019-12-15 | Stop reason: HOSPADM

## 2019-12-12 RX ORDER — AMOXICILLIN 250 MG
1 CAPSULE ORAL DAILY PRN
Status: DISCONTINUED | OUTPATIENT
Start: 2019-12-12 | End: 2019-12-15 | Stop reason: HOSPADM

## 2019-12-12 RX ADMIN — AMLODIPINE BESYLATE 5 MG: 5 TABLET ORAL at 11:12

## 2019-12-12 RX ADMIN — ACETAMINOPHEN 650 MG: 325 TABLET ORAL at 05:12

## 2019-12-12 RX ADMIN — FUROSEMIDE 40 MG: 10 INJECTION, SOLUTION INTRAMUSCULAR; INTRAVENOUS at 11:12

## 2019-12-12 RX ADMIN — PANTOPRAZOLE SODIUM 40 MG: 40 TABLET, DELAYED RELEASE ORAL at 08:12

## 2019-12-12 RX ADMIN — OXYCODONE HYDROCHLORIDE 10 MG: 10 TABLET ORAL at 07:12

## 2019-12-12 RX ADMIN — HEPARIN SODIUM 5000 UNITS: 5000 INJECTION, SOLUTION INTRAVENOUS; SUBCUTANEOUS at 02:12

## 2019-12-12 RX ADMIN — HEPARIN SODIUM 5000 UNITS: 5000 INJECTION, SOLUTION INTRAVENOUS; SUBCUTANEOUS at 06:12

## 2019-12-12 RX ADMIN — ACETAMINOPHEN 650 MG: 325 TABLET ORAL at 06:12

## 2019-12-12 RX ADMIN — METHOCARBAMOL TABLETS 500 MG: 500 TABLET, COATED ORAL at 08:12

## 2019-12-12 RX ADMIN — METHOCARBAMOL TABLETS 500 MG: 500 TABLET, COATED ORAL at 05:12

## 2019-12-12 RX ADMIN — POTASSIUM CHLORIDE 40 MEQ: 1500 TABLET, EXTENDED RELEASE ORAL at 05:12

## 2019-12-12 RX ADMIN — CALCIUM GLUCONATE 1 G: 98 INJECTION, SOLUTION INTRAVENOUS at 08:12

## 2019-12-12 RX ADMIN — POTASSIUM CHLORIDE 60 MEQ: 14.9 INJECTION, SOLUTION INTRAVENOUS at 10:12

## 2019-12-12 RX ADMIN — PRAVASTATIN SODIUM 40 MG: 40 TABLET ORAL at 08:12

## 2019-12-12 RX ADMIN — SODIUM PHOSPHATE, MONOBASIC, MONOHYDRATE 15 MMOL: 276; 142 INJECTION, SOLUTION INTRAVENOUS at 09:12

## 2019-12-12 RX ADMIN — ROPIVACAINE HYDROCHLORIDE 2 ML/HR: 2 INJECTION, SOLUTION EPIDURAL; INFILTRATION at 06:12

## 2019-12-12 RX ADMIN — ACETAMINOPHEN 650 MG: 325 TABLET ORAL at 11:12

## 2019-12-12 RX ADMIN — OXYCODONE HYDROCHLORIDE 10 MG: 10 TABLET ORAL at 11:12

## 2019-12-12 RX ADMIN — HEPARIN SODIUM 5000 UNITS: 5000 INJECTION, SOLUTION INTRAVENOUS; SUBCUTANEOUS at 10:12

## 2019-12-12 RX ADMIN — PREGABALIN 150 MG: 75 CAPSULE ORAL at 08:12

## 2019-12-12 RX ADMIN — SODIUM PHOSPHATE, MONOBASIC, MONOHYDRATE 20.01 MMOL: 276; 142 INJECTION, SOLUTION INTRAVENOUS at 10:12

## 2019-12-12 RX ADMIN — FUROSEMIDE 40 MG: 10 INJECTION, SOLUTION INTRAMUSCULAR; INTRAVENOUS at 05:12

## 2019-12-12 NOTE — SUBJECTIVE & OBJECTIVE
Interval History:   Not requiring pressors  OOBTC yesterday  Pain controlled with PNCs  Tolerating clear liquids without nausea or emesis  +BM yesterday  Received 2 u PRBC, hgb increased slightly to 7.3  Copeland removed this AM  On 25L comfort flow    Review of Systems    Objective:     Temp:  [98.2 °F (36.8 °C)-99.2 °F (37.3 °C)] 98.2 °F (36.8 °C)  Pulse:  [] 98  Resp:  [12-44] 18  SpO2:  [94 %-100 %] 97 %  BP: (100-136)/(62-80) 136/73  Arterial Line BP: ()/(50-80) 116/52     Body mass index is 39.64 kg/m².           Drains     Drain                 Trialysis (Dialysis) Catheter 03/20/17 0850 right internal jugular 996 days         Urethral Catheter 12/09/19 0822 Non-latex 16 Fr. 2 days                Physical Exam   Constitutional: He is oriented to person, place, and time. He appears well-developed and well-nourished. No distress.   HENT:   Head: Normocephalic and atraumatic.   R IJ central line   Neck: Normal range of motion.   Cardiovascular: Normal rate.    Pulmonary/Chest: Effort normal. No respiratory distress.   Abdominal: Soft. He exhibits distension. There is tenderness. There is no guarding.   Incision c/d/i  Appropriately tender to palpation  Slightly distended  Copeland removed this AM   Musculoskeletal: Normal range of motion. He exhibits edema.   Neurological: He is alert and oriented to person, place, and time.   Skin: Skin is warm and dry. He is not diaphoretic.     Psychiatric: He has a normal mood and affect.       Significant Labs:    BMP:  Recent Labs   Lab 12/11/19  0411 12/11/19  1533 12/11/19 1918 12/12/19  0355   * 137  --  133*   K 4.8 4.6 4.1 4.1    108  --  103   CO2 23 25  --  27   BUN 25* 23  --  22   CREATININE 1.4 1.2  --  1.2   CALCIUM 7.3* 7.4*  --  7.5*       CBC:   Recent Labs   Lab 12/11/19  1610 12/11/19 1918 12/12/19  0355   WBC 13.69* 12.94* 10.65   HGB 7.0* 6.9* 7.3*   HCT 22.0* 21.7* 22.5*   * 145* 160       All pertinent labs results from the  past 24 hours have been reviewed.    Significant Imaging:  All pertinent imaging results/findings from the past 24 hours have been reviewed.

## 2019-12-12 NOTE — PT/OT/SLP EVAL
Occupational Therapy   Evaluation    Name: Alexi Noguera  MRN: [id number]  Admitting Diagnosis:  Primary renal cell carcinoma of native left kidney 3 Days Post-Op    Recommendations:     Discharge Recommendations: home health OT  Discharge Equipment Recommendations:  none  Barriers to discharge:  None    Assessment:     Alexi Noguera is a 59 y.o. male with a medical diagnosis of Primary renal cell carcinoma of native left kidney.  He presents with the following performance deficits affecting function: impaired endurance, impaired self care skills, impaired functional mobilty, impaired cardiopulmonary response to activity, impaired coordination, impaired fine motor.  Patient participated well with therapy. Patient completed functional transfers with CGA - minimal assist. Patient with VSS through out session, reporting shortness of breath with completion of transfers. Patient would benefit from continued skilled OT services, four times a week, and would benefit from continued skilled acute therapy to maximize functional independence and address deficits listed above. Therapist educated patient regarding progression and safety with mobility.     At this time, patient is safe to participate in step transfers to bedside chair and bedside commode with nursing with contact guard-minimal level of assistance within patient's level of activity tolerance.     Rehab Prognosis: Good; patient would benefit from acute skilled OT services to address these deficits and reach maximum level of function.       Plan:     Patient to be seen 4 x/week to address the above listed problems via self-care/home management, therapeutic activities, therapeutic exercises  · Plan of Care Expires: 01/10/20  · Plan of Care Reviewed with: patient, family    Subjective     Chief Complaint: Edema in bilateral upper extremities  Patient/Family Comments/goals: Return to PLOF    Occupational Profile:  Type of Home Single story home   Steps to Enter Zero  steps to enter   Bathroom Set-Up Tub/shower   Prior Level of Function Independent   Working/Driving Driving, not returning to work   Roles/Responsibilities Work around the house   Assistance Upon Discharge Daughter-in-law will be available 24/7   DME Used at Home None       Pain/Comfort:  Pain Rating 1: 0/10  Pain Rating Post-Intervention 1: 0/10    Patients cultural, spiritual, Taoism conflicts given the current situation: no    Objective:     Communicated with: NSg prior to session.  Patient found up in chair with arterial line, blood pressure cuff, telemetry, pulse ox (continuous), oxygen, central line upon OT entry to room.    General Precautions: Standard, fall   Orthopedic Precautions:N/A   Braces: N/A     Occupational Performance:    Bed Mobility:    · NT - patient in bedside chair upon arrival    Functional Mobility/Transfers:  · Patient completed Sit <> Stand Transfer with minimum assistance  with  hand-held assist   · Patient completed Toilet Transfer Step Transfer technique with contact guard assistance with  hand-held assist  · Functional Mobility: Side steps from bedside commode to bedside chair, forward <> backward 3 steps with CGA    Activities of Daily Living:  · Feeding:  minimum assistance for cutting food due to impaired BUE integration from edema  · Grooming: stand by assistance seated in bedside chair  · Upper Body Dressing: minimum assistance seated in bedside chair due to line mgm  · Lower Body Dressing: maximal assistance seated in bedside chair, due to impaired BUE coordination/edema  · Toileting: minimum assistance for [place]-area hygiene    Cognitive/Visual Perceptual:  Cognitive/Psychosocial Skills:     -       Oriented to: Person, Place, Time and Situation   -       Follows Commands/attention:Follows multistep  commands  -       Safety awareness/insight to disability: intact   -       Mood/Affect/Coping skills/emotional control: Appropriate to situation and Cooperative    Physical  Exam:  Postural examination/scapula alignment:    -       No postural abnormalities identified  Upper Extremity Range of Motion:     -       Right Upper Extremity: WFL  -       Left Upper Extremity: WFL  Upper Extremity Strength:    -       Right Upper Extremity: WFL  -       Left Upper Extremity: WFL   Strength:    -       Right Upper Extremity: Deficits: 3+/5  -       Left Upper Extremity: Deficits: 3+/5  Fine Motor Coordination:    -       Impaired  Left hand thumb/finger opposition skills impaired, Right hand thumb/finger opposition skills impaired, Left hand, manipulation of objects impaired and Right hand, manipulation of objects impaired    AMPAC 6 Click ADL:  AMPAC Total Score: 17    Treatment & Education:  -Evaluation completed, plan of care established.  -Patient and family educated on roles/goals of OT and POC.  -White board updated.  -Patient educated on completion of BUE coordination therex and opposition activities, as well as positioning to address edema.  -Therapist provided time for questions and answered within scope of practice.  -Patient educated on importance of EOB/OOB activity to maximize recovery.  Education:    Patient left up in chair with all lines intact, call button in reach and nsg notified    GOALS:   Multidisciplinary Problems     Occupational Therapy Goals        Problem: Occupational Therapy Goal    Goal Priority Disciplines Outcome Interventions   Occupational Therapy Goal     OT, PT/OT Ongoing, Progressing                Patient will perform LB dressing with modified independence.  Pt will complete commode transfer with mod I.  Pt will complete UB dressing with mod I.  Pt will perform GH tasks with mod I.  Pt will complete all fx'l transfer with modified independence.    History:     Past Medical History:   Diagnosis Date    Hyperlipidemia     Hypertension          Past Surgical History:   Procedure Laterality Date    LYSIS OF ADHESIONS N/A 12/9/2019    Procedure: LYSIS,  ADHESIONS;  Surgeon: Tr Jorgensen MD;  Location: [facility] OR 2ND FLR;  Service: General;  Laterality: N/A;    NEPHRECTOMY Left 12/9/2019    Procedure: Nephrectomy OPEN;  Surgeon: José Manuel Buchanan MD;  [facility];  Service: Urology;  Laterality: Left;  4hrs GEN WITH REGIONAL    THROMBECTOMY OF VENA CAVA  12/9/2019    Procedure: THROMBECTOMY, VENA CAVA;  Surgeon: José Manuel Buchanan MD;  [facility];  Service: Urology;;    THROMBECTOMY OF VENA CAVA  12/9/2019    Procedure: THROMBECTOMY, VENA CAVA;  Surgeon: KIRSTEN Atkins III, MD;  [facility];  Service: Peripheral Vascular;;       Time Tracking:     OT Date of Treatment: 12/12/19  OT Start Time: 1307  OT Stop Time: 1345  OT Total Time (min): 38 min    Billable Minutes:Evaluation 10  Self Care/Home Management 28    Erika Pagan, PATEL  12/12/2019

## 2019-12-12 NOTE — PT/OT/SLP EVAL
Physical Therapy Evaluation    Patient Name:  Alexi Noguera   MRN:  [id number]    Recommendations:     Discharge Recommendations:  home health PT   Discharge Equipment Recommendations: none   Barriers to discharge: respiratory status     Assessment:     Alexi Noguera is a 59 y.o. male admitted with a medical diagnosis of Primary renal cell carcinoma of native left kidney.  He presents with the following impairments/functional limitations:  impaired endurance, impaired functional mobilty, impaired balance, gait instability, impaired cardiopulmonary response to activity. Pt tolerated activity with mobility primarily limited by impaired endurance and respiratory status. Pt on comfort flow SpO2 maintained above 90%, however, pt presenting with dyspnea, reporting SOB with light headedness. Pt returned to sitting, SOB improved. Pt would continue to benefit from acute skilled therapy intervention to address deficits and progress toward prior level of function.       Rehab Prognosis: Good; patient would benefit from acute skilled PT services to address these deficits and reach maximum level of function.    Recent Surgery: Procedure(s) (LRB):  Nephrectomy OPEN (Left)  LYSIS, ADHESIONS (N/A)  THROMBECTOMY, VENA CAVA  THROMBECTOMY, VENA CAVA  REPAIR of IVC 3 Days Post-Op    Plan:     During this hospitalization, patient to be seen 4 x/week to address the identified rehab impairments via gait training, therapeutic activities, therapeutic exercises, neuromuscular re-education and progress toward the following goals:    · Plan of Care Expires:  01/12/20    Subjective     Chief Complaint: Pt c/o SOB   Patient/Family Comments/goals: to get better and returned home   Pain/Comfort:  · Pain Rating 1: 0/10  · Pain Rating Post-Intervention 1: 0/10    Patients cultural, spiritual, Lutheran conflicts given the current situation: no    Living Environment:  Pt lives with son and daughter in law in a [facility] with a threshold K[place].   Prior to  admission, patients level of function was independent with mobility and ADLs.  Equipment used at home: none.  DME owned (not currently used): none.  Upon discharge, patient will have assistance from family.    Objective:     Communicated with RN prior to session.  Patient found up in chair with arterial line, blood pressure cuff, pulse ox (continuous), telemetry, oxygen, central line(nerve block)  upon PT entry to room.    General Precautions: Standard, fall   Orthopedic Precautions:N/A   Braces: N/A     Exams:  · Cognitive Exam:  Patient is AAOx4, followed all commands, communicates clearly and fluently  · Gross Motor Coordination:  WFL  · RUE ROM: WFL  · RUE Strength: WFL  · LUE ROM: WFL  · LUE Strength: WFL  · RLE ROM: WFL  · RLE Strength: WFL  · LLE ROM: WFL  · LLE Strength: WFL    Functional Mobility:  · Bed Mobility:  Not assessed 2/2 pt up in chair upon arrival.   · Transfers:     · Sit to Stand:  stand by assistance with no AD with use of arm rests   · Gait: pt ambulated 2x 4 feet forward and backward with no AD and stand by assistance. Distance limited by comfort flow line. Pt with one slight LOB, able to recover without assistance. Pt then performed 10x B marching in place. SpO2 dropped to 90%, pt reported SOB and dizziness, resolved with sitting.       Therapeutic Activities and Exercises:   Pt educated on role of PT/POC. Pt verbalized understanding.   Pt encouraged to only perform OOB mobility with assistance from nursing/therapy. Pt agreeable.   Pt educated on seated exercises to perform 20x, 3x/day. Exercises included bilateral LAQ, marching, ankle DF/PF, shoulder flexion/extension, elbow flexion/extension.       AM-PAC 6 CLICK MOBILITY  Total Score:21     Patient left up in chair with all lines intact, call button in reach and RN notified.    GOALS:   Multidisciplinary Problems     Physical Therapy Goals        Problem: Physical Therapy Goal    Goal Priority Disciplines Outcome Goal Variances  Interventions   Physical Therapy Goal     PT, PT/OT Ongoing, Progressing     Description:  Goals to be met by: 2019     Patient will increase functional independence with mobility by performin. Supine to sit with Modified [place]  2. Sit to stand transfer with [place]  3. Gait  x 200 feet with Supervision using no AD                      History:     Past Medical History:   Diagnosis Date    Hyperlipidemia     Hypertension        Past Surgical History:   Procedure Laterality Date    LYSIS OF ADHESIONS N/A 2019    Procedure: LYSIS, ADHESIONS;  Surgeon: Tr Jorgensen MD;  [facility];  Service: General;  Laterality: N/A;    NEPHRECTOMY Left 2019    Procedure: Nephrectomy OPEN;  Surgeon: José Manuel Buchanan MD;  Location: [facility] OR [address] [place] [address];  Service: Urology;  Laterality: Left;  4hrs GEN WITH REGIONAL    THROMBECTOMY OF VENA CAVA  2019    Procedure: THROMBECTOMY, VENA CAVA;  Surgeon: José Manuel Buchanan MD;  Location: [facility] OR [address] [place] [address];  Service: Urology;;    THROMBECTOMY OF VENA CAVA  2019    Procedure: THROMBECTOMY, VENA CAVA;  Surgeon: KIRSTEN Atkins III, MD;  Location: [facility] OR [address] [place] [address];  Service: Peripheral Vascular;;       Time Tracking:     PT Received On: 19  PT Start Time: 922     PT Stop Time: 931  PT Total Time (min): 9 min     Billable Minutes: Evaluation 9 mins      Deidra Gates, PT  2019

## 2019-12-12 NOTE — ASSESSMENT & PLAN NOTE
58 yo man admitted to SICU s/p radical left nephrectomy and IVC thrombectomy complicated by significant EBL of 7L.      Neuro:  - Pain control: Anesthesia  - AAO x4    Pulm:  - Extubated to HFNC  - Wean O2 as tolerated  - Improved aeration CXR    CV:  - Off all pressors now.    - s/p fluid resuscitation with total 18L of crystalloids, 1L of albumin, and 4 units PRBC  - Normal lactate  - Inadequate response to 2 URBC, trending CBC     Renal:  - s/p L nephrectomy with significant blood loss and fluid shift  - Trend BUN/Cr.  Creatinine 1.2 today.    - Monitor UOP, continues to be stable.    - Received 40mg of lasix, adequate response    FEN/GI:  - Passed bedside swallow  - Regular diet   - Replace lytes PRN    Hem:  - H/H down trending   - No signs of active bleeding    ID:  - Afebrile, no leukocytosis  - Monitor for signs of infection    Endo:  - Accuchecks  - No issues      PPx:  - Famotidine  - SQH    Dispo:   - Continued SICU care    Primary: Urology

## 2019-12-12 NOTE — ANESTHESIA POST-OP PAIN MANAGEMENT
Acute Pain Service Progress Note    Alexi Noguera is a 59 y.o., male, [id number].    Surgery:  Nephrectomy OPEN (Left Abdomen) - 4hrs GEN WITH REGIONAL      THROMBECTOMY, VENA CAVA (Chest)      LYSIS, ADHESIONS (N/A Abdomen)      THROMBECTOMY, VENA CAVA (Chest)      REPAIR of IVC (Abdomen)    Post Op Day #: 3    Catheter type: perineural  RENETTA bilat    Infusion type: Ropivacaine 0.2%  2 basal 8 bolus    Problem List:    Active Hospital Problems    Diagnosis  POA    *Primary renal cell carcinoma of native left kidney [C64.2]  Yes    Left renal mass [N28.89]  Yes      Resolved Hospital Problems   No resolved problems to display.       Subjective:    Patient reports good pain control, 5/10 at rest with 8/10 with movement with minimal opiate use. He reports he was able to work with PT. Using minimal PO opioids.     Objective:    Catheters clean, dry, intact         Vitals   Vitals:    12/12/19 0645   BP:    Pulse: 101   Resp:    Temp:         Labs    No results displayed because visit has over 200 results.           Meds   Current Facility-Administered Medications   Medication Dose Route Frequency Provider Last Rate Last Dose    0.9%  NaCl infusion (for blood administration)   Intravenous Q24H PRN Erin Kwon MD        0.9%  NaCl infusion (for blood administration)   Intravenous Q24H PRN Alberto Gregorio MD        acetaminophen tablet 650 mg  650 mg Oral Q6H Venu Wright MD   650 mg at 12/12/19 0605    calcium gluconate 1g in dextrose 5% 100mL (ready to mix system)  1 g Intravenous PRN Stepan Benjamin DO   1 g at 12/11/19 0621    calcium gluconate 1g in dextrose 5% 100mL (ready to mix system)  1 g Intravenous PRN Stepan Benjamin DO   1 g at 12/11/19 2230    calcium gluconate 1g in dextrose 5% 100mL (ready to mix system)  1 g Intravenous PRN Stepan Benjamin DO        heparin (porcine) injection 5,000 Units  5,000 Units Subcutaneous Q8H Alexsander Bravo MD   5,000 Units at 12/12/19 0605     magnesium sulfate 2g in water 50mL IVPB (premix)  2 g Intravenous PRN Stepan Benjamin DO   2 g at 12/10/19 1720    magnesium sulfate 2g in water 50mL IVPB (premix)  4 g Intravenous PRN Stepan Benjamin DO        methocarbamol tablet 500 mg  500 mg Oral QID Venu Wright MD   500 mg at 12/11/19 2025    oxyCODONE immediate release tablet 5 mg  5 mg Oral Q3H PRN Venu Wright MD        And    oxyCODONE immediate release tablet Tab 10 mg  10 mg Oral Q3H PRN Venu Wright MD   10 mg at 12/11/19 1801    And    morphine injection 2 mg  2 mg Intravenous Q1H PRN Venu Wright MD   2 mg at 12/11/19 1358    And    morphine injection 4 mg  4 mg Intravenous Q1H PRN Venu Wright MD        naloxone 0.4 mg/mL injection 0.02 mg  0.02 mg Intravenous PRN Venu Wright MD        ondansetron injection 4 mg  4 mg Intravenous Q12H PRN Joe Rodriguez MD   4 mg at 12/10/19 1032    pantoprazole EC tablet 40 mg  40 mg Oral Daily Jasvir Miranda MD        potassium chloride 40 mEq in 100 mL IVPB (FOR CENTRAL LINE ADMINISTRATION ONLY)  40 mEq Intravenous PRN Stepan Benjamin DO        And    potassium chloride 20 mEq in 100 mL IVPB (FOR CENTRAL LINE ADMINISTRATION ONLY)  60 mEq Intravenous PRN Stepan Benjamin DO        And    potassium chloride 40 mEq in 100 mL IVPB (FOR CENTRAL LINE ADMINISTRATION ONLY)  80 mEq Intravenous PRN Stepan Benjamin DO        pregabalin capsule 150 mg  150 mg Oral QHS Joe Rodriguez MD   150 mg at 12/11/19 2025    promethazine (PHENERGAN) 6.25 mg in dextrose 5 % 50 mL IVPB  6.25 mg Intravenous Q6H PRN Joe Rodriguez MD        ropivacaine (PF) 2 mg/ml (0.2%) infusion  2 mL/hr Perineural Continuous Joe Rodriguez MD 2 mL/hr at 12/12/19 0600 2 mL/hr at 12/12/19 0600    ropivacaine (PF) 2 mg/ml (0.2%) infusion  2 mL/hr Perineural Continuous Joe Rodriguez MD 2 mL/hr at 12/12/19 0600 2 mL/hr at 12/12/19 0600    senna-docusate 8.6-50 mg per tablet 1 tablet  1  tablet Oral BID Stepan Benjamin, DO   1 tablet at 12/11/19 2025    sodium chloride 0.9% flush 10 mL  10 mL Intravenous PRN Stepan Benjamin,         sodium phosphate 15 mmol in dextrose 5 % 250 mL IVPB  15 mmol Intravenous PRN Stepan Benjamin, DO 83.3 mL/hr at 12/11/19 1829 15 mmol at 12/11/19 1829    sodium phosphate 20.01 mmol in dextrose 5 % 250 mL IVPB  20.01 mmol Intravenous PRN Stepan Benjamin, DO        sodium phosphate 30 mmol in dextrose 5 % 250 mL IVPB  30 mmol Intravenous PRN Stepan Benjamin, DO                Assessment:     Pain control adequate    Plan:    - continue APAP, multimodals and breakthrough oxy 5/10s for moderate severe pain. Continues to progress from pain prospective. Potentially pause and pull Sunday.      Evaluator Venu Wright

## 2019-12-12 NOTE — PROGRESS NOTES
[facility]  Urology  Progress Note    Patient Name: Alexi Noguera  MRN: [id number]  Admission Date: 12/9/2019  Hospital Length of Stay: 3 days  Code Status: Full Code   Attending Provider: José Manuel Buchanan MD   Primary Care Physician: Derrick Matthews MD    Subjective:     HPI:  59 YOM with HTN and HLD with incidentally discovered left renal mass with level I caval tumor thrombus taken to the OR 12/9/2019 left radical nephrectomy (adrenal sparing) and IVC thrombectomy.      Surgery was successful, although blood loss was 7 liters, with a significant portion of that occurring during the cavotomy portion.  The patient was continuously resuscitated in the OR, receiving 9 L cyrstalloid, 1 L albumin, and 4 u PRBC. He was left intubated on transfer to the SICU.         Interval History:   Not requiring pressors  OOBTC yesterday  Pain controlled with PNCs  Tolerating clear liquids without nausea or emesis  +BM yesterday  Received 2 u PRBC, hgb increased slightly to 7.3  Copeland removed this AM  On 25L comfort flow    Review of Systems    Objective:     Temp:  [98.2 °F (36.8 °C)-99.2 °F (37.3 °C)] 98.2 °F (36.8 °C)  Pulse:  [] 98  Resp:  [12-44] 18  SpO2:  [94 %-100 %] 97 %  BP: (100-136)/(62-80) 136/73  Arterial Line BP: ()/(50-80) 116/52     Body mass index is 39.64 kg/m².           Drains     Drain                 Trialysis (Dialysis) Catheter 03/20/17 0850 right internal jugular 996 days         Urethral Catheter 12/09/19 0822 Non-latex 16 Fr. 2 days                Physical Exam   Constitutional: He is oriented to person, place, and time. He appears well-developed and well-nourished. No distress.   HENT:   Head: Normocephalic and atraumatic.   R IJ central line   Neck: Normal range of motion.   Cardiovascular: Normal rate.    Pulmonary/Chest: Effort normal. No respiratory distress.   Abdominal: Soft. He exhibits distension. There is tenderness. There is no guarding.   Incision  c/d/i  Appropriately tender to palpation  Slightly distended  Copeland removed this AM   Musculoskeletal: Normal range of motion. He exhibits edema.   Neurological: He is alert and oriented to person, place, and time.   Skin: Skin is warm and dry. He is not diaphoretic.     Psychiatric: He has a normal mood and affect.       Significant Labs:    BMP:  Recent Labs   Lab 12/11/19  0411 12/11/19  1533 12/11/19 1918 12/12/19  0355   * 137  --  133*   K 4.8 4.6 4.1 4.1    108  --  103   CO2 23 25  --  27   BUN 25* 23  --  22   CREATININE 1.4 1.2  --  1.2   CALCIUM 7.3* 7.4*  --  7.5*       CBC:   Recent Labs   Lab 12/11/19  1610 12/11/19 1918 12/12/19  0355   WBC 13.69* 12.94* 10.65   HGB 7.0* 6.9* 7.3*   HCT 22.0* 21.7* 22.5*   * 145* 160       All pertinent labs results from the past 24 hours have been reviewed.    Significant Imaging:  All pertinent imaging results/findings from the past 24 hours have been reviewed.                  Assessment/Plan:     Left renal mass      Neuro  -- pain control per Anesthesia pain management, currently with perineural catheters and PCA  -- likely can d/c PCA and switch to oral agents PRN    ENT  -- N/A    Pulmonary  -- on comfort flow, CXR clearing, hopefully will wean as patient diureses     Cardiac  -- HDS off pressors  -- follow lactate    ID  -- no infection suspected at this time    Heme  -- stable s/p 2 u PRBC 12/11 for post-op anemia (increased to 7.3), trend CBCs  -- do not suspect significant bleeding, patient still leveling out from intraoperative blood loss and resuscitation    Renal  -- Cr stabilizing at 1.2 s/p left radical nephrectomy  -- trend BMP    GI/FEN  -- okay for regular diet    Endo  -- per ICU    GI PPx:  PPI  DVT:  Sub-Q heparin  Dispo:  Continue SICU care, likely stepdown when weaned from O2 req    Code Status: Full Code                VTE Risk Mitigation (From admission, onward)         Ordered     heparin (porcine) injection 5,000 Units   Every 8 hours      12/10/19 0810     Place sequential compression device  Until discontinued      12/09/19 1653     IP VTE HIGH RISK PATIENT  Once      12/09/19 1653     Place sequential compression device  Until discontinued      12/09/19 0548                Alexsander Bravo MD  Urology  [facility]

## 2019-12-12 NOTE — ASSESSMENT & PLAN NOTE
Neuro  -- pain control per Anesthesia pain management, currently with perineural catheters and PCA  -- likely can d/c PCA and switch to oral agents PRN    ENT  -- N/A    Pulmonary  -- on comfort flow, CXR clearing, hopefully will wean as patient diureses     Cardiac  -- HDS off pressors  -- follow lactate    ID  -- no infection suspected at this time    Heme  -- stable s/p 2 u PRBC 12/11 for post-op anemia (increased to 7.3), trend CBCs  -- do not suspect significant bleeding, patient still leveling out from intraoperative blood loss and resuscitation    Renal  -- Cr stabilizing at 1.2 s/p left radical nephrectomy  -- trend BMP    GI/FEN  -- okay for regular diet    Endo  -- per ICU    GI PPx:  PPI  DVT:  Sub-Q heparin  Dispo:  Continue SICU care, likely stepdown when weaned from O2 req    Code Status: Full Code

## 2019-12-12 NOTE — SUBJECTIVE & OBJECTIVE
Interval History/Significant Events:   Off pressors   OOBTc this morning   Copeland was removed by urology  Inadequate response to blood transfusion, primary service do not suspect significant bleeding.   Diuresis yesterday 40mg of laxis UOP 2888    Follow-up For: Procedure(s) (LRB):  Nephrectomy OPEN (Left)  LYSIS, ADHESIONS (N/A)  THROMBECTOMY, VENA CAVA  THROMBECTOMY, VENA CAVA  REPAIR of IVC      Objective:     Vital Signs (Most Recent):  Temp: 98.2 °F (36.8 °C) (12/12/19 0315)  Pulse: 98 (12/12/19 0630)  Resp: 18 (12/12/19 0545)  BP: 136/73 (12/12/19 0600)  SpO2: 97 % (12/12/19 0630) Vital Signs (24h Range):  Temp:  [98.2 °F (36.8 °C)-99.2 °F (37.3 °C)] 98.2 °F (36.8 °C)  Pulse:  [] 98  Resp:  [12-44] 18  SpO2:  [94 %-100 %] 97 %  BP: (100-136)/(62-80) 136/73  Arterial Line BP: ()/(50-80) 116/52     Weight: (!) 136.3 kg (300 lb 7.8 oz)  Body mass index is 39.64 kg/m².      Intake/Output Summary (Last 24 hours) at 12/12/2019 0645  Last data filed at 12/12/2019 0600  Gross per 24 hour   Intake 1420 ml   Output 3185 ml   Net -1765 ml       Physical Exam   Constitutional: He is oriented to person, place, and time. He appears well-developed and well-nourished.   HENT:   Head: Normocephalic and atraumatic.   Eyes: Pupils are equal, round, and reactive to light.   Neck: Neck supple. No tracheal deviation present. No thyromegaly present.   Cardiovascular: Normal rate, regular rhythm and intact distal pulses.   No murmur heard.  Pulmonary/Chest: Tachypnea noted. He has no decreased breath sounds. He has no wheezes.   Abdominal: Soft. He exhibits no distension and no mass.   Surgical incision vertical midline of abdomen, without drainage, fluctuance or ecchymosis.     Musculoskeletal: He exhibits edema.   Neurological: He is alert and oriented to person, place, and time.   Skin: Skin is warm and dry. He is not diaphoretic. No erythema. No pallor.   Psychiatric: He has a normal mood and affect. His behavior is  normal.   Nursing note and vitals reviewed.        Lines/Drains/Airways     Central Venous Catheter Line                 Trialysis (Dialysis) Catheter 03/20/17 0850 right internal jugular 996 days         Percutaneous Central Line Insertion/Assessment - triple lumen  12/09/19 1830 right internal jugular 2 days          Epidural Line                 Perineural Analgesia/Anesthesia Assessment (using dermatomes) 12/09/19 0730 2 days         Perineural Analgesia/Anesthesia Assessment (using dermatomes) 12/09/19 0730 2 days          Arterial Line                 Arterial Line 12/09/19 0821 Right Radial 2 days                Significant Labs:    CBC/Anemia Profile:  Recent Labs   Lab 12/11/19  1610 12/11/19 1918 12/12/19  0355   WBC 13.69* 12.94* 10.65   HGB 7.0* 6.9* 7.3*   HCT 22.0* 21.7* 22.5*   * 145* 160   MCV 91 90 90   RDW 14.5 14.6* 14.7*        Chemistries:  Recent Labs   Lab 12/11/19  0300 12/11/19  0411 12/11/19  1533 12/11/19  1918 12/12/19  0355   * 135* 137  --  133*   K 4.9 4.8 4.6 4.1 4.1    107 108  --  103   CO2 24 23 25  --  27   BUN 26* 25* 23  --  22   CREATININE 1.6* 1.4 1.2  --  1.2   CALCIUM 7.3* 7.3* 7.4*  --  7.5*   ALBUMIN 2.1* 2.0*  --   --  1.9*   PROT 4.3* 4.2*  --   --  4.7*   BILITOT 0.4 0.4  --   --  0.7   ALKPHOS 26* 31*  --   --  36*   ALT 22 22  --   --  21   AST 57* 55*  --   --  45*   MG 2.1  --  2.2 2.2 2.2   PHOS 3.2  --  2.3*  --  2.5*       ABGs:   Recent Labs   Lab 12/11/19  1033   PH 7.410   PCO2 39.5   HCO3 25.1   POCSATURATED 96   BE 0     Lactic Acid:   Recent Labs   Lab 12/10/19  0646 12/10/19  0915 12/10/19  1606   LACTATE 2.2 1.9 3.4*     POCT Glucose:   Recent Labs   Lab 12/10/19  0754 12/11/19  1033 12/11/19  1538   POCTGLUCOSE 155* 99 104     All pertinent labs within the past 24 hours have been reviewed.      Significant Imaging:  I have reviewed all pertinent imaging results/findings within the past 24 hours.

## 2019-12-12 NOTE — PLAN OF CARE
Problem: Physical Therapy Goal  Goal: Physical Therapy Goal  Description  Goals to be met by: 2019     Patient will increase functional independence with mobility by performin. Supine to sit with Modified [place]  2. Sit to stand transfer with [place]  3. Gait  x 200 feet with Supervision using no AD     Outcome: Ongoing, Progressing     Pt evaluated and appropriate goals established.     Deidra Gates, PT, DPT  2019  [phone]

## 2019-12-12 NOTE — PLAN OF CARE
Oob to chair  Weaned vaso and levophed gtt over night  Transfused 2 units RBC  Possible removal of larsen per urology order tomorrow  40 mg lasix iv today

## 2019-12-12 NOTE — PROGRESS NOTES
[facility]  Critical Care - Surgery  Progress Note    Patient Name: Alexi Noguera  MRN: [id number]  Admission Date: 12/9/2019  Hospital Length of Stay: 3 days  Code Status: Full Code  Attending Provider: José Manuel Buchanan MD  Primary Care Provider: Derrick Matthews MD   Principal Problem: Primary renal cell carcinoma of native left kidney    Subjective:     Hospital/ICU Course:  12/9 - Arrives to surgical ICU intubated on levo @ 0.3, CVC placed. Resuscitated with additional 7L of crystalloids.  12/11 - Overnight pt H&H dropped two points, received 2u pRBC.  Otherwise NAEON.  Pt remains extubated, now off of pressors.  Pt able to ambulate and sit up in chair at bedside.      Interval History/Significant Events:   Off pressors   OOBTc this morning   Copeland was removed by urology  Inadequate response to blood transfusion, primary service do not suspect significant bleeding.   Diuresis yesterday 40mg of laxis UOP 2888    Follow-up For: Procedure(s) (LRB):  Nephrectomy OPEN (Left)  LYSIS, ADHESIONS (N/A)  THROMBECTOMY, VENA CAVA  THROMBECTOMY, VENA CAVA  REPAIR of IVC      Objective:     Vital Signs (Most Recent):  Temp: 98.2 °F (36.8 °C) (12/12/19 0315)  Pulse: 98 (12/12/19 0630)  Resp: 18 (12/12/19 0545)  BP: 136/73 (12/12/19 0600)  SpO2: 97 % (12/12/19 0630) Vital Signs (24h Range):  Temp:  [98.2 °F (36.8 °C)-99.2 °F (37.3 °C)] 98.2 °F (36.8 °C)  Pulse:  [] 98  Resp:  [12-44] 18  SpO2:  [94 %-100 %] 97 %  BP: (100-136)/(62-80) 136/73  Arterial Line BP: ()/(50-80) 116/52     Weight: (!) 136.3 kg (300 lb 7.8 oz)  Body mass index is 39.64 kg/m².      Intake/Output Summary (Last 24 hours) at 12/12/2019 0645  Last data filed at 12/12/2019 0600  Gross per 24 hour   Intake 1420 ml   Output 3185 ml   Net -1765 ml       Physical Exam   Constitutional: He is oriented to person, place, and time. He appears well-developed and well-nourished.   HENT:   Head: Normocephalic and atraumatic.   Eyes: Pupils  are equal, round, and reactive to light.   Neck: Neck supple. No tracheal deviation present. No thyromegaly present.   Cardiovascular: Normal rate, regular rhythm and intact distal pulses.   No murmur heard.  Pulmonary/Chest: Tachypnea noted. He has no decreased breath sounds. He has no wheezes.   Abdominal: Soft. He exhibits no distension and no mass.   Surgical incision vertical midline of abdomen, without drainage, fluctuance or ecchymosis.     Musculoskeletal: He exhibits edema.   Neurological: He is alert and oriented to person, place, and time.   Skin: Skin is warm and dry. He is not diaphoretic. No erythema. No pallor.   Psychiatric: He has a normal mood and affect. His behavior is normal.   Nursing note and vitals reviewed.        Lines/Drains/Airways     Central Venous Catheter Line                 Trialysis (Dialysis) Catheter 03/20/17 0850 right internal jugular 996 days         Percutaneous Central Line Insertion/Assessment - triple lumen  12/09/19 1830 right internal jugular 2 days          Epidural Line                 Perineural Analgesia/Anesthesia Assessment (using dermatomes) 12/09/19 0730 2 days         Perineural Analgesia/Anesthesia Assessment (using dermatomes) 12/09/19 0730 2 days          Arterial Line                 Arterial Line 12/09/19 0821 Right Radial 2 days                Significant Labs:    CBC/Anemia Profile:  Recent Labs   Lab 12/11/19  1610 12/11/19 1918 12/12/19  0355   WBC 13.69* 12.94* 10.65   HGB 7.0* 6.9* 7.3*   HCT 22.0* 21.7* 22.5*   * 145* 160   MCV 91 90 90   RDW 14.5 14.6* 14.7*        Chemistries:  Recent Labs   Lab 12/11/19  0300 12/11/19  0411 12/11/19  1533 12/11/19  1918 12/12/19  0355   * 135* 137  --  133*   K 4.9 4.8 4.6 4.1 4.1    107 108  --  103   CO2 24 23 25  --  27   BUN 26* 25* 23  --  22   CREATININE 1.6* 1.4 1.2  --  1.2   CALCIUM 7.3* 7.3* 7.4*  --  7.5*   ALBUMIN 2.1* 2.0*  --   --  1.9*   PROT 4.3* 4.2*  --   --  4.7*   BILITOT  0.4 0.4  --   --  0.7   ALKPHOS 26* 31*  --   --  36*   ALT 22 22  --   --  21   AST 57* 55*  --   --  45*   MG 2.1  --  2.2 2.2 2.2   PHOS 3.2  --  2.3*  --  2.5*       ABGs:   Recent Labs   Lab 12/11/19  1033   PH 7.410   PCO2 39.5   HCO3 25.1   POCSATURATED 96   BE 0     Lactic Acid:   Recent Labs   Lab 12/10/19  0646 12/10/19  0915 12/10/19  1606   LACTATE 2.2 1.9 3.4*     POCT Glucose:   Recent Labs   Lab 12/10/19  0754 12/11/19  1033 12/11/19  1538   POCTGLUCOSE 155* 99 104     All pertinent labs within the past 24 hours have been reviewed.      Significant Imaging:  I have reviewed all pertinent imaging results/findings within the past 24 hours.    Assessment/Plan:     * Primary renal cell carcinoma of native left kidney  60 yo man admitted to SICU s/p radical left nephrectomy and IVC thrombectomy complicated by significant EBL of 7L.      Neuro:  - Pain control: Anesthesia  - AAO x4    Pulm:  - Extubated to HFNC  - Wean O2 as tolerated  - Improved aeration CXR    CV:  - Off all pressors now.    - s/p fluid resuscitation with total 18L of crystalloids, 1L of albumin, and 4 units PRBC  - Normal lactate  - Inadequate response to 2 URBC, trending CBC     Renal:  - s/p L nephrectomy with significant blood loss and fluid shift  - Trend BUN/Cr.  Creatinine 1.2 today.    - Monitor UOP, continues to be stable.    - Received 40mg of lasix, adequate response  -Lasix this morning     FEN/GI:  - Passed bedside swallow  - Regular diet   - Replace lytes PRN    Hem:  - H/H down trending   - No signs of active bleeding    ID:  - Afebrile, no leukocytosis  - Monitor for signs of infection    Endo:  - Accuchecks  - No issues      PPx:  - Famotidine  - SQH    Dispo:   - Continued SICU care    Primary: Urology      Critical care was time spent personally by me on the following activities: development of treatment plan with patient or surrogate and bedside caregivers, discussions with consultants, evaluation of patient's response  to treatment, examination of patient, ordering and performing treatments and interventions, ordering and review of laboratory studies, ordering and review of radiographic studies, pulse oximetry, re-evaluation of patient's condition.  This critical care time did not overlap with that of any other provider or involve time for any procedures.     Bhargav Munoz MD  Critical Care - Surgery  [facility]

## 2019-12-12 NOTE — PLAN OF CARE
Evaluation completed, plan of care established.    Patient will perform LB dressing with modified independence.  Pt will complete commode transfer with mod I.  Pt will complete UB dressing with mod I.  Pt will perform GH tasks with mod I.  Pt will complete all fx'l transfer with modified independence.    Problem: Occupational Therapy Goal  Goal: Occupational Therapy Goal  Outcome: Ongoing, Progressing

## 2019-12-13 LAB
ALBUMIN SERPL BCP-MCNC: 1.8 G/DL (ref 3.5–5.2)
ALP SERPL-CCNC: 60 U/L (ref 55–135)
ALT SERPL W/O P-5'-P-CCNC: 22 U/L (ref 10–44)
ANION GAP SERPL CALC-SCNC: 5 MMOL/L (ref 8–16)
APTT BLDCRRT: 26.8 SEC (ref 21–32)
AST SERPL-CCNC: 36 U/L (ref 10–40)
BASOPHILS # BLD AUTO: 0.01 K/UL (ref 0–0.2)
BASOPHILS # BLD AUTO: 0.01 K/UL (ref 0–0.2)
BASOPHILS NFR BLD: 0.1 % (ref 0–1.9)
BASOPHILS NFR BLD: 0.1 % (ref 0–1.9)
BILIRUB SERPL-MCNC: 0.4 MG/DL (ref 0.1–1)
BUN SERPL-MCNC: 18 MG/DL (ref 8–23)
CA-I BLDV-SCNC: 1.02 MMOL/L (ref 1.06–1.42)
CALCIUM SERPL-MCNC: 7.3 MG/DL (ref 8.7–10.5)
CHLORIDE SERPL-SCNC: 103 MMOL/L (ref 95–110)
CO2 SERPL-SCNC: 28 MMOL/L (ref 23–29)
CREAT SERPL-MCNC: 1.1 MG/DL (ref 0.5–1.4)
DIFFERENTIAL METHOD: ABNORMAL
DIFFERENTIAL METHOD: ABNORMAL
EOSINOPHIL # BLD AUTO: 0.1 K/UL (ref 0–0.5)
EOSINOPHIL # BLD AUTO: 0.1 K/UL (ref 0–0.5)
EOSINOPHIL NFR BLD: 1 % (ref 0–8)
EOSINOPHIL NFR BLD: 1.4 % (ref 0–8)
ERYTHROCYTE [DISTWIDTH] IN BLOOD BY AUTOMATED COUNT: 15.1 % (ref 11.5–14.5)
ERYTHROCYTE [DISTWIDTH] IN BLOOD BY AUTOMATED COUNT: 15.2 % (ref 11.5–14.5)
EST. GFR  (AFRICAN AMERICAN): >60 ML/MIN/1.73 M^2
EST. GFR  (NON AFRICAN AMERICAN): >60 ML/MIN/1.73 M^2
GLUCOSE SERPL-MCNC: 101 MG/DL (ref 70–110)
HCT VFR BLD AUTO: 21.4 % (ref 40–54)
HCT VFR BLD AUTO: 23.1 % (ref 40–54)
HGB BLD-MCNC: 6.9 G/DL (ref 14–18)
HGB BLD-MCNC: 7.3 G/DL (ref 14–18)
IMM GRANULOCYTES # BLD AUTO: 0.07 K/UL (ref 0–0.04)
IMM GRANULOCYTES # BLD AUTO: 0.08 K/UL (ref 0–0.04)
IMM GRANULOCYTES NFR BLD AUTO: 0.7 % (ref 0–0.5)
IMM GRANULOCYTES NFR BLD AUTO: 0.8 % (ref 0–0.5)
INR PPP: 0.9 (ref 0.8–1.2)
LYMPHOCYTES # BLD AUTO: 0.6 K/UL (ref 1–4.8)
LYMPHOCYTES # BLD AUTO: 0.7 K/UL (ref 1–4.8)
LYMPHOCYTES NFR BLD: 6.3 % (ref 18–48)
LYMPHOCYTES NFR BLD: 7.2 % (ref 18–48)
MAGNESIUM SERPL-MCNC: 2.1 MG/DL (ref 1.6–2.6)
MCH RBC QN AUTO: 28.9 PG (ref 27–31)
MCH RBC QN AUTO: 29.2 PG (ref 27–31)
MCHC RBC AUTO-ENTMCNC: 31.6 G/DL (ref 32–36)
MCHC RBC AUTO-ENTMCNC: 32.2 G/DL (ref 32–36)
MCV RBC AUTO: 91 FL (ref 82–98)
MCV RBC AUTO: 91 FL (ref 82–98)
MONOCYTES # BLD AUTO: 0.8 K/UL (ref 0.3–1)
MONOCYTES # BLD AUTO: 0.8 K/UL (ref 0.3–1)
MONOCYTES NFR BLD: 7.9 % (ref 4–15)
MONOCYTES NFR BLD: 8 % (ref 4–15)
NEUTROPHILS # BLD AUTO: 8.1 K/UL (ref 1.8–7.7)
NEUTROPHILS # BLD AUTO: 8.5 K/UL (ref 1.8–7.7)
NEUTROPHILS NFR BLD: 83 % (ref 38–73)
NEUTROPHILS NFR BLD: 83.5 % (ref 38–73)
NRBC BLD-RTO: 0 /100 WBC
NRBC BLD-RTO: 0 /100 WBC
PHOSPHATE SERPL-MCNC: 2.4 MG/DL (ref 2.7–4.5)
PLATELET # BLD AUTO: 166 K/UL (ref 150–350)
PLATELET # BLD AUTO: 173 K/UL (ref 150–350)
PMV BLD AUTO: 8.9 FL (ref 9.2–12.9)
PMV BLD AUTO: 9.2 FL (ref 9.2–12.9)
POTASSIUM SERPL-SCNC: 3.8 MMOL/L (ref 3.5–5.1)
PROT SERPL-MCNC: 5.1 G/DL (ref 6–8.4)
PROTHROMBIN TIME: 9.4 SEC (ref 9–12.5)
RBC # BLD AUTO: 2.36 M/UL (ref 4.6–6.2)
RBC # BLD AUTO: 2.53 M/UL (ref 4.6–6.2)
SODIUM SERPL-SCNC: 136 MMOL/L (ref 136–145)
WBC # BLD AUTO: 10.2 K/UL (ref 3.9–12.7)
WBC # BLD AUTO: 9.75 K/UL (ref 3.9–12.7)

## 2019-12-13 PROCEDURE — 25000003 PHARM REV CODE 250: Performed by: STUDENT IN AN ORGANIZED HEALTH CARE EDUCATION/TRAINING PROGRAM

## 2019-12-13 PROCEDURE — 83735 ASSAY OF MAGNESIUM: CPT

## 2019-12-13 PROCEDURE — 63600175 PHARM REV CODE 636 W HCPCS: Performed by: STUDENT IN AN ORGANIZED HEALTH CARE EDUCATION/TRAINING PROGRAM

## 2019-12-13 PROCEDURE — 11000001 HC ACUTE MED/SURG PRIVATE ROOM

## 2019-12-13 PROCEDURE — 80053 COMPREHEN METABOLIC PANEL: CPT

## 2019-12-13 PROCEDURE — 97530 THERAPEUTIC ACTIVITIES: CPT

## 2019-12-13 PROCEDURE — 85025 COMPLETE CBC W/AUTO DIFF WBC: CPT

## 2019-12-13 PROCEDURE — 99232 SBSQ HOSP IP/OBS MODERATE 35: CPT | Mod: ,,, | Performed by: ANESTHESIOLOGY

## 2019-12-13 PROCEDURE — 85730 THROMBOPLASTIN TIME PARTIAL: CPT

## 2019-12-13 PROCEDURE — 97535 SELF CARE MNGMENT TRAINING: CPT

## 2019-12-13 PROCEDURE — 99232 PR SUBSEQUENT HOSPITAL CARE,LEVL II: ICD-10-PCS | Mod: ,,, | Performed by: ANESTHESIOLOGY

## 2019-12-13 PROCEDURE — 85610 PROTHROMBIN TIME: CPT

## 2019-12-13 PROCEDURE — 84100 ASSAY OF PHOSPHORUS: CPT

## 2019-12-13 PROCEDURE — 82330 ASSAY OF CALCIUM: CPT

## 2019-12-13 RX ORDER — LANOLIN ALCOHOL/MO/W.PET/CERES
800 CREAM (GRAM) TOPICAL
Status: DISCONTINUED | OUTPATIENT
Start: 2019-12-13 | End: 2019-12-15 | Stop reason: HOSPADM

## 2019-12-13 RX ORDER — SODIUM,POTASSIUM PHOSPHATES 280-250MG
2 POWDER IN PACKET (EA) ORAL
Status: DISCONTINUED | OUTPATIENT
Start: 2019-12-13 | End: 2019-12-15 | Stop reason: HOSPADM

## 2019-12-13 RX ORDER — POTASSIUM CHLORIDE 20 MEQ/15ML
40 SOLUTION ORAL
Status: DISCONTINUED | OUTPATIENT
Start: 2019-12-13 | End: 2019-12-15 | Stop reason: HOSPADM

## 2019-12-13 RX ORDER — POTASSIUM CHLORIDE 20 MEQ/15ML
60 SOLUTION ORAL
Status: DISCONTINUED | OUTPATIENT
Start: 2019-12-13 | End: 2019-12-15 | Stop reason: HOSPADM

## 2019-12-13 RX ADMIN — POTASSIUM CHLORIDE 40 MEQ: 20 SOLUTION ORAL at 05:12

## 2019-12-13 RX ADMIN — ROPIVACAINE HYDROCHLORIDE 2 ML/HR: 2 INJECTION, SOLUTION EPIDURAL; INFILTRATION at 10:12

## 2019-12-13 RX ADMIN — METHOCARBAMOL TABLETS 500 MG: 500 TABLET, COATED ORAL at 08:12

## 2019-12-13 RX ADMIN — PRAVASTATIN SODIUM 40 MG: 40 TABLET ORAL at 09:12

## 2019-12-13 RX ADMIN — OXYCODONE HYDROCHLORIDE 10 MG: 10 TABLET ORAL at 06:12

## 2019-12-13 RX ADMIN — ROPIVACAINE HYDROCHLORIDE 2 ML/HR: 2 INJECTION, SOLUTION EPIDURAL; INFILTRATION at 02:12

## 2019-12-13 RX ADMIN — METHOCARBAMOL TABLETS 500 MG: 500 TABLET, COATED ORAL at 01:12

## 2019-12-13 RX ADMIN — CALCIUM GLUCONATE 1 G: 98 INJECTION, SOLUTION INTRAVENOUS at 05:12

## 2019-12-13 RX ADMIN — HEPARIN SODIUM 5000 UNITS: 5000 INJECTION, SOLUTION INTRAVENOUS; SUBCUTANEOUS at 01:12

## 2019-12-13 RX ADMIN — PREGABALIN 150 MG: 75 CAPSULE ORAL at 09:12

## 2019-12-13 RX ADMIN — HEPARIN SODIUM 5000 UNITS: 5000 INJECTION, SOLUTION INTRAVENOUS; SUBCUTANEOUS at 05:12

## 2019-12-13 RX ADMIN — AMLODIPINE BESYLATE 5 MG: 5 TABLET ORAL at 08:12

## 2019-12-13 RX ADMIN — ACETAMINOPHEN 650 MG: 325 TABLET ORAL at 01:12

## 2019-12-13 RX ADMIN — METHOCARBAMOL TABLETS 500 MG: 500 TABLET, COATED ORAL at 06:12

## 2019-12-13 RX ADMIN — PANTOPRAZOLE SODIUM 40 MG: 40 TABLET, DELAYED RELEASE ORAL at 08:12

## 2019-12-13 RX ADMIN — ACETAMINOPHEN 650 MG: 325 TABLET ORAL at 06:12

## 2019-12-13 RX ADMIN — POTASSIUM & SODIUM PHOSPHATES POWDER PACK 280-160-250 MG 2 PACKET: 280-160-250 PACK at 05:12

## 2019-12-13 RX ADMIN — HEPARIN SODIUM 5000 UNITS: 5000 INJECTION, SOLUTION INTRAVENOUS; SUBCUTANEOUS at 09:12

## 2019-12-13 NOTE — PLAN OF CARE
Problem: Occupational Therapy Goal  Goal: Occupational Therapy Goal  Description  Goals to be met by: 12/26/19     Patient will increase functional independence with ADLs by performing:    Patient will perform LB dressing with modified independence.  Pt will complete commode transfer with mod I.  Pt will complete UB dressing with mod I.  Pt will perform GH tasks with mod I.  Pt will complete all fx'l transfer with modified independence.     Problem: Occupational Therapy Goal  Goal: Occupational Therapy Goal  Outcome: Ongoing, Progressing     Outcome: Ongoing, Progressing

## 2019-12-13 NOTE — SUBJECTIVE & OBJECTIVE
Interval History:   OOBTC, ambulating  Pain controlled with PNCs  Tolerating diet without nausea or emesis  +BMs  hgb stable, slightly down, recheck today  Voiding well  On 4L O2  Got 80mg lasix yesterday, good UOP    Review of Systems    Objective:     Temp:  [97.9 °F (36.6 °C)-98.6 °F (37 °C)] 98.6 °F (37 °C)  Pulse:  [] 94  Resp:  [13-29] 19  SpO2:  [94 %-99 %] 99 %  BP: (117-197)/(60-97) 152/97  Arterial Line BP: (114-182)/(54-95) 136/65     Body mass index is 38.25 kg/m².           Drains     Drain                 Trialysis (Dialysis) Catheter 03/20/17 0850 right internal jugular 996 days         Urethral Catheter 12/09/19 0822 Non-latex 16 Fr. 2 days                Physical Exam   Constitutional: He is oriented to person, place, and time. He appears well-developed and well-nourished. No distress.   HENT:   Head: Normocephalic and atraumatic.   Neck: Normal range of motion.   Cardiovascular: Normal rate.    Pulmonary/Chest: Effort normal. No respiratory distress.   Abdominal: Soft. He exhibits distension. There is tenderness. There is no guarding.   Incision c/d/i  Appropriately tender to palpation       Genitourinary:   Genitourinary Comments: penoscrotal edema present   Musculoskeletal: Normal range of motion. He exhibits edema.   Neurological: He is alert and oriented to person, place, and time.   Skin: Skin is warm and dry. He is not diaphoretic.     Psychiatric: He has a normal mood and affect.       Significant Labs:    BMP:  Recent Labs   Lab 12/12/19  1400 12/12/19  2100 12/13/19  0400   * 136 136   K 3.5 3.4* 3.8    102 103   CO2 26 29 28   BUN 21 18 18   CREATININE 1.2 1.2 1.1   CALCIUM 7.8* 7.5* 7.3*       CBC:   Recent Labs   Lab 12/12/19  0355 12/12/19  1400 12/13/19  0400   WBC 10.65 10.85 9.75   HGB 7.3* 7.5* 6.9*   HCT 22.5* 23.0* 21.4*    167 173       All pertinent labs results from the past 24 hours have been reviewed.    Significant Imaging:  All pertinent imaging  results/findings from the past 24 hours have been reviewed.

## 2019-12-13 NOTE — ASSESSMENT & PLAN NOTE
Neuro  -- pain control per Anesthesia pain management, currently with perineural catheters  -- oral agents PRN    ENT  -- N/A    Pulmonary  -- on 4L O2, will likely continue to wean as patient diureses     Cardiac  -- HDS off pressors    ID  -- no infection suspected at this time    Heme  -- stable s/p 2 u PRBC 12/11 for post-op anemia (6.9 this AM), trend CBCs  -- do not suspect significant bleeding, patient still leveling out from intraoperative blood loss and resuscitation  -- repeat CBC this AM    Renal  -- Cr stabilizing at 1.2 s/p left radical nephrectomy  -- trend BMP  -- Possibly additional diuresis today    GI/FEN  -- regular diet    Endo  -- per ICU    GI PPx:  PPI  DVT:  Sub-Q heparin  Dispo:  Continue SICU care, likely stepdown today    Code Status: Full Code

## 2019-12-13 NOTE — ASSESSMENT & PLAN NOTE
60 yo man admitted to SICU s/p radical left nephrectomy and IVC thrombectomy complicated by significant EBL of 7L.      Neuro:  - Pain control: per APS  - AAO x4    Pulm:  - On 4L NC  - Wean O2 as tolerated  - Improved aeration CXR    CV:  - Off all pressors  - s/p fluid resuscitation with total 19L of crystalloids, 1L of albumin, and 4 units PRBC  - Normal lactate    Renal:  - s/p L nephrectomy with significant blood loss and fluid shift  - Trend BUN/Cr 18/1.1  - Monitor UOP, continues to be stable.    FEN/GI:  - Regular diet   - Replace lytes PRN    Hem:  - H/H stable  - No signs of active bleeding    ID:  - Afebrile, no leukocytosis  - Monitor for signs of infection    Endo:  - Accuchecks  - No issues      PPx:  - Famotidine  - SQH    Dispo:   - Stepdown to the floor today    Primary: Urology

## 2019-12-13 NOTE — NURSING TRANSFER
Nursing Transfer Note      12/13/2019     Transfer To: [address]    Transfer via wheelchair    Transfer with cardiac monitoring    Transported by DOUGLAS Rubio RN    Medicines sent: none    Chart send with patient: Yes    Notified: son    Patient reassessed at: 1/3/2020, 1:24 PM      Upon arrival to floor: cardiac monitor applied, patient oriented to room, call bell in reach and bed in lowest position

## 2019-12-13 NOTE — PLAN OF CARE
Patient AAOX4, up in chair with wheels locked, call light and belongings in reach.  Surgical abdomen c/d/I, pnc sites c/d/I infusing with no concerns.  Patient pain controlled with current pain regimen. Oxygen level remains 94% on room air which is above goal of >92%.  No complaint of n/v and tolerating diet with no concerns.  Patient ambulates to bathroom with x1 person assist.  Voiding with no concerns, per ICU nurse had several loose Bms this shift, since arrival to floor no Bms.  Patient remains free from falls and safety maintained this shift.

## 2019-12-13 NOTE — PROGRESS NOTES
[facility]  Urology  Progress Note    Patient Name: Alexi Noguera  MRN: [id number]  Admission Date: 12/9/2019  Hospital Length of Stay: 4 days  Code Status: Full Code   Attending Provider: José Manuel Buchanan MD   Primary Care Physician: Derrick Matthews MD    Subjective:     HPI:  59 YOM with HTN and HLD with incidentally discovered left renal mass with level I caval tumor thrombus taken to the OR 12/9/2019 left radical nephrectomy (adrenal sparing) and IVC thrombectomy.      Surgery was successful, although blood loss was 7 liters, with a significant portion of that occurring during the cavotomy portion.  The patient was continuously resuscitated in the OR, receiving 9 L cyrstalloid, 1 L albumin, and 4 u PRBC. He was left intubated on transfer to the SICU.         Interval History:   OOBTC, ambulating  Pain controlled with PNCs  Tolerating diet without nausea or emesis  +BMs  hgb stable, slightly down, recheck today  Voiding well  On 4L O2  Got 80mg lasix yesterday, good UOP    Review of Systems    Objective:     Temp:  [97.9 °F (36.6 °C)-98.6 °F (37 °C)] 98.6 °F (37 °C)  Pulse:  [] 94  Resp:  [13-29] 19  SpO2:  [94 %-99 %] 99 %  BP: (117-197)/(60-97) 152/97  Arterial Line BP: (114-182)/(54-95) 136/65     Body mass index is 38.25 kg/m².           Drains     Drain                 Trialysis (Dialysis) Catheter 03/20/17 0850 right internal jugular 996 days         Urethral Catheter 12/09/19 0822 Non-latex 16 Fr. 2 days                Physical Exam   Constitutional: He is oriented to person, place, and time. He appears well-developed and well-nourished. No distress.   HENT:   Head: Normocephalic and atraumatic.   Neck: Normal range of motion.   Cardiovascular: Normal rate.    Pulmonary/Chest: Effort normal. No respiratory distress.   Abdominal: Soft. He exhibits distension. There is tenderness. There is no guarding.   Incision c/d/i  Appropriately tender to palpation       Genitourinary:    Genitourinary Comments: penoscrotal edema present   Musculoskeletal: Normal range of motion. He exhibits edema.   Neurological: He is alert and oriented to person, place, and time.   Skin: Skin is warm and dry. He is not diaphoretic.     Psychiatric: He has a normal mood and affect.       Significant Labs:    BMP:  Recent Labs   Lab 12/12/19  1400 12/12/19  2100 12/13/19  0400   * 136 136   K 3.5 3.4* 3.8    102 103   CO2 26 29 28   BUN 21 18 18   CREATININE 1.2 1.2 1.1   CALCIUM 7.8* 7.5* 7.3*       CBC:   Recent Labs   Lab 12/12/19  0355 12/12/19  1400 12/13/19  0400   WBC 10.65 10.85 9.75   HGB 7.3* 7.5* 6.9*   HCT 22.5* 23.0* 21.4*    167 173       All pertinent labs results from the past 24 hours have been reviewed.    Significant Imaging:  All pertinent imaging results/findings from the past 24 hours have been reviewed.                  Assessment/Plan:     Left renal mass      Neuro  -- pain control per Anesthesia pain management, currently with perineural catheters  -- oral agents PRN    ENT  -- N/A    Pulmonary  -- on 4L O2, will likely continue to wean as patient diureses     Cardiac  -- HDS off pressors    ID  -- no infection suspected at this time    Heme  -- stable s/p 2 u PRBC 12/11 for post-op anemia (6.9 this AM), trend CBCs  -- do not suspect significant bleeding, patient still leveling out from intraoperative blood loss and resuscitation  -- repeat CBC this AM    Renal  -- Cr stabilizing at 1.2 s/p left radical nephrectomy  -- trend BMP  -- Possibly additional diuresis today    GI/FEN  -- regular diet    Endo  -- per ICU    GI PPx:  PPI  DVT:  Sub-Q heparin  Dispo:  Continue SICU care, likely stepdown today    Code Status: Full Code                VTE Risk Mitigation (From admission, onward)         Ordered     heparin (porcine) injection 5,000 Units  Every 8 hours      12/10/19 0810     Place sequential compression device  Until discontinued      12/09/19 1653     IP VTE HIGH  RISK PATIENT  Once      12/09/19 5874                Alexsander Bravo MD  Urology  [facility]

## 2019-12-13 NOTE — PT/OT/SLP PROGRESS
Occupational Therapy  Treatment    Alexi Noguera   MRN: [id number]   Admitting Diagnosis: Primary renal cell carcinoma of native left kidney    OT Date of Treatment: 12/13/19       Billable Minutes:  Self Care/Home Management 12 and Therapeutic Activity 13    General Precautions: Standard, fall  Orthopedic Precautions: N/A  Braces: N/A    Spiritual, Cultural Beliefs, Anglican Practices, Values that Affect Care: no    Subjective:  Communicated with nurse prior to session.      Pain/Comfort  Pain Rating 1: 0/10  Pain Rating Post-Intervention 1: 0/10    Objective:  Patient found with: arterial line, blood pressure cuff, telemetry, pulse ox (continuous), central line    Occupational Performance:    Bed Mobility:    · Pt seated in bedside chair at onset of therapy session.    Functional Mobility/Transfers:  · Patient completed Sit <> Stand Transfer with contact guard assistance  with  no assistive device   · Patient completed Bed <> Chair Transfer using Stand Pivot technique with contact guard assistance with hand-held assist      Activities of Daily Living:  · Grooming: supervision with set up provided and Pt washing face, hands and performing oral care seated in bedside chair.  · Lower Body Dressing: minimum assistance with Pt presenting with increased edema in abdomen and (B) LEs  with hip kit introduced to compensate. Min (A) needed to prpoerly position reacher for doffing of socks. Sock aide used to don socks with V/Cs to complet task. Pants not yet attempted.    [facility] 6 Click:  [facility] Total Score: 17    OT Exercises: Pt performed dowel exercises with 2 pound dowel 2 sets 10 reps performing shld Flex/ Extn, seated rows,  and biceps curls. (S) provided with rest breaks required between sets.      Patient left up in chair with all lines intact, call button in reach and nurse notified    ASSESSMENT:  Alexi Noguera is a 59 y.o. male with a medical diagnosis of Primary renal cell carcinoma of native left kidney .  Pt  tolerated Tx without incident making progress with self care tasks and functional transfers but would continue to benefit from OT intervention to further his functional (I)ce and safety.    Rehab identified problem list/impairments: weakness, impaired endurance, impaired self care skills, impaired functional mobilty, impaired coordination, impaired cardiopulmonary response to activity, edema    Rehab potential is good    Activity tolerance: Good    Discharge recommendations: home health OT     Barriers to discharge: None     Equipment recommendations: none     GOALS:   Multidisciplinary Problems     Occupational Therapy Goals        Problem: Occupational Therapy Goal    Goal Priority Disciplines Outcome Interventions   Occupational Therapy Goal     OT, PT/OT Ongoing, Progressing    Description:  Goals to be met by: 12/26/19     Patient will increase functional independence with ADLs by performing:    Patient will perform LB dressing with modified independence.  Pt will complete commode transfer with mod I.  Pt will complete UB dressing with mod I.  Pt will perform GH tasks with mod I.  Pt will complete all fx'l transfer with modified independence.     Problem: Occupational Therapy Goal  Goal: Occupational Therapy Goal  Outcome: Ongoing, Progressing                      Plan:  Patient to be seen 4 x/week to address the above listed problems via self-care/home management, therapeutic activities  Plan of Care expires: 01/10/20  Plan of Care reviewed with: patient    Joe Callahan OTR/L  12/13/2019

## 2019-12-13 NOTE — PLAN OF CARE
Problem: Occupational Therapy Goal  Goal: Occupational Therapy Goal  Description  Goals to be met by: 12/26/19     Patient will increase functional independence with ADLs by performing:    Patient will perform LB dressing with modified independence.  Pt will complete commode transfer with mod I.  Pt will complete UB dressing with mod I.  Pt will perform GH tasks with mod I.  Pt will complete all fx'l transfer with modified independence.     Problem: Occupational Therapy Goal  Goal: Occupational Therapy Goal  Outcome: Ongoing, Progressing     12/13/2019 1442 by Joe Callahan, OTR/L  Outcome: Ongoing, Progressing  12/13/2019 1440 by Joe Callahan, OTR/L  Outcome: Ongoing, Progressing

## 2019-12-13 NOTE — ANESTHESIA POST-OP PAIN MANAGEMENT
Acute Pain Service Progress Note    Alexi Noguera is a 59 y.o., male, [id number].    Surgery:  Nephrectomy OPEN (Left Abdomen) - 4hrs GEN WITH REGIONAL      THROMBECTOMY, VENA CAVA (Chest)      LYSIS, ADHESIONS (N/A Abdomen)      THROMBECTOMY, VENA CAVA (Chest)      REPAIR of IVC (Abdomen)    Post Op Day #: 4    Catheter type: perineural  RENETTA bilat    Infusion type: Ropivacaine 0.2%  2 basal 8 bolus    Problem List:    Active Hospital Problems    Diagnosis  POA    *Primary renal cell carcinoma of native left kidney [C64.2]  Yes    Left renal mass [N28.89]  Yes      Resolved Hospital Problems   No resolved problems to display.       Subjective:    Patient reports good pain control, 5/10 at rest with 8/10 with movement with minimal opiate use. He reports he was able to work with PT. Using minimal PO opioids.     Objective:    Catheters clean, dry, intact         Vitals   Vitals:    12/13/19 1000   BP: (!) 147/89   Pulse: 96   Resp: (!) 21   Temp:         Labs    No results displayed because visit has over 200 results.           Meds   Current Facility-Administered Medications   Medication Dose Route Frequency Provider Last Rate Last Dose    acetaminophen tablet 650 mg  650 mg Oral Q6H Venu Wright MD   650 mg at 12/13/19 0604    amLODIPine tablet 5 mg  5 mg Oral Daily Bhargav Munoz MD   5 mg at 12/13/19 0800    calcium gluconate 1g in dextrose 5% 100mL (ready to mix system)  1 g Intravenous PRN Stepan Benjamin, DO   1 g at 12/11/19 0621    calcium gluconate 1g in dextrose 5% 100mL (ready to mix system)  1 g Intravenous PRN Stepan Benjamin DO   1 g at 12/13/19 0500    calcium gluconate 1g in dextrose 5% 100mL (ready to mix system)  1 g Intravenous PRN Stepan Benjamin DO        heparin (porcine) injection 5,000 Units  5,000 Units Subcutaneous Q8H Alexsander Bravo MD   5,000 Units at 12/13/19 0550    magnesium oxide tablet 800 mg  800 mg Oral PRN Bhargav Munoz MD        magnesium oxide  tablet 800 mg  800 mg Oral PRN Bhargav Munoz MD        methocarbamol tablet 500 mg  500 mg Oral QID Venu Wright MD   500 mg at 12/13/19 0800    oxyCODONE immediate release tablet 5 mg  5 mg Oral Q3H PRN Venu Wright MD        And    oxyCODONE immediate release tablet Tab 10 mg  10 mg Oral Q3H PRN Venu Wright MD   10 mg at 12/12/19 1959    And    morphine injection 2 mg  2 mg Intravenous Q1H PRN Venu Wright MD   2 mg at 12/11/19 1358    And    morphine injection 4 mg  4 mg Intravenous Q1H PRN Venu Wright MD        naloxone 0.4 mg/mL injection 0.02 mg  0.02 mg Intravenous PRN Venu Wright MD        ondansetron injection 4 mg  4 mg Intravenous Q12H PRN Joe Rodriguez MD   4 mg at 12/10/19 1032    pantoprazole EC tablet 40 mg  40 mg Oral Daily Jasvir Miranda MD   40 mg at 12/13/19 0800    potassium chloride 10% oral solution 40 mEq  40 mEq Oral PRN Bhargav Munoz MD   40 mEq at 12/13/19 0549    potassium chloride 10% oral solution 40 mEq  40 mEq Oral PRN Bhargav Munoz MD        potassium chloride 10% oral solution 60 mEq  60 mEq Oral PRN Bhargav Munoz MD        potassium, sodium phosphates 280-160-250 mg packet 2 packet  2 packet Oral PRN Bhargav Munoz MD   2 packet at 12/13/19 0549    potassium, sodium phosphates 280-160-250 mg packet 2 packet  2 packet Oral PRN Bhargav Munoz MD        potassium, sodium phosphates 280-160-250 mg packet 2 packet  2 packet Oral PRN Bhargav Munoz MD        pravastatin tablet 40 mg  40 mg Oral QHS Bhargav Munoz MD   40 mg at 12/12/19 2000    pregabalin capsule 150 mg  150 mg Oral QHS Joe Rodriguez MD   150 mg at 12/12/19 2000    promethazine (PHENERGAN) 6.25 mg in dextrose 5 % 50 mL IVPB  6.25 mg Intravenous Q6H PRN Joe Rodriguez MD        ropivacaine (PF) 2 mg/ml (0.2%) infusion  2 mL/hr Perineural Continuous Joe Rodriguze MD 2 mL/hr at 12/13/19 0700 2 mL/hr at  12/13/19 0700    ropivacaine (PF) 2 mg/ml (0.2%) infusion  2 mL/hr Perineural Continuous Joe Rodriguez MD 2 mL/hr at 12/13/19 0700 2 mL/hr at 12/13/19 0700    senna-docusate 8.6-50 mg per tablet 1 tablet  1 tablet Oral Daily PRN Bhargav Munoz MD        sodium chloride 0.9% flush 10 mL  10 mL Intravenous PRN Stepan Benjamin DO                Assessment:     Pain control adequate    Plan:    - continue APAP, multimodals and breakthrough oxy 5/10s for moderate severe pain. Continues to progress from pain prospective. Potentially pause and pull Sunday.      Evaluator Venu Wright

## 2019-12-13 NOTE — SUBJECTIVE & OBJECTIVE
Interval History/Significant Events: NAEON. Good response to lasix yesterday. Stepdown to floor today.    Follow-up For: Procedure(s) (LRB):  Nephrectomy OPEN (Left)  LYSIS, ADHESIONS (N/A)  THROMBECTOMY, VENA CAVA  THROMBECTOMY, VENA CAVA  REPAIR of IVC    Post-Operative Day: 4 Days Post-Op    Objective:     Vital Signs (Most Recent):  Temp: 98.4 °F (36.9 °C) (12/13/19 0700)  Pulse: 92 (12/13/19 1100)  Resp: 20 (12/13/19 1100)  BP: (!) 148/94 (12/13/19 1100)  SpO2: 98 % (12/13/19 1100) Vital Signs (24h Range):  Temp:  [98 °F (36.7 °C)-98.6 °F (37 °C)] 98.4 °F (36.9 °C)  Pulse:  [] 92  Resp:  [13-25] 20  SpO2:  [94 %-99 %] 98 %  BP: (117-197)/(60-97) 148/94  Arterial Line BP: (133-182)/(57-82) 136/65     Weight: 131.5 kg (289 lb 14.5 oz)  Body mass index is 38.25 kg/m².      Intake/Output Summary (Last 24 hours) at 12/13/2019 1119  Last data filed at 12/13/2019 0700  Gross per 24 hour   Intake 2481 ml   Output 4850 ml   Net -2369 ml       Physical Exam   Constitutional: He is oriented to person, place, and time. He appears well-developed and well-nourished.   HENT:   Head: Normocephalic and atraumatic.   Eyes: Pupils are equal, round, and reactive to light.   Neck: Neck supple. No tracheal deviation present. No thyromegaly present.   Cardiovascular: Normal rate, regular rhythm and intact distal pulses.   No murmur heard.  Pulmonary/Chest: Effort normal and breath sounds normal. He has no decreased breath sounds. He has no wheezes.   Abdominal: Soft. He exhibits no distension and no mass.   Surgical incision vertical midline of abdomen, without drainage, fluctuance or ecchymosis.     Musculoskeletal: He exhibits edema.   Neurological: He is alert and oriented to person, place, and time.   Skin: Skin is warm and dry. He is not diaphoretic. No erythema. No pallor.   Psychiatric: He has a normal mood and affect. His behavior is normal.   Nursing note and vitals reviewed.        Lines/Drains/Airways     Central  Venous Catheter Line                 Trialysis (Dialysis) Catheter 03/20/17 0850 right internal jugular 998 days         Percutaneous Central Line Insertion/Assessment - triple lumen  12/09/19 1830 right internal jugular 3 days          Epidural Line                 Perineural Analgesia/Anesthesia Assessment (using dermatomes) 12/09/19 0730 4 days         Perineural Analgesia/Anesthesia Assessment (using dermatomes) 12/09/19 0730 4 days          Peripheral Intravenous Line                 Peripheral IV - Single Lumen 12/13/19 0557 20 G Left Upper Arm less than 1 day                Significant Labs:    CBC/Anemia Profile:  Recent Labs   Lab 12/12/19  1400 12/13/19  0400 12/13/19  0800   WBC 10.85 9.75 10.20   HGB 7.5* 6.9* 7.3*   HCT 23.0* 21.4* 23.1*    173 166   MCV 89 91 91   RDW 14.9* 15.2* 15.1*        Chemistries:  Recent Labs   Lab 12/12/19  1400 12/12/19  2100 12/13/19  0400   * 136 136   K 3.5 3.4* 3.8    102 103   CO2 26 29 28   BUN 21 18 18   CREATININE 1.2 1.2 1.1   CALCIUM 7.8* 7.5* 7.3*   ALBUMIN 2.0* 1.9* 1.8*   PROT 5.2* 5.1* 5.1*   BILITOT 0.4 0.3 0.4   ALKPHOS 62 49* 60   ALT 24 23 22   AST 46* 40 36   MG 2.1 2.0 2.1   PHOS 2.6* 2.1* 2.4*       All pertinent labs within the past 24 hours have been reviewed.      Significant Imaging:  I have reviewed all pertinent imaging results/findings within the past 24 hours.

## 2019-12-13 NOTE — PLAN OF CARE
No acute events overnight.  Afebrile.  MAP > 65.  O2 sats > 94% on 4L NC.  Pt remains AAOx4.  Perineural catheter x2 remain in place with ropivacaine 0.2% at 2mL/hr.  PRN pain meds given once and adequately controlled.  UO adequate.  Appeared to sleep well most of the night.  Electrolytes replaced as ordered.  OOBtC this morning with 1 person assist.  Plan of care reviewed with patient.  Questions and concerns addressed.

## 2019-12-14 LAB
ALBUMIN SERPL BCP-MCNC: 2 G/DL (ref 3.5–5.2)
ALP SERPL-CCNC: 120 U/L (ref 55–135)
ALT SERPL W/O P-5'-P-CCNC: 28 U/L (ref 10–44)
ANION GAP SERPL CALC-SCNC: 9 MMOL/L (ref 8–16)
APTT BLDCRRT: 24.4 SEC (ref 21–32)
AST SERPL-CCNC: 36 U/L (ref 10–40)
BASOPHILS # BLD AUTO: 0.01 K/UL (ref 0–0.2)
BASOPHILS NFR BLD: 0.1 % (ref 0–1.9)
BILIRUB SERPL-MCNC: 0.5 MG/DL (ref 0.1–1)
BUN SERPL-MCNC: 15 MG/DL (ref 8–23)
CA-I BLDV-SCNC: 1.09 MMOL/L (ref 1.06–1.42)
CALCIUM SERPL-MCNC: 8.2 MG/DL (ref 8.7–10.5)
CHLORIDE SERPL-SCNC: 103 MMOL/L (ref 95–110)
CO2 SERPL-SCNC: 25 MMOL/L (ref 23–29)
CREAT SERPL-MCNC: 1.1 MG/DL (ref 0.5–1.4)
DIFFERENTIAL METHOD: ABNORMAL
EOSINOPHIL # BLD AUTO: 0.2 K/UL (ref 0–0.5)
EOSINOPHIL NFR BLD: 2 % (ref 0–8)
ERYTHROCYTE [DISTWIDTH] IN BLOOD BY AUTOMATED COUNT: 15 % (ref 11.5–14.5)
EST. GFR  (AFRICAN AMERICAN): >60 ML/MIN/1.73 M^2
EST. GFR  (NON AFRICAN AMERICAN): >60 ML/MIN/1.73 M^2
GLUCOSE SERPL-MCNC: 84 MG/DL (ref 70–110)
HCT VFR BLD AUTO: 24.2 % (ref 40–54)
HGB BLD-MCNC: 7.7 G/DL (ref 14–18)
IMM GRANULOCYTES # BLD AUTO: 0.14 K/UL (ref 0–0.04)
IMM GRANULOCYTES NFR BLD AUTO: 1.2 % (ref 0–0.5)
INR PPP: 0.9 (ref 0.8–1.2)
LYMPHOCYTES # BLD AUTO: 0.9 K/UL (ref 1–4.8)
LYMPHOCYTES NFR BLD: 7.8 % (ref 18–48)
MAGNESIUM SERPL-MCNC: 2.1 MG/DL (ref 1.6–2.6)
MCH RBC QN AUTO: 28.9 PG (ref 27–31)
MCHC RBC AUTO-ENTMCNC: 31.8 G/DL (ref 32–36)
MCV RBC AUTO: 91 FL (ref 82–98)
MONOCYTES # BLD AUTO: 1.1 K/UL (ref 0.3–1)
MONOCYTES NFR BLD: 9 % (ref 4–15)
NEUTROPHILS # BLD AUTO: 9.4 K/UL (ref 1.8–7.7)
NEUTROPHILS NFR BLD: 79.9 % (ref 38–73)
NRBC BLD-RTO: 0 /100 WBC
PHOSPHATE SERPL-MCNC: 2.3 MG/DL (ref 2.7–4.5)
PLATELET # BLD AUTO: 211 K/UL (ref 150–350)
PMV BLD AUTO: 9.4 FL (ref 9.2–12.9)
POTASSIUM SERPL-SCNC: 4.1 MMOL/L (ref 3.5–5.1)
PROT SERPL-MCNC: 5.6 G/DL (ref 6–8.4)
PROTHROMBIN TIME: 9.3 SEC (ref 9–12.5)
RBC # BLD AUTO: 2.66 M/UL (ref 4.6–6.2)
SODIUM SERPL-SCNC: 137 MMOL/L (ref 136–145)
WBC # BLD AUTO: 11.74 K/UL (ref 3.9–12.7)

## 2019-12-14 PROCEDURE — 25000003 PHARM REV CODE 250: Performed by: STUDENT IN AN ORGANIZED HEALTH CARE EDUCATION/TRAINING PROGRAM

## 2019-12-14 PROCEDURE — 94761 N-INVAS EAR/PLS OXIMETRY MLT: CPT

## 2019-12-14 PROCEDURE — 85730 THROMBOPLASTIN TIME PARTIAL: CPT

## 2019-12-14 PROCEDURE — 36415 COLL VENOUS BLD VENIPUNCTURE: CPT

## 2019-12-14 PROCEDURE — 82330 ASSAY OF CALCIUM: CPT

## 2019-12-14 PROCEDURE — 80053 COMPREHEN METABOLIC PANEL: CPT

## 2019-12-14 PROCEDURE — 84100 ASSAY OF PHOSPHORUS: CPT

## 2019-12-14 PROCEDURE — 83735 ASSAY OF MAGNESIUM: CPT

## 2019-12-14 PROCEDURE — 85610 PROTHROMBIN TIME: CPT

## 2019-12-14 PROCEDURE — 85025 COMPLETE CBC W/AUTO DIFF WBC: CPT

## 2019-12-14 PROCEDURE — 63600175 PHARM REV CODE 636 W HCPCS: Performed by: STUDENT IN AN ORGANIZED HEALTH CARE EDUCATION/TRAINING PROGRAM

## 2019-12-14 PROCEDURE — 11000001 HC ACUTE MED/SURG PRIVATE ROOM

## 2019-12-14 RX ORDER — OXYCODONE HYDROCHLORIDE 5 MG/1
5 TABLET ORAL EVERY 4 HOURS PRN
Qty: 15 TABLET | Refills: 0 | Status: SHIPPED | OUTPATIENT
Start: 2019-12-14 | End: 2020-03-24 | Stop reason: CLARIF

## 2019-12-14 RX ORDER — CALCIUM CARBONATE 500(1250)
1500 TABLET ORAL ONCE
Status: COMPLETED | OUTPATIENT
Start: 2019-12-14 | End: 2019-12-14

## 2019-12-14 RX ADMIN — METHOCARBAMOL TABLETS 500 MG: 500 TABLET, COATED ORAL at 09:12

## 2019-12-14 RX ADMIN — ACETAMINOPHEN 650 MG: 325 TABLET ORAL at 06:12

## 2019-12-14 RX ADMIN — ROPIVACAINE HYDROCHLORIDE 2 ML/HR: 2 INJECTION, SOLUTION EPIDURAL; INFILTRATION at 07:12

## 2019-12-14 RX ADMIN — PRAVASTATIN SODIUM 40 MG: 40 TABLET ORAL at 09:12

## 2019-12-14 RX ADMIN — OXYCODONE HYDROCHLORIDE 10 MG: 10 TABLET ORAL at 10:12

## 2019-12-14 RX ADMIN — ROPIVACAINE HYDROCHLORIDE 2 ML/HR: 2 INJECTION, SOLUTION EPIDURAL; INFILTRATION at 04:12

## 2019-12-14 RX ADMIN — HEPARIN SODIUM 5000 UNITS: 5000 INJECTION, SOLUTION INTRAVENOUS; SUBCUTANEOUS at 10:12

## 2019-12-14 RX ADMIN — ACETAMINOPHEN 650 MG: 325 TABLET ORAL at 11:12

## 2019-12-14 RX ADMIN — OXYCODONE HYDROCHLORIDE 5 MG: 5 TABLET ORAL at 09:12

## 2019-12-14 RX ADMIN — ACETAMINOPHEN 650 MG: 325 TABLET ORAL at 12:12

## 2019-12-14 RX ADMIN — PANTOPRAZOLE SODIUM 40 MG: 40 TABLET, DELAYED RELEASE ORAL at 09:12

## 2019-12-14 RX ADMIN — PREGABALIN 150 MG: 75 CAPSULE ORAL at 09:12

## 2019-12-14 RX ADMIN — AMLODIPINE BESYLATE 5 MG: 5 TABLET ORAL at 09:12

## 2019-12-14 RX ADMIN — METHOCARBAMOL TABLETS 500 MG: 500 TABLET, COATED ORAL at 03:12

## 2019-12-14 RX ADMIN — OXYCODONE HYDROCHLORIDE 10 MG: 10 TABLET ORAL at 06:12

## 2019-12-14 RX ADMIN — CALCIUM 1500 MG: 500 TABLET ORAL at 12:12

## 2019-12-14 RX ADMIN — HEPARIN SODIUM 5000 UNITS: 5000 INJECTION, SOLUTION INTRAVENOUS; SUBCUTANEOUS at 03:12

## 2019-12-14 RX ADMIN — OXYCODONE HYDROCHLORIDE 5 MG: 5 TABLET ORAL at 03:12

## 2019-12-14 RX ADMIN — HEPARIN SODIUM 5000 UNITS: 5000 INJECTION, SOLUTION INTRAVENOUS; SUBCUTANEOUS at 06:12

## 2019-12-14 NOTE — PLAN OF CARE
Home health referrals placed in [facility].   Six of twelve referrals have been declined so far.   Awaiting response from remaining home health referrals.  Patient's insurance is [employer].

## 2019-12-14 NOTE — PLAN OF CARE
[facility]    HOME HEALTH ORDERS  FACE TO FACE ENCOUNTER    Patient Name: Alexi Noguera  YOB: 1960    PCP: Derrick Matthews MD   PCP Address: [address] / [place] [address]  PCP Phone Number: [phone]  PCP Fax: [phone]    Encounter Date: 12/14/2019    Admit to Home Health    Diagnoses:  Active Hospital Problems    Diagnosis  POA    *Primary renal cell carcinoma of native left kidney [C64.2]  Yes    Left renal mass [N28.89]  Yes    Obesity (BMI 30-39.9) [E66.9]  Yes    Essential hypertension [I10]  Yes      Resolved Hospital Problems   No resolved problems to display.       Future Appointments   Date Time Provider Department Center   2/26/2020  9:00 AM ANDER JAVED [facility] [place]   3/4/2020  4:00 PM Derrick Matthews MD [facility]           I have seen and examined this patient face to face today. My clinical findings that support the need for the home health skilled services and home bound status are the following:  Weakness/numbness causing balance and gait disturbance due to Malignancy/Cancer and Surgery making it taxing to leave home.    Allergies:  Review of patient's allergies indicates:   Allergen Reactions    Lisinopril Other (See Comments)     Dry cough       Diet: regular diet    Activities: activity as tolerated and no driving while on analgesics    Nursing:   SN to complete comprehensive assessment including routine vital signs. Instruct on disease process and s/s of complications to report to MD. Review/verify medication list sent home with the patient at time of discharge  and instruct patient/caregiver as needed. Frequency may be adjusted depending on start of care date.    Notify MD if SBP > 160 or < 90; DBP > 90 or < 50; HR > 120 or < 50; Temp > 101      CONSULTS:    Physical Therapy to evaluate and treat. Evaluate for home safety and equipment needs; Establish/upgrade home exercise program. Perform / instruct on therapeutic  exercises, gait training, transfer training, and Range of Motion.  Occupational Therapy to evaluate and treat. Evaluate home environment for safety and equipment needs. Perform/Instruct on transfers, ADL training, ROM, and therapeutic exercises.    MISCELLANEOUS CARE:  N/A    WOUND CARE ORDERS  n/a      Medications: Review discharge medications with patient and family and provide education.      Current Discharge Medication List      START taking these medications    Details   oxyCODONE (ROXICODONE) 5 MG immediate release tablet Take 1 tablet (5 mg total) by mouth every 4 (four) hours as needed for Pain.  Qty: 15 tablet, Refills: 0    Comments: Quantity prescribed more than 7 day supply? No         CONTINUE these medications which have NOT CHANGED    Details   amLODIPine (NORVASC) 5 MG tablet Take 1 tablet (5 mg total) by mouth once daily.  Qty: 30 tablet, Refills: 11    Associated Diagnoses: Essential hypertension      losartan (COZAAR) 50 MG tablet Take 2 tablets (100 mg total) by mouth every evening.  Qty: 180 tablet, Refills: 1    Associated Diagnoses: Essential hypertension      naproxen (NAPROSYN) 500 MG tablet TAKE 1 TABLET BY MOUTH TWICE A DAY  Qty: 60 tablet, Refills: 0    Associated Diagnoses: Subscapular bursitis      pantoprazole (PROTONIX) 40 MG tablet Take 1 tablet (40 mg total) by mouth once daily.  Qty: 30 tablet, Refills: 11    Associated Diagnoses: Subscapular bursitis      pravastatin (PRAVACHOL) 40 MG tablet TAKE 1 TABLET BY MOUTH EVERY DAY  Qty: 90 tablet, Refills: 3    Associated Diagnoses: Hyperlipidemia, unspecified hyperlipidemia type             I certify that this patient is confined to his home and needs physical therapy and occupational therapy.

## 2019-12-14 NOTE — ASSESSMENT & PLAN NOTE
Neuro  -- pain control per Anesthesia pain management, currently with perineural catheters  -- oral agents PRN    ENT  -- N/A    Pulmonary  -- now on RA, not short of breath, working with IS    Cardiac  -- HDS    ID  -- no infection suspected at this time    Heme  -- stable Hgb this AM at 7.7  -- do not suspect significant bleeding at this point    Renal  -- Cr stable    GI/FEN  -- regular diet    GI PPx:  PPI  DVT:  Sub-Q heparin  Dispo:  Needs to work with PT and walk through hallways a bit more. He looks good today and we discussed possible discharge tomorrow morning (12/15) if he continues to improve.     Code Status: Full Code

## 2019-12-14 NOTE — ANESTHESIA POST-OP PAIN MANAGEMENT
Acute Pain Service Progress Note    Alexi Noguera is a 59 y.o., male, [id number].    Surgery:  Nephrectomy OPEN (Left Abdomen) - 4hrs GEN WITH REGIONAL      THROMBECTOMY, VENA CAVA (Chest)      LYSIS, ADHESIONS (N/A Abdomen)      THROMBECTOMY, VENA CAVA (Chest)      REPAIR of IVC (Abdomen)    Post Op Day #: 5    Catheter type: perineural  RENETTA bilat    Infusion type: Ropivacaine 0.2%  2 basal 8 bolus    Problem List:    Active Hospital Problems    Diagnosis  POA    *Primary renal cell carcinoma of native left kidney [C64.2]  Yes    Left renal mass [N28.89]  Yes    Obesity (BMI 30-39.9) [E66.9]  Yes    Essential hypertension [I10]  Yes      Resolved Hospital Problems   No resolved problems to display.       Subjective:    Patient reports good pain control, 5/10 at rest with 6/10 with movement with minimal opiate use. He reports he was able to work with PT. Using minimal PO opioids. Pt desires to keep ESPs and plan to pause and pull ESPs tomorrow.     Objective:    Catheters clean, dry, intact         Vitals   Vitals:    12/14/19 0940   BP: (!) 140/83   Pulse: 95   Resp: 18   Temp: 36.4 °C (97.6 °F)        Labs    No results displayed because visit has over 200 results.           Meds   Current Facility-Administered Medications   Medication Dose Route Frequency Provider Last Rate Last Dose    acetaminophen tablet 650 mg  650 mg Oral Q6H Venu Wright MD   650 mg at 12/14/19 0618    amLODIPine tablet 5 mg  5 mg Oral Daily Bhargav Munoz MD   5 mg at 12/14/19 0944    calcium gluconate 1g in dextrose 5% 100mL (ready to mix system)  1 g Intravenous PRN Stepan Benjamin, DO   1 g at 12/11/19 0621    calcium gluconate 1g in dextrose 5% 100mL (ready to mix system)  1 g Intravenous PRN Stepan Benjamin, DO   1 g at 12/13/19 0500    calcium gluconate 1g in dextrose 5% 100mL (ready to mix system)  1 g Intravenous PRN Stepan Benjamin, DO        heparin (porcine) injection 5,000 Units  5,000 Units  Subcutaneous Q8H Alexsander Bravo MD   5,000 Units at 12/14/19 0618    magnesium oxide tablet 800 mg  800 mg Oral PRN Bhargav Munoz MD        magnesium oxide tablet 800 mg  800 mg Oral PRN Bhargav Munoz MD        methocarbamol tablet 500 mg  500 mg Oral QID Venu Wright MD   500 mg at 12/14/19 0944    oxyCODONE immediate release tablet 5 mg  5 mg Oral Q3H PRN Venu Wright MD   5 mg at 12/14/19 0949    And    oxyCODONE immediate release tablet Tab 10 mg  10 mg Oral Q3H PRN Venu Wright MD   10 mg at 12/13/19 1808    And    morphine injection 2 mg  2 mg Intravenous Q1H PRN Venu Wright MD   2 mg at 12/11/19 1358    And    morphine injection 4 mg  4 mg Intravenous Q1H PRN Venu Wright MD        naloxone 0.4 mg/mL injection 0.02 mg  0.02 mg Intravenous PRN Venu Wright MD        ondansetron injection 4 mg  4 mg Intravenous Q12H PRN Joe Rodriguez MD   4 mg at 12/10/19 1032    pantoprazole EC tablet 40 mg  40 mg Oral Daily Jasvir Miranda MD   40 mg at 12/14/19 0943    potassium chloride 10% oral solution 40 mEq  40 mEq Oral PRN Bhargav Munoz MD   40 mEq at 12/13/19 0549    potassium chloride 10% oral solution 40 mEq  40 mEq Oral PRN Bhargav Munoz MD        potassium chloride 10% oral solution 60 mEq  60 mEq Oral PRN Bhargav Munoz MD        potassium, sodium phosphates 280-160-250 mg packet 2 packet  2 packet Oral PRN Bhargav Munoz MD   2 packet at 12/13/19 0549    potassium, sodium phosphates 280-160-250 mg packet 2 packet  2 packet Oral PRN Bhargav Munoz MD        potassium, sodium phosphates 280-160-250 mg packet 2 packet  2 packet Oral PRN Bhargav Munoz MD        pravastatin tablet 40 mg  40 mg Oral QHS Bhargav Munoz MD   40 mg at 12/13/19 2100    pregabalin capsule 150 mg  150 mg Oral QHS Joe Rodriguez MD   150 mg at 12/13/19 2100    promethazine (PHENERGAN) 6.25 mg in dextrose 5 % 50 mL IVPB   6.25 mg Intravenous Q6H PRN Joe Rodriguez MD        ropivacaine (PF) 2 mg/ml (0.2%) infusion  2 mL/hr Perineural Continuous Joe Rodriguez MD 2 mL/hr at 12/14/19 0700 2 mL/hr at 12/14/19 0700    ropivacaine (PF) 2 mg/ml (0.2%) infusion  2 mL/hr Perineural Continuous Joe Rodriguez MD 2 mL/hr at 12/14/19 0700 2 mL/hr at 12/14/19 0700    senna-docusate 8.6-50 mg per tablet 1 tablet  1 tablet Oral Daily PRN Bhargav Munoz MD        sodium chloride 0.9% flush 10 mL  10 mL Intravenous PRN Stepan Benjamin DO                Assessment:     Pain control adequate    Plan:    - continue APAP, multimodals and breakthrough oxy 5/10s for moderate severe pain. Continues to progress from pain prospective. Plan to pause and pull Sunday.      Evaluator Melvin Delgado        I have seen the patient, reviewed the Resident's assessment and plan. I have personally interviewed and examined the patient at bedside and: agree with the findings.

## 2019-12-14 NOTE — PROGRESS NOTES
[facility]  Urology  Progress Note    Patient Name: Alexi Noguera  MRN: [id number]  Admission Date: 12/9/2019  Hospital Length of Stay: 5 days  Code Status: Full Code   Attending Provider: José Manuel Buchanan MD   Primary Care Physician: Derrick Matthews MD    Subjective:     HPI:  59 YOM with HTN and HLD with incidentally discovered left renal mass with level I caval tumor thrombus taken to the OR 12/9/2019 left radical nephrectomy (adrenal sparing) and IVC thrombectomy.      Surgery was successful, although blood loss was 7 liters, with a significant portion of that occurring during the cavotomy portion.  The patient was continuously resuscitated in the OR, receiving 9 L cyrstalloid, 1 L albumin, and 4 u PRBC. He was left intubated on transfer to the SICU.         Interval History:   Stepped down to floor yesterday, doing well.   Hgb stable  Continues to diurese, has not gotten lasix since 12/12, swelling improving.  Tolerating his diet, eating more each meal without nausea or vomiting.  On RA with sats around 91-94%  Ambulating through halls and working with PT  Had some pain yesterday but did not take prn oxycodone, only took tylenol.  Voiding well, had BM yesterday    Review of Systems  Objective:     Temp:  [97.6 °F (36.4 °C)-98.5 °F (36.9 °C)] 97.6 °F (36.4 °C)  Pulse:  [] 94  Resp:  [18-21] 18  SpO2:  [93 %-98 %] 96 %  BP: (135-168)/(75-94) 140/83     Body mass index is 38.25 kg/m².           Drains     None                 Physical Exam   Constitutional: He is oriented to person, place, and time. He appears well-developed and well-nourished. No distress.   HENT:   Head: Normocephalic and atraumatic.   Neck: Normal range of motion.   Cardiovascular: Normal rate.    Pulmonary/Chest: Effort normal. No respiratory distress.   Abdominal: Soft. He exhibits distension. There is tenderness. There is no guarding.   Incision c/d/i  Appropriately tender to palpation   Genitourinary:   Genitourinary  Comments: penoscrotal edema present   Musculoskeletal: Normal range of motion. He exhibits edema.   Neurological: He is alert and oriented to person, place, and time.   Skin: Skin is warm and dry. He is not diaphoretic.     Psychiatric: He has a normal mood and affect.       Significant Labs:    BMP:  Recent Labs   Lab 12/12/19  2100 12/13/19  0400 12/14/19  0437    136 137   K 3.4* 3.8 4.1    103 103   CO2 29 28 25   BUN 18 18 15   CREATININE 1.2 1.1 1.1   CALCIUM 7.5* 7.3* 8.2*       CBC:   Recent Labs   Lab 12/13/19  0400 12/13/19  0800 12/14/19  0437   WBC 9.75 10.20 11.74   HGB 6.9* 7.3* 7.7*   HCT 21.4* 23.1* 24.2*    166 211       All pertinent labs results from the past 24 hours have been reviewed.    Significant Imaging:  All pertinent imaging results/findings from the past 24 hours have been reviewed.                  Assessment/Plan:     Left renal mass      Neuro  -- pain control per Anesthesia pain management, currently with perineural catheters  -- oral agents PRN    ENT  -- N/A    Pulmonary  -- now on RA, not short of breath, working with IS    Cardiac  -- HDS    ID  -- no infection suspected at this time    Heme  -- stable Hgb this AM at 7.7  -- do not suspect significant bleeding at this point    Renal  -- Cr stable    GI/FEN  -- regular diet    GI PPx:  PPI  DVT:  Sub-Q heparin  Dispo:  Needs to work with PT and walk through hallways a bit more. He looks good today and we discussed possible discharge tomorrow morning (12/15) if he continues to improve.     Code Status: Full Code                VTE Risk Mitigation (From admission, onward)         Ordered     heparin (porcine) injection 5,000 Units  Every 8 hours      12/10/19 0810     Place sequential compression device  Until discontinued      12/09/19 1653     IP VTE HIGH RISK PATIENT  Once      12/09/19 1653                Alexsander Esparza MD  Urology  [facility]

## 2019-12-14 NOTE — PLAN OF CARE
Patient AAOX4. Vitals charted, O2 stayed above 92%. Edema in lower and upper extremities including groin/scrotum area. Towel placed under scrotum. Scds on, incentive spirometer used. Pt ambulated to restroom had BM. Urinal at bedside. Safety measures in place, call bell with in reach. Pt safety placed in chair prior to shift end. No falls this shift.

## 2019-12-14 NOTE — SUBJECTIVE & OBJECTIVE
Interval History:   Stepped down to floor yesterday, doing well.   Hgb stable  Continues to diurese, has not gotten lasix since 12/12, swelling improving.  Tolerating his diet, eating more each meal without nausea or vomiting.  On RA with sats around 91-94%  Ambulating through halls and working with PT  Had some pain yesterday but did not take prn oxycodone, only took tylenol.  Voiding well, had BM yesterday    Review of Systems  Objective:     Temp:  [97.6 °F (36.4 °C)-98.5 °F (36.9 °C)] 97.6 °F (36.4 °C)  Pulse:  [] 94  Resp:  [18-21] 18  SpO2:  [93 %-98 %] 96 %  BP: (135-168)/(75-94) 140/83     Body mass index is 38.25 kg/m².           Drains     None                 Physical Exam   Constitutional: He is oriented to person, place, and time. He appears well-developed and well-nourished. No distress.   HENT:   Head: Normocephalic and atraumatic.   Neck: Normal range of motion.   Cardiovascular: Normal rate.    Pulmonary/Chest: Effort normal. No respiratory distress.   Abdominal: Soft. He exhibits distension. There is tenderness. There is no guarding.   Incision c/d/i  Appropriately tender to palpation   Genitourinary:   Genitourinary Comments: penoscrotal edema present   Musculoskeletal: Normal range of motion. He exhibits edema.   Neurological: He is alert and oriented to person, place, and time.   Skin: Skin is warm and dry. He is not diaphoretic.     Psychiatric: He has a normal mood and affect.       Significant Labs:    BMP:  Recent Labs   Lab 12/12/19  2100 12/13/19  0400 12/14/19  0437    136 137   K 3.4* 3.8 4.1    103 103   CO2 29 28 25   BUN 18 18 15   CREATININE 1.2 1.1 1.1   CALCIUM 7.5* 7.3* 8.2*       CBC:   Recent Labs   Lab 12/13/19  0400 12/13/19  0800 12/14/19  0437   WBC 9.75 10.20 11.74   HGB 6.9* 7.3* 7.7*   HCT 21.4* 23.1* 24.2*    166 211       All pertinent labs results from the past 24 hours have been reviewed.    Significant Imaging:  All pertinent imaging  results/findings from the past 24 hours have been reviewed.

## 2019-12-15 VITALS
OXYGEN SATURATION: 95 % | RESPIRATION RATE: 18 BRPM | TEMPERATURE: 98 F | SYSTOLIC BLOOD PRESSURE: 136 MMHG | DIASTOLIC BLOOD PRESSURE: 87 MMHG | BODY MASS INDEX: 38.42 KG/M2 | HEART RATE: 94 BPM | WEIGHT: 289.88 LBS | HEIGHT: 73 IN

## 2019-12-15 LAB
ALBUMIN SERPL BCP-MCNC: 1.9 G/DL (ref 3.5–5.2)
ALP SERPL-CCNC: 159 U/L (ref 55–135)
ALT SERPL W/O P-5'-P-CCNC: 34 U/L (ref 10–44)
ANION GAP SERPL CALC-SCNC: 5 MMOL/L (ref 8–16)
APTT BLDCRRT: 22.9 SEC (ref 21–32)
AST SERPL-CCNC: 36 U/L (ref 10–40)
BASOPHILS # BLD AUTO: 0.02 K/UL (ref 0–0.2)
BASOPHILS NFR BLD: 0.2 % (ref 0–1.9)
BILIRUB SERPL-MCNC: 0.5 MG/DL (ref 0.1–1)
BUN SERPL-MCNC: 13 MG/DL (ref 8–23)
CA-I BLDV-SCNC: 1.12 MMOL/L (ref 1.06–1.42)
CALCIUM SERPL-MCNC: 8.1 MG/DL (ref 8.7–10.5)
CHLORIDE SERPL-SCNC: 103 MMOL/L (ref 95–110)
CO2 SERPL-SCNC: 27 MMOL/L (ref 23–29)
CREAT SERPL-MCNC: 1.1 MG/DL (ref 0.5–1.4)
DIFFERENTIAL METHOD: ABNORMAL
EOSINOPHIL # BLD AUTO: 0.2 K/UL (ref 0–0.5)
EOSINOPHIL NFR BLD: 1.9 % (ref 0–8)
ERYTHROCYTE [DISTWIDTH] IN BLOOD BY AUTOMATED COUNT: 14.8 % (ref 11.5–14.5)
EST. GFR  (AFRICAN AMERICAN): >60 ML/MIN/1.73 M^2
EST. GFR  (NON AFRICAN AMERICAN): >60 ML/MIN/1.73 M^2
GLUCOSE SERPL-MCNC: 100 MG/DL (ref 70–110)
HCT VFR BLD AUTO: 24.2 % (ref 40–54)
HGB BLD-MCNC: 7.7 G/DL (ref 14–18)
IMM GRANULOCYTES # BLD AUTO: 0.24 K/UL (ref 0–0.04)
IMM GRANULOCYTES NFR BLD AUTO: 1.9 % (ref 0–0.5)
INR PPP: 0.9 (ref 0.8–1.2)
LYMPHOCYTES # BLD AUTO: 1.1 K/UL (ref 1–4.8)
LYMPHOCYTES NFR BLD: 8.1 % (ref 18–48)
MAGNESIUM SERPL-MCNC: 2 MG/DL (ref 1.6–2.6)
MCH RBC QN AUTO: 29.1 PG (ref 27–31)
MCHC RBC AUTO-ENTMCNC: 31.8 G/DL (ref 32–36)
MCV RBC AUTO: 91 FL (ref 82–98)
MONOCYTES # BLD AUTO: 1.2 K/UL (ref 0.3–1)
MONOCYTES NFR BLD: 9.2 % (ref 4–15)
NEUTROPHILS # BLD AUTO: 10.2 K/UL (ref 1.8–7.7)
NEUTROPHILS NFR BLD: 78.7 % (ref 38–73)
NRBC BLD-RTO: 0 /100 WBC
PHOSPHATE SERPL-MCNC: 2.9 MG/DL (ref 2.7–4.5)
PLATELET # BLD AUTO: 242 K/UL (ref 150–350)
PMV BLD AUTO: 9.2 FL (ref 9.2–12.9)
POTASSIUM SERPL-SCNC: 4.2 MMOL/L (ref 3.5–5.1)
PROT SERPL-MCNC: 5.5 G/DL (ref 6–8.4)
PROTHROMBIN TIME: 9.6 SEC (ref 9–12.5)
RBC # BLD AUTO: 2.65 M/UL (ref 4.6–6.2)
SODIUM SERPL-SCNC: 135 MMOL/L (ref 136–145)
WBC # BLD AUTO: 12.92 K/UL (ref 3.9–12.7)

## 2019-12-15 PROCEDURE — 63600175 PHARM REV CODE 636 W HCPCS: Performed by: STUDENT IN AN ORGANIZED HEALTH CARE EDUCATION/TRAINING PROGRAM

## 2019-12-15 PROCEDURE — 83735 ASSAY OF MAGNESIUM: CPT

## 2019-12-15 PROCEDURE — 80053 COMPREHEN METABOLIC PANEL: CPT

## 2019-12-15 PROCEDURE — 85025 COMPLETE CBC W/AUTO DIFF WBC: CPT

## 2019-12-15 PROCEDURE — 97116 GAIT TRAINING THERAPY: CPT

## 2019-12-15 PROCEDURE — 85610 PROTHROMBIN TIME: CPT

## 2019-12-15 PROCEDURE — 82330 ASSAY OF CALCIUM: CPT

## 2019-12-15 PROCEDURE — 36415 COLL VENOUS BLD VENIPUNCTURE: CPT

## 2019-12-15 PROCEDURE — 85730 THROMBOPLASTIN TIME PARTIAL: CPT

## 2019-12-15 PROCEDURE — 25000003 PHARM REV CODE 250: Performed by: STUDENT IN AN ORGANIZED HEALTH CARE EDUCATION/TRAINING PROGRAM

## 2019-12-15 PROCEDURE — 84100 ASSAY OF PHOSPHORUS: CPT

## 2019-12-15 RX ORDER — IBUPROFEN 600 MG/1
600 TABLET ORAL EVERY 8 HOURS PRN
Qty: 30 TABLET | Refills: 0 | Status: SHIPPED | OUTPATIENT
Start: 2019-12-15 | End: 2020-01-22

## 2019-12-15 RX ORDER — ACETAMINOPHEN 325 MG/1
650 TABLET ORAL EVERY 6 HOURS
Qty: 30 TABLET | Refills: 0 | Status: SHIPPED | OUTPATIENT
Start: 2019-12-15 | End: 2020-03-24 | Stop reason: CLARIF

## 2019-12-15 RX ORDER — POLYETHYLENE GLYCOL 3350 17 G/17G
17 POWDER, FOR SOLUTION ORAL DAILY
Qty: 10 PACKET | Refills: 0 | Status: SHIPPED | OUTPATIENT
Start: 2019-12-15 | End: 2020-01-22

## 2019-12-15 RX ADMIN — ROPIVACAINE HYDROCHLORIDE 2 ML/HR: 2 INJECTION, SOLUTION EPIDURAL; INFILTRATION at 03:12

## 2019-12-15 RX ADMIN — PANTOPRAZOLE SODIUM 40 MG: 40 TABLET, DELAYED RELEASE ORAL at 08:12

## 2019-12-15 RX ADMIN — HEPARIN SODIUM 5000 UNITS: 5000 INJECTION, SOLUTION INTRAVENOUS; SUBCUTANEOUS at 01:12

## 2019-12-15 RX ADMIN — ACETAMINOPHEN 650 MG: 325 TABLET ORAL at 05:12

## 2019-12-15 RX ADMIN — ACETAMINOPHEN 650 MG: 325 TABLET ORAL at 11:12

## 2019-12-15 RX ADMIN — METHOCARBAMOL TABLETS 500 MG: 500 TABLET, COATED ORAL at 01:12

## 2019-12-15 RX ADMIN — AMLODIPINE BESYLATE 5 MG: 5 TABLET ORAL at 08:12

## 2019-12-15 RX ADMIN — METHOCARBAMOL TABLETS 500 MG: 500 TABLET, COATED ORAL at 08:12

## 2019-12-15 RX ADMIN — HEPARIN SODIUM 5000 UNITS: 5000 INJECTION, SOLUTION INTRAVENOUS; SUBCUTANEOUS at 05:12

## 2019-12-15 NOTE — NURSING
Pt's R shoulder PNC fell out. I called anaesthesia and they informed me that it is scheduled to come out today anyway. Will continue to monitor pain score.

## 2019-12-15 NOTE — ANESTHESIA POST-OP PAIN MANAGEMENT
Instructed nurse on removal of PNC.   Confirmed with nurse, blue tip in tact upon removal. Saw patient at bedside. No new issues. Pain was well controlled.    Signing Physician:  Tamera Carter MD   Anesthesiology PGY4/CA-3

## 2019-12-15 NOTE — PLAN OF CARE
Pt A & O x4, VS stable, no s/s skin injury, pt positions independently, up in chair for most of the day, IV removed, DC teaching provided, questions encouraged and answered, fall precautions in place, pain monitored and controlled with scheduled and PRN medication, no signs of infection, call light within reach, awaiting removal of PNC, will continue to monitor pt.      Anaesthesia paged re: PNC removal. On call anaesthesiologist Carter informed me that it would be a little while longer. Since pt otherwise ready for DC, she instructed me on PNC removal, which I completed at bedside. Blue tip intact, pt tolerated well. Wheelchair called for transport.

## 2019-12-15 NOTE — PLAN OF CARE
Alexi Noguera tolerated treatment well today. He was sitting up in bedside chair with multiple family members present upon my entry to room, agreeable to treatment. He feels his pain is controlled, he's been ambulating in hallways past few days. Stands independently from chair, ambulates 200 ft in hallways independently without device or any LOB. Mild fatigue with activity, educated on home walking program to increase cardiorespiratory endurance upon discharge, verbalize understanding. Previous PT recommending HH PT but patient has progressed during hospital stay and I don't feel he warrants HHPT follow-up upon discharge, safe to d/c with family and gradually increase his own endurance and mobility as tolerated. Discussed patient care with CRISTIAN Babin and MD Crow. Discussed discharge from acute PT services with patient and/or family as patient is mobilizing well; verbalized understanding. Alexi Noguera has no further acute PT needs, will now discharge from acute PT services.    Problem: Physical Therapy Goal  Goal: Physical Therapy Goal  Description  Patient discharged from acute PT on 12/15, see progress made towards goals below:    1. Supine to sit with Modified [place] - MET per family (12/15)  2. Sit to stand transfer with [place] - MET (12/15)  3. Gait  x 200 feet with Supervision using no AD - MET (12/15)   Outcome: Met    Neo Erickson, PT  12/15/2019

## 2019-12-15 NOTE — PROGRESS NOTES
[facility]  Urology  Progress Note    Patient Name: Alexi Noguera  MRN: [id number]  Admission Date: 12/9/2019  Hospital Length of Stay: 6 days  Code Status: Full Code   Attending Provider: José Manuel Buchanan MD   Primary Care Physician: Derrick Matthews MD    Subjective:     HPI:  59 YOM with HTN and HLD with incidentally discovered left renal mass with level I caval tumor thrombus taken to the OR 12/9/2019 left radical nephrectomy (adrenal sparing) and IVC thrombectomy.      Surgery was successful, although blood loss was 7 liters, with a significant portion of that occurring during the cavotomy portion.  The patient was continuously resuscitated in the OR, receiving 9 L cyrstalloid, 1 L albumin, and 4 u PRBC. He was left intubated on transfer to the SICU.         Interval History: NAEON. Pain controlled, ambulating, tolerating PO, passing flatus. Labs and vitals stable.     Review of Systems  Objective:     Temp:  [97.5 °F (36.4 °C)-98.7 °F (37.1 °C)] 97.5 °F (36.4 °C)  Pulse:  [86-99] 94  Resp:  [18-20] 18  SpO2:  [90 %-98 %] 95 %  BP: (139-165)/(77-85) 143/85     Body mass index is 38.25 kg/m².    Date 12/15/19 0700 - 12/16/19 0659   Shift 6423-7382 0754-8906 8424-7222 24 Hour Total   INTAKE   Shift Total(mL/kg)       OUTPUT   Urine(mL/kg/hr) 650   650   Shift Total(mL/kg) 650(4.9)   650(4.9)   Weight (kg) 131.5 131.5 131.5 131.5          Drains     None                 Physical Exam   Constitutional: He is oriented to person, place, and time. He appears well-developed and well-nourished. No distress.   HENT:   Head: Normocephalic and atraumatic.   Neck: Normal range of motion.   Cardiovascular: Normal rate.    Pulmonary/Chest: Effort normal. No respiratory distress.   Abdominal: Soft. He exhibits distension. There is tenderness. There is no guarding.   Incision c/d/i  Appropriately tender to palpation   Genitourinary:   Genitourinary Comments: penoscrotal edema present   Musculoskeletal: Normal  range of motion. He exhibits edema.   Neurological: He is alert and oriented to person, place, and time.   Skin: Skin is warm and dry. He is not diaphoretic.     Psychiatric: He has a normal mood and affect.       Significant Labs:    BMP:  Recent Labs   Lab 12/13/19  0400 12/14/19  0437 12/15/19  0525    137 135*   K 3.8 4.1 4.2    103 103   CO2 28 25 27   BUN 18 15 13   CREATININE 1.1 1.1 1.1   CALCIUM 7.3* 8.2* 8.1*       CBC:   Recent Labs   Lab 12/13/19  0800 12/14/19  0437 12/15/19  0525   WBC 10.20 11.74 12.92*   HGB 7.3* 7.7* 7.7*   HCT 23.1* 24.2* 24.2*    211 242       All pertinent labs results from the past 24 hours have been reviewed.    Significant Imaging:  All pertinent imaging results/findings from the past 24 hours have been reviewed.                  Assessment/Plan:     Left renal mass      Neuro  -- pain control per Anesthesia pain management, currently with perineural catheters; paused this AM, will remove later if tolerating  -- oral agents PRN    ENT  -- N/A    Pulmonary  -- now on RA, not short of breath, working with IS    Cardiac  -- HDS    ID  -- no infection suspected at this time    Heme  -- stable Hgb  -- do not suspect significant bleeding at this point    Renal  -- Cr stable    GI/FEN  -- regular diet    GI PPx:  PPI  DVT:  Sub-Q heparin  Dispo:  Home today to . Will need to secure , and PNC need to be removed prior to discharge; will see if patient tolerates PNC being paused.    Code Status: Full Code                VTE Risk Mitigation (From admission, onward)         Ordered     heparin (porcine) injection 5,000 Units  Every 8 hours      12/10/19 0810     Place sequential compression device  Until discontinued      12/09/19 1653     IP VTE HIGH RISK PATIENT  Once      12/09/19 1653                Jaun David Crow MD  Urology  [facility]

## 2019-12-15 NOTE — ADDENDUM NOTE
Addendum  created 12/15/19 1041 by Kali West MD    Charge Capture section accepted, Sign clinical note

## 2019-12-15 NOTE — ANESTHESIA POST-OP PAIN MANAGEMENT
Acute Pain Service Progress Note    Alexi Noguera is a 59 y.o., male, [id number].    Surgery: Nephrectomy OPEN (Left Abdomen) - 4hrs GEN WITH REGIONAL      THROMBECTOMY, VENA CAVA (Chest)      LYSIS, ADHESIONS (N/A Abdomen)      THROMBECTOMY, VENA CAVA (Chest)      REPAIR of IVC (Abdomen)    Post Op Day #: 6    Catheter type: perineural  Bilateral RENETTA    Infusion type: Ropivacaine 0.2%  2 basal 8cc/hr IB    Problem List:    Active Hospital Problems    Diagnosis  POA    *Primary renal cell carcinoma of native left kidney [C64.2]  Yes    Left renal mass [N28.89]  Yes    Obesity (BMI 30-39.9) [E66.9]  Yes    Essential hypertension [I10]  Yes      Resolved Hospital Problems   No resolved problems to display.       Subjective:     General appearance of alert, oriented, no complaints   Pain with rest: 1    Numbers   Pain with movement: 5    Numbers   Side Effects    1. Pruritis No    2. Nausea No    3. Motor Blockade No,     4. Sedation No, 1=awake and alert    Objective:     Catheter site clean, dry, intact        Vitals   Vitals:    12/15/19 0804   BP:    Pulse: 94   Resp:    Temp:         Labs    No results displayed because visit has over 200 results.           Meds   Current Facility-Administered Medications   Medication Dose Route Frequency Provider Last Rate Last Dose    acetaminophen tablet 650 mg  650 mg Oral Q6H Venu Wright MD   650 mg at 12/15/19 0505    amLODIPine tablet 5 mg  5 mg Oral Daily Bhargav Munoz MD   5 mg at 12/15/19 0854    calcium gluconate 1g in dextrose 5% 100mL (ready to mix system)  1 g Intravenous PRN Stepan Benjamin, DO   1 g at 12/11/19 0621    calcium gluconate 1g in dextrose 5% 100mL (ready to mix system)  1 g Intravenous PRN Stepan Benjamin, DO   1 g at 12/13/19 0500    calcium gluconate 1g in dextrose 5% 100mL (ready to mix system)  1 g Intravenous PRN Stepan Benjamin, DO        heparin (porcine) injection 5,000 Units  5,000 Units Subcutaneous Q8H Alexsander  MD Lawrence   5,000 Units at 12/15/19 0505    magnesium oxide tablet 800 mg  800 mg Oral PRN Bhargav Munoz MD        magnesium oxide tablet 800 mg  800 mg Oral PRN Bhargav Munoz MD        methocarbamol tablet 500 mg  500 mg Oral QID Venu Wright MD   500 mg at 12/15/19 0854    oxyCODONE immediate release tablet 5 mg  5 mg Oral Q3H PRN Venu Wright MD   5 mg at 12/14/19 1513    And    oxyCODONE immediate release tablet Tab 10 mg  10 mg Oral Q3H PRN Venu Wright MD   10 mg at 12/14/19 2219    And    morphine injection 2 mg  2 mg Intravenous Q1H PRN Venu Wright MD   2 mg at 12/11/19 1358    And    morphine injection 4 mg  4 mg Intravenous Q1H PRN Venu Wright MD        naloxone 0.4 mg/mL injection 0.02 mg  0.02 mg Intravenous PRN Venu Wright MD        ondansetron injection 4 mg  4 mg Intravenous Q12H PRN Joe Rodriguez MD   4 mg at 12/10/19 1032    pantoprazole EC tablet 40 mg  40 mg Oral Daily Jasvir Miranda MD   40 mg at 12/15/19 0854    potassium chloride 10% oral solution 40 mEq  40 mEq Oral PRN Bhargav Munoz MD   40 mEq at 12/13/19 0549    potassium chloride 10% oral solution 40 mEq  40 mEq Oral PRN Bhargav Munoz MD        potassium chloride 10% oral solution 60 mEq  60 mEq Oral PRN Bhargav Munoz MD        potassium, sodium phosphates 280-160-250 mg packet 2 packet  2 packet Oral PRN Bhargav Munoz MD   2 packet at 12/13/19 0549    potassium, sodium phosphates 280-160-250 mg packet 2 packet  2 packet Oral PRN Bhargav Munoz MD        potassium, sodium phosphates 280-160-250 mg packet 2 packet  2 packet Oral PRN Bhargav Munoz MD        pravastatin tablet 40 mg  40 mg Oral QHS Bhargav Munoz MD   40 mg at 12/14/19 2155    pregabalin capsule 150 mg  150 mg Oral QHS Joe Rodriguez MD   150 mg at 12/14/19 5319    promethazine (PHENERGAN) 6.25 mg in dextrose 5 % 50 mL IVPB  6.25 mg Intravenous Q6H  PRN Joe Rodriguez MD        ropivacaine (PF) 2 mg/ml (0.2%) infusion  2 mL/hr Perineural Continuous Joe Rodriguez MD 2 mL/hr at 12/15/19 0331 2 mL/hr at 12/15/19 0331    ropivacaine (PF) 2 mg/ml (0.2%) infusion  2 mL/hr Perineural Continuous Joe Rodriguez MD 2 mL/hr at 12/15/19 0330 2 mL/hr at 12/15/19 0330    senna-docusate 8.6-50 mg per tablet 1 tablet  1 tablet Oral Daily PRN Bhargav Munoz MD        sodium chloride 0.9% flush 10 mL  10 mL Intravenous PRN Stepan Benjamin,             Anticoagulant dose heparin at 5000TID.    Assessment:     Pain control adequate. R RENETTA became disconnected at alligator clip this morning. Pulled at 945. Paused L sided PNC at 945, aim to remove later this afternoon prior to discharge.    Evaluator Tamera Carter          I have seen the patient, reviewed the Resident's assessment and plan. I have personally interviewed and examined the patient at bedside and: agree with the findings.

## 2019-12-15 NOTE — ASSESSMENT & PLAN NOTE
Neuro  -- pain control per Anesthesia pain management, currently with perineural catheters; paused this AM, will remove later if tolerating  -- oral agents PRN    ENT  -- N/A    Pulmonary  -- now on RA, not short of breath, working with IS    Cardiac  -- HDS    ID  -- no infection suspected at this time    Heme  -- stable Hgb  -- do not suspect significant bleeding at this point    Renal  -- Cr stable    GI/FEN  -- regular diet    GI PPx:  PPI  DVT:  Sub-Q heparin  Dispo:  Home today to . Will need to secure HH, and PNC need to be removed prior to discharge; will see if patient tolerates PNC being paused.    Code Status: Full Code

## 2019-12-15 NOTE — PLAN OF CARE
Pt in bed with no complaints voiced, no acute distress noted at this time.  Frequent assessments ongoing, pain currently controlled.

## 2019-12-15 NOTE — PT/OT/SLP PROGRESS
Physical Therapy  Treatment and Discharge    Alexi Noguera   [id number]    Time Tracking:     PT Received On: 12/15/19   PT Start Time: 1300   PT Stop Time: 1310   PT Total Time (min): 10 min    Billable Minutes: Gait Training 8 and Therapeutic Activity 2      Recommendations:     Discharge recommendations: Home, no needs     Equipment recommendations: None    Barriers to Discharge: None    Patient Information:     Recent Surgery: Procedure(s) (LRB):  Nephrectomy OPEN (Left)  LYSIS, ADHESIONS (N/A)  THROMBECTOMY, VENA CAVA  THROMBECTOMY, VENA CAVA  REPAIR of IVC 6 Days Post-Op    Diagnosis: Primary renal cell carcinoma of native left kidney    Length of Stay: 6 days    General Precautions: Standard, fall  Orthopedic Precautions: None    Assessment:     Alexi Noguera tolerated treatment well today. He was sitting up in bedside chair with multiple family members present upon my entry to room, agreeable to treatment. He feels his pain is controlled, he's been ambulating in hallways past few days. Stands independently from chair, ambulates 200 ft in hallways independently without device or any LOB. Mild fatigue with activity, educated on home walking program to increase cardiorespiratory endurance upon discharge, verbalize understanding. Previous PT recommending HH PT but patient has progressed during hospital stay and I don't feel he warrants HHPT follow-up upon discharge, safe to d/c with family and gradually increase his own endurance and mobility as tolerated. Discussed patient care with CRISTIAN Babin and MD Crow. Discussed discharge from acute PT services with patient and/or family as patient is mobilizing well; verbalized understanding. Alexi Noguera has no further acute PT needs, will now discharge from acute PT services.    Problem List: None    Plan:     Discharge from acute PT services.    Plan of Care reviewed with: patient, family    Subjective:     Communicated with RN prior to evaluation, appropriate to see  for treatment.    Pt found sitting up in bedside chair upon PT entry to room, agreeable to treatment.    Does this patient have any cultural, spiritual, Yarsani conflicts given the current situation? Patient has no barriers to learning. Patient verbalizes understanding of his/her program and goals and demonstrates them correctly. No cultural, spiritual, or educational needs identified.    Objective:     Patient found with: perineural catheter    Pain:  Pain Rating 1: 0/10  Pain Rating Post-Intervention 1: 0/10    Functional Mobility:    · Bed Mobility:  · NT; family states he's getting in/out of bed independently    · Transfers:  · Sit to Stand: Independent from BS chair with no AD x 1 trial(s)  · Stand to Sit: Independent to BS chair with no AD x 1 trial(s)    · Gait:  · 200 feet  · Assist level: Independent  · Device: no AD    · Balance:  · Static Sit: Independent at edge of BS chair    · Static Stand: Independent with no AD; dons his own secondary gown without assist    Additional Therapeutic Activity/Exercises:     1. Discussed discharge from acute PT services with patient and/or family as patient is mobilizing well with family and/or nursing; verbalized understanding.    2. Whiteboard was updated.    AM-PAC 6 CLICK MOBILITY  Turning over in bed (including adjusting bedclothes, sheets and blankets)?: 4  Sitting down on and standing up from a chair with arms (e.g., wheelchair, bedside commode, etc.): 4  Moving from lying on back to sitting on the side of the bed?: 4  Moving to and from a bed to a chair (including a wheelchair)?: 4  Need to walk in hospital room?: 4  Climbing 3-5 steps with a railing?: 4  Basic Mobility Total Score: 24    Patient was left sitting up in bedside chair with all lines intact, call button in reach, MD Tristin FOSTER notified and family present.    GOALS:   Multidisciplinary Problems     Physical Therapy Goals        Problem: Physical Therapy Goal    Goal Priority Disciplines Outcome  Goal Variances Interventions   Physical Therapy Goal     PT, PT/OT Ongoing, Progressing     Description:  Patient discharged from acute PT on 12/15, see progress made towards goals below:    1. Supine to sit with Modified [place] - MET per family (12/15)  2. Sit to stand transfer with [place] - MET (12/15)  3. Gait  x 200 feet with Supervision using no AD - MET (12/15)                  Neo Erickson, PT   12/15/2019

## 2019-12-15 NOTE — SUBJECTIVE & OBJECTIVE
Interval History: NAEON. Pain controlled, ambulating, tolerating PO, passing flatus. Labs and vitals stable.     Review of Systems  Objective:     Temp:  [97.5 °F (36.4 °C)-98.7 °F (37.1 °C)] 97.5 °F (36.4 °C)  Pulse:  [86-99] 94  Resp:  [18-20] 18  SpO2:  [90 %-98 %] 95 %  BP: (139-165)/(77-85) 143/85     Body mass index is 38.25 kg/m².    Date 12/15/19 0700 - 12/16/19 0659   Shift 2249-5194 2423-6126 7920-6537 24 Hour Total   INTAKE   Shift Total(mL/kg)       OUTPUT   Urine(mL/kg/hr) 650   650   Shift Total(mL/kg) 650(4.9)   650(4.9)   Weight (kg) 131.5 131.5 131.5 131.5          Drains     None                 Physical Exam   Constitutional: He is oriented to person, place, and time. He appears well-developed and well-nourished. No distress.   HENT:   Head: Normocephalic and atraumatic.   Neck: Normal range of motion.   Cardiovascular: Normal rate.    Pulmonary/Chest: Effort normal. No respiratory distress.   Abdominal: Soft. He exhibits distension. There is tenderness. There is no guarding.   Incision c/d/i  Appropriately tender to palpation   Genitourinary:   Genitourinary Comments: penoscrotal edema present   Musculoskeletal: Normal range of motion. He exhibits edema.   Neurological: He is alert and oriented to person, place, and time.   Skin: Skin is warm and dry. He is not diaphoretic.     Psychiatric: He has a normal mood and affect.       Significant Labs:    BMP:  Recent Labs   Lab 12/13/19  0400 12/14/19  0437 12/15/19  0525    137 135*   K 3.8 4.1 4.2    103 103   CO2 28 25 27   BUN 18 15 13   CREATININE 1.1 1.1 1.1   CALCIUM 7.3* 8.2* 8.1*       CBC:   Recent Labs   Lab 12/13/19  0800 12/14/19  0437 12/15/19  0525   WBC 10.20 11.74 12.92*   HGB 7.3* 7.7* 7.7*   HCT 23.1* 24.2* 24.2*    211 242       All pertinent labs results from the past 24 hours have been reviewed.    Significant Imaging:  All pertinent imaging results/findings from the past 24 hours have been  reviewed.

## 2019-12-15 NOTE — PLAN OF CARE
"CM attempted to Google search pt's insurance plan, [employer] and was directed to website [email or web address]. Pt's medical plan appears to be a limited indemnity medical policy. CM unable to verify a h/h provider via the website. CM also attempted to call [phone] and company is closed and will be able to assist M-F. Pt has multiple denials for h/h in  - denial reason is "payor not accepted". At this time, CM is unable to secure h/h services.  "

## 2019-12-16 NOTE — PLAN OF CARE
Pt d/c home with no needs.     Future Appointments   Date Time Provider Department Center   2/26/2020  9:00 AM LAB, NAOMYLL [facility] [place]   3/4/2020  4:00 PM Derrick Matthews MD [facility]        12/16/19 0918   Final Note   Assessment Type Final Discharge Note   Anticipated Discharge Disposition Home   Hospital Follow Up  Appt(s) scheduled? Yes   Discharge plans and expectations educations in teach back method with documentation complete? Yes     Julie Haase RN  Case Management [phone]

## 2019-12-16 NOTE — DISCHARGE SUMMARY
[facility]  Urology  Discharge Summary      Patient Name: Alexi Noguera  MRN: [id number]  Admission Date: 12/9/2019  Hospital Length of Stay: 6 days  Discharge Date and Time: 12/15/2019  3:07 PM  Attending Physician: José Manuel Buchanan MD  Discharging Provider: Juan David Crow MD  Primary Care Physician: Derrick Matthews MD    HPI:     Alexi Noguera is a 59 y.o. male with a left renal mass.      He was found to have a large L renal mass extending into the IVC while being worked up for a lesion at C2 (felt to be most consistent with a meningioma.) MRI confirmed extension into the IVC.     PMH is otherwise notable for HTN and HLD. No prior abdominal surgeries.     He has never smoked.  No hematuria.  Mild left flank pain.    Procedure(s) (LRB):  Nephrectomy OPEN (Left)  LYSIS, ADHESIONS (N/A)  THROMBECTOMY, VENA CAVA  THROMBECTOMY, VENA CAVA  REPAIR of IVC     Indwelling Lines/Drains at time of discharge:   Lines/Drains/Airways     Epidural Line                 Perineural Analgesia/Anesthesia Assessment (using dermatomes) 12/09/19 0730 6 days         Perineural Analgesia/Anesthesia Assessment (using dermatomes) 12/09/19 0730 6 days                Hospital Course (synopsis of major diagnoses, care, treatment, and services provided during the course of the hospital stay): On 12/9/19 the patient underwent the above procedure. He required transfusion of 4U pRBC intraop, as well as large volume crystalloid boluses He was transferred to the ICU after surgery. Pressors were weaned and he was extubated on 12/10/19. He required transfusion of 2U pRBC due to anemia, presumed to be dilutional from large volume fluid boluses. In addition, he was diuresed while in the ICU with Lasix. He was transferred to the floor on 12/13. He continued to recover well and on 12/15 was tolerating a diet, ambulating, having bowel movements, voiding spontaneously, with pain controlled. He was discharged on 12/15/19.    Consults:      Significant Diagnostic Studies:     Pending Diagnostic Studies:     Procedure Component Value Units Date/Time    CBC auto differential [[id number]] Collected:  12/09/19 2002    Order Status:  Sent Lab Status:  In process Updated:  12/09/19 2002    Specimen:  Blood     Lactic acid, plasma [[id number]] Collected:  12/09/19 1717    Order Status:  Sent Lab Status:  In process Updated:  12/09/19 1718    Specimen:  Blood     Specimen to Pathology, Surgery Urology [[id number]] Collected:  12/09/19 1538    Order Status:  Sent Lab Status:  In process Updated:  12/09/19 1704          Final Active Diagnoses:    Diagnosis Date Noted POA    PRINCIPAL PROBLEM:  Primary renal cell carcinoma of native left kidney [C64.2] 10/02/2019 Yes    Left renal mass [N28.89] 12/09/2019 Yes    Obesity (BMI 30-39.9) [E66.9] 05/05/2016 Yes    Essential hypertension [I10] 05/05/2016 Yes      Problems Resolved During this Admission:       Discharged Condition: good    [place] [facility] [place] Up:  Follow-up Information     José Manuel Buchanan MD In 2 weeks.    Specialty:  Urology  Contact information:  [address]  [place] [address]  [phone]                 Patient Instructions:      Diet general     Call MD for:  extreme fatigue     Call MD for:  persistent dizziness or light-headedness     Call MD for:  hives     Call MD for:  redness, tenderness, or signs of infection (pain, swelling, redness, odor or green/yellow discharge around incision site)     Call MD for:  difficulty breathing, headache or visual disturbances     Call MD for:  severe uncontrolled pain     Call MD for:  persistent nausea and vomiting     Call MD for:  temperature >100.4     Medications:  Reconciled Home Medications:      Medication List      START taking these medications    acetaminophen 325 MG tablet  Commonly known as:  TYLENOL  Take 2 tablets (650 mg total) by mouth every 6 (six) hours.     ibuprofen 600 MG tablet  Commonly known  as:  ADVIL,MOTRIN  Take 1 tablet (600 mg total) by mouth every 8 (eight) hours as needed.     oxyCODONE 5 MG immediate release tablet  Commonly known as:  ROXICODONE  Take 1 tablet (5 mg total) by mouth every 4 (four) hours as needed for Pain.     polyethylene glycol 17 gram Pwpk  Commonly known as:  GLYCOLAX  Take 17 g by mouth once daily.        CHANGE how you take these medications    losartan 50 MG tablet  Commonly known as:  COZAAR  Take 2 tablets (100 mg total) by mouth every evening.  What changed:  additional instructions     pantoprazole 40 MG tablet  Commonly known as:  PROTONIX  Take 1 tablet (40 mg total) by mouth once daily.  What changed:  additional instructions     pravastatin 40 MG tablet  Commonly known as:  PRAVACHOL  TAKE 1 TABLET BY MOUTH EVERY DAY  What changed:    · how much to take  · how to take this  · when to take this  · additional instructions        CONTINUE taking these medications    amLODIPine 5 MG tablet  Commonly known as:  NORVASC  Take 1 tablet (5 mg total) by mouth once daily.     naproxen 500 MG tablet  Commonly known as:  NAPROSYN  TAKE 1 TABLET BY MOUTH TWICE A DAY            Time spent on the discharge of patient: 20 minutes    Juan David Crow MD  Urology  [facility]

## 2019-12-16 NOTE — PROGRESS NOTES
Occupational Therapy Discharge Summary    Alexi Noguera  MRN: [id number]   Principal Problem: Primary renal cell carcinoma of native left kidney      Patient Discharged from acute Occupational Therapy on 12/15/19.  Please refer to prior OT note dated 12/13/19 for functional status.    Assessment:      Patient was discharged unexpectedly.  Information required to complete an accurate discharge summary is unknown.  Refer to therapy initial evaluation and last progress note for initial and most recent functional status and goal achievement.  Recommendations made may be found in medical record.    Objective:     GOALS:   Multidisciplinary Problems     Occupational Therapy Goals        Problem: Occupational Therapy Goal    Goal Priority Disciplines Outcome Interventions   Occupational Therapy Goal     OT, PT/OT Ongoing, Progressing    Description:  Goals to be met by: 12/26/19     Patient will increase functional independence with ADLs by performing:    Patient will perform LB dressing with modified independence. - Not met  Pt will complete commode transfer with mod I. - Not met  Pt will complete UB dressing with mod I. - Not met  Pt will perform GH tasks with mod I. -Not met  Pt will complete all fx'l transfer with modified independence.- Not met     Problem: Occupational Therapy Goal  Goal: Occupational Therapy Goal  Outcome: Ongoing, Progressing                      Reasons for Discontinuation of Therapy Services  Transfer to alternate level of care.      Plan:     Patient Discharged to: Home with Home Health Service    Joe Callahan OTR/L  12/16/2019

## 2019-12-17 ENCOUNTER — NURSE TRIAGE (OUTPATIENT)
Dept: ADMINISTRATIVE | Facility: CLINIC | Age: 59
End: 2019-12-17

## 2019-12-17 NOTE — TELEPHONE ENCOUNTER
Alexi called to say he is not taking the Roxicodone, and has not taken it since the open abdomen left nephrectomy Dr Buchanan 12/09/19.  Taking ibuprofen instead.  He said he is taking the Glycolax daily, as recommended, and wants to know if he should continue to take.  Explained that it is very important that he not strain at all for BM or any other reason, and that is one reason he was prescribed the Glycolax for 10 days.  He states understanding.  Also states abdominal incision is itching.  Assured him this is not unusual, and part of healing.  No signs of infection, and itching is only every now and then.  He would like a call from Dr Buchanan to tell him if there is anything he can use on the incision to help with the itch.  Message to Dr Buchanan.  Please contact caller directly with any additional care advice.  Per [facility] triage protocol, recommend call back today.      Reason for Disposition   Nursing judgment    Protocols used: NO PROTOCOL AVAILABLE - INFORMATION ONLY-A-[place]

## 2019-12-23 LAB
FINAL PATHOLOGIC DIAGNOSIS: NORMAL
GROSS: NORMAL
MICROSCOPIC EXAM: NORMAL

## 2019-12-31 ENCOUNTER — OFFICE VISIT (OUTPATIENT)
Dept: UROLOGY | Facility: CLINIC | Age: 59
End: 2019-12-31
Payer: COMMERCIAL

## 2019-12-31 VITALS — HEIGHT: 73 IN | BODY MASS INDEX: 38.28 KG/M2 | WEIGHT: 288.81 LBS

## 2019-12-31 DIAGNOSIS — C64.2 CARCINOMA, RENAL CELL, LEFT: Primary | ICD-10-CM

## 2019-12-31 DIAGNOSIS — N28.89 LEFT RENAL MASS: ICD-10-CM

## 2019-12-31 PROCEDURE — 99024 POSTOP FOLLOW-UP VISIT: CPT | Mod: S$GLB,,, | Performed by: NURSE PRACTITIONER

## 2019-12-31 PROCEDURE — 99024 PR POST-OP FOLLOW-UP VISIT: ICD-10-PCS | Mod: S$GLB,,, | Performed by: NURSE PRACTITIONER

## 2019-12-31 PROCEDURE — 99999 PR PBB SHADOW E&M-EST. PATIENT-LVL III: ICD-10-PCS | Mod: PBBFAC,,, | Performed by: NURSE PRACTITIONER

## 2019-12-31 PROCEDURE — 99999 PR PBB SHADOW E&M-EST. PATIENT-LVL III: CPT | Mod: PBBFAC,,, | Performed by: NURSE PRACTITIONER

## 2019-12-31 NOTE — LETTER
December 31, 2019      Derrick Matthews MD  [address]  [facility] [address]           [facility] - Urology 4th Floor  [address]  [place] [address]  Phone: [phone]          Patient: Alexi Noguera   MR Number: [id number]   YOB: 1960   Date of Visit: 12/31/2019       Dear Dr. Derrick Matthews:    Thank you for referring Alexi Noguera to me for evaluation. Attached you will find relevant portions of my assessment and plan of care.    If you have questions, please do not hesitate to call me. I look forward to following Alexi Noguera along with you.    Sincerely,    Bubba Lemus, DNP    Enclosure  CC:  No Recipients    If you would like to receive this communication electronically, please contact [email or web address] or [phone] to request more information on [employer] Link access.    For providers and/or their staff who would like to refer a patient to [facility], please contact us through our one-stop-shop provider referral line, [facility], at [phone].    If you feel you have received this communication in error or would no longer like to receive these types of communications, please e-mail [email or web address]

## 2019-12-31 NOTE — PROGRESS NOTES
CHIEF COMPLAINT:    Mr. Noguera is a 59 y.o. male presenting for 2 wk post-op s/p open L radical nephrectomy for L renal mass.    PRESENTING ILLNESS:    Alexi Noguera is a 59 y.o. male new patient to me (records of past medical, family and social history personally reviewed by me), w/ h/o HLD, HTN, obesity, and L RCC, s/p open L radical nephrectomy w/ cavotomy and IVC thrombectomy w/ Dr. Buchanan, concerning for [id number].    Findings:  - large left renal mass with caval thrombus with tip palpable in IVC within 1-2 cm of renal vein ostium  - left renal hilar dissection difficult due to the tissue being very adherent in this location  - upper pole adherent in area of pancreas and spleen, required intraoperative consultation by Dr. Jorgensen with surgical oncology   - very large saprophytic veins surrounding the entire kidney, anastomosing with splenic vein as well as gonadal vein.    Today pt presents to clinic for 2 wk post-op f/u s/p open L nephrectomy. Reports feeling well. Denies pain. Denies need for taking pain meds post-op. Denies GH, dysuria, irritative/obstructive urinary sxs. Denies h/o tobacco use. Denies family h/o renal CA.    REVIEW OF SYSTEMS:    Alexi Noguera denies headache, blurred vision, fever, nausea, vomiting, chills, abdominal pain, pelvic pain, flank pain, bleeding per rectum, cough, SOB, recent loss of consciousness, recent mental status changes, seizures, dizziness, or upper or lower extremity weakness.    PATIENT HISTORY:    Past Medical History:   Diagnosis Date    Hyperlipidemia     Hypertension        Past Surgical History:   Procedure Laterality Date    LYSIS OF ADHESIONS N/A 12/9/2019    Procedure: LYSIS, ADHESIONS;  Surgeon: Tr Jorgensen MD;  Location: [facility] OR [address] [place] [address];  Service: General;  Laterality: N/A;    NEPHRECTOMY Left 12/9/2019    Procedure: Nephrectomy OPEN;  Surgeon: José Manuel Buchanan MD;  Location: [facility] OR [address] [place] [address];  Service: Urology;  Laterality: Left;  4hrs GEN  WITH REGIONAL    THROMBECTOMY OF VENA CAVA  12/9/2019    Procedure: THROMBECTOMY, VENA CAVA;  Surgeon: José Manuel Buchanan MD;  Location: [facility] OR [address] [place] [address];  Service: Urology;;    THROMBECTOMY OF VENA CAVA  12/9/2019    Procedure: THROMBECTOMY, VENA CAVA;  Surgeon: KIRSTEN Atkins III, MD;  Location: [facility] OR [address] [place] [address];  Service: Peripheral Vascular;;       Family History   Problem Relation Age of Onset    No Known Problems Mother     No Known Problems Father     No Known Problems Sister     Heart disease Brother     Diabetes Paternal Aunt     Heart disease Paternal Uncle        Social History     Socioeconomic History    Marital status: Legally      Spouse name: Not on file    Number of children: Not on file    Years of education: Not on file    Highest education level: Not on file   Occupational History    Not on file   Social Needs    Financial resource strain: Not on file    Food insecurity:     Worry: Not on file     Inability: Not on file    Transportation needs:     Medical: Not on file     Non-medical: Not on file   Tobacco Use    Smoking status: Never Smoker   Substance and Sexual Activity    Alcohol use: No     Alcohol/week: 0.0 standard drinks    Drug use: No    Sexual activity: Not on file   Lifestyle    Physical activity:     Days per week: Not on file     Minutes per session: Not on file    Stress: Not on file   Relationships    Social connections:     Talks on phone: Not on file     Gets together: Not on file     Attends Bahai service: Not on file     Active member of club or organization: Not on file     Attends meetings of clubs or organizations: Not on file     Relationship status: Not on file   Other Topics Concern    Not on file   Social History Narrative    Not on file       Allergies:  Lisinopril    Medications:    Current Outpatient Medications:     acetaminophen (TYLENOL) 325 MG tablet, Take 2 tablets (650 mg total) by mouth every 6 (six) hours., Disp: 30  tablet, Rfl: 0    amLODIPine (NORVASC) 5 MG tablet, Take 1 tablet (5 mg total) by mouth once daily., Disp: 30 tablet, Rfl: 11    ibuprofen (ADVIL,MOTRIN) 600 MG tablet, Take 1 tablet (600 mg total) by mouth every 8 (eight) hours as needed., Disp: 30 tablet, Rfl: 0    losartan (COZAAR) 50 MG tablet, Take 2 tablets (100 mg total) by mouth every evening. (Patient taking differently: Take 100 mg by mouth every evening. Takes @ hs), Disp: 180 tablet, Rfl: 1    naproxen (NAPROSYN) 500 MG tablet, TAKE 1 TABLET BY MOUTH TWICE A DAY (Patient not taking: Reported on 12/4/2019), Disp: 60 tablet, Rfl: 0    oxyCODONE (ROXICODONE) 5 MG immediate release tablet, Take 1 tablet (5 mg total) by mouth every 4 (four) hours as needed for Pain., Disp: 15 tablet, Rfl: 0    pantoprazole (PROTONIX) 40 MG tablet, Take 1 tablet (40 mg total) by mouth once daily. (Patient taking differently: Take 40 mg by mouth once daily. TAKE AS SCHEDULED), Disp: 30 tablet, Rfl: 11    polyethylene glycol (GLYCOLAX) 17 gram PwPk, Take 17 g by mouth once daily., Disp: 10 packet, Rfl: 0    pravastatin (PRAVACHOL) 40 MG tablet, TAKE 1 TABLET BY MOUTH EVERY DAY (Patient taking differently: TAKE AS SCHEDULED), Disp: 90 tablet, Rfl: 3    PHYSICAL EXAMINATION:    The patient generally appears in good health, is appropriately interactive, and is in no apparent distress.     Eyes: anicteric sclerae, moist conjunctivae; no lid-lag; PERRLA     HENT: Atraumatic; oropharynx clear with moist mucous membranes and no mucosal ulcerations;normal hard and soft palate. No evidence of lymphadenopathy.    Neck: Trachea midline.  No thyromegaly.    Musculoskeletal: No abnormal gait.    Skin: No lesions.    Mental: Cooperative with normal affect.  Is oriented to time, place, and person.    Neuro: Grossly intact.    Chest: Normal inspiratory effort.   No accessory muscles.  No audible wheezes.  Respirations symmetric on inspiration and expiration.    Heart: Regular rhythm.       Abdomen:  Abd scar close and healed, clean, no surrounding erythema/swelling. Soft, non-tender.    Extremities: No clubbing, cyanosis, or edema.    LABS:    Lab Results   Component Value Date    CREATININE 1.1 12/15/2019    CREATININE 1.1 12/14/2019    CREATININE 1.1 12/13/2019       Lab Results   Component Value Date    PSA 1.4 07/18/2015       No results found for: HGBA1C    No results found for: LABURIN     Imaging:    MRI abd w/ w/o contrast (10/8/19):  -Large mass involving the left kidney with extension of tumor thrombus into a distended left renal vein and into the inferior vena cava at the level of the left renal vein.  This is compatible with a primary renal neoplasm.  -Small cyst involving the right kidney.  -1.4 cm gallstone.  -Mildly prominent lymph nodes in the para-aortic region at the level of the renal hilum.    IMPRESSION:    Carcinoma, renal cell, left  -     CT Chest W Wo Contrast; Future; Expected date: 03/31/2020  -     CT Abdomen Pelvis W Wo Contrast; Future; Expected date: 03/31/2020    Left renal mass  -     CT Chest W Wo Contrast; Future; Expected date: 03/31/2020  -     CT Abdomen Pelvis W Wo Contrast; Future; Expected date: 03/31/2020      PLAN:    I spent 25 minutes with the patient of which more than half was spent in direct consultation with the patient in regards to our treatment and plan.    Discussed post-op expectations.    Informed Dr. Buchanan will contact pt re path findings.    Education sheets provided.    Follow up in about 3 months (around 3/31/2020) for L RCC w/ chest and abd/pelvic CT.    Pt expressed understanding and agree w/ plan.

## 2020-01-03 ENCOUNTER — OFFICE VISIT (OUTPATIENT)
Dept: FAMILY MEDICINE | Facility: CLINIC | Age: 60
End: 2020-01-03
Payer: COMMERCIAL

## 2020-01-03 ENCOUNTER — TELEPHONE (OUTPATIENT)
Dept: FAMILY MEDICINE | Facility: CLINIC | Age: 60
End: 2020-01-03

## 2020-01-03 VITALS
HEART RATE: 101 BPM | HEIGHT: 73 IN | SYSTOLIC BLOOD PRESSURE: 112 MMHG | BODY MASS INDEX: 31.26 KG/M2 | RESPIRATION RATE: 20 BRPM | WEIGHT: 235.88 LBS | OXYGEN SATURATION: 99 % | DIASTOLIC BLOOD PRESSURE: 78 MMHG | TEMPERATURE: 98 F

## 2020-01-03 DIAGNOSIS — C64.2 CARCINOMA, RENAL CELL, LEFT: Primary | ICD-10-CM

## 2020-01-03 DIAGNOSIS — J20.9 ACUTE BRONCHITIS, UNSPECIFIED ORGANISM: Primary | ICD-10-CM

## 2020-01-03 DIAGNOSIS — T81.49XA SURGICAL WOUND INFECTION: ICD-10-CM

## 2020-01-03 PROCEDURE — 99214 PR OFFICE/OUTPT VISIT, EST, LEVL IV, 30-39 MIN: ICD-10-PCS | Mod: S$GLB,,, | Performed by: NURSE PRACTITIONER

## 2020-01-03 PROCEDURE — 87070 CULTURE OTHR SPECIMN AEROBIC: CPT

## 2020-01-03 PROCEDURE — 87075 CULTR BACTERIA EXCEPT BLOOD: CPT

## 2020-01-03 PROCEDURE — 87077 CULTURE AEROBIC IDENTIFY: CPT

## 2020-01-03 PROCEDURE — 99214 OFFICE O/P EST MOD 30 MIN: CPT | Mod: S$GLB,,, | Performed by: NURSE PRACTITIONER

## 2020-01-03 PROCEDURE — 87186 SC STD MICRODIL/AGAR DIL: CPT

## 2020-01-03 RX ORDER — DOXYCYCLINE HYCLATE 100 MG
100 TABLET ORAL 2 TIMES DAILY
Qty: 20 TABLET | Refills: 0 | Status: SHIPPED | OUTPATIENT
Start: 2020-01-03 | End: 2020-01-13

## 2020-01-03 RX ORDER — BENZONATATE 200 MG/1
200 CAPSULE ORAL 3 TIMES DAILY PRN
Qty: 30 CAPSULE | Refills: 1 | Status: SHIPPED | OUTPATIENT
Start: 2020-01-03 | End: 2020-01-13

## 2020-01-03 NOTE — PROGRESS NOTES
Subjective:       Patient ID: Alexi Noguera is a 59 y.o. male.    Chief Complaint: Cough    Cough   This is a new problem. The current episode started 1 to 4 weeks ago. The problem has been unchanged. The problem occurs every few minutes. The cough is productive of sputum. Pertinent negatives include no chest pain, chills, fever, headaches or sweats. Nothing aggravates the symptoms. He has tried OTC cough suppressant (daquil) for the symptoms. The treatment provided moderate relief. Just had nephrectomy for renal ca (about 3 weeks ago)     Had nephrectomy surgery for renal carcinoma. Suture line is healing except at the bottom. He has been leaving it open to air and washing it with soap and water. He had not noticed it had dehisced. He saw the urologist 3 days ago who did not say anything about the suture line.   Review of Systems   Constitutional: Negative for chills and fever.   Respiratory: Positive for cough.    Cardiovascular: Negative for chest pain.   Neurological: Negative for headaches.       Objective:           Physical Exam   Constitutional: He is oriented to person, place, and time. He appears well-developed and well-nourished. No distress.   HENT:   Head: Normocephalic and atraumatic.   Right Ear: External ear normal.   Left Ear: External ear normal.   Mouth/Throat: Oropharynx is clear and moist. No oropharyngeal exudate.   TMs pearly grey with light reflex bilaterally.   Nares congested bilaterally.   Eyes: Conjunctivae are normal. Right eye exhibits no discharge. Left eye exhibits no discharge. No scleral icterus.   Neck: Normal range of motion. Neck supple.   Cardiovascular: Normal rate, regular rhythm and normal heart sounds. Exam reveals no gallop and no friction rub.   No murmur heard.  Pulmonary/Chest: Effort normal and breath sounds normal. No stridor. No respiratory distress. He has no wheezes. He has no rales.   Musculoskeletal: He exhibits no edema.   Lymphadenopathy:     He has no cervical  adenopathy.   Neurological: He is alert and oriented to person, place, and time.   Skin: Skin is warm and dry. No rash noted. He is not diaphoretic. There is erythema. No pallor.   Midline suture line well approximated except at distal edge where it has dehisced.    Psychiatric: He has a normal mood and affect. His behavior is normal.   Nursing note and vitals reviewed.      Assessment:     This provider spent  25 minutes face to face with patient, more than half the time for counseling and coordination of care as noted below including dressing change.    1. Acute bronchitis, unspecified organism    2. Surgical wound infection        Plan:       Acute bronchitis, unspecified organism  -     benzonatate (TESSALON) 200 MG capsule; Take 1 capsule (200 mg total) by mouth 3 (three) times daily as needed for Cough.  Dispense: 30 capsule; Refill: 1    Surgical wound infection  -     doxycycline (VIBRA-TABS) 100 MG tablet; Take 1 tablet (100 mg total) by mouth 2 (two) times daily. for 10 days  Dispense: 20 tablet; Refill: 0  -     Culture, Anaerobic  -     CULTURE, AEROBIC  (SPECIFY SOURCE)    wound cleaned with 20 cc sterile normal saline under pressure from wound shield. Culture obtained. Wound dressed with 2 strips of silver alginate and duoderm with tegaderm covering. May shower but do not soak in a tub. Leave dressing on. Follow up in 5 days. If dressing leaks, call for sooner appt.   Take the tessalon perles (benzonatate) tabs for cough with either mucinex or plain (generic) tussin cough syrup during the day and use the liquid cough syrup that you have at night (as it will make you sleepy).

## 2020-01-03 NOTE — TELEPHONE ENCOUNTER
----- Message from Nick Gibbs sent at 1/3/2020  9:40 AM CST -----  Contact: patient  Type:  Same Day Appointment Request    Caller is requesting a same day appointment.  Caller declined first available appointment listed below.      Name of Caller:  Patient  When is the first available appointment?  01/20  Symptoms:  cough light green mucus  Best Call Back Number:  [phone]  Additional Information:   Requesting a call back to confirm appointment

## 2020-01-03 NOTE — PATIENT INSTRUCTIONS
wound cleaned with 20 cc sterile normal saline under pressure from wound shield. Culture obtained. Wound dressed with 2 strips of silver alginate and duoderm with tegaderm covering. May shower but do not soak in a tub. Leave dressing on. Follow up in 5 days. If dressing leaks, call for sooner appt.   Take the tessalon perles (benzonatate) tabs for cough with either mucinex or plain (generic) tussin cough syrup during the day and use the liquid cough syrup that you have at night (as it will make you sleepy).

## 2020-01-06 ENCOUNTER — TELEPHONE (OUTPATIENT)
Dept: FAMILY MEDICINE | Facility: CLINIC | Age: 60
End: 2020-01-06

## 2020-01-06 LAB
BACTERIA SPEC AEROBE CULT: ABNORMAL
BACTERIA SPEC AEROBE CULT: ABNORMAL

## 2020-01-06 NOTE — TELEPHONE ENCOUNTER
----- Message from Mirna Guillen sent at 1/6/2020  2:24 PM CST -----  Type:  Patient Returning Call    Who Called:  patient  Who Left Message for Patient:  nurse  Does the patient know what this is regarding?:  no  Best Call Back Number:  [phone]  Additional Information:  Please call patient

## 2020-01-07 ENCOUNTER — OFFICE VISIT (OUTPATIENT)
Dept: FAMILY MEDICINE | Facility: CLINIC | Age: 60
End: 2020-01-07
Payer: COMMERCIAL

## 2020-01-07 VITALS
DIASTOLIC BLOOD PRESSURE: 60 MMHG | TEMPERATURE: 98 F | OXYGEN SATURATION: 98 % | WEIGHT: 234.38 LBS | HEART RATE: 111 BPM | BODY MASS INDEX: 31.06 KG/M2 | RESPIRATION RATE: 22 BRPM | SYSTOLIC BLOOD PRESSURE: 90 MMHG | HEIGHT: 73 IN

## 2020-01-07 DIAGNOSIS — T81.49XA INFECTED SURGICAL WOUND: Primary | ICD-10-CM

## 2020-01-07 DIAGNOSIS — I10 ESSENTIAL HYPERTENSION: ICD-10-CM

## 2020-01-07 DIAGNOSIS — I95.2 HYPOTENSION DUE TO MEDICATION: ICD-10-CM

## 2020-01-07 PROCEDURE — 99214 OFFICE O/P EST MOD 30 MIN: CPT | Mod: S$GLB,,, | Performed by: NURSE PRACTITIONER

## 2020-01-07 PROCEDURE — 99214 PR OFFICE/OUTPT VISIT, EST, LEVL IV, 30-39 MIN: ICD-10-PCS | Mod: S$GLB,,, | Performed by: NURSE PRACTITIONER

## 2020-01-07 RX ORDER — LOSARTAN POTASSIUM 50 MG/1
50 TABLET ORAL NIGHTLY
Qty: 180 TABLET | Refills: 1
Start: 2020-01-07 | End: 2020-01-14 | Stop reason: ALTCHOICE

## 2020-01-07 RX ORDER — AMLODIPINE BESYLATE 5 MG/1
2.5 TABLET ORAL DAILY
Qty: 15 TABLET | Refills: 11
Start: 2020-01-07 | End: 2020-08-31 | Stop reason: SDUPTHER

## 2020-01-07 NOTE — PATIENT INSTRUCTIONS
Wound cleaned with soap and water, dressed with 2 strips of silver alginate and duoderm with tegaderm covering. May shower but do not soak in a tub. Leave dressing on. Follow up in 7 days.  Continue doxycycline 100 mg. Twice a day as ordered until gone.     Cut the losartan back to 1 (50 mg. Tab ) at bedtime and cut the amlodipine in half and take 2.5 mg. In the am.

## 2020-01-07 NOTE — PROGRESS NOTES
Subjective:       Patient ID: Alexi Noguera is a 59 y.o. male.    Chief Complaint: Wound Infection (five day follow up) and Bronchitis  Had surgery about a month ago. Suture line is healed except at the bottom which was open and infected. He came in last week for a cough, but also mentioned the surgery and after looking at the suture line, I cleaned it, got a culture and started him on empiric antibiotics. The wound was dressed with silver alginate and duoderm and he has been able to keep the dressing on for the past 5 days. The wound has cultured out both MRSA and E. Coli. Pt. Denies any fever, nausea, vomiting or diarrhea (except for 1 day, but it has resolved now).      Has chronic HTN, but is too low currently. Is taking amlodipine in the am and losartan 100 at hs.   HPI  Review of Systems    Objective:            Physical Exam   Constitutional: He is oriented to person, place, and time. He appears well-developed and well-nourished. No distress.   HENT:   Head: Normocephalic and atraumatic.   Eyes: Conjunctivae are normal. Right eye exhibits no discharge. Left eye exhibits no discharge. No scleral icterus.   Cardiovascular: Normal rate, regular rhythm and normal heart sounds. Exam reveals no gallop and no friction rub.   No murmur heard.  Pulmonary/Chest: Effort normal and breath sounds normal. No stridor. No respiratory distress. He has no wheezes. He has no rales.   Musculoskeletal: He exhibits no edema.   Neurological: He is alert and oriented to person, place, and time.   Skin: Skin is warm and dry. No rash noted. He is not diaphoretic. There is erythema. No pallor.   Wound is healing, has pink granulating tissue at all edges, erythema is lighter in color, still has some slough in center, but not as much as it had 5 days ago.    Psychiatric: He has a normal mood and affect. His behavior is normal.   Nursing note and vitals reviewed.      Assessment:     This provider spent  25 minutes face to face with  patient, more than half the time for counseling and coordination of care as noted.    1. Infected surgical wound        Plan:     Wound cleaned with soap and water, dressed with 2 strips of silver alginate and duoderm with tegaderm covering. May shower but do not soak in a tub. Leave dressing on. Follow up in 7 days.  Continue doxycycline 100 mg. Twice a day as ordered until gone.     Cut the losartan back to 1 (50 mg. Tab ) at bedtime and cut the amlodipine in half and take 2.5 mg. In the am.

## 2020-01-08 LAB — BACTERIA SPEC ANAEROBE CULT: NORMAL

## 2020-01-14 ENCOUNTER — OFFICE VISIT (OUTPATIENT)
Dept: FAMILY MEDICINE | Facility: CLINIC | Age: 60
End: 2020-01-14
Payer: COMMERCIAL

## 2020-01-14 VITALS
SYSTOLIC BLOOD PRESSURE: 90 MMHG | DIASTOLIC BLOOD PRESSURE: 70 MMHG | HEART RATE: 100 BPM | HEIGHT: 73 IN | WEIGHT: 233.69 LBS | OXYGEN SATURATION: 98 % | BODY MASS INDEX: 30.97 KG/M2 | TEMPERATURE: 98 F

## 2020-01-14 DIAGNOSIS — T81.49XA INFECTED SURGICAL WOUND: Primary | ICD-10-CM

## 2020-01-14 PROCEDURE — 99214 OFFICE O/P EST MOD 30 MIN: CPT | Mod: S$GLB,,, | Performed by: NURSE PRACTITIONER

## 2020-01-14 PROCEDURE — 99214 PR OFFICE/OUTPT VISIT, EST, LEVL IV, 30-39 MIN: ICD-10-PCS | Mod: S$GLB,,, | Performed by: NURSE PRACTITIONER

## 2020-01-14 NOTE — PROGRESS NOTES
Subjective:       Patient ID: Alexi Noguera is a 59 y.o. male.    Chief Complaint: Wound Check    Found to have infected suture line 2 weeks ago.  The wound has cultured out both MRSA and E. Coli. Which are both sensitive to tetracycline. Patient was empirically started on doxycycline.  Pt. denies any fever, nausea, vomiting or diarrhea in past week and finished the doxycycline this morning. Would was dressed with silver alginate and duoderm and topped with tegaderm. He has been able to keep it on all week without any leaking.     Hypertension remains overtreated. He has cut the amlodipine back to 2.5 in the am and takes 50 of losartan in the pm.   HPI  Review of Systems    Objective:           Physical Exam   Constitutional: He is oriented to person, place, and time. He appears well-developed and well-nourished. No distress.   HENT:   Head: Normocephalic and atraumatic.   Eyes: Conjunctivae are normal. Right eye exhibits no discharge. Left eye exhibits no discharge. No scleral icterus.   Cardiovascular: Normal rate, regular rhythm and normal heart sounds. Exam reveals no gallop and no friction rub.   No murmur heard.  Pulmonary/Chest: Effort normal and breath sounds normal. No stridor. No respiratory distress. He has no wheezes. He has no rales.   Musculoskeletal: He exhibits no edema.   Neurological: He is alert and oriented to person, place, and time.   Skin: Skin is warm and dry. No rash noted. He is not diaphoretic. No erythema. No pallor.   Wound continues to heal, had moderate amount of purulent drainage. Slough has decreased significantly, now only has small area of slough in left upper corner. No erythema. Has suture distal to wound, which appears to be attached.    Psychiatric: He has a normal mood and affect. His behavior is normal.   Nursing note and vitals reviewed.      Assessment:     This provider spent  25 minutes face to face with patient, more than half the time for counseling and coordination of  care as noted.    1. Infected surgical wound        Plan:       Stop the losartan at night. Only take 2.5 mg. (1/2 of 5 mg. Tab) of amlodipine in the am. Leave dressing on until you return to clinic. May shower, but do not soak in the tub.

## 2020-01-14 NOTE — PATIENT INSTRUCTIONS
Stop the losartan at night. Only take 2.5 mg. (1/2 of 5 mg. Tab) of amlodipine in the am. Leave dressing on until you return to clinic. May shower, but do not soak in the tub.

## 2020-01-22 ENCOUNTER — OFFICE VISIT (OUTPATIENT)
Dept: FAMILY MEDICINE | Facility: CLINIC | Age: 60
End: 2020-01-22
Payer: COMMERCIAL

## 2020-01-22 VITALS
TEMPERATURE: 98 F | OXYGEN SATURATION: 97 % | HEIGHT: 73 IN | HEART RATE: 103 BPM | SYSTOLIC BLOOD PRESSURE: 108 MMHG | WEIGHT: 233.25 LBS | DIASTOLIC BLOOD PRESSURE: 86 MMHG | RESPIRATION RATE: 16 BRPM | BODY MASS INDEX: 30.91 KG/M2

## 2020-01-22 DIAGNOSIS — T81.49XA INFECTED SURGICAL WOUND: Primary | ICD-10-CM

## 2020-01-22 DIAGNOSIS — E78.5 HYPERLIPIDEMIA, UNSPECIFIED HYPERLIPIDEMIA TYPE: ICD-10-CM

## 2020-01-22 DIAGNOSIS — I10 ESSENTIAL HYPERTENSION: ICD-10-CM

## 2020-01-22 PROCEDURE — 99214 OFFICE O/P EST MOD 30 MIN: CPT | Mod: S$GLB,,, | Performed by: NURSE PRACTITIONER

## 2020-01-22 PROCEDURE — 99214 PR OFFICE/OUTPT VISIT, EST, LEVL IV, 30-39 MIN: ICD-10-PCS | Mod: S$GLB,,, | Performed by: NURSE PRACTITIONER

## 2020-01-22 RX ORDER — PRAVASTATIN SODIUM 40 MG/1
40 TABLET ORAL NIGHTLY
Qty: 90 TABLET | Refills: 3
Start: 2020-01-22 | End: 2020-08-31 | Stop reason: SDUPTHER

## 2020-01-22 NOTE — PROGRESS NOTES
Subjective:       Patient ID: Alexi Noguera is a 59 y.o. male.    Chief Complaint: Wound Check  Found to have infected suture line 3 weeks ago. Had a course of antibiotics and finished them last week.  Would was dressed with silver alginate and duoderm and topped with tegaderm. He has been able to keep it on all week without any leaking or pain.     HTN is chronic, well controlled with just 2.5 of amlodipine. Denies chest pain, dyspnea and dizziness.     Hyperlipidemia is chronic and controlled. Taking statin without difficulty, denies myalgias.   HPI  Review of Systems   Constitutional: Negative for fatigue, fever and unexpected weight change.   HENT: Negative for congestion, hearing loss and sore throat.    Eyes: Negative for pain, redness and visual disturbance.   Respiratory: Negative for cough and shortness of breath.    Cardiovascular: Negative for chest pain and leg swelling.   Gastrointestinal: Negative for constipation, diarrhea, nausea and vomiting.   Endocrine: Negative for cold intolerance and heat intolerance.   Genitourinary: Negative for dysuria and hematuria.   Musculoskeletal: Negative for arthralgias and myalgias.   Skin: Negative for pallor and rash.   Neurological: Negative for dizziness and headaches.   Psychiatric/Behavioral: Negative for dysphoric mood. The patient is not nervous/anxious.        Objective:          Physical Exam   Constitutional: He is oriented to person, place, and time. He appears well-developed and well-nourished. No distress.   HENT:   Head: Normocephalic and atraumatic.   Eyes: Conjunctivae are normal. Right eye exhibits no discharge. Left eye exhibits no discharge. No scleral icterus.   Cardiovascular: Normal rate, regular rhythm and normal heart sounds. Exam reveals no gallop and no friction rub.   No murmur heard.  Pulmonary/Chest: Effort normal and breath sounds normal. No stridor. No respiratory distress. He has no wheezes. He has no rales.   Musculoskeletal: He  exhibits no edema.   Neurological: He is alert and oriented to person, place, and time.   Skin: Skin is warm and dry. No rash noted. He is not diaphoretic. No erythema. No pallor.   Surgical wound on abdomen has moderate drainage, pink granulating tissue with beefy center and small erythematous are where suture was below wound. No suture noted today.    Psychiatric: He has a normal mood and affect. His behavior is normal.   Nursing note and vitals reviewed.      Assessment:     This provider spent  25 minutes face to face with patient, more than half the time for counseling and coordination of care as noted.    1. Infected surgical wound    2. Hyperlipidemia, unspecified hyperlipidemia type    3. Essential hypertension        Plan:       Infected surgical wound    Hyperlipidemia, unspecified hyperlipidemia type  -     pravastatin (PRAVACHOL) 40 MG tablet; Take 1 tablet (40 mg total) by mouth every evening.  Dispense: 90 tablet; Refill: 3    Essential hypertension      Take pravastatin in the evening, continue 2.5 mg. Of norvasc in the morning. You can stop the pantoprazole as you are not taking any NSAIDs which would require something to protect your stomach. If you develop heart burn or reflux, let us know. Dressing removed without difficulty, wound washed with soap and water, silver alginate applied and covered with duoderm and tegaderm for reinforcement.  Leave dressing on until you return to clinic. May shower, but do not soak in the tub.    1 week

## 2020-01-22 NOTE — PATIENT INSTRUCTIONS
Take pravastatin in the evening, continue 2.5 mg. Of norvasc in the morning. You can stop the pantoprazole as you are not taking any NSAIDs which would require something to protect your stomach. If you develop heart burn or reflux, let us know.  Leave dressing on until you return to clinic. May shower, but do not soak in the tub.    1 week

## 2020-01-29 ENCOUNTER — OFFICE VISIT (OUTPATIENT)
Dept: FAMILY MEDICINE | Facility: CLINIC | Age: 60
End: 2020-01-29
Payer: COMMERCIAL

## 2020-01-29 VITALS
WEIGHT: 237.88 LBS | RESPIRATION RATE: 18 BRPM | OXYGEN SATURATION: 97 % | TEMPERATURE: 99 F | BODY MASS INDEX: 31.53 KG/M2 | HEART RATE: 94 BPM | DIASTOLIC BLOOD PRESSURE: 78 MMHG | HEIGHT: 73 IN | SYSTOLIC BLOOD PRESSURE: 122 MMHG

## 2020-01-29 DIAGNOSIS — T81.49XA SURGICAL WOUND INFECTION: Primary | ICD-10-CM

## 2020-01-29 PROCEDURE — 99213 PR OFFICE/OUTPT VISIT, EST, LEVL III, 20-29 MIN: ICD-10-PCS | Mod: S$GLB,,, | Performed by: NURSE PRACTITIONER

## 2020-01-29 PROCEDURE — 99213 OFFICE O/P EST LOW 20 MIN: CPT | Mod: S$GLB,,, | Performed by: NURSE PRACTITIONER

## 2020-01-29 RX ORDER — MUPIROCIN 20 MG/G
OINTMENT TOPICAL DAILY
Qty: 15 G | Refills: 0 | Status: SHIPPED | OUTPATIENT
Start: 2020-01-29 | End: 2020-03-24 | Stop reason: CLARIF

## 2020-01-29 NOTE — PATIENT INSTRUCTIONS
Keep wound clean, put bactroban on skin, cover with bandaid for at least 1 week so skin can toughen up a little. Follow up with Dr. Matthews in March as previously scheduled.

## 2020-01-29 NOTE — PROGRESS NOTES
Subjective:       Patient ID: Alexi Noguera is a 59 y.o. male.    Chief Complaint: Wound Care  Had surgery with wound at base of suture line that dehiced and became infected. It has been dressed weekly with alginate and duoderm. He was treated with antibiotic for infection and today is her for dressing change. He was able to keep dressing on all week without any leakage.   HPI  Review of Systems   Constitutional: Negative for fatigue, fever and unexpected weight change.   HENT: Negative for congestion, hearing loss and sore throat.    Eyes: Negative for pain, redness and visual disturbance.   Respiratory: Negative for cough and shortness of breath.    Cardiovascular: Negative for chest pain and leg swelling.   Gastrointestinal: Negative for constipation, diarrhea, nausea and vomiting.   Endocrine: Negative for cold intolerance and heat intolerance.   Genitourinary: Negative for dysuria and hematuria.   Musculoskeletal: Negative for arthralgias and myalgias.   Skin: Positive for wound. Negative for pallor and rash.   Neurological: Negative for dizziness and headaches.   Psychiatric/Behavioral: Negative for dysphoric mood. The patient is not nervous/anxious.        Objective:           Physical Exam   Constitutional: He is oriented to person, place, and time. He appears well-developed and well-nourished. No distress.   HENT:   Head: Normocephalic and atraumatic.   Eyes: Conjunctivae are normal. Right eye exhibits no discharge. Left eye exhibits no discharge. No scleral icterus.   Cardiovascular: Normal rate, regular rhythm and normal heart sounds. Exam reveals no gallop and no friction rub.   No murmur heard.  Pulmonary/Chest: Effort normal and breath sounds normal. No stridor. No respiratory distress. He has no wheezes. He has no rales.   Musculoskeletal: He exhibits no edema.   Neurological: He is alert and oriented to person, place, and time.   Skin: Skin is warm and dry. No rash noted. He is not diaphoretic. No  pallor.   Psychiatric: He has a normal mood and affect. His behavior is normal.   Nursing note and vitals reviewed.      Assessment:       1. Surgical wound infection        Plan:       Surgical wound infection  -     mupirocin (BACTROBAN) 2 % ointment; Apply topically once daily.  Dispense: 15 g; Refill: 0    Wound is healed, granulating tissue covering entire wound with center still very fragile.   Keep wound clean, put bactroban on skin, cover with bandaid for at least 1 week so skin can toughen up a little. Follow up with Dr. Matthews in March as previously scheduled.

## 2020-02-06 ENCOUNTER — OFFICE VISIT (OUTPATIENT)
Dept: HEMATOLOGY/ONCOLOGY | Facility: CLINIC | Age: 60
End: 2020-02-06
Payer: COMMERCIAL

## 2020-02-06 ENCOUNTER — LAB VISIT (OUTPATIENT)
Dept: LAB | Facility: HOSPITAL | Age: 60
End: 2020-02-06
Payer: COMMERCIAL

## 2020-02-06 VITALS
HEART RATE: 85 BPM | SYSTOLIC BLOOD PRESSURE: 153 MMHG | WEIGHT: 240.06 LBS | RESPIRATION RATE: 20 BRPM | DIASTOLIC BLOOD PRESSURE: 92 MMHG | OXYGEN SATURATION: 98 % | BODY MASS INDEX: 31.82 KG/M2 | TEMPERATURE: 98 F | HEIGHT: 73 IN

## 2020-02-06 DIAGNOSIS — D32.1 MENINGIOMA, RECURRENT OF SPINE: ICD-10-CM

## 2020-02-06 DIAGNOSIS — C64.2 CARCINOMA, RENAL CELL, LEFT: ICD-10-CM

## 2020-02-06 DIAGNOSIS — C64.2 CARCINOMA, RENAL CELL, LEFT: Primary | ICD-10-CM

## 2020-02-06 LAB
ALBUMIN SERPL BCP-MCNC: 3.7 G/DL (ref 3.5–5.2)
ALP SERPL-CCNC: 72 U/L (ref 55–135)
ALT SERPL W/O P-5'-P-CCNC: 14 U/L (ref 10–44)
ANION GAP SERPL CALC-SCNC: 8 MMOL/L (ref 8–16)
AST SERPL-CCNC: 17 U/L (ref 10–40)
BILIRUB SERPL-MCNC: 0.3 MG/DL (ref 0.1–1)
BUN SERPL-MCNC: 22 MG/DL (ref 8–23)
CALCIUM SERPL-MCNC: 9.9 MG/DL (ref 8.7–10.5)
CHLORIDE SERPL-SCNC: 104 MMOL/L (ref 95–110)
CO2 SERPL-SCNC: 30 MMOL/L (ref 23–29)
CREAT SERPL-MCNC: 1.4 MG/DL (ref 0.5–1.4)
ERYTHROCYTE [DISTWIDTH] IN BLOOD BY AUTOMATED COUNT: 14.2 % (ref 11.5–14.5)
EST. GFR  (AFRICAN AMERICAN): >60 ML/MIN/1.73 M^2
EST. GFR  (NON AFRICAN AMERICAN): 53.8 ML/MIN/1.73 M^2
GLUCOSE SERPL-MCNC: 101 MG/DL (ref 70–110)
HCT VFR BLD AUTO: 43.3 % (ref 40–54)
HGB BLD-MCNC: 13.2 G/DL (ref 14–18)
IMM GRANULOCYTES # BLD AUTO: 0.02 K/UL (ref 0–0.04)
MCH RBC QN AUTO: 27.2 PG (ref 27–31)
MCHC RBC AUTO-ENTMCNC: 30.5 G/DL (ref 32–36)
MCV RBC AUTO: 89 FL (ref 82–98)
NEUTROPHILS # BLD AUTO: 5.2 K/UL (ref 1.8–7.7)
PLATELET # BLD AUTO: 322 K/UL (ref 150–350)
PMV BLD AUTO: 9.3 FL (ref 9.2–12.9)
POTASSIUM SERPL-SCNC: 4.3 MMOL/L (ref 3.5–5.1)
PROT SERPL-MCNC: 8.1 G/DL (ref 6–8.4)
RBC # BLD AUTO: 4.85 M/UL (ref 4.6–6.2)
SODIUM SERPL-SCNC: 142 MMOL/L (ref 136–145)
WBC # BLD AUTO: 7.9 K/UL (ref 3.9–12.7)

## 2020-02-06 PROCEDURE — 80053 COMPREHEN METABOLIC PANEL: CPT

## 2020-02-06 PROCEDURE — 99214 PR OFFICE/OUTPT VISIT, EST, LEVL IV, 30-39 MIN: ICD-10-PCS | Mod: S$GLB,,, | Performed by: INTERNAL MEDICINE

## 2020-02-06 PROCEDURE — 99999 PR PBB SHADOW E&M-EST. PATIENT-LVL III: ICD-10-PCS | Mod: PBBFAC,,, | Performed by: INTERNAL MEDICINE

## 2020-02-06 PROCEDURE — 85027 COMPLETE CBC AUTOMATED: CPT

## 2020-02-06 PROCEDURE — 99214 OFFICE O/P EST MOD 30 MIN: CPT | Mod: S$GLB,,, | Performed by: INTERNAL MEDICINE

## 2020-02-06 PROCEDURE — 36415 COLL VENOUS BLD VENIPUNCTURE: CPT

## 2020-02-06 PROCEDURE — 99999 PR PBB SHADOW E&M-EST. PATIENT-LVL III: CPT | Mod: PBBFAC,,, | Performed by: INTERNAL MEDICINE

## 2020-02-07 NOTE — PROGRESS NOTES
ONCOLOGY FOLLOW UP VISIT.     Reason for visit: Post-operative visit for surveillance planning for resected renal cell carcinoma    Best Contact Phone Number(s): [phone] (home)      Cancer/Stage/TNM:   Cancer Staging  Carcinoma, renal cell, left  Staging form: Kidney, AJCC 8th Edition  - Clinical stage from 9/16/2019: Stage III (cT3a, cN0, cM0) - Unsigned        No history exists.        Interval History:   Today patient reports long recovery after surgery, but he is starting to gain his energy back to baseline.  Denies pain, hematuria, infections     ROS:  Review of Systems   Constitutional: Negative for activity change, chills, fatigue, fever and unexpected weight change.   HENT: Negative for mouth sores, nosebleeds and sore throat.    Respiratory: Negative for cough, shortness of breath and wheezing.    Cardiovascular: Negative for chest pain, palpitations and leg swelling.   Gastrointestinal: Negative for abdominal distention, abdominal pain and blood in stool.   Endocrine: Negative for cold intolerance and heat intolerance.   Genitourinary: Negative for dysuria, flank pain and frequency.   Musculoskeletal: Negative for arthralgias, joint swelling and myalgias.   Skin: Negative for color change, rash and wound.   Neurological: Negative for dizziness, light-headedness and headaches.   Hematological: Negative for adenopathy. Does not bruise/bleed easily.      A complete 12-point review of systems was reviewed and is negative except as mentioned above.     Past Medical History:   Past Medical History:   Diagnosis Date    Hyperlipidemia     Hypertension         Allergies:   Review of patient's allergies indicates:   Allergen Reactions    Lisinopril Other (See Comments)     Dry cough        Medications:   Current Outpatient Medications   Medication Sig Dispense Refill    acetaminophen (TYLENOL) 325 MG tablet Take 2 tablets (650 mg total) by mouth every 6 (six) hours. 30 tablet 0    amLODIPine (NORVASC) 5 MG  "tablet Take 0.5 tablets (2.5 mg total) by mouth once daily. 15 tablet 11    mupirocin (BACTROBAN) 2 % ointment Apply topically once daily. 15 g 0    pravastatin (PRAVACHOL) 40 MG tablet Take 1 tablet (40 mg total) by mouth every evening. 90 tablet 3    oxyCODONE (ROXICODONE) 5 MG immediate release tablet Take 1 tablet (5 mg total) by mouth every 4 (four) hours as needed for Pain. (Patient not taking: Reported on 2/6/2020) 15 tablet 0     No current facility-administered medications for this visit.         Physical Exam:   BP (!) 153/92 (BP Location: Left arm, Patient Position: Sitting, BP Method: Medium (Automatic))   Pulse 85   Temp 97.9 °F (36.6 °C) (Oral)   Resp 20   Ht 6' 1" (1.854 m)   Wt 108.9 kg (240 lb 1.3 oz)   SpO2 98%   BMI 31.67 kg/m²      ECOG Performance Status: (foot note - ECOG PS provided by Eastern Cooperative Oncology Group) 0 - Asymptomatic    Physical Exam   Constitutional: He is oriented to person, place, and time. He appears well-developed and well-nourished.   HENT:   Head: Normocephalic and atraumatic.   Eyes: Pupils are equal, round, and reactive to light. Conjunctivae and EOM are normal.   Neck: Normal range of motion. Neck supple.   Cardiovascular: Normal rate and regular rhythm. Exam reveals no friction rub.   No murmur heard.  Pulmonary/Chest: Effort normal and breath sounds normal.   Abdominal: Soft. Bowel sounds are normal. There is no tenderness.   Musculoskeletal: Normal range of motion. He exhibits no edema.   Lymphadenopathy:     He has no cervical adenopathy.     He has no axillary adenopathy.   Neurological: He is alert and oriented to person, place, and time.   Skin: Skin is warm and dry.   Psychiatric: He has a normal mood and affect. His behavior is normal.         Labs:   Recent Results (from the past 48 hour(s))   CBC Oncology    Collection Time: 02/06/20  2:22 PM   Result Value Ref Range    WBC 7.90 3.90 - 12.70 K/uL    RBC 4.85 4.60 - 6.20 M/uL    Hemoglobin " 13.2 (L) 14.0 - 18.0 g/dL    Hematocrit 43.3 40.0 - 54.0 %    Mean Corpuscular Volume 89 82 - 98 fL    Mean Corpuscular Hemoglobin 27.2 27.0 - 31.0 pg    Mean Corpuscular Hemoglobin Conc 30.5 (L) 32.0 - 36.0 g/dL    RDW 14.2 11.5 - 14.5 %    Platelets 322 150 - 350 K/uL    MPV 9.3 9.2 - 12.9 fL    Gran # (ANC) 5.2 1.8 - 7.7 K/uL    Immature Grans (Abs) 0.02 0.00 - 0.04 K/uL   CMP    Collection Time: 02/06/20  2:22 PM   Result Value Ref Range    Sodium 142 136 - 145 mmol/L    Potassium 4.3 3.5 - 5.1 mmol/L    Chloride 104 95 - 110 mmol/L    CO2 30 (H) 23 - 29 mmol/L    Glucose 101 70 - 110 mg/dL    BUN, Bld 22 8 - 23 mg/dL    Creatinine 1.4 0.5 - 1.4 mg/dL    Calcium 9.9 8.7 - 10.5 mg/dL    Total Protein 8.1 6.0 - 8.4 g/dL    Albumin 3.7 3.5 - 5.2 g/dL    Total Bilirubin 0.3 0.1 - 1.0 mg/dL    Alkaline Phosphatase 72 55 - 135 U/L    AST 17 10 - 40 U/L    ALT 14 10 - 44 U/L    Anion Gap 8 8 - 16 mmol/L    eGFR if African American >60.0 >60 mL/min/1.73 m^2    eGFR if non  53.8 (A) >60 mL/min/1.73 m^2        Imaging:   X-Ray Chest 1 View  Narrative: EXAMINATION:  XR CHEST 1 VIEW    CLINICAL HISTORY:  post op;    TECHNIQUE:  Single frontal view of the chest was performed.    COMPARISON:  12/10/2019.    FINDINGS:  Lungs are symmetrically expanded.  Mediastinal structures are midline.  Enteric and endotracheal tubes have been removed.  Right-sided central venous catheter tip projects over the mid SVC.  Small bilateral pleural effusions and atelectatic changes at the lung bases.  Cardiac silhouette is within normal limits.  No pneumothorax.  Osseous structures show age-appropriate degenerative change.  Impression: Improved aeration over both lungs.    Interval removal of enteric and endotracheal tubes.    Small bilateral pleural effusions.    Electronically signed by resident: Gaby Gu  Date:    12/11/2019  Time:    07:52    Electronically signed by: Black Subramanian,  MD  Date:    12/11/2019  Time:    08:02            Diagnoses:       1. Carcinoma, renal cell, left    2. Meningioma, recurrent of spine          Assessment and Plan:         1. Stage III RCC:   -Sites of Disease: L kidney, L renal vein   Discussed with patient that cervical lesion is unlikely metastasis from RCC, but a repeat MRI is important to ensure stability before proceeding with RCC treatment.  Now s/p nephrectomy. Controversial benefit with adjuvant Sutent and significant side effects.  Will undergo surveillance, CT CAP q 3 month x 1 year, then q 4 month x 1 year, then q 6 month.     2. Meningioma  Repeat MRI spine with next re-staging scans    3. HTN  Well controlled on current regimen             I have provided the patient with an opportunity to ask questions and have all questions answered to his satisfaction.     he will return to clinic prior to next scans, but knows to call in the interim if symptoms change or should a problem arise.    Acosta Pham MD  Medical Oncology [facility]

## 2020-02-12 ENCOUNTER — TELEPHONE (OUTPATIENT)
Dept: HEMATOLOGY/ONCOLOGY | Facility: CLINIC | Age: 60
End: 2020-02-12

## 2020-02-12 NOTE — TELEPHONE ENCOUNTER
----- Message from Jonathan De Jesus sent at 2/12/2020 12:30 PM CST -----  Contact: [employer]  Staff Message     Caller name: Mele     Reason for call: Requesting auth to use new date of 02/01 on the attending physician statement to extend disability claim.        Communication Preference: [phone]    Additional Information:

## 2020-02-26 ENCOUNTER — LAB VISIT (OUTPATIENT)
Dept: LAB | Facility: HOSPITAL | Age: 60
End: 2020-02-26
Attending: INTERNAL MEDICINE
Payer: COMMERCIAL

## 2020-02-26 DIAGNOSIS — I10 ESSENTIAL HYPERTENSION: ICD-10-CM

## 2020-02-26 DIAGNOSIS — N28.89 LEFT KIDNEY MASS: ICD-10-CM

## 2020-02-26 LAB
ALBUMIN SERPL BCP-MCNC: 3.9 G/DL (ref 3.5–5.2)
ALP SERPL-CCNC: 77 U/L (ref 55–135)
ALT SERPL W/O P-5'-P-CCNC: 18 U/L (ref 10–44)
ANION GAP SERPL CALC-SCNC: 9 MMOL/L (ref 8–16)
AST SERPL-CCNC: 20 U/L (ref 10–40)
BASOPHILS # BLD AUTO: 0.03 K/UL (ref 0–0.2)
BASOPHILS NFR BLD: 0.4 % (ref 0–1.9)
BILIRUB SERPL-MCNC: 0.4 MG/DL (ref 0.1–1)
BUN SERPL-MCNC: 17 MG/DL (ref 8–23)
CALCIUM SERPL-MCNC: 9.7 MG/DL (ref 8.7–10.5)
CHLORIDE SERPL-SCNC: 104 MMOL/L (ref 95–110)
CHOLEST SERPL-MCNC: 162 MG/DL (ref 120–199)
CHOLEST/HDLC SERPL: 4.1 {RATIO} (ref 2–5)
CO2 SERPL-SCNC: 26 MMOL/L (ref 23–29)
CREAT SERPL-MCNC: 1.4 MG/DL (ref 0.5–1.4)
DIFFERENTIAL METHOD: ABNORMAL
EOSINOPHIL # BLD AUTO: 0.2 K/UL (ref 0–0.5)
EOSINOPHIL NFR BLD: 1.9 % (ref 0–8)
ERYTHROCYTE [DISTWIDTH] IN BLOOD BY AUTOMATED COUNT: 14.4 % (ref 11.5–14.5)
EST. GFR  (AFRICAN AMERICAN): >60 ML/MIN/1.73 M^2
EST. GFR  (NON AFRICAN AMERICAN): 53.5 ML/MIN/1.73 M^2
GLUCOSE SERPL-MCNC: 86 MG/DL (ref 70–110)
HCT VFR BLD AUTO: 48.3 % (ref 40–54)
HDLC SERPL-MCNC: 40 MG/DL (ref 40–75)
HDLC SERPL: 24.7 % (ref 20–50)
HGB BLD-MCNC: 14.8 G/DL (ref 14–18)
IMM GRANULOCYTES # BLD AUTO: 0.03 K/UL (ref 0–0.04)
IMM GRANULOCYTES NFR BLD AUTO: 0.4 % (ref 0–0.5)
LDLC SERPL CALC-MCNC: 89 MG/DL (ref 63–159)
LYMPHOCYTES # BLD AUTO: 1.8 K/UL (ref 1–4.8)
LYMPHOCYTES NFR BLD: 22.7 % (ref 18–48)
MCH RBC QN AUTO: 27 PG (ref 27–31)
MCHC RBC AUTO-ENTMCNC: 30.6 G/DL (ref 32–36)
MCV RBC AUTO: 88 FL (ref 82–98)
MONOCYTES # BLD AUTO: 0.5 K/UL (ref 0.3–1)
MONOCYTES NFR BLD: 5.9 % (ref 4–15)
NEUTROPHILS # BLD AUTO: 5.4 K/UL (ref 1.8–7.7)
NEUTROPHILS NFR BLD: 68.7 % (ref 38–73)
NONHDLC SERPL-MCNC: 122 MG/DL
NRBC BLD-RTO: 0 /100 WBC
PLATELET # BLD AUTO: 282 K/UL (ref 150–350)
PMV BLD AUTO: 9.7 FL (ref 9.2–12.9)
POTASSIUM SERPL-SCNC: 5.1 MMOL/L (ref 3.5–5.1)
PROT SERPL-MCNC: 8.4 G/DL (ref 6–8.4)
RBC # BLD AUTO: 5.49 M/UL (ref 4.6–6.2)
SODIUM SERPL-SCNC: 139 MMOL/L (ref 136–145)
TRIGL SERPL-MCNC: 165 MG/DL (ref 30–150)
WBC # BLD AUTO: 7.9 K/UL (ref 3.9–12.7)

## 2020-02-26 PROCEDURE — 85025 COMPLETE CBC W/AUTO DIFF WBC: CPT

## 2020-02-26 PROCEDURE — 80061 LIPID PANEL: CPT

## 2020-02-26 PROCEDURE — 80053 COMPREHEN METABOLIC PANEL: CPT

## 2020-02-26 PROCEDURE — 36415 COLL VENOUS BLD VENIPUNCTURE: CPT | Mod: PO

## 2020-02-26 NOTE — PROGRESS NOTES
Normal labs, more to come.   Dr. Matthews has reviewed your result, let us know if you have any problems

## 2020-03-03 ENCOUNTER — TELEPHONE (OUTPATIENT)
Dept: NEUROSURGERY | Facility: CLINIC | Age: 60
End: 2020-03-03

## 2020-03-03 NOTE — TELEPHONE ENCOUNTER
----- Message from Amber Muller sent at 3/3/2020  8:51 AM CST -----  Contact: pt @ [phone]  Calling to schedule an appt with Dr. Lebron, says he may need an MRI. Please call.

## 2020-03-03 NOTE — TELEPHONE ENCOUNTER
Spoke with patient regarding scheduling appointment with Dr. Lebron. Inform patient that I will schedule appointment along with MRI C-Spine on same day. Appointment scheduled date and time given to patient and appointment also mailed out. Patient verbalized understanding.

## 2020-03-04 ENCOUNTER — OFFICE VISIT (OUTPATIENT)
Dept: FAMILY MEDICINE | Facility: CLINIC | Age: 60
End: 2020-03-04
Payer: COMMERCIAL

## 2020-03-04 VITALS
RESPIRATION RATE: 16 BRPM | SYSTOLIC BLOOD PRESSURE: 124 MMHG | TEMPERATURE: 98 F | WEIGHT: 240.06 LBS | BODY MASS INDEX: 31.82 KG/M2 | HEART RATE: 76 BPM | HEIGHT: 73 IN | DIASTOLIC BLOOD PRESSURE: 86 MMHG | OXYGEN SATURATION: 98 %

## 2020-03-04 DIAGNOSIS — E78.5 HYPERLIPIDEMIA, UNSPECIFIED HYPERLIPIDEMIA TYPE: ICD-10-CM

## 2020-03-04 DIAGNOSIS — I10 ESSENTIAL HYPERTENSION: Primary | ICD-10-CM

## 2020-03-04 PROCEDURE — 99213 OFFICE O/P EST LOW 20 MIN: CPT | Mod: S$GLB,,, | Performed by: INTERNAL MEDICINE

## 2020-03-04 PROCEDURE — 99213 PR OFFICE/OUTPT VISIT, EST, LEVL III, 20-29 MIN: ICD-10-PCS | Mod: S$GLB,,, | Performed by: INTERNAL MEDICINE

## 2020-03-04 NOTE — PATIENT INSTRUCTIONS
Avoid  alcohol  Low-Salt Diet  This diet removes foods that are high in salt. It also limits the amount of salt you use when cooking. It is most often used for people with high blood pressure, edema (fluid retention), and kidney, liver, or heart disease.  Table salt contains the mineral sodium. Your body needs sodium to work normally. But too much sodium can make your health problems worse. Your healthcare provider is recommending a low-salt (also called low-sodium) diet for you. Your total daily allowance of salt is 1,500 to 2,300 milligrams (mg). It is less than 1 teaspoon of table salt. This means you can have only about 500 to 700 mg of sodium at each meal. People with certain health problems should limit salt intake to the lower end of the recommended range.    When you cook, don´t add much salt. If you can cook without using salt, even better. Don´t add salt to your food at the table.  When shopping, read food labels. Salt is often called sodium on the label. Choose foods that are salt-free, low salt, or very low salt. Note that foods with reduced salt may not lower your salt intake enough.    Beans, potatoes, and pasta  Ok: Dry beans, split peas, lentils, potatoes, rice, macaroni, pasta, spaghetti without added salt  Avoid: Potato chips, tortilla chips, and similar products  Breads and cereals  Ok: Low-sodium breads, rolls, cereals, and cakes; low-salt crackers, matzo crackers  Avoid: Salted crackers, pretzels, popcorn, Ug[place]an toast, pancakes, muffins  Dairy  Ok: Milk, chocolate milk, hot chocolate mix, low-salt cheeses, and yogurt  Avoid: Processed cheese and cheese spreads; Roquefort, Camembert, and cottage cheese; buttermilk, instant breakfast drink  Desserts  Ok: Ice cream, frozen yogurt, juice bars, gelatin, cookies and pies, sugar, honey, jelly, hard candy  Avoid: Most pies, cakes and cookies prepared or processed with salt; instant pudding  Drinks  Ok: Tea, coffee, fizzy (carbonated) drinks,  juices  Avoid: Flavored coffees, electrolyte replacement drinks, sports drinks  Meats  Ok: All fresh meat, fish, poultry, low-salt tuna, eggs, egg substitute  Avoid: Smoked, pickled, brine-cured, or salted meats and fish. This includes kelly, chipped beef, corned beef, hot dogs, deli meats, ham, kosher meats, salt pork, sausage, canned tuna, salted codfish, smoked salmon, herring, sardines, or anchovies.  Seasonings and spices  Ok: Most seasonings are okay. Good substitutes for salt include: fresh herb blends, hot sauce, lemon, garlic, rose, vinegar, dry mustard, parsley, cilantro, horseradish, tomato paste, regular margarine, mayonnaise, unsalted butter, cream cheese, vegetable oil, cream, low-salt salad dressing and gravy.  Avoid: Regular ketchup, relishes, pickles, soy sauce, teriyaki sauce, Worcestershire sauce, BBQ sauce, tartar sauce, meat tenderizer, chili sauce, regular gravy, regular salad dressing, salted butter  Soups  Ok: Low-salt soups and broths made with allowed foods  Avoid: Bouillon cubes, soups with smoked or salted meats, regular soup and broth  Vegetables  Ok: Most vegetables are okay; also low-salt tomato and vegetable juices  Avoid: Sauerkraut and other brine-soaked vegetables; pickles and other pickled vegetables; tomato juice, olives  © 4714-3145 [employer] [address] [place] [address]. All rights reserved. This information is not intended as a substitute for professional medical care. Always follow your healthcare professional's instructions.      Thank you for choosing [facility].     Please fill out the patient experience survey.

## 2020-03-04 NOTE — PROGRESS NOTES
Subjective:      4:32 PM     Patient ID: Alexi Noguera is a 60 y.o. male.    Chief Complaint: Hyperlipidemia (labs)    HPI           CHIEF COMPLAINT: Hypertension  HPI:  Patient just received news that a friend of his is dying so he was upset when he came in.    ONSET:      QUALITY/COURSE:   Unchanged.     INTENSITY/SEVERITY:  Average blood pressure is unknown.      MODIFIERS/TREATMENTS:  Taking medications: yes. .High sodium intake: no. alcohol: no      The following symptoms are positive only if BOLDED, otherwise are negative.      SYMPTOMS/RELATED: Possible medication side effects include:   Depression..  . Cough. . Constipation.    REVIEW OF SYMPTOMS: . Weight_loss . Weight_gain . Leg_cramps .Potency_problems .    TARGET ORGAN DAMAGE:: angina/ prior myocardial infarction, chronic kidney disease, heart failure, left ventricular hypertrophy, peripheral artery disease, prior coronary revascularization, retinopathy, stroke. transient ischemic attack.        CHIEF COMPLAINT: Hyperlipidemia. cholesterol screening: no.   HPI:     ONSET:    MODIFIERS/TREATMENTS: . Taking medications: yes. . Non-compliance with following diet: no. .     SYMPTOMS/RELATED:Possible medication side effects include:   Myalgia: no.  .     REVIEW OF SYMPTOMS: past weights:   Wt Readings from Last 1 Encounters:   03/04/20 1552 108.9 kg (240 lb 1.3 oz)                                                     Last lipids: total   Lab Results   Component Value Date    CHOL 162 02/26/2020    CHOL 139 04/27/2019    CHOL 158 10/13/2018                                                                     HDL   Lab Results   Component Value Date    HDL 40 02/26/2020    HDL 38 (L) 04/27/2019    HDL 41 10/13/2018                                                                     LDL   Lab Results   Component Value Date    LDLCALC 89.0 02/26/2020    LDLCALC 85.0 04/27/2019    LDLCALC 96.2 10/13/2018                                                                "      TRIG   Lab Results   Component Value Date    TRIG 165 (H) 02/26/2020    TRIG 80 04/27/2019    TRIG 104 10/13/2018                                                                         Review of Systems   Eyes: Negative for visual disturbance.   Respiratory: Negative for chest tightness and shortness of breath.    Cardiovascular: Negative for chest pain and leg swelling.   Neurological: Negative for dizziness, syncope, weakness and headaches.   Psychiatric/Behavioral: Negative for dysphoric mood. The patient is not nervous/anxious.          Objective:      Vitals:    03/04/20 1552 03/04/20 1648   BP: (!) 128/100 124/86   Pulse: 76    Resp: 16    Temp: 98.1 °F (36.7 °C)    TempSrc: Oral    SpO2: 98%    Weight: 108.9 kg (240 lb 1.3 oz)    Height: 6' 1" (1.854 m)    PainSc: 0-No pain      Physical Exam   Constitutional: He appears well-developed and well-nourished.   Cardiovascular: Normal rate, regular rhythm and normal heart sounds.   Pulmonary/Chest: Effort normal and breath sounds normal.   Abdominal: Soft. There is no tenderness.   Neurological: He is alert.   Psychiatric: He has a normal mood and affect. His behavior is normal. Thought content normal.   Nursing note and vitals reviewed.        Assessment:       1. Essential hypertension    2. Hyperlipidemia, unspecified hyperlipidemia type          Plan:       Essential hypertension  -     Comprehensive metabolic panel; Future; Expected date: 03/04/2020    Hyperlipidemia, unspecified hyperlipidemia type  -     Lipid panel; Future; Expected date: 03/04/2020      Follow up in about 6 months (around 9/4/2020).      "

## 2020-03-11 ENCOUNTER — TELEPHONE (OUTPATIENT)
Dept: FAMILY MEDICINE | Facility: CLINIC | Age: 60
End: 2020-03-11

## 2020-03-11 NOTE — TELEPHONE ENCOUNTER
----- Message from Barbara Burroughs sent at 3/11/2020 10:54 AM CDT -----  Contact[facility]  Type: Needs Medical Advice    Who Called:   Alexi  Symptoms (please be specific):    How long has patient had these symptoms:   Pharmacy [facility] phone [facility]  Best Call Back Number: [phone]  Additional Information: Patient is currently at [facility] pharmacy, pt was advised to get a vaccine. The patient doesn't remember the name of the vaccine. Pharmacist called asking for the name of the vaccine.,please advise.

## 2020-03-24 ENCOUNTER — TELEPHONE (OUTPATIENT)
Dept: UROLOGY | Facility: CLINIC | Age: 60
End: 2020-03-24

## 2020-03-24 NOTE — TELEPHONE ENCOUNTER
Attempted to reach pt multiple times via phone. No answer LVM. Informed radiologist contacted me re today's CT imaging, possible findings of bilateral emboli. Recommend pt to report to local ED ASAP.  Also, phoned pt's son, Alexi RogersJr macario. Spoke w/ son. Informed pt to contact his father, recommend pt report to local ED for further evaluation. Pt's son verbalized understanding and agree w/ plan.     Reason for Call:  Other     Detailed comments:  Cookie from [facility] to state that [facility] is not ready for Hospice care yet.  Per Cookie, [facility] is medically ready but not emotionally ready yet.        Phone Number Patient can be reached at: [phone]    Best Time: any    Can we leave a detailed message on this number? YES    Call taken on 1/25/2019 at 9:24 AM by Brandy Damon

## 2020-03-24 NOTE — TELEPHONE ENCOUNTER
Spoke w/ pt's son.  Confirmed that his father (pt) is on his way to ED for further evaluation of possible bilateral PE.

## 2020-03-25 ENCOUNTER — TELEPHONE (OUTPATIENT)
Dept: PULMONOLOGY | Facility: CLINIC | Age: 60
End: 2020-03-25

## 2020-03-25 ENCOUNTER — PATIENT MESSAGE (OUTPATIENT)
Dept: HEMATOLOGY/ONCOLOGY | Facility: CLINIC | Age: 60
End: 2020-03-25

## 2020-03-25 ENCOUNTER — TELEPHONE (OUTPATIENT)
Dept: HEMATOLOGY/ONCOLOGY | Facility: CLINIC | Age: 60
End: 2020-03-25

## 2020-03-25 NOTE — TELEPHONE ENCOUNTER
Spoke to the pt to schedule his pulmonology and help him get on the portal. Pt does not have My chart. He was offered assistance in setting up my chart but deferred stating he wants to wait for his daughter to come home to do the setup.

## 2020-03-25 NOTE — TELEPHONE ENCOUNTER
I called patient phone and needed to leave message. I provided a call back number. My MA spoke with patient to help access portal. However, he would like to wait for his daughter to set up.     I am reaching out to him to ensure he is taking his prescribed anticoagulation medication and discuss symptoms since being d/c'd from ER.     I will attempt to call patient back again today. If he does not call me back.

## 2020-03-25 NOTE — TELEPHONE ENCOUNTER
Spoke with patient. He reports feeling well without dyspnea or feeling of heart racing. He reports no change in his symptoms since ER visitation- yesterday.     He reports has obtained his Xarelto from pharmacy. I discussed that multiple medical offices have been trying to reach him and leaving messages. I made him aware that he needs to return call to hematology/oncology- Dr. Pham. He reports in process to sign up for virtual visitation. Will schedule virtual visitation within 1 week with me.     I discussed symptoms that would warrant emergency services.

## 2020-03-25 NOTE — TELEPHONE ENCOUNTER
Spoke with patient, informed him that I have received his message. Patient states that he has been advised by a physician about this message already. I verbalized to patient that I understand.

## 2020-03-25 NOTE — TELEPHONE ENCOUNTER
----- Message from Danette Aguero sent at 3/25/2020  1:52 PM CDT -----  Contact: Alexi   tel:   [phone]  Caller says he is returning your call.  Pls call again.

## 2020-03-26 ENCOUNTER — PATIENT MESSAGE (OUTPATIENT)
Dept: HEMATOLOGY/ONCOLOGY | Facility: CLINIC | Age: 60
End: 2020-03-26

## 2020-03-27 ENCOUNTER — OFFICE VISIT (OUTPATIENT)
Dept: HEMATOLOGY/ONCOLOGY | Facility: CLINIC | Age: 60
End: 2020-03-27
Payer: COMMERCIAL

## 2020-03-27 DIAGNOSIS — I26.99 OTHER ACUTE PULMONARY EMBOLISM WITHOUT ACUTE COR PULMONALE: ICD-10-CM

## 2020-03-27 DIAGNOSIS — C64.2 CARCINOMA, RENAL CELL, LEFT: Primary | ICD-10-CM

## 2020-03-27 DIAGNOSIS — I10 ESSENTIAL HYPERTENSION: ICD-10-CM

## 2020-03-27 PROCEDURE — 99212 OFFICE O/P EST SF 10 MIN: CPT | Mod: 95,,, | Performed by: INTERNAL MEDICINE

## 2020-03-27 PROCEDURE — 99212 PR OFFICE/OUTPT VISIT, EST, LEVL II, 10-19 MIN: ICD-10-PCS | Mod: 95,,, | Performed by: INTERNAL MEDICINE

## 2020-03-27 NOTE — Clinical Note
- RTC in 12 weeks with CBC, CMP and CT scans prior- set reminder to send premedication for contrast allergy before CT scans

## 2020-03-27 NOTE — PROGRESS NOTES
ONCOLOGY FOLLOW UP VISIT.     The patient location is: home  The chief complaint leading to consultation is: Re-staging for for resected stage III renal cell carcinoma  Visit type: Virtual visit with synchronous audio and video  Total time spent with patient: 10 minutes  Each patient to whom he or she provides medical services by telemedicine is:  (1) informed of the relationship between the physician and patient and the respective role of any other health care provider with respect to management of the patient; and (2) notified that he or she may decline to receive medical services by telemedicine and may withdraw from such care at any time.    Cancer/Stage/TNM:   Cancer Staging  Carcinoma, renal cell, left  Staging form: Kidney, AJCC 8th Edition  - Clinical stage from 9/16/2019: Stage III (cT3a, cN0, cM0) - Unsigned     Interval History:   Today patient reports no new symptoms     ROS:  Review of Systems   Constitutional: Negative for activity change, appetite change, chills, fatigue, fever and unexpected weight change.   HENT: Negative for mouth sores, nosebleeds and sore throat.    Eyes: Negative for visual disturbance.   Respiratory: Positive for cough. Negative for shortness of breath and wheezing.    Cardiovascular: Negative for chest pain, palpitations and leg swelling.   Gastrointestinal: Negative for abdominal distention, abdominal pain, blood in stool and diarrhea.   Endocrine: Negative for cold intolerance and heat intolerance.   Genitourinary: Positive for frequency. Negative for dysuria and flank pain.   Musculoskeletal: Negative for arthralgias, back pain, joint swelling and myalgias.   Skin: Negative for color change, rash and wound.   Neurological: Negative for dizziness, light-headedness and headaches.   Hematological: Negative for adenopathy. Does not bruise/bleed easily.   Psychiatric/Behavioral: The patient is not nervous/anxious.       A complete 12-point review of systems was reviewed and is  negative except as mentioned above.     Past Medical History:   Past Medical History:   Diagnosis Date    Cancer of kidney     Left    Hyperlipidemia     Hypertension         Allergies:   Review of patient's allergies indicates:   Allergen Reactions    Lisinopril Other (See Comments)     Dry cough        Medications:   Current Outpatient Medications   Medication Sig Dispense Refill    amLODIPine (NORVASC) 5 MG tablet Take 0.5 tablets (2.5 mg total) by mouth once daily. 15 tablet 11    pravastatin (PRAVACHOL) 40 MG tablet Take 1 tablet (40 mg total) by mouth every evening. 90 tablet 3    rivaroxaban (XARELTO) 15 mg Tab Take 1 tablet (15 mg total) by mouth daily with dinner or evening meal. for 21 days 21 tablet 0     No current facility-administered medications for this visit.         Physical Exam:   There were no vitals taken for this visit.     ECOG Performance Status: (foot note - ECOG PS provided by Eastern Cooperative Oncology Group) 0 - Asymptomatic    Physical Exam      Labs:   Recent Results (from the past 48 hour(s))   CBC auto differential    Collection Time: 03/26/20  1:33 PM   Result Value Ref Range    WBC 8.20 3.90 - 12.70 K/uL    RBC 5.33 4.60 - 6.20 M/uL    Hemoglobin 14.3 14.0 - 18.0 g/dL    Hematocrit 43.9 40.0 - 54.0 %    Mean Corpuscular Volume 82 82 - 98 fL    Mean Corpuscular Hemoglobin 26.8 (L) 27.0 - 31.0 pg    Mean Corpuscular Hemoglobin Conc 32.5 32.0 - 36.0 g/dL    RDW 16.9 (H) 11.5 - 14.5 %    Platelets 283 150 - 350 K/uL    MPV 7.0 (L) 9.2 - 12.9 fL    Gran # (ANC) 6.0 1.8 - 7.7 K/uL    Lymph # 1.3 1.0 - 4.8 K/uL    Mono # 0.5 0.3 - 1.0 K/uL    Eos # 0.4 0.0 - 0.5 K/uL    Baso # 0.00 0.00 - 0.20 K/uL    Gran% 72.7 38.0 - 73.0 %    Lymph% 15.9 (L) 18.0 - 48.0 %    Mono% 6.3 4.0 - 15.0 %    Eosinophil% 4.9 0.0 - 8.0 %    Basophil% 0.2 0.0 - 1.9 %    Differential Method Automated    Comprehensive metabolic panel    Collection Time: 03/26/20  1:33 PM   Result Value Ref Range    Sodium  137 136 - 145 mmol/L    Potassium 4.2 3.5 - 5.1 mmol/L    Chloride 99 (L) 101 - 111 mmol/L    CO2 27 23 - 29 mmol/L    Glucose 103 74 - 118 mg/dL    BUN, Bld 17 8 - 23 mg/dL    Creatinine 1.3 0.5 - 1.4 mg/dL    Calcium 9.6 8.6 - 10.0 mg/dL    Total Protein 8.1 6.0 - 8.4 g/dL    Albumin 4.3 3.5 - 5.2 g/dL    Total Bilirubin 0.6 0.3 - 1.2 mg/dL    Alkaline Phosphatase 71 38 - 126 U/L    AST 18 15 - 41 U/L    ALT 18 17 - 63 U/L    Anion Gap 11 8 - 16 mmol/L    eGFR if African American >60.0 >60 mL/min/1.73 m^2    eGFR if non  58.5 (A) >60 mL/min/1.73 m^2   Lactate dehydrogenase    Collection Time: 03/26/20  1:33 PM   Result Value Ref Range     84 - 195 U/L        Imaging: I have personally reviewed patient's CT scans imaging showing TIMUR, but new PE and IVC thrombus found.  CT Abdomen Pelvis W Wo Contrast  Narrative: EXAMINATION:  CT ABDOMEN PELVIS W WO CONTRAST    CLINICAL HISTORY:  L RCC;Malignant neoplasm of left kidney, except renal pelvis    TECHNIQUE:  Low dose axial images, sagittal and coronal reformations were obtained from the lung bases to the pubic symphysis before and following the IV administration of 75 mL of Omnipaque 350.  Oral contrast is not given    COMPARISON:  MRI abdomen 10/08/2019    FINDINGS:  Lung bases are clear.  There is no pleural or pericardial effusion.    Noncontrast images show a dependent gallstone.  The gallbladder is not distended.  Metallic clips are identified within the left renal fossa associated with a large 6 x 10 cm fluid attenuated structure likely representing a postoperative seroma in this patient who is status post left nephrectomy.    After IV contrast is given there is no abnormal enhancement surrounding this fluid collection nor is there intraluminal gas or findings to suggest this represent an abscess.    There is homogeneous attenuation of the liver, spleen, and pancreas.  The adrenal glands are unremarkable.  The right kidney shows no mass or  hydronephrosis.    Stomach is not distended.  There is no bowel dilatation or obstructing process.  There is no free air or free fluid within the abdomen or pelvis.  The urinary bladder is unremarkable.    The aorta is of normal caliber.  There is no Yahaira aortic lymphadenopathy.  There is a tubular filling defect within the IVC extending from the right renal vein into the left common iliac vein.  Impression: 1. Status post left nephrectomy with subsequent fluid collection in the left renal fossa likely representing a large seroma.  Less likely an inflammatory/infectious process.  2. No significant solid organ abnormality.  3. Cholelithiasis without biliary dilatation.  4. IVC thrombus as described.  These findings were discussed with Dr. Lemus    Electronically signed by: Angel Corcoran  Date:    03/24/2020  Time:    11:19  CT Chest W Wo Contrast  Narrative: EXAMINATION:  CT CHEST W WO CONTRAST    CLINICAL HISTORY:  L RCC;  Malignant neoplasm of left kidney, except renal pelvis    TECHNIQUE:  Pre and post contrasted axial images are obtained from lung apices to the hemidiaphragm.  75 cc IV contrast given.    COMPARISON:  09/16/2019    FINDINGS:  Lungs are clear.  There is no consolidation, pleural effusion, or pneumothorax.  No evidence for lung mass or pulmonary nodule.  The tracheobronchial tree is unremarkable.    Heart is of normal size.  There is no pericardial effusion.  Coronary artery calcifications are present.  There is no evidence for mediastinal mass or lymphadenopathy.  Note is made of tubular filling defects within the left lower lobe pulmonary artery as well as right upper and middle and lower lobe pulmonary lobar branches.  Impression: 1. Findings consistent with pulmonary embolism bilaterally as described.  2. No acute lung parenchymal abnormality or mediastinal lymphadenopathy.  Findings are relayed to    Electronically signed by: Angel Corcoran  Date:    03/24/2020  Time:    11:11            Diagnoses:        1. Carcinoma, renal cell, left    2. Other acute pulmonary embolism without acute cor pulmonale    3. Essential hypertension          Assessment and Plan:         1. Stage III RCC:   -Sites of Disease: L kidney, L renal vein   Discussed with patient that cervical lesion is unlikely metastasis from RCC, but a repeat MRI is important to ensure stability before proceeding with RCC treatment.  Now s/p nephrectomy. Controversial benefit with adjuvant Sutent and significant side effects.  Continue surveillance, CT CAP q 3 month x 1 year, then q 4 month x 1 year, then q 6 month.     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  No comment on enhancement to suggest tumor thrombus.  On Xarelto.       3. HTN  Well controlled on current regimen      10 minutes were spent face to face with the patient and his family to discuss the disease, natural history, treatment options and survival statistics. Greater than 50% of this time involved counseling or coordination of care. I have provided the patient with an opportunity to ask questions and have all questions answered to his satisfaction.     he will return to clinic in 3 months, but knows to call in the interim if symptoms change or should a problem arise.    Acosta Pham MD  Medical Oncology [facility]

## 2020-05-05 ENCOUNTER — PATIENT MESSAGE (OUTPATIENT)
Dept: ADMINISTRATIVE | Facility: HOSPITAL | Age: 60
End: 2020-05-05

## 2020-05-11 ENCOUNTER — TELEPHONE (OUTPATIENT)
Dept: NEUROSURGERY | Facility: CLINIC | Age: 60
End: 2020-05-11

## 2020-05-11 NOTE — TELEPHONE ENCOUNTER
Pt requested to push his MRI and VV a couple of weeks due to family commitments at this time.  MRI rescheduled to [facility] on 06.05.2020 and his VV w/Dr. Lebron is scheduled for 06.15.2020.  Denies any discomfort or change in symptoms at this time.  Appt letters in the mail, V/U.

## 2020-06-14 ENCOUNTER — PATIENT OUTREACH (OUTPATIENT)
Dept: ADMINISTRATIVE | Facility: OTHER | Age: 60
End: 2020-06-14

## 2020-06-15 ENCOUNTER — OFFICE VISIT (OUTPATIENT)
Dept: SPINE | Facility: CLINIC | Age: 60
End: 2020-06-15
Payer: MEDICAID

## 2020-06-15 DIAGNOSIS — D32.1 MENINGIOMA, RECURRENT OF SPINE: Primary | ICD-10-CM

## 2020-06-15 PROCEDURE — 99212 OFFICE O/P EST SF 10 MIN: CPT | Mod: 95,,, | Performed by: NEUROLOGICAL SURGERY

## 2020-06-15 PROCEDURE — 99212 PR OFFICE/OUTPT VISIT, EST, LEVL II, 10-19 MIN: ICD-10-PCS | Mod: 95,,, | Performed by: NEUROLOGICAL SURGERY

## 2020-06-15 NOTE — PROGRESS NOTES
CHIEF COMPLAINT:  Follow up with new imaging    The patient location is: Patient Home  The chief complaint leading to consultation is: Follow up with imaging    Visit type: audiovisual    Face to Face time with patient: 6 minutes   11 minutes of total time spent on the encounter, which includes face to face time and non-face to face time preparing to see the patient (eg, review of tests), Obtaining and/or reviewing separately obtained history, Documenting clinical information in the electronic or other health record, Independently interpreting results (not separately reported) and communicating results to the patient/family/caregiver, or Care coordination (not separately reported).     Each patient to whom he or she provides medical services by telemedicine is:  (1) informed of the relationship between the physician and patient and the respective role of any other health care provider with respect to management of the patient; and (2) notified that he or she may decline to receive medical services by telemedicine and may withdraw from such care at any time.    I, Dieter Roa, attest that this documentation has been prepared under the direction and in the presence of Eugenio Lebron MD.    HPI:  Alexi Noguera is a 60 y.o.  male asymptomatic male with HLD and HTN, who presents today for follow up evaluation of a cervical mass with new imaging. Pt reports he is doing well. He received a nephrectomy in December 2019. Pt was recently given anticoagulant medication for a pulmonary embolism. Pt denies any weakness of the arms or legs. He does not note any shooting pain or other neurological symptoms.        Review of patient's allergies indicates:   Allergen Reactions    Lisinopril Other (See Comments)     Dry cough       Past Medical History:   Diagnosis Date    Cancer of kidney     Left    Hyperlipidemia     Hypertension      Past Surgical History:   Procedure Laterality Date    LYSIS OF ADHESIONS N/A 12/9/2019     Procedure: LYSIS, ADHESIONS;  Surgeon: Tr Jorgensen MD;  Location: [facility] OR [address] [place] [address];  Service: General;  Laterality: N/A;    NEPHRECTOMY Left 12/9/2019    Procedure: Nephrectomy OPEN;  Surgeon: José Manuel Buchanan MD;  [facility];  Service: Urology;  Laterality: Left;  4hrs GEN WITH REGIONAL    THROMBECTOMY OF VENA CAVA  12/9/2019    Procedure: THROMBECTOMY, VENA CAVA;  Surgeon: José Manuel Buchanan MD;  [facility];  Service: Urology;;    THROMBECTOMY OF VENA CAVA  12/9/2019    Procedure: THROMBECTOMY, VENA CAVA;  Surgeon: KIRSTEN Atkins III, MD;  Location: [facility] OR [address] [place] [address];  Service: Peripheral Vascular;;     Family History   Problem Relation Age of Onset    No Known Problems Mother     No Known Problems Father     No Known Problems Sister     Heart disease Brother     Diabetes Paternal Aunt     Heart disease Paternal Uncle      Social History     Tobacco Use    Smoking status: Never Smoker    Smokeless tobacco: Never Used   Substance Use Topics    Alcohol use: No     Alcohol/week: 0.0 standard drinks    Drug use: No        Review of Systems   Constitutional: Negative.    HENT: Negative.    Eyes: Negative.    Respiratory: Negative.    Cardiovascular: Negative.    Gastrointestinal: Negative.    Endocrine: Negative.    Genitourinary: Negative.    Musculoskeletal: Negative for back pain, gait problem and neck pain.   Skin: Negative.    Allergic/Immunologic: Negative.    Neurological: Negative for weakness, light-headedness, numbness and headaches.   Hematological: Negative.    Psychiatric/Behavioral: Negative.        OBJECTIVE:   Vital Signs:       Physical Exam:    Vital signs: All nursing notes and vital signs reviewed -- afebrile, vital signs stable.  Constitutional: Patient sitting comfortably in chair. Appears well developed and well nourished.  Skin: Exposed areas are intact without abnormal markings, rashes or other lesions.  HEENT: Normocephalic. Normal  conjunctivae.  Cardiovascular: Normal rate and regular rhythm.  Respiratory: Chest wall rises and falls symmetrically, without signs of respiratory distress.  Abdomen: Soft and non-tender.  Extremities: Warm and without edema. Calves supple, non-tender.  Psych/Behavior: Normal affect.    Neurological:    Mental status: Alert and oriented. Conversational and appropriate.       Cranial Nerves: Grossly intact.       Diagnostic Results:  All imaging was independently reviewed by me.    CT Chest Abdomen Pelvis, dated 06/15/2020:  1. Resolving right PE    MRI C-spine, dated 6/5/2020:  1. Stable intradural extramedullary mass at C2     ASSESSMENT/PLAN:     Alexi Noguera has a stable asymptomatic C2 intradural meningioma. At this point I recommend another MRI in 2 years to assess for growth.    The patient understands and agrees with the plan of care. All questions were answered.     1. RTC 2 years with MRI C-spine       I, Dr. Eugenio Lebron personally performed the services described in this documentation. All medical record entries made by the scribe, Dieter Rao, were at my direction and in my presence.  I have reviewed the chart and agree that the record reflects my personal performance and is accurate and complete.      Eugenio Lebron M.D.  Department of Neurosurgery  [facility]      .

## 2020-06-15 NOTE — PROGRESS NOTES
Chart reviewed.   Immunizations: Triggered Imm Registry     Orders placed: n/a  Upcoming appts to satisfy DUARTE topics: n/a

## 2020-07-14 ENCOUNTER — OFFICE VISIT (OUTPATIENT)
Dept: HEMATOLOGY/ONCOLOGY | Facility: CLINIC | Age: 60
End: 2020-07-14
Payer: COMMERCIAL

## 2020-07-14 DIAGNOSIS — D32.1 MENINGIOMA, RECURRENT OF SPINE: ICD-10-CM

## 2020-07-14 DIAGNOSIS — I10 ESSENTIAL HYPERTENSION: ICD-10-CM

## 2020-07-14 DIAGNOSIS — C64.2 CARCINOMA, RENAL CELL, LEFT: Primary | ICD-10-CM

## 2020-07-14 DIAGNOSIS — I26.99 OTHER ACUTE PULMONARY EMBOLISM WITHOUT ACUTE COR PULMONALE: ICD-10-CM

## 2020-07-14 PROCEDURE — 99213 PR OFFICE/OUTPT VISIT, EST, LEVL III, 20-29 MIN: ICD-10-PCS | Mod: 95,,, | Performed by: INTERNAL MEDICINE

## 2020-07-14 PROCEDURE — 99213 OFFICE O/P EST LOW 20 MIN: CPT | Mod: 95,,, | Performed by: INTERNAL MEDICINE

## 2020-07-14 NOTE — PROGRESS NOTES
ONCOLOGY FOLLOW UP VISIT.     The patient location is: home  The chief complaint leading to consultation is: Re-staging for for resected stage III renal cell carcinoma  Visit type: Virtual visit with synchronous audio and video  Total time spent with patient: 15 minutes  Each patient to whom he or she provides medical services by telemedicine is:  (1) informed of the relationship between the physician and patient and the respective role of any other health care provider with respect to management of the patient; and (2) notified that he or she may decline to receive medical services by telemedicine and may withdraw from such care at any time.    Cancer/Stage/TNM:   Cancer Staging  Carcinoma, renal cell, left  Staging form: Kidney, AJCC 8th Edition  - Clinical stage from 9/16/2019: Stage III (cT3a, cN0, cM0) - Unsigned     Interval History:   Today patient reports no new symptoms     ROS:  Review of Systems   Constitutional: Negative for activity change, appetite change, chills, fatigue, fever and unexpected weight change.   HENT: Negative for mouth sores, nosebleeds and sore throat.    Eyes: Negative for visual disturbance.   Respiratory: Negative for cough, shortness of breath and wheezing.    Cardiovascular: Negative for chest pain, palpitations and leg swelling.   Gastrointestinal: Negative for abdominal distention, abdominal pain, blood in stool and diarrhea.   Endocrine: Negative for cold intolerance and heat intolerance.   Genitourinary: Positive for frequency. Negative for dysuria and flank pain.   Musculoskeletal: Negative for arthralgias, back pain, joint swelling and myalgias.   Skin: Negative for color change, rash and wound.   Neurological: Negative for dizziness, light-headedness and headaches.   Hematological: Negative for adenopathy. Does not bruise/bleed easily.   Psychiatric/Behavioral: The patient is not nervous/anxious.       A complete 12-point review of systems was reviewed and is negative  except as mentioned above.     Past Medical History:   Past Medical History:   Diagnosis Date    Cancer of kidney     Left    Hyperlipidemia     Hypertension         Allergies:   Review of patient's allergies indicates:   Allergen Reactions    Lisinopril Other (See Comments)     Dry cough        Medications:   Current Outpatient Medications   Medication Sig Dispense Refill    amLODIPine (NORVASC) 5 MG tablet Take 0.5 tablets (2.5 mg total) by mouth once daily. 15 tablet 11    pravastatin (PRAVACHOL) 40 MG tablet Take 1 tablet (40 mg total) by mouth every evening. 90 tablet 3    rivaroxaban (XARELTO) 20 mg Tab Take 1 tablet (20 mg total) by mouth daily with dinner or evening meal. 30 tablet 5     No current facility-administered medications for this visit.         Physical Exam:   There were no vitals taken for this visit.     ECOG Performance Status: (foot note - ECOG PS provided by Eastern Cooperative Oncology Group) 0 - Asymptomatic    Physical Exam      Labs:   No results found for this or any previous visit (from the past 48 hour(s)).     Imaging: I have personally reviewed patient's CT scans imaging showing TIMUR, but new PE and IVC thrombus found.  CT Chest Abdomen Pelvis With Contrast  Narrative: EXAMINATION:  CT CHEST ABDOMEN PELVIS WITH CONTRAST (XPD)    CLINICAL HISTORY:  Re-staging stage III RCC s/p nephrectomy; Malignant neoplasm of left kidney, except renal pelvis    TECHNIQUE:  Low dose axial images, sagittal and coronal reformations were obtained from the thoracic inlet to the pubic symphysis following the IV administration of 100 mL of Omnipaque 350 .  No oral contrast was administered.    COMPARISON:  03/24/2020.    FINDINGS:  The heart is not enlarged.  There is a small amount of atherosclerotic calcification present within the coronary arteries.  The thoracic aorta is normal in caliber and there is no evidence for thoracic aortic aneurysm or aortic dissection.  No hilar or mediastinal  adenopathy is identified.  There is a small intraluminal filling defect within the right lower lobe pulmonary artery consistent with a pulmonary embolus, however this is markedly decreased in volume when compared to the prior examination dated 03/24/2020 and likely represents resolving thrombus.  There is a 3 mm pulmonary nodule within the right lung apex (image 15, series 3) a 2-3 mm subpleural nodule within the left upper lobe anteriorly (image 42, series 3) and a 3 mm pulmonary nodule within the left upper lobe laterally (image 43, series 3).  These are all unchanged dating back to 09/16/2019 and likely represent small granulomas.  No new pulmonary nodules are identified.  There is no evidence for pneumothorax or pleural effusions.  The bones are intact without evidence for acute fracture or bone destruction.    The liver is normal in size and is homogeneous in density with no focal liver lesions identified.  The gallbladder is present and there is a calcified gallstone present.  There is no evidence for intrahepatic or extrahepatic biliary dilatation.  The spleen, stomach, and pancreas appear grossly unremarkable.  The adrenal glands are not enlarged.  The right kidney is normal in size, position, and contour and there is no evidence for abnormal renal masses or hydronephrosis.  The patient is status post left nephrectomy.  Once again, a fluid collection is identified within the left renal fossa likely representing a postoperative seroma and this has decreased in size when compared to the prior study now measuring approximately 4.6 x 2.8 cm in transverse dimensions (image 38, series 6) compared to 7.3 x 6.3 cm on the prior study.  There is no convincing evidence for local recurrence.  The abdominal aorta tapers normally without aneurysmal dilatation.  No para-aortic lymphadenopathy is identified.  The previously noted IVC thrombus is not appreciated on today's examination.  The appendix is present and appears  unremarkable.  There are no dilated loops of bowel evident.  The prostate gland is prominent in size.  The urinary bladder is unremarkable.  There are bilateral fat containing inguinal hernias, larger on the left than on the right.  There is no evidence for pelvic or inguinal lymphadenopathy.  No ascites is identified.  Impression: Small intraluminal filling defect within a right lower lobe pulmonary artery consistent with pulmonary embolus, however this is markedly decreased in size when compared to the prior examination and likely represents resolving pulmonary emboli.  Previously identified IVC thrombus is no longer identified.    Postsurgical changes related to left nephrectomy with decreasing size of fluid collection in the left renal fossa likely representing a shrinking postoperative seroma.    Pulmonary micro nodules are unchanged dating back to 09/16/2019 and likely represent noncalcified granulomas.    Cholelithiasis.    Bilateral fat containing inguinal hernias, left greater than right.    Enlarged prostate gland indenting the base of the urinary bladder.    Electronically signed by: Pablo Lacy MD  Date:    06/15/2020  Time:    11:13            Diagnoses:       1. Carcinoma, renal cell, left    2. Other acute pulmonary embolism without acute cor pulmonale    3. Essential hypertension    4. Meningioma, recurrent of spine          Assessment and Plan:         1,4. Stage III RCC:   -Sites of Disease: L kidney, L renal vein   Discussed with patient that cervical lesion is unlikely metastasis from RCC. Repeat MRI showed stability. Now s/p nephrectomy. Controversial benefit with adjuvant Sutent and significant side effects.  Continue surveillance, CT CAP q 3 month x 1 year, then q 4 month x 1 year, then q 6 month.     2. VTE  Bilateral PE and IVC thrombus identified incidentally on re-staging scans.  No comment on enhancement to suggest tumor thrombus.  On Xarelto. Thrombus improving on last  scans     3. HTN  Well controlled on current regimen      15 minutes were spent face to face with the patient and his family to discuss the disease, natural history, treatment options and survival statistics. Greater than 50% of this time involved counseling or coordination of care. I have provided the patient with an opportunity to ask questions and have all questions answered to his satisfaction.     he will return to clinic in 3 months, but knows to call in the interim if symptoms change or should a problem arise.    Acosta Pham MD  Medical Oncology [facility]

## 2020-08-29 ENCOUNTER — LAB VISIT (OUTPATIENT)
Dept: LAB | Facility: HOSPITAL | Age: 60
End: 2020-08-29
Attending: INTERNAL MEDICINE
Payer: COMMERCIAL

## 2020-08-29 DIAGNOSIS — I10 ESSENTIAL HYPERTENSION: ICD-10-CM

## 2020-08-29 DIAGNOSIS — E78.5 HYPERLIPIDEMIA, UNSPECIFIED HYPERLIPIDEMIA TYPE: ICD-10-CM

## 2020-08-29 LAB
ALBUMIN SERPL BCP-MCNC: 4.1 G/DL (ref 3.5–5.2)
ALP SERPL-CCNC: 66 U/L (ref 55–135)
ALT SERPL W/O P-5'-P-CCNC: 19 U/L (ref 10–44)
ANION GAP SERPL CALC-SCNC: 7 MMOL/L (ref 8–16)
AST SERPL-CCNC: 19 U/L (ref 10–40)
BILIRUB SERPL-MCNC: 0.5 MG/DL (ref 0.1–1)
BUN SERPL-MCNC: 23 MG/DL (ref 8–23)
CALCIUM SERPL-MCNC: 9.6 MG/DL (ref 8.7–10.5)
CHLORIDE SERPL-SCNC: 104 MMOL/L (ref 95–110)
CHOLEST SERPL-MCNC: 214 MG/DL (ref 120–199)
CHOLEST/HDLC SERPL: 5.5 {RATIO} (ref 2–5)
CO2 SERPL-SCNC: 27 MMOL/L (ref 23–29)
CREAT SERPL-MCNC: 1.5 MG/DL (ref 0.5–1.4)
EST. GFR  (AFRICAN AMERICAN): 56.9 ML/MIN/1.73 M^2
EST. GFR  (NON AFRICAN AMERICAN): 49.2 ML/MIN/1.73 M^2
GLUCOSE SERPL-MCNC: 93 MG/DL (ref 70–110)
HDLC SERPL-MCNC: 39 MG/DL (ref 40–75)
HDLC SERPL: 18.2 % (ref 20–50)
LDLC SERPL CALC-MCNC: 136.8 MG/DL (ref 63–159)
NONHDLC SERPL-MCNC: 175 MG/DL
POTASSIUM SERPL-SCNC: 5 MMOL/L (ref 3.5–5.1)
PROT SERPL-MCNC: 8.1 G/DL (ref 6–8.4)
SODIUM SERPL-SCNC: 138 MMOL/L (ref 136–145)
TRIGL SERPL-MCNC: 191 MG/DL (ref 30–150)

## 2020-08-29 PROCEDURE — 80061 LIPID PANEL: CPT

## 2020-08-29 PROCEDURE — 80053 COMPREHEN METABOLIC PANEL: CPT

## 2020-08-29 PROCEDURE — 36415 COLL VENOUS BLD VENIPUNCTURE: CPT | Mod: PO

## 2020-08-31 ENCOUNTER — PATIENT MESSAGE (OUTPATIENT)
Dept: FAMILY MEDICINE | Facility: CLINIC | Age: 60
End: 2020-08-31

## 2020-08-31 DIAGNOSIS — I10 ESSENTIAL HYPERTENSION: ICD-10-CM

## 2020-08-31 DIAGNOSIS — E78.5 HYPERLIPIDEMIA, UNSPECIFIED HYPERLIPIDEMIA TYPE: ICD-10-CM

## 2020-08-31 RX ORDER — AMLODIPINE BESYLATE 5 MG/1
2.5 TABLET ORAL DAILY
Qty: 15 TABLET | Refills: 11
Start: 2020-08-31 | End: 2020-09-04 | Stop reason: SDUPTHER

## 2020-08-31 RX ORDER — PRAVASTATIN SODIUM 40 MG/1
40 TABLET ORAL NIGHTLY
Qty: 90 TABLET | Refills: 3
Start: 2020-08-31 | End: 2020-09-04 | Stop reason: SDUPTHER

## 2020-09-04 ENCOUNTER — OFFICE VISIT (OUTPATIENT)
Dept: FAMILY MEDICINE | Facility: CLINIC | Age: 60
End: 2020-09-04
Payer: COMMERCIAL

## 2020-09-04 VITALS
TEMPERATURE: 99 F | SYSTOLIC BLOOD PRESSURE: 130 MMHG | HEART RATE: 73 BPM | WEIGHT: 259.06 LBS | DIASTOLIC BLOOD PRESSURE: 88 MMHG | RESPIRATION RATE: 16 BRPM | HEIGHT: 73 IN | BODY MASS INDEX: 34.33 KG/M2 | OXYGEN SATURATION: 98 %

## 2020-09-04 DIAGNOSIS — Z79.01 ANTICOAGULATED: ICD-10-CM

## 2020-09-04 DIAGNOSIS — R63.5 WEIGHT GAIN: ICD-10-CM

## 2020-09-04 DIAGNOSIS — E78.5 HYPERLIPIDEMIA, UNSPECIFIED HYPERLIPIDEMIA TYPE: ICD-10-CM

## 2020-09-04 DIAGNOSIS — I10 ESSENTIAL HYPERTENSION: Primary | ICD-10-CM

## 2020-09-04 DIAGNOSIS — I26.99 OTHER ACUTE PULMONARY EMBOLISM WITHOUT ACUTE COR PULMONALE: ICD-10-CM

## 2020-09-04 PROCEDURE — 3008F BODY MASS INDEX DOCD: CPT | Mod: CPTII,S$GLB,, | Performed by: INTERNAL MEDICINE

## 2020-09-04 PROCEDURE — 3079F PR MOST RECENT DIASTOLIC BLOOD PRESSURE 80-89 MM HG: ICD-10-PCS | Mod: CPTII,S$GLB,, | Performed by: INTERNAL MEDICINE

## 2020-09-04 PROCEDURE — 3079F DIAST BP 80-89 MM HG: CPT | Mod: CPTII,S$GLB,, | Performed by: INTERNAL MEDICINE

## 2020-09-04 PROCEDURE — 3075F SYST BP GE 130 - 139MM HG: CPT | Mod: CPTII,S$GLB,, | Performed by: INTERNAL MEDICINE

## 2020-09-04 PROCEDURE — 99214 OFFICE O/P EST MOD 30 MIN: CPT | Mod: S$GLB,,, | Performed by: INTERNAL MEDICINE

## 2020-09-04 PROCEDURE — 3008F PR BODY MASS INDEX (BMI) DOCUMENTED: ICD-10-PCS | Mod: CPTII,S$GLB,, | Performed by: INTERNAL MEDICINE

## 2020-09-04 PROCEDURE — 3075F PR MOST RECENT SYSTOLIC BLOOD PRESS GE 130-139MM HG: ICD-10-PCS | Mod: CPTII,S$GLB,, | Performed by: INTERNAL MEDICINE

## 2020-09-04 PROCEDURE — 99214 PR OFFICE/OUTPT VISIT, EST, LEVL IV, 30-39 MIN: ICD-10-PCS | Mod: S$GLB,,, | Performed by: INTERNAL MEDICINE

## 2020-09-04 RX ORDER — PRAVASTATIN SODIUM 40 MG/1
40 TABLET ORAL NIGHTLY
Qty: 90 TABLET | Refills: 1 | Status: SHIPPED | OUTPATIENT
Start: 2020-09-04 | End: 2021-02-25

## 2020-09-04 RX ORDER — AMLODIPINE BESYLATE 2.5 MG/1
2.5 TABLET ORAL DAILY
Qty: 90 TABLET | Refills: 1 | Status: SHIPPED | OUTPATIENT
Start: 2020-09-04 | End: 2021-02-24

## 2020-09-04 NOTE — PROGRESS NOTES
Subjective:      5:00 PM     Patient ID: Alexi Noguera is a 60 y.o. male.    Chief Complaint: Hypertension (labs,refills)    HPI   History of pulmonary embolism on Xarelto.  He is not having any bleeding.        CHIEF COMPLAINT: Hypertension  HPI:     ONSET:      QUALITY/COURSE:   Unchanged.     INTENSITY/SEVERITY:  Average blood pressure is unknown.      MODIFIERS/TREATMENTS:  Taking medications: yes. .High sodium intake: no. alcohol: no      The following symptoms are positive only if BOLDED, otherwise are negative.      SYMPTOMS/RELATED: Possible medication side effects include:   Depression..  . Cough. . Constipation.    REVIEW OF SYMPTOMS: . Weight_loss . Weight_gain up 20 lb. Leg_cramps .Potency_problems .    TARGET ORGAN DAMAGE:: angina/ prior myocardial infarction, chronic kidney disease, heart failure, left ventricular hypertrophy, peripheral artery disease, prior coronary revascularization, retinopathy, stroke. transient ischemic attack.        CHIEF COMPLAINT: Hyperlipidemia. cholesterol screening: no.   HPI:     ONSET:    MODIFIERS/TREATMENTS: . Taking medications:  Has been out of it for 3 weeks. . Non-compliance with following diet: no. .     SYMPTOMS/RELATED:Possible medication side effects include:   Myalgia: no.  .     REVIEW OF SYMPTOMS: past weights:   Wt Readings from Last 1 Encounters:   09/04/20 1636 117.5 kg (259 lb 0.7 oz)                                                     Last lipids: total   Lab Results   Component Value Date    CHOL 214 (H) 08/29/2020    CHOL 162 02/26/2020    CHOL 139 04/27/2019                                                                     HDL   Lab Results   Component Value Date    HDL 39 (L) 08/29/2020    HDL 40 02/26/2020    HDL 38 (L) 04/27/2019                                                                     LDL   Lab Results   Component Value Date    LDLCALC 136.8 08/29/2020    LDLCALC 89.0 02/26/2020    LDLCALC 85.0 04/27/2019                              "                                        TRIG   Lab Results   Component Value Date    TRIG 191 (H) 08/29/2020    TRIG 165 (H) 02/26/2020    TRIG 80 04/27/2019                                                                         Review of Systems      Objective:      Vitals:    09/04/20 1636   BP: 130/88   Pulse: 73   Resp: 16   Temp: 99.2 °F (37.3 °C)   TempSrc: Oral   SpO2: 98%   Weight: 117.5 kg (259 lb 0.7 oz)   Height: 6' 1" (1.854 m)   PainSc: 0-No pain     Physical Exam  Vitals signs and nursing note reviewed.   Constitutional:       Appearance: He is well-developed.   Cardiovascular:      Rate and Rhythm: Normal rate and regular rhythm.      Heart sounds: Normal heart sounds.   Pulmonary:      Effort: Pulmonary effort is normal.      Breath sounds: Normal breath sounds.   Abdominal:      Palpations: Abdomen is soft.      Tenderness: There is no abdominal tenderness.   Neurological:      Mental Status: He is alert.   Psychiatric:         Behavior: Behavior normal.         Thought Content: Thought content normal.       Recent Results (from the past 1008 hour(s))   Comprehensive metabolic panel    Collection Time: 08/29/20  9:07 AM   Result Value Ref Range    Sodium 138 136 - 145 mmol/L    Potassium 5.0 3.5 - 5.1 mmol/L    Chloride 104 95 - 110 mmol/L    CO2 27 23 - 29 mmol/L    Glucose 93 70 - 110 mg/dL    BUN, Bld 23 8 - 23 mg/dL    Creatinine 1.5 (H) 0.5 - 1.4 mg/dL    Calcium 9.6 8.7 - 10.5 mg/dL    Total Protein 8.1 6.0 - 8.4 g/dL    Albumin 4.1 3.5 - 5.2 g/dL    Total Bilirubin 0.5 0.1 - 1.0 mg/dL    Alkaline Phosphatase 66 55 - 135 U/L    AST 19 10 - 40 U/L    ALT 19 10 - 44 U/L    Anion Gap 7 (L) 8 - 16 mmol/L    eGFR if African American 56.9 (A) >60 mL/min/1.73 m^2    eGFR if non  49.2 (A) >60 mL/min/1.73 m^2   Lipid panel    Collection Time: 08/29/20  9:07 AM   Result Value Ref Range    Cholesterol 214 (H) 120 - 199 mg/dL    Triglycerides 191 (H) 30 - 150 mg/dL    HDL 39 (L) 40 - 75 " mg/dL    LDL Cholesterol 136.8 63.0 - 159.0 mg/dL    Hdl/Cholesterol Ratio 18.2 (L) 20.0 - 50.0 %    Total Cholesterol/HDL Ratio 5.5 (H) 2.0 - 5.0    Non-HDL Cholesterol 175 mg/dL          Assessment:       No diagnosis found.      Plan:       There are no diagnoses linked to this encounter.  No follow-ups on file.

## 2020-09-04 NOTE — PATIENT INSTRUCTIONS
Lose 5 pounds.  Suggest [place] diet      Avoid  alcohol  Low-Salt Diet  This diet removes foods that are high in salt. It also limits the amount of salt you use when cooking. It is most often used for people with high blood pressure, edema (fluid retention), and kidney, liver, or heart disease.  Table salt contains the mineral sodium. Your body needs sodium to work normally. But too much sodium can make your health problems worse. Your healthcare provider is recommending a low-salt (also called low-sodium) diet for you. Your total daily allowance of salt is 1,500 to 2,300 milligrams (mg). It is less than 1 teaspoon of table salt. This means you can have only about 500 to 700 mg of sodium at each meal. People with certain health problems should limit salt intake to the lower end of the recommended range.    When you cook, don´t add much salt. If you can cook without using salt, even better. Don´t add salt to your food at the table.  When shopping, read food labels. Salt is often called sodium on the label. Choose foods that are salt-free, low salt, or very low salt. Note that foods with reduced salt may not lower your salt intake enough.    Beans, potatoes, and pasta  Ok: Dry beans, split peas, lentils, potatoes, rice, macaroni, pasta, spaghetti without added salt  Avoid: Potato chips, tortilla chips, and similar products  Breads and cereals  Ok: Low-sodium breads, rolls, cereals, and cakes; low-salt crackers, matzo crackers  Avoid: Salted crackers, pretzels, popcorn, [place] toast, pancakes, muffins  Dairy  Ok: Milk, chocolate milk, hot chocolate mix, low-salt cheeses, and yogurt  Avoid: Processed cheese and cheese spreads; Roquefort, Camembert, and cottage cheese; buttermilk, instant breakfast drink  Desserts  Ok: Ice cream, frozen yogurt, juice bars, gelatin, cookies and pies, sugar, honey, jelly, hard candy  Avoid: Most pies, cakes and cookies prepared or processed with salt; instant pudding  Drinks  Ok: Tea,  coffee, fizzy (carbonated) drinks, juices  Avoid: Flavored coffees, electrolyte replacement drinks, sports drinks  Meats  Ok: All fresh meat, fish, poultry, low-salt tuna, eggs, egg substitute  Avoid: Smoked, pickled, brine-cured, or salted meats and fish. This includes kelly, chipped beef, corned beef, hot dogs, deli meats, ham, kosher meats, salt pork, sausage, canned tuna, salted codfish, smoked salmon, herring, sardines, or anchovies.  Seasonings and spices  Ok: Most seasonings are okay. Good substitutes for salt include: fresh herb blends, hot sauce, lemon, garlic, rose, vinegar, dry mustard, parsley, cilantro, horseradish, tomato paste, regular margarine, mayonnaise, unsalted butter, cream cheese, vegetable oil, cream, low-salt salad dressing and gravy.  Avoid: Regular ketchup, relishes, pickles, soy sauce, teriyaki sauce, Worcestershire sauce, BBQ sauce, tartar sauce, meat tenderizer, chili sauce, regular gravy, regular salad dressing, salted butter  Soups  Ok: Low-salt soups and broths made with allowed foods  Avoid: Bouillon cubes, soups with smoked or salted meats, regular soup and broth  Vegetables  Ok: Most vegetables are okay; also low-salt tomato and vegetable juices  Avoid: Sauerkraut and other brine-soaked vegetables; pickles and other pickled vegetables; tomato juice, olives  © 2542-4681 [employer]. [address] [place] [id number]. All rights reserved. This information is not intended as a substitute for professional medical care. Always follow your healthcare professional's instructions.      Thank you for choosing [facility].     Please fill out the patient experience survey.

## 2020-09-17 NOTE — PROGRESS NOTES
ONCOLOGY FOLLOW UP VISIT.     Cancer/Stage/TNM:   Cancer Staging  Carcinoma, renal cell, left  Staging form: Kidney, AJCC 8th Edition  - Clinical stage from 9/16/2019: Stage III (cT3a, cN0, cM0) - Unsigned     Interval History:   Patient returns to clinic today for review of imaging. Today patient reports no new symptoms     ROS:  Review of Systems   Constitutional: Negative for activity change, appetite change, chills, fatigue, fever and unexpected weight change.   HENT: Negative for mouth sores, nosebleeds and sore throat.    Eyes: Negative for visual disturbance.   Respiratory: Negative for cough, shortness of breath and wheezing.    Cardiovascular: Negative for chest pain, palpitations and leg swelling.   Gastrointestinal: Negative for abdominal distention, abdominal pain, blood in stool and diarrhea.   Endocrine: Negative for cold intolerance and heat intolerance.   Genitourinary: Positive for frequency. Negative for dysuria and flank pain.   Musculoskeletal: Negative for arthralgias, back pain, joint swelling and myalgias.   Skin: Negative for color change, rash and wound.   Neurological: Negative for dizziness, light-headedness and headaches.   Hematological: Negative for adenopathy. Does not bruise/bleed easily.   Psychiatric/Behavioral: The patient is not nervous/anxious.       A complete 12-point review of systems was reviewed and is negative except as mentioned above.     Past Medical History:   Past Medical History:   Diagnosis Date    Cancer of kidney     Left    Hyperlipidemia     Hypertension         Allergies:   Review of patient's allergies indicates:   Allergen Reactions    Lisinopril Other (See Comments)     Dry cough        Medications:   Current Outpatient Medications   Medication Sig Dispense Refill    amLODIPine (NORVASC) 2.5 MG tablet Take 1 tablet (2.5 mg total) by mouth once daily. 90 tablet 1    pravastatin (PRAVACHOL) 40 MG tablet Take 1 tablet (40 mg total) by mouth every evening.  90 tablet 1    rivaroxaban (XARELTO) 20 mg Tab Take 1 tablet (20 mg total) by mouth daily with dinner or evening meal. 90 tablet 1     No current facility-administered medications for this visit.         Physical Exam:   There were no vitals taken for this visit.     ECOG Performance Status: (foot note - ECOG PS provided by [employer]) 0 - Asymptomatic    Physical Exam  Vitals signs and nursing note reviewed.   Constitutional:       General: He is awake. He is not in acute distress.     Appearance: Normal appearance. He is well-groomed and normal weight. He is not ill-appearing.   HENT:      Head: Normocephalic and atraumatic.      Jaw: There is normal jaw occlusion.      Right Ear: External ear normal. No decreased hearing noted.      Left Ear: External ear normal. No decreased hearing noted.      Nose: No nasal deformity or nasal tenderness.      Right Nostril: No epistaxis.      Left Nostril: No epistaxis.      Mouth/Throat:      Lips: Pink. No lesions.      Mouth: Mucous membranes are moist. No oral lesions.      Dentition: Normal dentition. Does not have dentures. No dental tenderness or gum lesions.      Tongue: No lesions.      Palate: No lesions.      Pharynx: Oropharynx is clear. Uvula midline. No pharyngeal swelling or posterior oropharyngeal erythema.   Eyes:      General: Lids are normal. Gaze aligned appropriately.         Right eye: No discharge.         Left eye: No discharge.      Extraocular Movements: Extraocular movements intact.      Conjunctiva/sclera: Conjunctivae normal.      Right eye: Right conjunctiva is not injected.      Left eye: Left conjunctiva is not injected.      Pupils: Pupils are equal, round, and reactive to light.   Neck:      Musculoskeletal: Full passive range of motion without pain.      Thyroid: No thyroid mass.      Trachea: Trachea normal. No tracheostomy.   Cardiovascular:      Rate and Rhythm: Normal rate and regular rhythm.      Pulses: Normal  pulses.      Heart sounds: Normal heart sounds. No murmur.   Pulmonary:      Effort: Pulmonary effort is normal.      Breath sounds: Normal breath sounds and air entry.   Chest:      Chest wall: No mass, lacerations, deformity or tenderness.      Breasts: Breasts are symmetrical.     Abdominal:      General: Abdomen is flat. Bowel sounds are normal.      Palpations: Abdomen is soft.      Tenderness: There is no abdominal tenderness.      Hernia: No hernia is present.   Musculoskeletal: Normal range of motion.      Right shoulder: Normal.      Left shoulder: Normal.      Right hip: Normal.      Left hip: Normal.      Right knee: Normal.      Left knee: Normal.      Right upper arm: Normal. He exhibits no edema.      Left upper arm: Normal. He exhibits no edema.      Right lower leg: Normal. No edema.      Left lower leg: Normal. No edema.      Right foot: Normal range of motion.      Left foot: Normal range of motion.   Feet:      Right foot:      Skin integrity: Skin integrity normal. No skin breakdown or dry skin.      Left foot:      Skin integrity: Skin integrity normal. No skin breakdown or dry skin.   Lymphadenopathy:      Head:      Right side of head: No submental, submandibular, tonsillar, preauricular or posterior auricular adenopathy.      Left side of head: No submental, submandibular, tonsillar, preauricular or posterior auricular adenopathy.      Cervical: No cervical adenopathy.      Upper Body:      Right upper body: No supraclavicular adenopathy.      Left upper body: No supraclavicular adenopathy.   Skin:     General: Skin is warm and dry.      Coloration: Skin is not pale.      Findings: No abrasion, bruising, ecchymosis, erythema, lesion, petechiae, rash or wound.   Neurological:      Mental Status: He is alert and oriented to person, place, and time.      Sensory: Sensation is intact.      Motor: Motor function is intact.      Coordination: Coordination is intact.      Gait: Gait is intact.    Psychiatric:         Attention and Perception: Attention and perception normal.         Mood and Affect: Mood and affect normal.         Speech: Speech normal.         Behavior: Behavior normal. Behavior is cooperative.         Thought Content: Thought content normal.         Judgment: Judgment normal.           Labs:   Recent Results (from the past 48 hour(s))   CBC auto differential    Collection Time: 09/16/20  8:20 AM   Result Value Ref Range    WBC 6.50 3.90 - 12.70 K/uL    RBC 5.13 4.60 - 6.20 M/uL    Hemoglobin 15.3 14.0 - 18.0 g/dL    Hematocrit 45.8 40.0 - 54.0 %    Mean Corpuscular Volume 89 82 - 98 fL    Mean Corpuscular Hemoglobin 29.9 27.0 - 31.0 pg    Mean Corpuscular Hemoglobin Conc 33.5 32.0 - 36.0 g/dL    RDW 14.0 11.5 - 14.5 %    Platelets 230 150 - 350 K/uL    MPV 7.2 (L) 9.2 - 12.9 fL    Gran # (ANC) 4.1 1.8 - 7.7 K/uL    Lymph # 1.8 1.0 - 4.8 K/uL    Mono # 0.5 0.3 - 1.0 K/uL    Eos # 0.1 0.0 - 0.5 K/uL    Baso # 0.00 0.00 - 0.20 K/uL    Gran% 63.1 38.0 - 73.0 %    Lymph% 27.3 18.0 - 48.0 %    Mono% 7.2 4.0 - 15.0 %    Eosinophil% 2.1 0.0 - 8.0 %    Basophil% 0.3 0.0 - 1.9 %    Differential Method Automated    Comprehensive metabolic panel    Collection Time: 09/16/20  8:20 AM   Result Value Ref Range    Sodium 139 136 - 145 mmol/L    Potassium 4.5 3.5 - 5.1 mmol/L    Chloride 105 101 - 111 mmol/L    CO2 26 23 - 29 mmol/L    Glucose 115 74 - 118 mg/dL    BUN, Bld 17 8 - 23 mg/dL    Creatinine 1.4 0.5 - 1.4 mg/dL    Calcium 9.0 8.6 - 10.0 mg/dL    Total Protein 7.8 6.0 - 8.4 g/dL    Albumin 4.3 3.5 - 5.2 g/dL    Total Bilirubin 0.6 0.3 - 1.2 mg/dL    Alkaline Phosphatase 58 38 - 126 U/L    AST 19 15 - 41 U/L    ALT 20 17 - 63 U/L    Anion Gap 8 8 - 16 mmol/L    eGFR if African American >60.0 >60 mL/min/1.73 m^2    eGFR if non  53.5 (A) >60 mL/min/1.73 m^2        Imaging: I have personally reviewed patient's CT scans imaging showing TIMUR, but new PE and IVC thrombus found.  CT  Chest Abdomen Pelvis With Contrast  Narrative: EXAMINATION:  CT CHEST ABDOMEN PELVIS WITH CONTRAST (XPD)    CLINICAL HISTORY:  Re-staging for stage III RCC s/p nephrectomy; Malignant neoplasm of left kidney, except renal pelvis    TECHNIQUE:  Low dose axial CT images obtained throughout the region of the chest, abdomen and pelvis after the administration of 100 mL of Omnipaque 350 intravenous contrast.  Axial, sagittal and coronal reconstructions were performed.    COMPARISON:  06/15/2020    FINDINGS:  Soft tissue structures at the base of the neck are unremarkable.    The trachea and bronchial airways are clear and fully patent.    The lungs are well expanded and free of confluent opacity.  Scarring identified LEFT lung base.  Stable micro nodules such as series 3, image 194 RIGHT middle lobe as well as calcified granuloma RIGHT apically region series 3, image 59    No pleural fluid, pericardial fluid or mediastinal lymph node enlargement.    The heart and aorta appear within normal limits.    The liver,  and bile ducts are unremarkable.  Gallstone identified within the gallbladder.    Spleen, pancreas, and adrenal glands appear within normal limits.    S/P LEFT nephrectomy.  The fluid collection LEFT renal fossa, likely postoperative, has decreased in size from prior examination, now measuring 5.3 x 1.4 cm largest component with convex margin more inferiorly, adjacent to 2 surgical clips is also smaller measuring 2.7 x 1.4 cm.  Urinary bladder unremarkable.    The stomach, small bowel and colon demonstrate no evidence of obstruction or focal inflammation.    No free air or free fluid identified within the abdomen or pelvis.    Aorta tapers normally throughout its course.    Osseous structures exhibit mild degenerative changes. No fracture or focal osseous destructive lesion.    Bilateral fat containing inguinal hernias.  Impression: Expected temporal evolution at the level of the LEFT nephrectomy bed with  resolving hematoma.    Stable micro nodules within the lungs.    Cholelithiasis.    Fat containing inguinal hernias.    Electronically signed by: Williams Martinez MD  Date:    09/16/2020  Time:    09:25            Diagnoses:       1. Carcinoma, renal cell, left    2. Other acute pulmonary embolism without acute cor pulmonale    3. Essential hypertension    4. Meningioma, recurrent of spine          Assessment and Plan:         1,4. Stage III RCC:   -Sites of Disease: L kidney, L renal vein   Discussed with patient that cervical lesion is unlikely metastasis from RCC. Repeat MRI showed stability. Now s/p nephrectomy. Controversial benefit with adjuvant Sutent and significant side effects.  Surgery was 12/10/2019. Continue surveillance, CT CAP q 3 month x 1 year, then q 4 month x 1 year, then q 6 month.  -CT CAP (9/16) - Expected temporal evolution at the level of the LEFT nephrectomy bed with resolving hematoma.    RTC in 3 months with CT CAP, labs (CBC, CMP) and to see Dr. Pham.     2. VTE  Bilateral PE and IVC thrombus identified incidentally on re-staging scans.  No comment on enhancement to suggest tumor thrombus.  On Xarelto. Thrombus improving on last scans     3. HTN  Well controlled on Norvasc.    15 minutes were spent face to face with the patient and his family to discuss the disease, natural history, treatment options and survival statistics. Greater than 50% of this time involved counseling or coordination of care. I have provided the patient with an opportunity to ask questions and have all questions answered to his satisfaction.     he will return to clinic in 3 months, but knows to call in the interim if symptoms change or should a problem arise.    Acosta Pham MD  Medical Oncology [facility]

## 2020-09-18 ENCOUNTER — OFFICE VISIT (OUTPATIENT)
Dept: HEMATOLOGY/ONCOLOGY | Facility: CLINIC | Age: 60
End: 2020-09-18
Payer: MEDICAID

## 2020-09-18 VITALS
WEIGHT: 261.94 LBS | HEIGHT: 73 IN | SYSTOLIC BLOOD PRESSURE: 158 MMHG | DIASTOLIC BLOOD PRESSURE: 93 MMHG | BODY MASS INDEX: 34.71 KG/M2 | HEART RATE: 76 BPM | TEMPERATURE: 99 F

## 2020-09-18 DIAGNOSIS — I10 ESSENTIAL HYPERTENSION: ICD-10-CM

## 2020-09-18 DIAGNOSIS — D32.1 MENINGIOMA, RECURRENT OF SPINE: ICD-10-CM

## 2020-09-18 DIAGNOSIS — C64.2 CARCINOMA, RENAL CELL, LEFT: Primary | ICD-10-CM

## 2020-09-18 DIAGNOSIS — I26.99 OTHER ACUTE PULMONARY EMBOLISM WITHOUT ACUTE COR PULMONALE: ICD-10-CM

## 2020-09-18 PROCEDURE — 99213 OFFICE O/P EST LOW 20 MIN: CPT | Mod: PBBFAC | Performed by: INTERNAL MEDICINE

## 2020-09-18 PROCEDURE — 99214 OFFICE O/P EST MOD 30 MIN: CPT | Mod: S$PBB,,, | Performed by: INTERNAL MEDICINE

## 2020-09-18 PROCEDURE — 99214 PR OFFICE/OUTPT VISIT, EST, LEVL IV, 30-39 MIN: ICD-10-PCS | Mod: S$PBB,,, | Performed by: INTERNAL MEDICINE

## 2020-09-18 PROCEDURE — 99999 PR PBB SHADOW E&M-EST. PATIENT-LVL III: CPT | Mod: PBBFAC,,, | Performed by: INTERNAL MEDICINE

## 2020-09-18 PROCEDURE — 99999 PR PBB SHADOW E&M-EST. PATIENT-LVL III: ICD-10-PCS | Mod: PBBFAC,,, | Performed by: INTERNAL MEDICINE

## 2020-12-04 ENCOUNTER — OFFICE VISIT (OUTPATIENT)
Dept: FAMILY MEDICINE | Facility: CLINIC | Age: 60
End: 2020-12-04
Payer: MEDICAID

## 2020-12-04 VITALS
TEMPERATURE: 97 F | SYSTOLIC BLOOD PRESSURE: 132 MMHG | DIASTOLIC BLOOD PRESSURE: 86 MMHG | HEART RATE: 67 BPM | RESPIRATION RATE: 16 BRPM | OXYGEN SATURATION: 97 % | BODY MASS INDEX: 34.62 KG/M2 | WEIGHT: 261.25 LBS | HEIGHT: 73 IN

## 2020-12-04 DIAGNOSIS — Z23 NEED FOR PNEUMOCOCCAL VACCINATION: ICD-10-CM

## 2020-12-04 DIAGNOSIS — Z85.528 HISTORY OF RENAL CELL CARCINOMA: ICD-10-CM

## 2020-12-04 DIAGNOSIS — I26.99 OTHER ACUTE PULMONARY EMBOLISM WITHOUT ACUTE COR PULMONALE: ICD-10-CM

## 2020-12-04 DIAGNOSIS — I10 ESSENTIAL HYPERTENSION: Primary | ICD-10-CM

## 2020-12-04 DIAGNOSIS — E78.5 HYPERLIPIDEMIA, UNSPECIFIED HYPERLIPIDEMIA TYPE: ICD-10-CM

## 2020-12-04 PROCEDURE — 90471 PNEUMOCOCCAL POLYSACCHARIDE VACCINE 23-VALENT =>2YO SQ IM: ICD-10-PCS | Mod: S$GLB,,, | Performed by: INTERNAL MEDICINE

## 2020-12-04 PROCEDURE — 90471 IMMUNIZATION ADMIN: CPT | Mod: S$GLB,,, | Performed by: INTERNAL MEDICINE

## 2020-12-04 PROCEDURE — 99214 OFFICE O/P EST MOD 30 MIN: CPT | Mod: 25,S$GLB,, | Performed by: INTERNAL MEDICINE

## 2020-12-04 PROCEDURE — 90732 PPSV23 VACC 2 YRS+ SUBQ/IM: CPT | Mod: S$GLB,,, | Performed by: INTERNAL MEDICINE

## 2020-12-04 PROCEDURE — 99214 PR OFFICE/OUTPT VISIT, EST, LEVL IV, 30-39 MIN: ICD-10-PCS | Mod: 25,S$GLB,, | Performed by: INTERNAL MEDICINE

## 2020-12-04 PROCEDURE — 90732 PNEUMOCOCCAL POLYSACCHARIDE VACCINE 23-VALENT =>2YO SQ IM: ICD-10-PCS | Mod: S$GLB,,, | Performed by: INTERNAL MEDICINE

## 2020-12-04 NOTE — PROGRESS NOTES
Subjective:      10:19 AM     Patient ID: Alexi Noguera is a 60 y.o. male.    Chief Complaint: Hyperlipidemia (labs,no refills needed)    HPI       CHIEF COMPLAINT: Hyperlipidemia. cholesterol screening: no.   HPI:     ONSET:    MODIFIERS/TREATMENTS: . Taking medications: yes but is taking it at night. . Non-compliance with following diet: no. .     SYMPTOMS/RELATED:Possible medication side effects include:   Myalgia: no.  .     REVIEW OF SYMPTOMS: past weights:   Wt Readings from Last 1 Encounters:   12/04/20 1003 118.5 kg (261 lb 3.9 oz)                                                     Last lipids: total   Lab Results   Component Value Date    CHOL 200 12/01/2020    CHOL 214 (H) 08/29/2020    CHOL 162 02/26/2020                                                                     HDL   Lab Results   Component Value Date    HDL 41 12/01/2020    HDL 39 (L) 08/29/2020    HDL 40 02/26/2020                                                                     LDL   Lab Results   Component Value Date    LDLCALC 115 (H) 12/01/2020    LDLCALC 136.8 08/29/2020    LDLCALC 89.0 02/26/2020                                                                     TRIG   Lab Results   Component Value Date    TRIG 222 (H) 12/01/2020    TRIG 191 (H) 08/29/2020    TRIG 165 (H) 02/26/2020                                                                         Coumadin monitoring:  Lab Results   Component Value Date    INR 0.9 12/15/2019    INR 0.9 12/14/2019    INR 0.9 12/13/2019           CHIEF COMPLAINT: Hypertension  HPI:     ONSET:      QUALITY/COURSE:   Unchanged.  The    INTENSITY/SEVERITY:  Average blood pressure is 125/80.      MODIFIERS/TREATMENTS:  Taking medications: yes. .High sodium intake: no. alcohol: no      The following symptoms are positive only if BOLDED, otherwise are negative.      SYMPTOMS/RELATED: Possible medication side effects include:   Depression..  . Cough. . Constipation.    REVIEW OF SYMPTOMS: .  "Weight_loss . Weight_gain . Leg_cramps .Potency_problems .    TARGET ORGAN DAMAGE:: angina/ prior myocardial infarction, chronic kidney disease, heart failure, left ventricular hypertrophy, peripheral artery disease, prior coronary revascularization, retinopathy, stroke. transient ischemic attack.      The patient has had a nephrectomy in 2019.  He had a pulmonary embolism related to this.  He saw his hematologist oncologist in September and the patient states that he was told to go off the Xarelto although it is not a note.      Review of Systems   Eyes: Negative for visual disturbance.   Respiratory: Negative for chest tightness and shortness of breath.    Cardiovascular: Negative for chest pain and leg swelling.   Neurological: Negative for dizziness, syncope, weakness and headaches.   Psychiatric/Behavioral: Negative for dysphoric mood. The patient is not nervous/anxious.          Objective:      Vitals:    12/04/20 1003   BP: 132/86   Pulse: 67   Resp: 16   Temp: 97.4 °F (36.3 °C)   TempSrc: Oral   SpO2: 97%   Weight: 118.5 kg (261 lb 3.9 oz)   Height: 6' 1" (1.854 m)   PainSc: 0-No pain     Physical Exam  Vitals signs and nursing note reviewed.   Constitutional:       Appearance: He is well-developed.   Cardiovascular:      Rate and Rhythm: Normal rate and regular rhythm.      Heart sounds: Normal heart sounds.   Pulmonary:      Effort: Pulmonary effort is normal.      Breath sounds: Normal breath sounds.   Abdominal:      Palpations: Abdomen is soft.      Tenderness: There is no abdominal tenderness.   Neurological:      Mental Status: He is alert.   Psychiatric:         Behavior: Behavior normal.         Thought Content: Thought content normal.       Recent Results (from the past 1008 hour(s))   CBC auto differential    Collection Time: 12/01/20  7:03 AM   Result Value Ref Range    WBC 6.40 3.90 - 12.70 K/uL    RBC 5.15 4.60 - 6.20 M/uL    Hemoglobin 15.8 14.0 - 18.0 g/dL    Hematocrit 45.9 40.0 - 54.0 %    " MCV 89 82 - 98 fL    MCH 30.6 27.0 - 31.0 pg    MCHC 34.3 32.0 - 36.0 g/dL    RDW 14.1 11.5 - 14.5 %    Platelets 237 150 - 350 K/uL    MPV 7.1 (L) 9.2 - 12.9 fL    Gran # (ANC) 4.0 1.8 - 7.7 K/uL    Lymph # 1.8 1.0 - 4.8 K/uL    Mono # 0.4 0.3 - 1.0 K/uL    Eos # 0.1 0.0 - 0.5 K/uL    Baso # 0.10 0.00 - 0.20 K/uL    Gran % 62.9 38.0 - 73.0 %    Lymph % 27.7 18.0 - 48.0 %    Mono % 5.9 4.0 - 15.0 %    Eosinophil % 2.3 0.0 - 8.0 %    Basophil % 1.2 0.0 - 1.9 %    Differential Method Automated    Comprehensive metabolic panel    Collection Time: 12/01/20  7:03 AM   Result Value Ref Range    Sodium 140 136 - 145 mmol/L    Potassium 4.5 3.5 - 5.1 mmol/L    Chloride 103 101 - 111 mmol/L    CO2 27 23 - 29 mmol/L    Glucose 108 74 - 118 mg/dL    BUN 19 8 - 23 mg/dL    Creatinine 1.4 0.5 - 1.4 mg/dL    Calcium 9.1 8.6 - 10.0 mg/dL    Total Protein 8.0 6.0 - 8.4 g/dL    Albumin 4.3 3.5 - 5.2 g/dL    Total Bilirubin 0.3 0.3 - 1.2 mg/dL    Alkaline Phosphatase 59 38 - 126 U/L    AST 24 15 - 41 U/L    ALT 25 17 - 63 U/L    Anion Gap 10 8 - 16 mmol/L    eGFR if African American >60.0 >60 mL/min/1.73 m^2    eGFR if non  53.5 (A) >60 mL/min/1.73 m^2   Lipid Panel    Collection Time: 12/01/20  7:03 AM   Result Value Ref Range    Cholesterol 200 80 - 200 mg/dL    Triglycerides 222 (H) 30 - 150 mg/dL    HDL 41 40 - 75 mg/dL    LDL Cholesterol 115 (H) <100 mg/dL    HDL/Cholesterol Ratio 20.5 20.0 - 50.0 %    Total Cholesterol/HDL Ratio 4.9 2.0 - 5.0    Non-HDL Cholesterol 159 mg/dL          Assessment:       1. Essential hypertension    2. Need for pneumococcal vaccination    3. Hyperlipidemia, unspecified hyperlipidemia type    4. Other acute pulmonary embolism without acute cor pulmonale    5. History of renal cell carcinoma          Plan:    Note sent to Dr. Acosta Pham, oncologist to clarify if the patient should be on Xarelto    .  The    Essential hypertension  -     CBC Auto Differential; Future; Expected  date: 12/04/2020  -     Comprehensive Metabolic Panel; Future; Expected date: 12/04/2020    Need for pneumococcal vaccination  -     Pneumococcal Polysaccharide Vaccine (23 Valent) (SQ/IM)    Hyperlipidemia, unspecified hyperlipidemia type  -     Lipid Panel; Future; Expected date: 12/04/2020    Other acute pulmonary embolism without acute cor pulmonale    History of renal cell carcinoma     The  Follow up in about 3 months (around 3/4/2021).

## 2020-12-04 NOTE — PATIENT INSTRUCTIONS
Take the amlodipine and Pravachol at night        Avoid  alcohol  Low-Salt Diet  This diet removes foods that are high in salt. It also limits the amount of salt you use when cooking. It is most often used for people with high blood pressure, edema (fluid retention), and kidney, liver, or heart disease.  Table salt contains the mineral sodium. Your body needs sodium to work normally. But too much sodium can make your health problems worse. Your healthcare provider is recommending a low-salt (also called low-sodium) diet for you. Your total daily allowance of salt is 1,500 to 2,300 milligrams (mg). It is less than 1 teaspoon of table salt. This means you can have only about 500 to 700 mg of sodium at each meal. People with certain health problems should limit salt intake to the lower end of the recommended range.    When you cook, don´t add much salt. If you can cook without using salt, even better. Don´t add salt to your food at the table.  When shopping, read food labels. Salt is often called sodium on the label. Choose foods that are salt-free, low salt, or very low salt. Note that foods with reduced salt may not lower your salt intake enough.    Beans, potatoes, and pasta  Ok: Dry beans, split peas, lentils, potatoes, rice, macaroni, pasta, spaghetti without added salt  Avoid: Potato chips, tortilla chips, and similar products  Breads and cereals  Ok: Low-sodium breads, rolls, cereals, and cakes; low-salt crackers, matzo crackers  Avoid: Salted crackers, pretzels, popcorn, Persian toast, pancakes, muffins  Dairy  Ok: Milk, chocolate milk, hot chocolate mix, low-salt cheeses, and yogurt  Avoid: Processed cheese and cheese spreads; Roquefort, Camembert, and cottage cheese; buttermilk, instant breakfast drink  Desserts  Ok: Ice cream, frozen yogurt, juice bars, gelatin, cookies and pies, sugar, honey, jelly, hard candy  Avoid: Most pies, cakes and cookies prepared or processed with salt; instant pudding  Drinks  Ok:  Tea, coffee, fizzy (carbonated) drinks, juices  Avoid: Flavored coffees, electrolyte replacement drinks, sports drinks  Meats  Ok: All fresh meat, fish, poultry, low-salt tuna, eggs, egg substitute  Avoid: Smoked, pickled, brine-cured, or salted meats and fish. This includes kelly, chipped beef, corned beef, hot dogs, deli meats, ham, kosher meats, salt pork, sausage, canned tuna, salted codfish, smoked salmon, herring, sardines, or anchovies.  Seasonings and spices  Ok: Most seasonings are okay. Good substitutes for salt include: fresh herb blends, hot sauce, lemon, garlic, rose, vinegar, dry mustard, parsley, cilantro, horseradish, tomato paste, regular margarine, mayonnaise, unsalted butter, cream cheese, vegetable oil, cream, low-salt salad dressing and gravy.  Avoid: Regular ketchup, relishes, pickles, soy sauce, teriyaki sauce, Worcestershire sauce, BBQ sauce, tartar sauce, meat tenderizer, chili sauce, regular gravy, regular salad dressing, salted butter  Soups  Ok: Low-salt soups and broths made with allowed foods  Avoid: Bouillon cubes, soups with smoked or salted meats, regular soup and broth  Vegetables  Ok: Most vegetables are okay; also low-salt tomato and vegetable juices  Avoid: Sauerkraut and other brine-soaked vegetables; pickles and other pickled vegetables; tomato juice, olives  © 0401-0515 [employer]. [address] [place] [address]. All rights reserved. This information is not intended as a substitute for professional medical care. Always follow your healthcare professional's instructions.      If you have labs scheduled at [facility] you need to make an appointment. This is a measure to protect you from covid 19.      Thank you for choosing [facility].     Please fill out the patient experience survey.  The

## 2020-12-11 ENCOUNTER — OFFICE VISIT (OUTPATIENT)
Dept: HEMATOLOGY/ONCOLOGY | Facility: CLINIC | Age: 60
End: 2020-12-11
Payer: MEDICAID

## 2020-12-11 ENCOUNTER — TELEPHONE (OUTPATIENT)
Dept: HEMATOLOGY/ONCOLOGY | Facility: CLINIC | Age: 60
End: 2020-12-11

## 2020-12-11 ENCOUNTER — PATIENT MESSAGE (OUTPATIENT)
Dept: OTHER | Facility: OTHER | Age: 60
End: 2020-12-11

## 2020-12-11 VITALS
HEIGHT: 73 IN | TEMPERATURE: 98 F | WEIGHT: 260.38 LBS | HEART RATE: 89 BPM | DIASTOLIC BLOOD PRESSURE: 94 MMHG | BODY MASS INDEX: 34.51 KG/M2 | SYSTOLIC BLOOD PRESSURE: 171 MMHG

## 2020-12-11 DIAGNOSIS — I10 ESSENTIAL HYPERTENSION: ICD-10-CM

## 2020-12-11 DIAGNOSIS — C64.2 CARCINOMA, RENAL CELL, LEFT: Primary | ICD-10-CM

## 2020-12-11 DIAGNOSIS — D32.1 MENINGIOMA, RECURRENT OF SPINE: ICD-10-CM

## 2020-12-11 DIAGNOSIS — I26.99 OTHER ACUTE PULMONARY EMBOLISM WITHOUT ACUTE COR PULMONALE: ICD-10-CM

## 2020-12-11 PROCEDURE — 99999 PR PBB SHADOW E&M-EST. PATIENT-LVL III: ICD-10-PCS | Mod: PBBFAC,,, | Performed by: INTERNAL MEDICINE

## 2020-12-11 PROCEDURE — 99999 PR PBB SHADOW E&M-EST. PATIENT-LVL III: CPT | Mod: PBBFAC,,, | Performed by: INTERNAL MEDICINE

## 2020-12-11 PROCEDURE — 99213 OFFICE O/P EST LOW 20 MIN: CPT | Mod: PBBFAC | Performed by: INTERNAL MEDICINE

## 2020-12-11 PROCEDURE — 99214 PR OFFICE/OUTPT VISIT, EST, LEVL IV, 30-39 MIN: ICD-10-PCS | Mod: S$PBB,,, | Performed by: INTERNAL MEDICINE

## 2020-12-11 PROCEDURE — 99214 OFFICE O/P EST MOD 30 MIN: CPT | Mod: S$PBB,,, | Performed by: INTERNAL MEDICINE

## 2020-12-11 NOTE — TELEPHONE ENCOUNTER
----- Message from Acosta Pham MD sent at 12/11/2020  1:29 PM CST -----  - RTC in 4 months with CT CAP, labs (CBC, CMP).

## 2020-12-11 NOTE — PROGRESS NOTES
ONCOLOGY FOLLOW UP VISIT.     Cancer/Stage/TNM:   Cancer Staging  Carcinoma, renal cell, left  Staging form: Kidney, AJCC 8th Edition  - Clinical stage from 9/16/2019: Stage III (cT3a, cN0, cM0) - Unsigned     Interval History:   Patient returns to clinic today for review of imaging. Today patient reports no new symptoms     ROS:  Review of Systems   Constitutional: Negative for activity change, appetite change, chills, fatigue, fever and unexpected weight change.   HENT: Negative for mouth sores, nosebleeds and sore throat.    Eyes: Negative for visual disturbance.   Respiratory: Negative for cough, shortness of breath and wheezing.    Cardiovascular: Negative for chest pain, palpitations and leg swelling.   Gastrointestinal: Negative for abdominal distention, abdominal pain, blood in stool and diarrhea.   Endocrine: Negative for cold intolerance and heat intolerance.   Genitourinary: Positive for frequency. Negative for dysuria and flank pain.   Musculoskeletal: Negative for arthralgias, back pain, joint swelling and myalgias.   Skin: Negative for color change, rash and wound.   Neurological: Negative for dizziness, light-headedness and headaches.   Hematological: Negative for adenopathy. Does not bruise/bleed easily.   Psychiatric/Behavioral: The patient is not nervous/anxious.       A complete 12-point review of systems was reviewed and is negative except as mentioned above.     Past Medical History:   Past Medical History:   Diagnosis Date    Cancer of kidney     Left    Hyperlipidemia     Hypertension         Allergies:   Review of patient's allergies indicates:   Allergen Reactions    Lisinopril Other (See Comments)     Dry cough        Medications:   Current Outpatient Medications   Medication Sig Dispense Refill    amLODIPine (NORVASC) 2.5 MG tablet Take 1 tablet (2.5 mg total) by mouth once daily. 90 tablet 1    pravastatin (PRAVACHOL) 40 MG tablet Take 1 tablet (40 mg total) by mouth every evening.  90 tablet 1     No current facility-administered medications for this visit.         Physical Exam:   There were no vitals taken for this visit.     ECOG Performance Status: (foot note - ECOG PS provided by Eastern Cooperative Oncology Group) 0 - Asymptomatic    Physical Exam  Constitutional:       Appearance: Normal appearance. He is well-developed.   HENT:      Head: Normocephalic and atraumatic.   Eyes:      Conjunctiva/sclera: Conjunctivae normal.      Pupils: Pupils are equal, round, and reactive to light.   Neck:      Musculoskeletal: Normal range of motion and neck supple.   Pulmonary:      Effort: Pulmonary effort is normal. No respiratory distress.   Abdominal:      Palpations: Abdomen is soft.      Tenderness: There is no abdominal tenderness.   Musculoskeletal: Normal range of motion.         General: No swelling.   Skin:     General: Skin is warm and dry.   Neurological:      General: No focal deficit present.      Mental Status: He is alert and oriented to person, place, and time.   Psychiatric:         Mood and Affect: Mood normal.         Behavior: Behavior normal.           Labs:   Recent Results (from the past 48 hour(s))   CBC auto differential    Collection Time: 12/10/20  9:14 AM   Result Value Ref Range    WBC 6.00 3.90 - 12.70 K/uL    RBC 5.17 4.60 - 6.20 M/uL    Hemoglobin 15.4 14.0 - 18.0 g/dL    Hematocrit 46.4 40.0 - 54.0 %    MCV 90 82 - 98 fL    MCH 29.8 27.0 - 31.0 pg    MCHC 33.3 32.0 - 36.0 g/dL    RDW 14.1 11.5 - 14.5 %    Platelets 246 150 - 350 K/uL    MPV 7.2 (L) 9.2 - 12.9 fL    Gran # (ANC) 4.0 1.8 - 7.7 K/uL    Lymph # 1.6 1.0 - 4.8 K/uL    Mono # 0.3 0.3 - 1.0 K/uL    Eos # 0.1 0.0 - 0.5 K/uL    Baso # 0.00 0.00 - 0.20 K/uL    Gran % 66.4 38.0 - 73.0 %    Lymph % 26.0 18.0 - 48.0 %    Mono % 5.4 4.0 - 15.0 %    Eosinophil % 1.6 0.0 - 8.0 %    Basophil % 0.6 0.0 - 1.9 %    Differential Method Automated    Comprehensive metabolic panel    Collection Time: 12/10/20  9:14 AM    Result Value Ref Range    Sodium 137 136 - 145 mmol/L    Potassium 4.7 3.5 - 5.1 mmol/L    Chloride 99 (L) 101 - 111 mmol/L    CO2 25 23 - 29 mmol/L    Glucose 95 74 - 118 mg/dL    BUN 18 8 - 23 mg/dL    Creatinine 1.4 0.5 - 1.4 mg/dL    Calcium 9.3 8.6 - 10.0 mg/dL    Total Protein 7.8 6.0 - 8.4 g/dL    Albumin 4.3 3.5 - 5.2 g/dL    Total Bilirubin 0.8 0.3 - 1.2 mg/dL    Alkaline Phosphatase 62 38 - 126 U/L    AST 21 15 - 41 U/L    ALT 22 17 - 63 U/L    Anion Gap 13 8 - 16 mmol/L    eGFR if African American >60.0 >60 mL/min/1.73 m^2    eGFR if non  53.5 (A) >60 mL/min/1.73 m^2   Lactate dehydrogenase    Collection Time: 12/10/20  9:14 AM   Result Value Ref Range     84 - 195 U/L        Imaging: I have personally reviewed patient's CT scans imaging showing TIMUR, but new PE and IVC thrombus found.  CT Chest Abdomen Pelvis With Contrast  Narrative: EXAMINATION:  CT CHEST ABDOMEN PELVIS WITH CONTRAST (XPD)    CLINICAL HISTORY:  Re-staging scans for stage III RCC s/p nephrectomy.  Malignant neoplasm of left kidney, except renal pelvis.    TECHNIQUE:  Low dose axial images were obtained from the thoracic inlet to the pubic symphysis following the administration of 100 mL of Omnipaque 350 intravenous contrast.  Sagittal and coronal reformats were provided.    COMPARISON:  CT of the chest, abdomen, and pelvis from 09/16/2020, and additional priors.    FINDINGS:  Lungs: No focal consolidation.  There is a 3 mm pleural based right middle lobe pulmonary nodule (series 2, image 43), unchanged.  6 mm right lower lobe perifissural nodule (series 2, image 36), unchanged.  7 mm left lower lobe perifissural nodule (series 2, image 47), unchanged.    Airways: The large airways are clear.    Pleura: No fluid or thickening.  No pneumothorax.    Lymph nodes: No evidence of mediastinal, hilar, or axillary lymphadenopathy.    Esophagus: Normal.    Heart: Heart size is normal.  No pericardial effusion.    Chest  wall: Normal.    Liver: Normal.    Biliary tract: No intra or extrahepatic biliary ductal dilatation.    Gallbladder: There is a single gallstone.  No wall thickening or pericholecystic fluid.    Pancreas: Normal. No pancreatic ductal dilation.    Spleen: Normal in size without focal lesion.    Adrenals: Normal.    Kidneys and urinary collecting systems: There are postoperative changes of left nephrectomy.  There is soft tissue thickening/scarring and a resolving hematoma measuring 5.5 x 2.4 cm within the nephrectomy bed, similar in appearance to prior.  There is a simple cyst within the right kidney, unchanged.  No right-sided hydronephrosis or urolithiasis.    Stomach and bowel: No bowel obstruction or abnormal bowel wall thickening.  There is colonic diverticulosis without evidence of acute diverticulitis.    Peritoneum and mesentery: No ascites or free intraperitoneal air. No evidence of mesenteric or retroperitoneal lymphadenopathy.    Vasculature: Normal.    Urinary bladder: Normal.    Reproductive organs: The prostate is enlarged.    Body wall: Small bilateral fat containing inguinal hernias.  Remote anterior abdominal wall incision.    Musculoskeletal: No aggressive lytic or sclerotic osseous lesion.  There is grade 1 anterolisthesis of L5 on S1 with bilateral pars defects.  Impression: 1. Postoperative changes of left sided nephrectomy with continued resolving hematoma and soft tissue thickening in the nephrectomy bed.  No evidence of recurrent disease or significant change from prior.    2. Unchanged pulmonary micro nodules.  3. Cholelithiasis without acute cholecystitis.  4. Colonic diverticulosis without acute diverticulitis.    Electronically signed by: Wai Anne  Date:    12/10/2020  Time:    11:29            Diagnoses:       1. Carcinoma, renal cell, left    2. Other acute pulmonary embolism without acute cor pulmonale    3. Essential hypertension    4. Meningioma, recurrent of spine           Assessment and Plan:         1. Stage III RCC:   -Sites of Disease: L kidney, L renal vein   Discussed with patient that cervical lesion is unlikely metastasis from RCC. Repeat MRI showed stability. Now s/p nephrectomy. Controversial benefit with adjuvant Sutent and significant side effects.  Surgery was 12/10/2019. Continue surveillance, CT CAP q 3 month x 1 year (12/2020), then q 4 month x 1 year (12/2021), then q 6 month for years 3-5 (12/2024).  -CT CAP (12/2020) - Residual hematoma in the LEFT nephrectomy bed, no evidence of recurrence.    RTC in 4 months with CT CAP, labs (CBC, CMP).     2. History VTE  Bilateral PE and IVC thrombus identified incidentally on re-staging scans.  No comment on enhancement to suggest tumor thrombus.  Patient completed 6 months of Xarelto, discontinued.     3. HTN  Well controlled on Norvasc.    15 minutes were spent face to face with the patient and his family to discuss the disease, natural history, treatment options and survival statistics. Greater than 50% of this time involved counseling or coordination of care. I have provided the patient with an opportunity to ask questions and have all questions answered to his satisfaction.     he will return to clinic in 4 months, but knows to call in the interim if symptoms change or should a problem arise.    Acosta Pham MD  Medical Oncology [facility]

## 2021-03-10 ENCOUNTER — OFFICE VISIT (OUTPATIENT)
Dept: FAMILY MEDICINE | Facility: CLINIC | Age: 61
End: 2021-03-10
Payer: MEDICAID

## 2021-03-10 VITALS
SYSTOLIC BLOOD PRESSURE: 126 MMHG | HEIGHT: 73 IN | DIASTOLIC BLOOD PRESSURE: 88 MMHG | TEMPERATURE: 98 F | RESPIRATION RATE: 16 BRPM | OXYGEN SATURATION: 98 % | WEIGHT: 266.75 LBS | BODY MASS INDEX: 35.35 KG/M2 | HEART RATE: 76 BPM

## 2021-03-10 DIAGNOSIS — I10 ESSENTIAL HYPERTENSION: Primary | ICD-10-CM

## 2021-03-10 DIAGNOSIS — R73.9 HYPERGLYCEMIA: ICD-10-CM

## 2021-03-10 DIAGNOSIS — E78.5 HYPERLIPIDEMIA, UNSPECIFIED HYPERLIPIDEMIA TYPE: ICD-10-CM

## 2021-03-10 DIAGNOSIS — Z11.4 ENCOUNTER FOR SCREENING FOR HIV: ICD-10-CM

## 2021-03-10 PROCEDURE — 99213 PR OFFICE/OUTPT VISIT, EST, LEVL III, 20-29 MIN: ICD-10-PCS | Mod: S$GLB,,, | Performed by: INTERNAL MEDICINE

## 2021-03-10 PROCEDURE — 99213 OFFICE O/P EST LOW 20 MIN: CPT | Mod: S$GLB,,, | Performed by: INTERNAL MEDICINE

## 2021-04-12 ENCOUNTER — OFFICE VISIT (OUTPATIENT)
Dept: HEMATOLOGY/ONCOLOGY | Facility: CLINIC | Age: 61
End: 2021-04-12
Payer: MEDICAID

## 2021-04-12 VITALS
OXYGEN SATURATION: 97 % | DIASTOLIC BLOOD PRESSURE: 85 MMHG | BODY MASS INDEX: 35.41 KG/M2 | HEART RATE: 81 BPM | RESPIRATION RATE: 16 BRPM | TEMPERATURE: 97 F | HEIGHT: 73 IN | SYSTOLIC BLOOD PRESSURE: 134 MMHG | WEIGHT: 267.19 LBS

## 2021-04-12 DIAGNOSIS — I10 ESSENTIAL HYPERTENSION: ICD-10-CM

## 2021-04-12 DIAGNOSIS — I26.99 OTHER ACUTE PULMONARY EMBOLISM WITHOUT ACUTE COR PULMONALE: ICD-10-CM

## 2021-04-12 DIAGNOSIS — C64.2 CARCINOMA, RENAL CELL, LEFT: Primary | ICD-10-CM

## 2021-04-12 DIAGNOSIS — D32.1 MENINGIOMA, RECURRENT OF SPINE: ICD-10-CM

## 2021-04-12 PROCEDURE — 99999 PR PBB SHADOW E&M-EST. PATIENT-LVL III: CPT | Mod: PBBFAC,,, | Performed by: INTERNAL MEDICINE

## 2021-04-12 PROCEDURE — 99214 PR OFFICE/OUTPT VISIT, EST, LEVL IV, 30-39 MIN: ICD-10-PCS | Mod: S$PBB,,, | Performed by: INTERNAL MEDICINE

## 2021-04-12 PROCEDURE — 99999 PR PBB SHADOW E&M-EST. PATIENT-LVL III: ICD-10-PCS | Mod: PBBFAC,,, | Performed by: INTERNAL MEDICINE

## 2021-04-12 PROCEDURE — 99213 OFFICE O/P EST LOW 20 MIN: CPT | Mod: PBBFAC | Performed by: INTERNAL MEDICINE

## 2021-04-12 PROCEDURE — 99214 OFFICE O/P EST MOD 30 MIN: CPT | Mod: S$PBB,,, | Performed by: INTERNAL MEDICINE

## 2021-07-21 ENCOUNTER — IMMUNIZATION (OUTPATIENT)
Dept: PRIMARY CARE CLINIC | Facility: CLINIC | Age: 61
End: 2021-07-21

## 2021-07-21 DIAGNOSIS — Z23 NEED FOR VACCINATION: Primary | ICD-10-CM

## 2021-07-21 PROCEDURE — 91300 COVID-19, MRNA, LNP-S, PF, 30 MCG/0.3 ML DOSE VACCINE: ICD-10-PCS | Mod: S$GLB,,, | Performed by: INTERNAL MEDICINE

## 2021-07-21 PROCEDURE — 91300 COVID-19, MRNA, LNP-S, PF, 30 MCG/0.3 ML DOSE VACCINE: CPT | Mod: S$GLB,,, | Performed by: INTERNAL MEDICINE

## 2021-07-21 PROCEDURE — 0001A COVID-19, MRNA, LNP-S, PF, 30 MCG/0.3 ML DOSE VACCINE: ICD-10-PCS | Mod: CV19,S$GLB,, | Performed by: INTERNAL MEDICINE

## 2021-07-21 PROCEDURE — 0001A COVID-19, MRNA, LNP-S, PF, 30 MCG/0.3 ML DOSE VACCINE: CPT | Mod: CV19,S$GLB,, | Performed by: INTERNAL MEDICINE

## 2021-08-11 ENCOUNTER — IMMUNIZATION (OUTPATIENT)
Dept: PRIMARY CARE CLINIC | Facility: CLINIC | Age: 61
End: 2021-08-11
Payer: MEDICAID

## 2021-08-11 DIAGNOSIS — Z23 NEED FOR VACCINATION: Primary | ICD-10-CM

## 2021-08-11 PROCEDURE — 91300 COVID-19, MRNA, LNP-S, PF, 30 MCG/0.3 ML DOSE VACCINE: ICD-10-PCS | Mod: S$GLB,,, | Performed by: FAMILY MEDICINE

## 2021-08-11 PROCEDURE — 91300 COVID-19, MRNA, LNP-S, PF, 30 MCG/0.3 ML DOSE VACCINE: CPT | Mod: S$GLB,,, | Performed by: FAMILY MEDICINE

## 2021-08-11 PROCEDURE — 0002A COVID-19, MRNA, LNP-S, PF, 30 MCG/0.3 ML DOSE VACCINE: ICD-10-PCS | Mod: CV19,S$GLB,, | Performed by: FAMILY MEDICINE

## 2021-08-11 PROCEDURE — 0002A COVID-19, MRNA, LNP-S, PF, 30 MCG/0.3 ML DOSE VACCINE: CPT | Mod: CV19,S$GLB,, | Performed by: FAMILY MEDICINE

## 2021-08-12 ENCOUNTER — OFFICE VISIT (OUTPATIENT)
Dept: HEMATOLOGY/ONCOLOGY | Facility: CLINIC | Age: 61
End: 2021-08-12
Payer: MEDICAID

## 2021-08-12 VITALS
BODY MASS INDEX: 34.42 KG/M2 | HEART RATE: 77 BPM | WEIGHT: 259.69 LBS | TEMPERATURE: 98 F | DIASTOLIC BLOOD PRESSURE: 95 MMHG | HEIGHT: 73 IN | RESPIRATION RATE: 18 BRPM | OXYGEN SATURATION: 98 % | SYSTOLIC BLOOD PRESSURE: 150 MMHG

## 2021-08-12 DIAGNOSIS — C64.2 CARCINOMA, RENAL CELL, LEFT: Primary | ICD-10-CM

## 2021-08-12 DIAGNOSIS — I10 ESSENTIAL HYPERTENSION: ICD-10-CM

## 2021-08-12 DIAGNOSIS — I26.99 OTHER ACUTE PULMONARY EMBOLISM WITHOUT ACUTE COR PULMONALE: ICD-10-CM

## 2021-08-12 DIAGNOSIS — D32.1 MENINGIOMA, RECURRENT OF SPINE: ICD-10-CM

## 2021-08-12 PROCEDURE — 99999 PR PBB SHADOW E&M-EST. PATIENT-LVL III: ICD-10-PCS | Mod: PBBFAC,,, | Performed by: INTERNAL MEDICINE

## 2021-08-12 PROCEDURE — 99214 PR OFFICE/OUTPT VISIT, EST, LEVL IV, 30-39 MIN: ICD-10-PCS | Mod: S$PBB,,, | Performed by: INTERNAL MEDICINE

## 2021-08-12 PROCEDURE — 99213 OFFICE O/P EST LOW 20 MIN: CPT | Mod: PBBFAC | Performed by: INTERNAL MEDICINE

## 2021-08-12 PROCEDURE — 99214 OFFICE O/P EST MOD 30 MIN: CPT | Mod: S$PBB,,, | Performed by: INTERNAL MEDICINE

## 2021-08-12 PROCEDURE — 99999 PR PBB SHADOW E&M-EST. PATIENT-LVL III: CPT | Mod: PBBFAC,,, | Performed by: INTERNAL MEDICINE

## 2021-08-27 DIAGNOSIS — E78.5 HYPERLIPIDEMIA, UNSPECIFIED HYPERLIPIDEMIA TYPE: ICD-10-CM

## 2021-08-27 DIAGNOSIS — I10 ESSENTIAL HYPERTENSION: ICD-10-CM

## 2021-08-27 DIAGNOSIS — Z79.01 ANTICOAGULATED: ICD-10-CM

## 2021-08-27 RX ORDER — AMLODIPINE BESYLATE 2.5 MG/1
2.5 TABLET ORAL DAILY
Qty: 90 TABLET | Refills: 1 | Status: SHIPPED | OUTPATIENT
Start: 2021-08-27 | End: 2022-02-08 | Stop reason: SDUPTHER

## 2021-08-27 RX ORDER — PRAVASTATIN SODIUM 40 MG/1
40 TABLET ORAL NIGHTLY
Qty: 90 TABLET | Refills: 1 | Status: SHIPPED | OUTPATIENT
Start: 2021-08-27 | End: 2022-02-08 | Stop reason: SDUPTHER

## 2021-10-28 ENCOUNTER — OFFICE VISIT (OUTPATIENT)
Dept: FAMILY MEDICINE | Facility: CLINIC | Age: 61
End: 2021-10-28
Payer: MEDICAID

## 2021-10-28 VITALS
WEIGHT: 267.63 LBS | BODY MASS INDEX: 35.47 KG/M2 | HEART RATE: 85 BPM | OXYGEN SATURATION: 98 % | DIASTOLIC BLOOD PRESSURE: 90 MMHG | SYSTOLIC BLOOD PRESSURE: 150 MMHG | HEIGHT: 73 IN | TEMPERATURE: 98 F

## 2021-10-28 DIAGNOSIS — I10 ESSENTIAL HYPERTENSION: ICD-10-CM

## 2021-10-28 DIAGNOSIS — N18.31 STAGE 3A CHRONIC KIDNEY DISEASE: ICD-10-CM

## 2021-10-28 DIAGNOSIS — K76.0 FATTY LIVER: Primary | ICD-10-CM

## 2021-10-28 PROCEDURE — 90686 FLU VACCINE (QUAD) GREATER THAN OR EQUAL TO 3YO PRESERVATIVE FREE IM: ICD-10-PCS | Mod: S$GLB,,, | Performed by: INTERNAL MEDICINE

## 2021-10-28 PROCEDURE — 90471 IMMUNIZATION ADMIN: CPT | Mod: S$GLB,,, | Performed by: INTERNAL MEDICINE

## 2021-10-28 PROCEDURE — 99214 PR OFFICE/OUTPT VISIT, EST, LEVL IV, 30-39 MIN: ICD-10-PCS | Mod: 25,S$GLB,, | Performed by: INTERNAL MEDICINE

## 2021-10-28 PROCEDURE — 90686 IIV4 VACC NO PRSV 0.5 ML IM: CPT | Mod: S$GLB,,, | Performed by: INTERNAL MEDICINE

## 2021-10-28 PROCEDURE — 90471 FLU VACCINE (QUAD) GREATER THAN OR EQUAL TO 3YO PRESERVATIVE FREE IM: ICD-10-PCS | Mod: S$GLB,,, | Performed by: INTERNAL MEDICINE

## 2021-10-28 PROCEDURE — 99214 OFFICE O/P EST MOD 30 MIN: CPT | Mod: 25,S$GLB,, | Performed by: INTERNAL MEDICINE

## 2021-10-28 RX ORDER — LOSARTAN POTASSIUM 50 MG/1
50 TABLET ORAL DAILY
Qty: 90 TABLET | Refills: 3 | Status: SHIPPED | OUTPATIENT
Start: 2021-10-28 | End: 2022-02-08 | Stop reason: SDUPTHER

## 2021-11-01 ENCOUNTER — OFFICE VISIT (OUTPATIENT)
Dept: HEPATOLOGY | Facility: CLINIC | Age: 61
End: 2021-11-01
Payer: MEDICAID

## 2021-11-01 VITALS
BODY MASS INDEX: 35.39 KG/M2 | HEART RATE: 79 BPM | RESPIRATION RATE: 18 BRPM | HEIGHT: 73 IN | DIASTOLIC BLOOD PRESSURE: 95 MMHG | TEMPERATURE: 98 F | OXYGEN SATURATION: 96 % | SYSTOLIC BLOOD PRESSURE: 153 MMHG | WEIGHT: 267 LBS

## 2021-11-01 DIAGNOSIS — I10 ESSENTIAL HYPERTENSION: ICD-10-CM

## 2021-11-01 DIAGNOSIS — E78.2 MIXED HYPERLIPIDEMIA: ICD-10-CM

## 2021-11-01 DIAGNOSIS — E66.9 OBESITY (BMI 30-39.9): ICD-10-CM

## 2021-11-01 DIAGNOSIS — K76.0 NAFLD (NONALCOHOLIC FATTY LIVER DISEASE): Primary | ICD-10-CM

## 2021-11-01 DIAGNOSIS — R73.03 PRE-DIABETES: ICD-10-CM

## 2021-11-01 PROCEDURE — 99999 PR PBB SHADOW E&M-EST. PATIENT-LVL IV: ICD-10-PCS | Mod: PBBFAC,,, | Performed by: NURSE PRACTITIONER

## 2021-11-01 PROCEDURE — 99214 OFFICE O/P EST MOD 30 MIN: CPT | Mod: PBBFAC | Performed by: NURSE PRACTITIONER

## 2021-11-01 PROCEDURE — 99999 PR PBB SHADOW E&M-EST. PATIENT-LVL IV: CPT | Mod: PBBFAC,,, | Performed by: NURSE PRACTITIONER

## 2021-11-01 PROCEDURE — 99204 OFFICE O/P NEW MOD 45 MIN: CPT | Mod: S$PBB,,, | Performed by: NURSE PRACTITIONER

## 2021-11-01 PROCEDURE — 99204 PR OFFICE/OUTPT VISIT, NEW, LEVL IV, 45-59 MIN: ICD-10-PCS | Mod: S$PBB,,, | Performed by: NURSE PRACTITIONER

## 2021-11-17 ENCOUNTER — TELEPHONE (OUTPATIENT)
Dept: FAMILY MEDICINE | Facility: CLINIC | Age: 61
End: 2021-11-17
Payer: MEDICAID

## 2021-11-29 ENCOUNTER — OFFICE VISIT (OUTPATIENT)
Dept: HEMATOLOGY/ONCOLOGY | Facility: CLINIC | Age: 61
End: 2021-11-29
Payer: MEDICAID

## 2021-11-29 ENCOUNTER — PROCEDURE VISIT (OUTPATIENT)
Dept: HEPATOLOGY | Facility: CLINIC | Age: 61
End: 2021-11-29
Payer: MEDICAID

## 2021-11-29 VITALS
HEART RATE: 74 BPM | SYSTOLIC BLOOD PRESSURE: 141 MMHG | WEIGHT: 269.19 LBS | RESPIRATION RATE: 18 BRPM | HEIGHT: 73 IN | BODY MASS INDEX: 35.68 KG/M2 | DIASTOLIC BLOOD PRESSURE: 94 MMHG | OXYGEN SATURATION: 97 % | TEMPERATURE: 98 F

## 2021-11-29 DIAGNOSIS — I10 ESSENTIAL HYPERTENSION: ICD-10-CM

## 2021-11-29 DIAGNOSIS — C64.2 CARCINOMA, RENAL CELL, LEFT: Primary | ICD-10-CM

## 2021-11-29 DIAGNOSIS — K76.0 NAFLD (NONALCOHOLIC FATTY LIVER DISEASE): ICD-10-CM

## 2021-11-29 PROCEDURE — 99213 OFFICE O/P EST LOW 20 MIN: CPT | Mod: PBBFAC | Performed by: INTERNAL MEDICINE

## 2021-11-29 PROCEDURE — 91200 LIVER ELASTOGRAPHY: CPT | Mod: PBBFAC | Performed by: NURSE PRACTITIONER

## 2021-11-29 PROCEDURE — 99999 PR PBB SHADOW E&M-EST. PATIENT-LVL III: CPT | Mod: PBBFAC,,, | Performed by: INTERNAL MEDICINE

## 2021-11-29 PROCEDURE — 99999 PR PBB SHADOW E&M-EST. PATIENT-LVL III: ICD-10-PCS | Mod: PBBFAC,,, | Performed by: INTERNAL MEDICINE

## 2021-11-29 PROCEDURE — 99214 OFFICE O/P EST MOD 30 MIN: CPT | Mod: S$PBB,,, | Performed by: INTERNAL MEDICINE

## 2021-11-29 PROCEDURE — 91200 FIBROSCAN (VIBRATION CONTROLLED TRANSIENT ELASTOGRAPHY): ICD-10-PCS | Mod: 26,S$PBB,, | Performed by: NURSE PRACTITIONER

## 2021-11-29 PROCEDURE — 91200 LIVER ELASTOGRAPHY: CPT | Mod: 26,S$PBB,, | Performed by: NURSE PRACTITIONER

## 2021-11-29 PROCEDURE — 99214 PR OFFICE/OUTPT VISIT, EST, LEVL IV, 30-39 MIN: ICD-10-PCS | Mod: S$PBB,,, | Performed by: INTERNAL MEDICINE

## 2021-12-01 ENCOUNTER — PATIENT MESSAGE (OUTPATIENT)
Dept: HEPATOLOGY | Facility: CLINIC | Age: 61
End: 2021-12-01
Payer: MEDICAID

## 2021-12-01 ENCOUNTER — TELEPHONE (OUTPATIENT)
Dept: HEPATOLOGY | Facility: CLINIC | Age: 61
End: 2021-12-01
Payer: MEDICAID

## 2022-02-08 ENCOUNTER — OFFICE VISIT (OUTPATIENT)
Dept: FAMILY MEDICINE | Facility: CLINIC | Age: 62
End: 2022-02-08
Payer: MEDICAID

## 2022-02-08 VITALS
WEIGHT: 270.75 LBS | HEIGHT: 73 IN | TEMPERATURE: 98 F | DIASTOLIC BLOOD PRESSURE: 90 MMHG | HEART RATE: 79 BPM | BODY MASS INDEX: 35.88 KG/M2 | SYSTOLIC BLOOD PRESSURE: 145 MMHG | OXYGEN SATURATION: 97 %

## 2022-02-08 DIAGNOSIS — I10 ESSENTIAL HYPERTENSION: ICD-10-CM

## 2022-02-08 DIAGNOSIS — E78.5 HYPERLIPIDEMIA, UNSPECIFIED HYPERLIPIDEMIA TYPE: ICD-10-CM

## 2022-02-08 DIAGNOSIS — Z79.01 ANTICOAGULATED: ICD-10-CM

## 2022-02-08 DIAGNOSIS — K76.0 NAFLD (NONALCOHOLIC FATTY LIVER DISEASE): ICD-10-CM

## 2022-02-08 DIAGNOSIS — Z12.5 PROSTATE CANCER SCREENING: ICD-10-CM

## 2022-02-08 DIAGNOSIS — C64.2 CARCINOMA, RENAL CELL, LEFT: ICD-10-CM

## 2022-02-08 DIAGNOSIS — R73.03 PRE-DIABETES: ICD-10-CM

## 2022-02-08 DIAGNOSIS — E66.9 OBESITY (BMI 30-39.9): Primary | ICD-10-CM

## 2022-02-08 DIAGNOSIS — E04.1 THYROID NODULE: ICD-10-CM

## 2022-02-08 PROCEDURE — 4010F ACE/ARB THERAPY RXD/TAKEN: CPT | Mod: CPTII,S$GLB,, | Performed by: PHYSICIAN ASSISTANT

## 2022-02-08 PROCEDURE — 4010F PR ACE/ARB THEARPY RXD/TAKEN: ICD-10-PCS | Mod: CPTII,S$GLB,, | Performed by: PHYSICIAN ASSISTANT

## 2022-02-08 PROCEDURE — 99214 PR OFFICE/OUTPT VISIT, EST, LEVL IV, 30-39 MIN: ICD-10-PCS | Mod: S$GLB,,, | Performed by: PHYSICIAN ASSISTANT

## 2022-02-08 PROCEDURE — 99214 OFFICE O/P EST MOD 30 MIN: CPT | Mod: S$GLB,,, | Performed by: PHYSICIAN ASSISTANT

## 2022-02-08 PROCEDURE — 3008F BODY MASS INDEX DOCD: CPT | Mod: CPTII,S$GLB,, | Performed by: PHYSICIAN ASSISTANT

## 2022-02-08 PROCEDURE — 1159F MED LIST DOCD IN RCRD: CPT | Mod: CPTII,S$GLB,, | Performed by: PHYSICIAN ASSISTANT

## 2022-02-08 PROCEDURE — 1160F RVW MEDS BY RX/DR IN RCRD: CPT | Mod: CPTII,S$GLB,, | Performed by: PHYSICIAN ASSISTANT

## 2022-02-08 PROCEDURE — 3080F PR MOST RECENT DIASTOLIC BLOOD PRESSURE >= 90 MM HG: ICD-10-PCS | Mod: CPTII,S$GLB,, | Performed by: PHYSICIAN ASSISTANT

## 2022-02-08 PROCEDURE — 1159F PR MEDICATION LIST DOCUMENTED IN MEDICAL RECORD: ICD-10-PCS | Mod: CPTII,S$GLB,, | Performed by: PHYSICIAN ASSISTANT

## 2022-02-08 PROCEDURE — 3077F SYST BP >= 140 MM HG: CPT | Mod: CPTII,S$GLB,, | Performed by: PHYSICIAN ASSISTANT

## 2022-02-08 PROCEDURE — 3080F DIAST BP >= 90 MM HG: CPT | Mod: CPTII,S$GLB,, | Performed by: PHYSICIAN ASSISTANT

## 2022-02-08 PROCEDURE — 3077F PR MOST RECENT SYSTOLIC BLOOD PRESSURE >= 140 MM HG: ICD-10-PCS | Mod: CPTII,S$GLB,, | Performed by: PHYSICIAN ASSISTANT

## 2022-02-08 PROCEDURE — 1160F PR REVIEW ALL MEDS BY PRESCRIBER/CLIN PHARMACIST DOCUMENTED: ICD-10-PCS | Mod: CPTII,S$GLB,, | Performed by: PHYSICIAN ASSISTANT

## 2022-02-08 PROCEDURE — 3008F PR BODY MASS INDEX (BMI) DOCUMENTED: ICD-10-PCS | Mod: CPTII,S$GLB,, | Performed by: PHYSICIAN ASSISTANT

## 2022-02-08 RX ORDER — LOSARTAN POTASSIUM 50 MG/1
50 TABLET ORAL DAILY
Qty: 90 TABLET | Refills: 3 | Status: SHIPPED | OUTPATIENT
Start: 2022-02-08 | End: 2023-04-18

## 2022-02-08 RX ORDER — AMLODIPINE BESYLATE 2.5 MG/1
2.5 TABLET ORAL DAILY
Qty: 90 TABLET | Refills: 1 | Status: SHIPPED | OUTPATIENT
Start: 2022-02-08 | End: 2022-08-01 | Stop reason: SDUPTHER

## 2022-02-08 RX ORDER — PRAVASTATIN SODIUM 40 MG/1
40 TABLET ORAL NIGHTLY
Qty: 90 TABLET | Refills: 1 | Status: SHIPPED | OUTPATIENT
Start: 2022-02-08 | End: 2022-08-01 | Stop reason: SDUPTHER

## 2022-02-08 NOTE — PROGRESS NOTES
Subjective:       Patient ID: Alexi Noguera is a 61 y.o. male.    Chief Complaint: Hypertension and Hyperlipidemia    HPI   3 lb wt. increase  Review of Systems   Constitutional: Negative.  Negative for activity change, appetite change, chills, diaphoresis, fatigue, fever and unexpected weight change.   HENT: Negative.    Eyes: Negative.    Respiratory: Negative.  Negative for cough and shortness of breath.    Cardiovascular: Negative.  Negative for chest pain and leg swelling.   Gastrointestinal: Negative.    Endocrine: Negative.    Genitourinary: Negative.    Musculoskeletal: Negative.    Integumentary:  Negative for rash. Negative.   Neurological: Negative.          Objective:      Physical Exam  Vitals reviewed.   Constitutional:       General: He is not in acute distress.     Appearance: Normal appearance. He is obese. He is not ill-appearing, toxic-appearing or diaphoretic.   HENT:      Head: Normocephalic and atraumatic.      Right Ear: Tympanic membrane, ear canal and external ear normal. There is no impacted cerumen.      Left Ear: Tympanic membrane, ear canal and external ear normal. There is no impacted cerumen.      Nose: Nose normal.      Mouth/Throat:      Mouth: Mucous membranes are moist.      Pharynx: Oropharynx is clear. No oropharyngeal exudate or posterior oropharyngeal erythema.   Eyes:      General: No scleral icterus.     Conjunctiva/sclera: Conjunctivae normal.   Neck:      Vascular: No carotid bruit.      Comments: L thyroid nodule  Cardiovascular:      Rate and Rhythm: Normal rate and regular rhythm.      Pulses: Normal pulses.      Heart sounds: Normal heart sounds. No murmur heard.  No friction rub. No gallop.    Pulmonary:      Effort: Pulmonary effort is normal. No respiratory distress.      Breath sounds: Normal breath sounds. No stridor. No wheezing, rhonchi or rales.   Abdominal:      General: Abdomen is flat. Bowel sounds are normal. There is no distension.      Palpations: Abdomen  is soft. There is no mass.      Tenderness: There is no abdominal tenderness. There is no guarding or rebound.      Hernia: No hernia is present.   Musculoskeletal:         General: No swelling.      Cervical back: Normal range of motion and neck supple. No rigidity or tenderness.      Right lower leg: No edema.      Left lower leg: No edema.   Lymphadenopathy:      Cervical: No cervical adenopathy.   Skin:     General: Skin is warm and dry.      Findings: No rash.   Neurological:      General: No focal deficit present.      Mental Status: He is alert and oriented to person, place, and time.         Assessment:       Problem List Items Addressed This Visit     Obesity (BMI 30-39.9) - Primary    Relevant Orders    Comprehensive Metabolic Panel    T3    T4, Free    TSH    CBC Auto Differential    Essential hypertension    Relevant Medications    amLODIPine (NORVASC) 2.5 MG tablet    losartan (COZAAR) 50 MG tablet    Other Relevant Orders    Comprehensive Metabolic Panel    T3    T4, Free    TSH    CBC Auto Differential    Hyperlipidemia    Relevant Medications    pravastatin (PRAVACHOL) 40 MG tablet    Other Relevant Orders    Comprehensive Metabolic Panel    T3    T4, Free    TSH    CBC Auto Differential    Carcinoma, renal cell, left    Relevant Orders    Comprehensive Metabolic Panel    T3    T4, Free    TSH    CBC Auto Differential    Pre-diabetes    Relevant Orders    Comprehensive Metabolic Panel    T3    T4, Free    TSH    CBC Auto Differential    NAFLD (nonalcoholic fatty liver disease)    Relevant Orders    Comprehensive Metabolic Panel    T3    T4, Free    TSH    CBC Auto Differential      Other Visit Diagnoses     Anticoagulated        Relevant Medications    amLODIPine (NORVASC) 2.5 MG tablet    Other Relevant Orders    Comprehensive Metabolic Panel    T3    T4, Free    TSH    CBC Auto Differential    Thyroid nodule        Relevant Orders    US Soft Tissue Head Neck Thyroid    Comprehensive Metabolic Panel     T3    T4, Free    TSH    CBC Auto Differential    Prostate cancer screening        Relevant Orders    Comprehensive Metabolic Panel    T3    T4, Free    TSH    PSA, Screening    CBC Auto Differential          Plan:       Alexi was seen today for hypertension and hyperlipidemia.    Diagnoses and all orders for this visit:    Obesity (BMI 30-39.9)  -     Comprehensive Metabolic Panel; Future  -     T3; Future  -     T4, Free; Future  -     TSH; Future  -     CBC Auto Differential; Future    Essential hypertension  -     amLODIPine (NORVASC) 2.5 MG tablet; Take 1 tablet (2.5 mg total) by mouth once daily.  -     losartan (COZAAR) 50 MG tablet; Take 1 tablet (50 mg total) by mouth once daily.  -     Comprehensive Metabolic Panel; Future  -     T3; Future  -     T4, Free; Future  -     TSH; Future  -     CBC Auto Differential; Future    Anticoagulated  -     amLODIPine (NORVASC) 2.5 MG tablet; Take 1 tablet (2.5 mg total) by mouth once daily.  -     Comprehensive Metabolic Panel; Future  -     T3; Future  -     T4, Free; Future  -     TSH; Future  -     CBC Auto Differential; Future    Hyperlipidemia, unspecified hyperlipidemia type  -     pravastatin (PRAVACHOL) 40 MG tablet; Take 1 tablet (40 mg total) by mouth every evening.  -     Comprehensive Metabolic Panel; Future  -     T3; Future  -     T4, Free; Future  -     TSH; Future  -     CBC Auto Differential; Future    Pre-diabetes  -     Comprehensive Metabolic Panel; Future  -     T3; Future  -     T4, Free; Future  -     TSH; Future  -     CBC Auto Differential; Future    NAFLD (nonalcoholic fatty liver disease)  -     Comprehensive Metabolic Panel; Future  -     T3; Future  -     T4, Free; Future  -     TSH; Future  -     CBC Auto Differential; Future    Carcinoma, renal cell, left  -     Comprehensive Metabolic Panel; Future  -     T3; Future  -     T4, Free; Future  -     TSH; Future  -     CBC Auto Differential; Future    Thyroid nodule  -     US Soft Tissue  Head Neck Thyroid; Future  -     Comprehensive Metabolic Panel; Future  -     T3; Future  -     T4, Free; Future  -     TSH; Future  -     CBC Auto Differential; Future    Prostate cancer screening  -     Comprehensive Metabolic Panel; Future  -     T3; Future  -     T4, Free; Future  -     TSH; Future  -     PSA, Screening; Future  -     CBC Auto Differential; Future    discussed immunization needs  Discussed diet  Salt restriction  discussed exercise  Serial BP checks  F/u 6 mos

## 2022-02-22 ENCOUNTER — CLINICAL SUPPORT (OUTPATIENT)
Dept: FAMILY MEDICINE | Facility: CLINIC | Age: 62
End: 2022-02-22
Payer: MEDICAID

## 2022-02-22 VITALS — DIASTOLIC BLOOD PRESSURE: 94 MMHG | SYSTOLIC BLOOD PRESSURE: 137 MMHG

## 2022-02-22 DIAGNOSIS — I10 ESSENTIAL HYPERTENSION: Primary | ICD-10-CM

## 2022-02-22 NOTE — PROGRESS NOTES
Pt came in today for bp recheck and comparison with his bp machine. Blood pressure normal and pt will continue on current treatment as prescribed by provider. Pt denies any new c/o today

## 2022-04-21 ENCOUNTER — TELEPHONE (OUTPATIENT)
Dept: HEMATOLOGY/ONCOLOGY | Facility: CLINIC | Age: 62
End: 2022-04-21
Payer: MEDICAID

## 2022-04-21 ENCOUNTER — TELEPHONE (OUTPATIENT)
Dept: FAMILY MEDICINE | Facility: CLINIC | Age: 62
End: 2022-04-21
Payer: MEDICAID

## 2022-04-21 DIAGNOSIS — I10 ESSENTIAL HYPERTENSION: ICD-10-CM

## 2022-04-21 DIAGNOSIS — Z12.5 PROSTATE CANCER SCREENING: Primary | ICD-10-CM

## 2022-04-21 DIAGNOSIS — E78.5 HYPERLIPIDEMIA, UNSPECIFIED HYPERLIPIDEMIA TYPE: ICD-10-CM

## 2022-04-21 NOTE — TELEPHONE ENCOUNTER
"----- Message from Wai Pandey sent at 4/21/2022  2:57 PM CDT -----  Scheduling Request        Patient Status: Est      Scheduling Appt: F/u      Time/Date Preference: May      Contact Preference?: [phone] (home)         Treating Provider: Justin      Do you feel you need to be seen today? No      Additional Notes:  "Thank you for all that you do for our patients"     "

## 2022-04-21 NOTE — TELEPHONE ENCOUNTER
"----- Message from Wai Pandey sent at 4/21/2022  2:51 PM CDT -----  Scheduling Request        Patient Status: Est      Scheduling Appt: 6 mo f/u - CT      Time/Date Preference: End of May or June      Contact Preference?: [phone]         Treating Provider: Ankit      Do you feel you need to be seen today? No      Additional Notes:  "Thank you for all that you do for our patients"     "

## 2022-04-21 NOTE — TELEPHONE ENCOUNTER
Call placed to patient, no answer. Left message on voicemail. Told him I would have the schedulers contact him to make his appointment end of May or beginning of June. I also told him he could call if he needed anything else.

## 2022-06-03 ENCOUNTER — OFFICE VISIT (OUTPATIENT)
Dept: HEMATOLOGY/ONCOLOGY | Facility: CLINIC | Age: 62
End: 2022-06-03
Payer: MEDICAID

## 2022-06-03 VITALS
HEART RATE: 80 BPM | DIASTOLIC BLOOD PRESSURE: 93 MMHG | WEIGHT: 268.31 LBS | SYSTOLIC BLOOD PRESSURE: 168 MMHG | TEMPERATURE: 98 F | BODY MASS INDEX: 35.56 KG/M2 | OXYGEN SATURATION: 98 % | RESPIRATION RATE: 18 BRPM | HEIGHT: 73 IN

## 2022-06-03 DIAGNOSIS — C64.2 CARCINOMA, RENAL CELL, LEFT: Primary | ICD-10-CM

## 2022-06-03 DIAGNOSIS — I10 ESSENTIAL HYPERTENSION: ICD-10-CM

## 2022-06-03 PROCEDURE — 99999 PR PBB SHADOW E&M-EST. PATIENT-LVL III: ICD-10-PCS | Mod: PBBFAC,,, | Performed by: INTERNAL MEDICINE

## 2022-06-03 PROCEDURE — 99999 PR PBB SHADOW E&M-EST. PATIENT-LVL III: CPT | Mod: PBBFAC,,, | Performed by: INTERNAL MEDICINE

## 2022-06-03 PROCEDURE — 99215 OFFICE O/P EST HI 40 MIN: CPT | Mod: S$PBB,,, | Performed by: INTERNAL MEDICINE

## 2022-06-03 PROCEDURE — 99213 OFFICE O/P EST LOW 20 MIN: CPT | Mod: PBBFAC | Performed by: INTERNAL MEDICINE

## 2022-06-03 PROCEDURE — 99215 PR OFFICE/OUTPT VISIT, EST, LEVL V, 40-54 MIN: ICD-10-PCS | Mod: S$PBB,,, | Performed by: INTERNAL MEDICINE

## 2022-06-03 NOTE — PROGRESS NOTES
ONCOLOGY FOLLOW UP VISIT.     Cancer/Stage/TNM:   Cancer Staging  Carcinoma, renal cell, left  Staging form: Kidney, AJCC 8th Edition  - Clinical stage from 9/16/2019: Stage III (cT3a, cN0, cM0) - Unsigned     Interval History:   Patient returns to clinic today for review of imaging. Today patient reports no new symptoms.    ROS:  Review of Systems   Constitutional: Negative for activity change, appetite change, chills, fatigue, fever and unexpected weight change.   HENT: Negative for mouth sores, nosebleeds and sore throat.    Eyes: Negative for visual disturbance.   Respiratory: Negative for cough, shortness of breath and wheezing.    Cardiovascular: Negative for chest pain, palpitations and leg swelling.   Gastrointestinal: Negative for abdominal distention, abdominal pain, blood in stool and diarrhea.   Endocrine: Negative for cold intolerance and heat intolerance.   Genitourinary: Negative for dysuria, flank pain and frequency.   Musculoskeletal: Negative for arthralgias, back pain, joint swelling and myalgias.   Skin: Negative for color change, rash and wound.   Neurological: Negative for dizziness, light-headedness and headaches.   Hematological: Negative for adenopathy. Does not bruise/bleed easily.   Psychiatric/Behavioral: The patient is not nervous/anxious.       A complete 12-point review of systems was reviewed and is negative except as mentioned above.     Past Medical History:   Past Medical History:   Diagnosis Date    Cancer of kidney     Left    Hyperlipidemia     Hypertension         Allergies:   Review of patient's allergies indicates:   Allergen Reactions    Lisinopril Other (See Comments)     Dry cough        Medications:   Current Outpatient Medications   Medication Sig Dispense Refill    amLODIPine (NORVASC) 2.5 MG tablet Take 1 tablet (2.5 mg total) by mouth once daily. 90 tablet 1    losartan (COZAAR) 50 MG tablet Take 1 tablet (50 mg total) by mouth once daily. 90 tablet 3     "pravastatin (PRAVACHOL) 40 MG tablet Take 1 tablet (40 mg total) by mouth every evening. 90 tablet 1     No current facility-administered medications for this visit.        Physical Exam:   BP (!) 168/93 (BP Location: Left arm, Patient Position: Sitting, BP Method: Medium (Automatic))   Pulse 80   Temp 97.8 °F (36.6 °C) (Oral)   Resp 18   Ht 6' 1" (1.854 m)   Wt 121.7 kg (268 lb 4.8 oz)   SpO2 98%   BMI 35.40 kg/m²      ECOG Performance Status: (foot note - ECOG PS provided by [employer]cology [employer]) 0 - Asymptomatic    Physical Exam  Vitals and nursing note reviewed.   Constitutional:       General: He is not in acute distress.     Appearance: Normal appearance. He is well-developed.   HENT:      Head: Normocephalic and atraumatic.   Eyes:      Conjunctiva/sclera: Conjunctivae normal.      Pupils: Pupils are equal, round, and reactive to light.   Pulmonary:      Effort: Pulmonary effort is normal. No respiratory distress.   Abdominal:      General: There is no distension.      Palpations: Abdomen is soft.      Tenderness: There is no abdominal tenderness.   Musculoskeletal:         General: No swelling. Normal range of motion.      Cervical back: Normal range of motion and neck supple.   Skin:     General: Skin is warm and dry.   Neurological:      General: No focal deficit present.      Mental Status: He is alert and oriented to person, place, and time.   Psychiatric:         Mood and Affect: Mood normal.         Behavior: Behavior normal.         Thought Content: Thought content normal.         Judgment: Judgment normal.         Labs:   No results found for this or any previous visit (from the past 48 hour(s)).     Imaging: I have personally reviewed patient's CT scans imaging showing TIMUR, but new PE and IVC thrombus found.  CT Chest Abdomen Pelvis Without Contrast (XPD)  Narrative: EXAMINATION:  CT CHEST ABDOMEN PELVIS WITHOUT CONTRAST(XPD)    CLINICAL HISTORY:  Re-staging stage III RCC; " Malignant neoplasm of left kidney, except renal pelvis    TECHNIQUE:  Low dose axial images, sagittal and coronal reformations were obtained from the thoracic inlet to the pubic symphysis no oral contrast    COMPARISON:  11/26/2021 chest abdomen and pelvic CT    FINDINGS:  Evaluation of the chest demonstrates patent airways.    No mediastinal or hilar lymphadenopathy.    The thoracic aorta is of normal caliber and demonstrates no significant atherosclerotic plaque.    There are coronary artery calcifications.    No pericardial effusion.    The esophagus is normal in course and caliber.    The right hemithorax is well aerated.  Few tiny pulmonary nodules within the right hemithorax, unchanged from the prior study, the largest 0.3 cm, pleural base, series 10, image 235.    The left hemithorax is well aerated.  There is a stable 0.3 cm pulmonary nodule within the lingula, series 10, image 214.    No pleural effusion.    The axillary regions appear normal.    No liver lesion noted on this noncontrast CT.    There is a stone within the gallbladder, no inflammatory changes of the gallbladder seen.    The biliary ducts are not dilated.    The stomach, pancreas, spleen, and bilateral adrenal glands appear normal.    The right kidney appears normal, no hydronephrosis, the right ureter appears normal.    The left kidney is removed,  soft tissue stranding and surgical clips in the  surgical bed, unchanged from the prior study.    0.7 cm node left para-aortic region series 7, image 60, unchanged from 06/15/2020.    The abdominal aorta tapers normally, there is mild atherosclerotic plaque.    The bladder is nondistended, the prostate and seminal vesicles demonstrate nothing unusual.    Mild stool retention, no inflammatory changes of the bowel seen, no bowel dilation.  The appendix is normal.    The mesentery appears normal.    No ascites.    Small fat containing bilateral inguinal hernia left more than right.    The osseous  structures demonstrate no lytic lesion.  Bilateral L5 pars defect.  Impression: No interval detrimental change compared to the prior examination of 11/26/2021.    Prior left nephrectomy, scarring within the surgical bed, not significantly changed, no evidence of local recurrence or metastatic disease.    Stable tiny pulmonary nodules.    Cholelithiasis.    Bilateral L5 pars defect.    There are no measurable lesions per RECIST criteria.    Electronically signed by: Shauna Bazan MD  Date:    06/01/2022  Time:    09:13            Diagnoses:       1. Carcinoma, renal cell, left    2. Essential hypertension          Assessment and Plan:         1. Stage III RCC:   -Sites of Disease: L kidney, L renal vein   Discussed with patient that cervical lesion is unlikely metastasis from RCC. Repeat MRI showed stability. Now s/p nephrectomy. Controversial benefit with adjuvant Sutent and significant side effects. Surgery was 12/10/2019. Continue surveillance, CT CAP q 3 month x 1 year (12/2020), then q 4 month x 1 year (12/2021), then q 6 month for years 3-5 (12/2024).  - CT CAP 6/1/22 without evidence of disease, continue q 6 month surveillance    2. HTN  Managed by PCP, currently on Norvasc.      he will return to clinic in 6 months, but knows to call in the interim if symptoms change or should a problem arise.    Patient was also seen and examined by Dr. Pham. Patient is in agreement with the proposed treatment plan. All questions were answered to the patient's satisfaction. Pt knows to call clinic if anything is needed before the next clinic visit.    Pat Dobson, MSN, APRN, FNP-C  Hematology and Medical Oncology  Nurse Practitioner to Dr. Aocsta Pham  Nurse Practitioner, [facility]      I have reviewed the notes, assessments, and/or procedures performed by Pat DÍAZ, as above.  I have personally interviewed and examined the patient at the beside, and rounded with Pat.  I concur with her assessment and plan and the documentation of Alexi Noguera.    Acosta Pham M.D.  Hematology/Oncology Attending  [facility]  [facility]          Route Chart for Scheduling    Med Onc Chart Routing      Follow up with physician 6 months. Review scans   Follow up with YANDLE    Labs CBC and CMP   Lab interval: every 6 months     Imaging CT chest abdomen pelvis   In 6 months   Pharmacy appointment    Other referrals            Supportive Plan Information  OP STEM CELL MOBILIZATION (FILGRASTIM/PLERIXAFOR)   Yudi Arora NP   Upcoming Treatment Dates - OP STEM CELL MOBILIZATION (FILGRASTIM/PLERIXAFOR)    No upcoming days in selected categories.

## 2022-07-11 ENCOUNTER — TELEPHONE (OUTPATIENT)
Dept: FAMILY MEDICINE | Facility: CLINIC | Age: 62
End: 2022-07-11
Payer: MEDICARE

## 2022-07-11 NOTE — TELEPHONE ENCOUNTER
Attempted to reach patient in regards to rescheduling his appointment. Instructed patient to return the call to the clinic at his earliest convenience.

## 2022-08-01 ENCOUNTER — OFFICE VISIT (OUTPATIENT)
Dept: FAMILY MEDICINE | Facility: CLINIC | Age: 62
End: 2022-08-01
Payer: MEDICARE

## 2022-08-01 VITALS
HEART RATE: 74 BPM | SYSTOLIC BLOOD PRESSURE: 130 MMHG | HEIGHT: 73 IN | DIASTOLIC BLOOD PRESSURE: 72 MMHG | TEMPERATURE: 99 F | WEIGHT: 261.44 LBS | OXYGEN SATURATION: 96 % | BODY MASS INDEX: 34.65 KG/M2

## 2022-08-01 DIAGNOSIS — R73.03 PRE-DIABETES: ICD-10-CM

## 2022-08-01 DIAGNOSIS — C64.2 CARCINOMA, RENAL CELL, LEFT: ICD-10-CM

## 2022-08-01 DIAGNOSIS — E66.9 OBESITY (BMI 30-39.9): ICD-10-CM

## 2022-08-01 DIAGNOSIS — E04.1 THYROID NODULE: ICD-10-CM

## 2022-08-01 DIAGNOSIS — I10 ESSENTIAL HYPERTENSION: Primary | ICD-10-CM

## 2022-08-01 DIAGNOSIS — E78.5 HYPERLIPIDEMIA, UNSPECIFIED HYPERLIPIDEMIA TYPE: ICD-10-CM

## 2022-08-01 PROCEDURE — 99999 PR PBB SHADOW E&M-EST. PATIENT-LVL IV: ICD-10-PCS | Mod: PBBFAC,,, | Performed by: NURSE PRACTITIONER

## 2022-08-01 PROCEDURE — 99213 PR OFFICE/OUTPT VISIT, EST, LEVL III, 20-29 MIN: ICD-10-PCS | Mod: S$GLB,,, | Performed by: NURSE PRACTITIONER

## 2022-08-01 PROCEDURE — 99213 OFFICE O/P EST LOW 20 MIN: CPT | Mod: S$GLB,,, | Performed by: NURSE PRACTITIONER

## 2022-08-01 PROCEDURE — 99999 PR PBB SHADOW E&M-EST. PATIENT-LVL IV: CPT | Mod: PBBFAC,,, | Performed by: NURSE PRACTITIONER

## 2022-08-01 PROCEDURE — 99214 OFFICE O/P EST MOD 30 MIN: CPT | Mod: PBBFAC,PO | Performed by: NURSE PRACTITIONER

## 2022-08-01 RX ORDER — AMLODIPINE BESYLATE 2.5 MG/1
2.5 TABLET ORAL DAILY
Qty: 90 TABLET | Refills: 1 | Status: SHIPPED | OUTPATIENT
Start: 2022-08-01 | End: 2023-03-07

## 2022-08-01 RX ORDER — PRAVASTATIN SODIUM 40 MG/1
40 TABLET ORAL NIGHTLY
Qty: 90 TABLET | Refills: 1 | Status: SHIPPED | OUTPATIENT
Start: 2022-08-01 | End: 2023-03-07

## 2022-08-01 NOTE — PROGRESS NOTES
Subjective:       Patient ID: Alexi Noguera is a 62 y.o. male.    Chief Complaint: Follow-up    HPI   61 y/o male patient with medical problems listed below presents for follow up. No acute complaints reported. States has been adherent with medication regimen.     Follows oncology for RCCp- s/p nephrectomy. Surgery was 12/10/2019. Continue surveillance, CT CAP q 3 month x 1 year (12/2020), then q 4 month x 1 year (12/2021), then q 6 month for years 3-5 (12/2024). - CT CAP 6/1/22 without evidence of disease, continue q 6 month surveillance    Patient Active Problem List   Diagnosis    Obesity (BMI 30-39.9)    Essential hypertension    Stem cell donor    Hyperlipidemia    Carcinoma, renal cell, left    Left renal mass    Infected surgical wound    Meningioma, recurrent of spine    Pulmonary embolism    Pre-diabetes    NAFLD (nonalcoholic fatty liver disease)        Review of patient's allergies indicates:   Allergen Reactions    Lisinopril Other (See Comments)     Dry cough       Past Surgical History:   Procedure Laterality Date    LYSIS OF ADHESIONS N/A 12/9/2019    Procedure: LYSIS, ADHESIONS;  Surgeon: Tr Jorgensen MD;  Location: [facility] OR [address] [place] [address];  Service: General;  Laterality: N/A;    NEPHRECTOMY Left 12/9/2019    Procedure: Nephrectomy OPEN;  Surgeon: José Manuel Buchanan MD;  [facility];  Service: Urology;  Laterality: Left;  4hrs GEN WITH REGIONAL    THROMBECTOMY OF VENA CAVA  12/9/2019    Procedure: THROMBECTOMY, VENA CAVA;  Surgeon: José Manuel Buchanan MD;  [facility];  Service: Urology;;    THROMBECTOMY OF VENA CAVA  12/9/2019    Procedure: THROMBECTOMY, VENA CAVA;  Surgeon: KIRSTEN Atkins III, MD;  Location: [facility] OR [address] [place] [address];  Service: Peripheral Vascular;;          Current Outpatient Medications:     ciprofloxacin HCl (CIPRO) 500 MG tablet, Take 1 tablet (500 mg total) by mouth 2 (two) times daily., Disp: 14 tablet, Rfl: 0    losartan (COZAAR) 50 MG  "tablet, Take 1 tablet (50 mg total) by mouth once daily., Disp: 90 tablet, Rfl: 3    amLODIPine (NORVASC) 2.5 MG tablet, Take 1 tablet (2.5 mg total) by mouth once daily., Disp: 90 tablet, Rfl: 1    dicyclomine (BENTYL) 20 mg tablet, Take 1 tablet (20 mg total) by mouth 2 (two) times daily. (Patient not taking: Reported on 8/1/2022), Disp: 20 tablet, Rfl: 0    pravastatin (PRAVACHOL) 40 MG tablet, Take 1 tablet (40 mg total) by mouth every evening., Disp: 90 tablet, Rfl: 1    Lab Results   Component Value Date    WBC 5.93 07/25/2022    HGB 14.1 07/25/2022    HCT 44.3 07/25/2022     07/25/2022    CHOL 176 07/25/2022    TRIG 123 07/25/2022    HDL 35 (L) 07/25/2022    ALT 22 07/25/2022    AST 16 07/25/2022     07/25/2022    K 5.1 07/25/2022     07/25/2022    CREATININE 1.3 07/25/2022    BUN 19 07/25/2022    CO2 25 07/25/2022    TSH 1.656 03/03/2022    PSA 1.8 07/25/2022    INR 0.9 12/15/2019    HGBA1C 6.1 (H) 10/21/2021     Above labs reviewed    Review of Systems   Constitutional: Negative for chills and fever.   Respiratory: Negative for chest tightness and shortness of breath.    Cardiovascular: Negative for chest pain and palpitations.   Gastrointestinal: Negative for abdominal pain.   Neurological: Negative for dizziness and headaches.       Objective:   /72 (BP Location: Left arm, Patient Position: Sitting, BP Method: Small (Manual))   Pulse 74   Temp 98.6 °F (37 °C) (Oral)   Ht 6' 1" (1.854 m)   Wt 118.6 kg (261 lb 7.5 oz)   SpO2 96%   BMI 34.50 kg/m²       Physical Exam  Vitals reviewed.   Constitutional:       General: He is not in acute distress.     Appearance: Normal appearance.   HENT:      Mouth/Throat:      Mouth: Mucous membranes are moist.   Cardiovascular:      Rate and Rhythm: Normal rate and regular rhythm.      Pulses: Normal pulses.      Heart sounds: Normal heart sounds.   Pulmonary:      Effort: Pulmonary effort is normal.      Breath sounds: Normal breath " sounds.   Chest:      Chest wall: No deformity, swelling or edema.   Abdominal:      General: Abdomen is flat. Bowel sounds are normal.      Palpations: Abdomen is soft.   Musculoskeletal:      Cervical back: Normal range of motion.   Skin:     General: Skin is warm and dry.   Neurological:      General: No focal deficit present.      Mental Status: He is alert.   Psychiatric:         Mood and Affect: Mood normal.         Behavior: Behavior normal.         Assessment:       1. Essential hypertension    2. Carcinoma, renal cell, left    3. Obesity (BMI 30-39.9)    4. Pre-diabetes    5. Hyperlipidemia, unspecified hyperlipidemia type    6. Thyroid nodule        Plan:       1. Carcinoma, renal cell, left  - Continue to follow with oncology    2. Essential hypertension  - Controlled and continue with current regimen  - amLODIPine (NORVASC) 2.5 MG tablet; Take 1 tablet (2.5 mg total) by mouth once daily.  Dispense: 90 tablet; Refill: 1  - CBC Auto Differential; Future  - Comprehensive Metabolic Panel; Future    3. Obesity (BMI 30-39.9)  - Continue with diet and exercise    4. Pre-diabetes  - Hemoglobin A1C; Future    5. Hyperlipidemia, unspecified hyperlipidemia type  - pravastatin (PRAVACHOL) 40 MG tablet; Take 1 tablet (40 mg total) by mouth every evening.  Dispense: 90 tablet; Refill: 1    6. Thyroid nodule  - TSH; Future  - T4, Free; Future    Patient with be reevaluated in 6 months or sooner altagracia Mata NP

## 2022-11-01 ENCOUNTER — TELEPHONE (OUTPATIENT)
Dept: HEPATOLOGY | Facility: CLINIC | Age: 62
End: 2022-11-01
Payer: MEDICAID

## 2022-11-01 NOTE — TELEPHONE ENCOUNTER
Spoke with pt about his appt , just making sure he was still schedule for 12/06     Thanks   Theodora

## 2022-11-02 DIAGNOSIS — C64.2 CARCINOMA, RENAL CELL, LEFT: Primary | ICD-10-CM

## 2022-12-05 ENCOUNTER — OFFICE VISIT (OUTPATIENT)
Dept: HEMATOLOGY/ONCOLOGY | Facility: CLINIC | Age: 62
End: 2022-12-05
Payer: MEDICARE

## 2022-12-05 VITALS
RESPIRATION RATE: 18 BRPM | TEMPERATURE: 98 F | BODY MASS INDEX: 35.37 KG/M2 | HEART RATE: 70 BPM | OXYGEN SATURATION: 97 % | DIASTOLIC BLOOD PRESSURE: 89 MMHG | SYSTOLIC BLOOD PRESSURE: 130 MMHG | WEIGHT: 268.06 LBS

## 2022-12-05 DIAGNOSIS — I10 ESSENTIAL HYPERTENSION: ICD-10-CM

## 2022-12-05 DIAGNOSIS — C64.2 CARCINOMA, RENAL CELL, LEFT: Primary | ICD-10-CM

## 2022-12-05 PROCEDURE — 99999 PR PBB SHADOW E&M-EST. PATIENT-LVL III: ICD-10-PCS | Mod: PBBFAC,,, | Performed by: INTERNAL MEDICINE

## 2022-12-05 PROCEDURE — 3075F SYST BP GE 130 - 139MM HG: CPT | Mod: CPTII,S$GLB,, | Performed by: INTERNAL MEDICINE

## 2022-12-05 PROCEDURE — 99999 PR PBB SHADOW E&M-EST. PATIENT-LVL III: CPT | Mod: PBBFAC,,, | Performed by: INTERNAL MEDICINE

## 2022-12-05 PROCEDURE — 3079F DIAST BP 80-89 MM HG: CPT | Mod: CPTII,S$GLB,, | Performed by: INTERNAL MEDICINE

## 2022-12-05 PROCEDURE — 3079F PR MOST RECENT DIASTOLIC BLOOD PRESSURE 80-89 MM HG: ICD-10-PCS | Mod: CPTII,S$GLB,, | Performed by: INTERNAL MEDICINE

## 2022-12-05 PROCEDURE — 4010F ACE/ARB THERAPY RXD/TAKEN: CPT | Mod: CPTII,S$GLB,, | Performed by: INTERNAL MEDICINE

## 2022-12-05 PROCEDURE — 4010F PR ACE/ARB THEARPY RXD/TAKEN: ICD-10-PCS | Mod: CPTII,S$GLB,, | Performed by: INTERNAL MEDICINE

## 2022-12-05 PROCEDURE — 3008F BODY MASS INDEX DOCD: CPT | Mod: CPTII,S$GLB,, | Performed by: INTERNAL MEDICINE

## 2022-12-05 PROCEDURE — 3075F PR MOST RECENT SYSTOLIC BLOOD PRESS GE 130-139MM HG: ICD-10-PCS | Mod: CPTII,S$GLB,, | Performed by: INTERNAL MEDICINE

## 2022-12-05 PROCEDURE — 99214 PR OFFICE/OUTPT VISIT, EST, LEVL IV, 30-39 MIN: ICD-10-PCS | Mod: S$GLB,,, | Performed by: INTERNAL MEDICINE

## 2022-12-05 PROCEDURE — 1159F MED LIST DOCD IN RCRD: CPT | Mod: CPTII,S$GLB,, | Performed by: INTERNAL MEDICINE

## 2022-12-05 PROCEDURE — 99214 OFFICE O/P EST MOD 30 MIN: CPT | Mod: S$GLB,,, | Performed by: INTERNAL MEDICINE

## 2022-12-05 PROCEDURE — 3008F PR BODY MASS INDEX (BMI) DOCUMENTED: ICD-10-PCS | Mod: CPTII,S$GLB,, | Performed by: INTERNAL MEDICINE

## 2022-12-05 PROCEDURE — 1159F PR MEDICATION LIST DOCUMENTED IN MEDICAL RECORD: ICD-10-PCS | Mod: CPTII,S$GLB,, | Performed by: INTERNAL MEDICINE

## 2022-12-05 NOTE — PROGRESS NOTES
ONCOLOGY FOLLOW UP VISIT.     Reason for visit: Surveillance for stage III RCC s/p resection  Cancer/Stage/TNM:   Cancer Staging  Carcinoma, renal cell, left  Staging form: Kidney, AJCC 8th Edition  - Clinical stage from 9/16/2019: Stage III (cT3a, cN0, cM0) - Unsigned     Interval History:   Patient returns to clinic today for review of imaging. Today patient reports no new symptoms.    ROS:  Review of Systems   Constitutional:  Negative for activity change, appetite change, chills, fatigue, fever and unexpected weight change.   HENT:  Negative for mouth sores, nosebleeds and sore throat.    Eyes:  Negative for visual disturbance.   Respiratory:  Negative for cough, shortness of breath and wheezing.    Cardiovascular:  Negative for chest pain, palpitations and leg swelling.   Gastrointestinal:  Negative for abdominal distention, abdominal pain, blood in stool and diarrhea.   Endocrine: Negative for cold intolerance and heat intolerance.   Genitourinary:  Negative for dysuria, flank pain and frequency.   Musculoskeletal:  Negative for arthralgias, back pain, joint swelling and myalgias.   Skin:  Negative for color change, rash and wound.   Neurological:  Negative for dizziness, light-headedness and headaches.   Hematological:  Negative for adenopathy. Does not bruise/bleed easily.   Psychiatric/Behavioral:  The patient is not nervous/anxious.     A complete 12-point review of systems was reviewed and is negative except as mentioned above.     Past Medical History:   Past Medical History:   Diagnosis Date    Cancer of kidney     Left    Hyperlipidemia     Hypertension         Allergies:   Review of patient's allergies indicates:   Allergen Reactions    Lisinopril Other (See Comments)     Dry cough        Medications:   Current Outpatient Medications   Medication Sig Dispense Refill    amLODIPine (NORVASC) 2.5 MG tablet Take 1 tablet (2.5 mg total) by mouth once daily. 90 tablet 1    ciprofloxacin HCl (CIPRO) 500 MG  tablet Take 1 tablet (500 mg total) by mouth 2 (two) times daily. 14 tablet 0    losartan (COZAAR) 50 MG tablet Take 1 tablet (50 mg total) by mouth once daily. 90 tablet 3    pravastatin (PRAVACHOL) 40 MG tablet Take 1 tablet (40 mg total) by mouth every evening. 90 tablet 1     No current facility-administered medications for this visit.        Physical Exam:   /89 (BP Location: Right arm, Patient Position: Sitting, BP Method: Large (Automatic))   Pulse 70   Temp 97.9 °F (36.6 °C) (Oral)   Resp 18   Wt 121.6 kg (268 lb 1.3 oz)   SpO2 97%   BMI 35.37 kg/m²      ECOG Performance Status: (foot note - ECOG PS provided by Eastern Cooperative Oncology Group) 0 - Asymptomatic    Physical Exam  Vitals and nursing note reviewed.   Constitutional:       General: He is not in acute distress.     Appearance: Normal appearance. He is well-developed.   HENT:      Head: Normocephalic and atraumatic.   Eyes:      Conjunctiva/sclera: Conjunctivae normal.      Pupils: Pupils are equal, round, and reactive to light.   Pulmonary:      Effort: Pulmonary effort is normal. No respiratory distress.   Abdominal:      General: There is no distension.      Palpations: Abdomen is soft.      Tenderness: There is no abdominal tenderness.   Musculoskeletal:         General: No swelling. Normal range of motion.      Cervical back: Normal range of motion and neck supple.   Skin:     General: Skin is warm and dry.   Neurological:      General: No focal deficit present.      Mental Status: He is alert and oriented to person, place, and time.   Psychiatric:         Mood and Affect: Mood normal.         Behavior: Behavior normal.         Thought Content: Thought content normal.         Judgment: Judgment normal.       Labs:   No results found for this or any previous visit (from the past 48 hour(s)).     Imaging: I have personally reviewed patient's CT scans imaging showing TIMUR  CT Chest Abdomen Pelvis With Contrast  Narrative:  EXAMINATION:  CT CHEST ABDOMEN PELVIS WITH CONTRAST (XPD)    CLINICAL HISTORY:  surveillance stage III RCC; Malignant neoplasm of left kidney, except renal pelvis    TECHNIQUE:  Low dose axial CT images obtained throughout the region of the chest, abdomen and pelvis after the administration of 125 mL of Omnipaque 350 intravenous contrast.  Axial, sagittal and coronal reconstructions were performed.    COMPARISON:  06/01/2022j    FINDINGS:  Soft tissue structures at the base of the neck are unremarkable.    The trachea and bronchial airways are clear and fully patent.    The lungs are well expanded and free of confluent opacity.  Small stable micro nodules    No pleural fluid, pericardial fluid or mediastinal lymph node enlargement.    The heart and aorta appear within normal limits.  Coronary arterial calcifications.    The liver, and bile ducts are unremarkable.  Stable cholelithiasis.    Spleen, pancreas, and adrenal glands appear within normal limits.    RIGHT kidney unremarkable.  LEFT nephrectomy.  No evidence for RIGHT-sided hydronephrosis.  Urinary bladder unremarkable.    LEFT periaortic lymph node series 11, image 95 measures 7 mm unchanged from prior examination.    The stomach, small bowel and colon demonstrate no evidence of obstruction or focal inflammation.    No free air or free fluid identified within the abdomen or pelvis.    Aorta tapers normally throughout its course.    Osseous structures exhibit mild degenerative changes. No fracture or focal osseous destructive lesion.  Pars defects bilaterally L5    Containing inguinal hernias LEFT more prominent than RIGHT.  Impression: No acute abnormality identified.  No interval detrimental change from 06/01/2022.  Stable appearance.    S/P LEFT nephrectomy with scarring LEFT surgical bed not significantly changed, with no evidence for local recurrence or metastatic disease.    Cholelithiasis.    Bilateral pars defects L5.    Electronically signed  by: Williams Martinez MD  Date:    12/02/2022  Time:    09:02            Diagnoses:       1. Carcinoma, renal cell, left    2. Essential hypertension          Assessment and Plan:         1. Stage III RCC:   -Sites of Disease: L kidney, L renal vein   Discussed with patient that cervical lesion is unlikely metastasis from RCC. Repeat MRI showed stability. Now s/p nephrectomy. Controversial benefit with adjuvant Sutent and significant side effects. Surgery was 12/10/2019. Continue surveillance, CT CAP q 3 month x 1 year (12/2020), then q 4 month x 1 year (12/2021), then q 6-12 month for years 3-5 (12/2024).  - CT CAP 12/2/22 without evidence of disease, continue q 6 month surveillance    2. HTN  Managed by PCP, currently on Norvasc.      he will return to clinic in 6 months, but knows to call in the interim if symptoms change or should a problem arise.      Acosta Pham M.D.  Hematology/Oncology Attending  [facility]  [facility]

## 2022-12-06 ENCOUNTER — OFFICE VISIT (OUTPATIENT)
Dept: HEPATOLOGY | Facility: CLINIC | Age: 62
End: 2022-12-06
Payer: MEDICARE

## 2022-12-06 VITALS
TEMPERATURE: 98 F | SYSTOLIC BLOOD PRESSURE: 146 MMHG | WEIGHT: 268.06 LBS | DIASTOLIC BLOOD PRESSURE: 85 MMHG | BODY MASS INDEX: 35.53 KG/M2 | HEIGHT: 73 IN | HEART RATE: 73 BPM | RESPIRATION RATE: 18 BRPM | OXYGEN SATURATION: 95 %

## 2022-12-06 DIAGNOSIS — I10 ESSENTIAL HYPERTENSION: ICD-10-CM

## 2022-12-06 DIAGNOSIS — K76.0 NAFLD (NONALCOHOLIC FATTY LIVER DISEASE): Primary | ICD-10-CM

## 2022-12-06 DIAGNOSIS — E78.2 MIXED HYPERLIPIDEMIA: ICD-10-CM

## 2022-12-06 DIAGNOSIS — R73.03 PRE-DIABETES: ICD-10-CM

## 2022-12-06 DIAGNOSIS — E66.9 OBESITY (BMI 30-39.9): ICD-10-CM

## 2022-12-06 PROCEDURE — 3077F PR MOST RECENT SYSTOLIC BLOOD PRESSURE >= 140 MM HG: ICD-10-PCS | Mod: CPTII,S$GLB,, | Performed by: NURSE PRACTITIONER

## 2022-12-06 PROCEDURE — 4010F PR ACE/ARB THEARPY RXD/TAKEN: ICD-10-PCS | Mod: CPTII,S$GLB,, | Performed by: NURSE PRACTITIONER

## 2022-12-06 PROCEDURE — 3079F PR MOST RECENT DIASTOLIC BLOOD PRESSURE 80-89 MM HG: ICD-10-PCS | Mod: CPTII,S$GLB,, | Performed by: NURSE PRACTITIONER

## 2022-12-06 PROCEDURE — 1159F PR MEDICATION LIST DOCUMENTED IN MEDICAL RECORD: ICD-10-PCS | Mod: CPTII,S$GLB,, | Performed by: NURSE PRACTITIONER

## 2022-12-06 PROCEDURE — 99999 PR PBB SHADOW E&M-EST. PATIENT-LVL III: ICD-10-PCS | Mod: PBBFAC,,, | Performed by: NURSE PRACTITIONER

## 2022-12-06 PROCEDURE — 3077F SYST BP >= 140 MM HG: CPT | Mod: CPTII,S$GLB,, | Performed by: NURSE PRACTITIONER

## 2022-12-06 PROCEDURE — 3008F BODY MASS INDEX DOCD: CPT | Mod: CPTII,S$GLB,, | Performed by: NURSE PRACTITIONER

## 2022-12-06 PROCEDURE — 1159F MED LIST DOCD IN RCRD: CPT | Mod: CPTII,S$GLB,, | Performed by: NURSE PRACTITIONER

## 2022-12-06 PROCEDURE — 99214 OFFICE O/P EST MOD 30 MIN: CPT | Mod: S$GLB,,, | Performed by: NURSE PRACTITIONER

## 2022-12-06 PROCEDURE — 99999 PR PBB SHADOW E&M-EST. PATIENT-LVL III: CPT | Mod: PBBFAC,,, | Performed by: NURSE PRACTITIONER

## 2022-12-06 PROCEDURE — 4010F ACE/ARB THERAPY RXD/TAKEN: CPT | Mod: CPTII,S$GLB,, | Performed by: NURSE PRACTITIONER

## 2022-12-06 PROCEDURE — 99214 PR OFFICE/OUTPT VISIT, EST, LEVL IV, 30-39 MIN: ICD-10-PCS | Mod: S$GLB,,, | Performed by: NURSE PRACTITIONER

## 2022-12-06 PROCEDURE — 3008F PR BODY MASS INDEX (BMI) DOCUMENTED: ICD-10-PCS | Mod: CPTII,S$GLB,, | Performed by: NURSE PRACTITIONER

## 2022-12-06 PROCEDURE — 3079F DIAST BP 80-89 MM HG: CPT | Mod: CPTII,S$GLB,, | Performed by: NURSE PRACTITIONER

## 2022-12-06 NOTE — PROGRESS NOTES
[facility] - Established Patient    Last Clinic Visit: 11/1/21    Chief Complaint: Follow-up for fatty liver        HISTORY     This is a 62 y.o. male with PMH noted below, here for follow-up of fatty liver.    He was initially referred to hepatology in 2016 for positive hepatitis C Ab though HCV RNA undetected x 2, indicating no current/chronic HCV infection.        Hepatic steatosis first noted on CT 7/9/21.   He has frequent imaging for h/o renal CA- s/p nephrectomy.     He has normal liver enzymes/LFTs.    Serologic workup has been negative for hemochromatosis and viral hepatitis.    Fibrosis staging:   Fibroscan 12/1/21 = F0-F1 (kPa 4.4), S3 ()    Health Maintenance:  - Most recent imaging: CT 12/2022 with normal liver  - Hepatitis A & B vaccination: +immunity    Interval history:  Feels well today, no concerns.   Notes sister was recently diagnosed with fatty liver.     Updates on risk factors for fatty liver:  Weight -- Body mass index is 35.37 kg/m².     Was able to lose ~10 lb over the summer; was more active and eating less. Has gained this back.  Drinks a lot of sweet tea                     Dyslipidemia -- well controlled on statin                            Insulin resistance / diabetes -- last HgbA1c 6.1         Hypertension -- well controlled  Alcohol use -- none in 30 years    Denies symptoms of hepatic decompensation including jaundice, ascites, cognitive problems to suggest hepatic encephalopathy, or GI bleeding.           Past medical history, surgical history, problem list, family history, social history, allergies: Reviewed and updated in the appropriate section of the electronic medical record.      Current Outpatient Medications   Medication Sig Dispense Refill    amLODIPine (NORVASC) 2.5 MG tablet Take 1 tablet (2.5 mg total) by mouth once daily. 90 tablet 1    ciprofloxacin HCl (CIPRO) 500 MG tablet Take 1 tablet (500 mg total) by mouth 2 (two) times daily. 14 tablet 0  "   losartan (COZAAR) 50 MG tablet Take 1 tablet (50 mg total) by mouth once daily. 90 tablet 3    pravastatin (PRAVACHOL) 40 MG tablet Take 1 tablet (40 mg total) by mouth every evening. 90 tablet 1     No current facility-administered medications for this visit.     Medication list reviewed and updated.      Review of Systems   As per HPI      Physical Exam   Constitutional: Well-nourished. No distress. Alert and oriented.  Eyes: No scleral icterus.   Pulmonary/Chest: Respiratory effort normal. No respiratory distress.   Abdominal: No distension, no ascites appreciated.   Extremities: No edema.   Neurological: No tremor or asterixis. Gait normal.  Skin: No jaundice. No spider telangiectasias or palmar erythema.  Psychiatric: Normal mood and affect. Speech, behavior, and thought content normal. No depression or anxiety noted.       Vitals reviewed.  BP (!) 146/85 (BP Location: Right arm, Patient Position: Sitting, BP Method: Medium (Automatic))   Pulse 73   Temp 97.7 °F (36.5 °C) (Oral)   Resp 18   Ht 6' 1" (1.854 m)   Wt 121.6 kg (268 lb 1.3 oz)   SpO2 95%   BMI 35.37 kg/m²       LABS & DIAGNOSTIC STUDIES     I have personally reviewed pertinent laboratory findings:    Lab Results   Component Value Date    ALT 19 12/02/2022    AST 18 12/02/2022    ALKPHOS 52 (L) 12/02/2022    BILITOT 0.6 12/02/2022    ALBUMIN 3.9 12/02/2022    INR 0.9 12/15/2019       Lab Results   Component Value Date    WBC 6.83 12/02/2022    HGB 15.6 12/02/2022    HCT 47.6 12/02/2022    MCV 92 12/02/2022     12/02/2022       Lab Results   Component Value Date     12/02/2022    K 4.8 12/02/2022    BUN 23 12/02/2022    CREATININE 1.4 12/02/2022    ESTGFRAFRICA >60.0 07/25/2022    EGFRNONAA 57.7 (A) 07/25/2022       Lab Results   Component Value Date    FERRITIN 170 11/12/2021    FESATURATED 19 (L) 11/12/2021    HEPBSAG Negative 11/12/2021    HEPBCAB Positive (A) 11/12/2021    HEPCAB Positive (A) 04/30/2016    HCVRNAQUANTP <12 " 08/22/2016    HEPAIGM Negative 02/24/2017       No results found for: AFP    I have personally reviewed the following result reports:  CT - 12/2/22      ASSESSMENT & PLAN     62 y.o. male with:    1. NAFLD  -- Labs, imaging, and fibrosis staging reviewed.  -- Liver enzymes remain normal. Recommend labs 1-2 times per year, can be done with PCP  -- Fibroscan previously reassuring. This showed significant steatosis but no-minimal fibrosis (F0-F1). Repeat next year.  -- Continue imaging surveillance with Oncology   -- Reminded that the only treatment for fatty liver is weight loss and maintaining good control of metabolic risk factors (blood pressure, cholesterol, and blood sugar).     2. Body mass index is 35.37 kg/m²., HTN, HLD, pre-diabetes  -- We reviewed his risk factors for fatty liver  -- Reviewed weight loss strategies including dietary changes and physical activity. Discussed low carbohydrate, low sugar diet. Recommend he stop ALL sugary beverages first   -- Recommend long-term weight loss goal of 10% (about 25 lb).   -- Needs updated HgbA1c; previously 6.1 and DM runs in his family      Orders Placed This Encounter   Procedures    FibroScan Ross (Vibration Controlled Transient Elastography)       *See AVS for patient education and instructions.      Return to clinic in 1 year with Fibroscan      Thank you for allowing me to participate in the care of [facility]  Hepatology        Duration of encounter: 30 min  This includes face to face time and non-face to face time preparing to see the patient (eg, review of tests), obtaining and/or reviewing separately obtained history, documenting clinical information in the electronic or other health record, independently interpreting results and communicating results to the patient/family/caregiver, or care coordination.

## 2022-12-06 NOTE — PATIENT INSTRUCTIONS
Follow-up in 1 year with Fibroscan  Routine labs with PCP        FATTY LIVER EDUCATION:  There is no FDA approved therapy for non-alcoholic fatty liver disease (NAFLD); therefore, lifestyle changes are important:  1. Weight loss goal of 25 lbs    *Weight loss of 5% has been shown to reduce fat in the liver  *Weight loss of 7-10% has been shown to improve fatty liver, inflammation from fatty liver, and liver fibrosis (scarring/damage)    2. Decreasing daily calories is recommended for weight loss. Try to limit carbohydrate intake to LESS than 30-45 grams of carbs with a meal and LESS than 5-10 grams of carbs with a snack. Avoid drinking beverages with sugar/carbohydrates. Meeting with a dietician or using one of the weight loss apps below can be helpful to determine individualized calorie goals and add up carbs throughout the day. Look for snacks high in protein, low in carbohydrates/sugar.    3. Exercise as tolerated. Studies have shown that exercise improves fatty liver even without weight loss.     4. Recommend good control of cholesterol, blood pressure, blood sugar levels (as these are risk factors for fatty liver). Cholesterol lowering medications including statins are typically safe and beneficial (even if liver enzymes are elevated)    5. Limit alcohol consumption, no more than 2 serving(s) of alcohol in any day (1 serving is 5 ounces of wine, 12 ounces of beer, or 1.5 ounces of liquor). Do not recommend daily alcohol use or drinking alcohol most days per week.    In some people, fatty liver can progress to steatohepatitis (inflamatory fatty liver). This can cause liver scarring or damage (fibrosis) and possibly to cirrhosis. Cirrhosis increases risk of liver cancer and liver failure. Lifestyle changes now can help to decrease this risk.     Ask about our fatty liver/CHOWDARY clinical trials if you have fibrosis / scar tissue related to fatty liver.        Additional Resources:    Websites with information about  fatty liver and inflammation related to fatty liver (CHOWDARY): www.nashtruth.com and www.nashactually.com   [facility]: Non-Alcoholic Fatty Liver Disease (NAFLD)    Facebook support group with tips and recipes:   Non-Alcoholic Fatty Liver Disease (NAFLD) Diet & Nutrition Support     Can download Girl Meets Dress Pal or Lose It laura to add up your carbohydrates throughout the day.      Try www.Erly for recipes.    If interested in seeing a dietician to create a weight loss plan, contact the dietician team at [facility]: [email or web address].  You can also call to schedule a consult with a dietician: [phone]  *Virtual visits are available with one of our dieticians.     If you have diabetes or high blood pressure, you can meet with a Health  through [facility] [employer] program. Let us know if you are interested in a referral.     Let us know if you are interested in a referral to [facility].      Phone numbers:  Bariatric medicine - [phone]  Medical Fitness - [phone]

## 2023-02-06 ENCOUNTER — OFFICE VISIT (OUTPATIENT)
Dept: FAMILY MEDICINE | Facility: CLINIC | Age: 63
End: 2023-02-06
Payer: MEDICARE

## 2023-02-06 VITALS
DIASTOLIC BLOOD PRESSURE: 68 MMHG | TEMPERATURE: 98 F | RESPIRATION RATE: 14 BRPM | SYSTOLIC BLOOD PRESSURE: 136 MMHG | HEIGHT: 73 IN | WEIGHT: 266.13 LBS | BODY MASS INDEX: 35.27 KG/M2 | HEART RATE: 74 BPM | OXYGEN SATURATION: 97 %

## 2023-02-06 DIAGNOSIS — I10 ESSENTIAL HYPERTENSION: ICD-10-CM

## 2023-02-06 DIAGNOSIS — Z00.00 PERIODIC HEALTH ASSESSMENT, GENERAL SCREENING, ADULT: ICD-10-CM

## 2023-02-06 DIAGNOSIS — E78.2 MIXED HYPERLIPIDEMIA: ICD-10-CM

## 2023-02-06 DIAGNOSIS — R73.03 PRE-DIABETES: Primary | ICD-10-CM

## 2023-02-06 DIAGNOSIS — Z28.39 IMMUNIZATION DEFICIENCY: ICD-10-CM

## 2023-02-06 DIAGNOSIS — E66.9 OBESITY (BMI 30-39.9): ICD-10-CM

## 2023-02-06 DIAGNOSIS — C64.2 CARCINOMA, RENAL CELL, LEFT: ICD-10-CM

## 2023-02-06 PROCEDURE — 4010F PR ACE/ARB THEARPY RXD/TAKEN: ICD-10-PCS | Mod: CPTII,S$GLB,, | Performed by: PHYSICIAN ASSISTANT

## 2023-02-06 PROCEDURE — 90686 IIV4 VACC NO PRSV 0.5 ML IM: CPT | Mod: S$GLB,,, | Performed by: PHYSICIAN ASSISTANT

## 2023-02-06 PROCEDURE — 1159F MED LIST DOCD IN RCRD: CPT | Mod: CPTII,S$GLB,, | Performed by: PHYSICIAN ASSISTANT

## 2023-02-06 PROCEDURE — 99214 PR OFFICE/OUTPT VISIT, EST, LEVL IV, 30-39 MIN: ICD-10-PCS | Mod: S$GLB,,, | Performed by: PHYSICIAN ASSISTANT

## 2023-02-06 PROCEDURE — 3075F PR MOST RECENT SYSTOLIC BLOOD PRESS GE 130-139MM HG: ICD-10-PCS | Mod: CPTII,S$GLB,, | Performed by: PHYSICIAN ASSISTANT

## 2023-02-06 PROCEDURE — 1159F PR MEDICATION LIST DOCUMENTED IN MEDICAL RECORD: ICD-10-PCS | Mod: CPTII,S$GLB,, | Performed by: PHYSICIAN ASSISTANT

## 2023-02-06 PROCEDURE — 3044F HG A1C LEVEL LT 7.0%: CPT | Mod: CPTII,S$GLB,, | Performed by: PHYSICIAN ASSISTANT

## 2023-02-06 PROCEDURE — 3078F PR MOST RECENT DIASTOLIC BLOOD PRESSURE < 80 MM HG: ICD-10-PCS | Mod: CPTII,S$GLB,, | Performed by: PHYSICIAN ASSISTANT

## 2023-02-06 PROCEDURE — 4010F ACE/ARB THERAPY RXD/TAKEN: CPT | Mod: CPTII,S$GLB,, | Performed by: PHYSICIAN ASSISTANT

## 2023-02-06 PROCEDURE — 99214 OFFICE O/P EST MOD 30 MIN: CPT | Mod: S$GLB,,, | Performed by: PHYSICIAN ASSISTANT

## 2023-02-06 PROCEDURE — 90686 FLU VACCINE (QUAD) GREATER THAN OR EQUAL TO 3YO PRESERVATIVE FREE IM: ICD-10-PCS | Mod: S$GLB,,, | Performed by: PHYSICIAN ASSISTANT

## 2023-02-06 PROCEDURE — 99999 PR PBB SHADOW E&M-EST. PATIENT-LVL IV: ICD-10-PCS | Mod: PBBFAC,,, | Performed by: PHYSICIAN ASSISTANT

## 2023-02-06 PROCEDURE — 99999 PR PBB SHADOW E&M-EST. PATIENT-LVL IV: CPT | Mod: PBBFAC,,, | Performed by: PHYSICIAN ASSISTANT

## 2023-02-06 PROCEDURE — 3044F PR MOST RECENT HEMOGLOBIN A1C LEVEL <7.0%: ICD-10-PCS | Mod: CPTII,S$GLB,, | Performed by: PHYSICIAN ASSISTANT

## 2023-02-06 PROCEDURE — 1160F RVW MEDS BY RX/DR IN RCRD: CPT | Mod: CPTII,S$GLB,, | Performed by: PHYSICIAN ASSISTANT

## 2023-02-06 PROCEDURE — 3078F DIAST BP <80 MM HG: CPT | Mod: CPTII,S$GLB,, | Performed by: PHYSICIAN ASSISTANT

## 2023-02-06 PROCEDURE — 3008F PR BODY MASS INDEX (BMI) DOCUMENTED: ICD-10-PCS | Mod: CPTII,S$GLB,, | Performed by: PHYSICIAN ASSISTANT

## 2023-02-06 PROCEDURE — 1160F PR REVIEW ALL MEDS BY PRESCRIBER/CLIN PHARMACIST DOCUMENTED: ICD-10-PCS | Mod: CPTII,S$GLB,, | Performed by: PHYSICIAN ASSISTANT

## 2023-02-06 PROCEDURE — 3008F BODY MASS INDEX DOCD: CPT | Mod: CPTII,S$GLB,, | Performed by: PHYSICIAN ASSISTANT

## 2023-02-06 PROCEDURE — G0008 FLU VACCINE (QUAD) GREATER THAN OR EQUAL TO 3YO PRESERVATIVE FREE IM: ICD-10-PCS | Mod: S$GLB,,, | Performed by: PHYSICIAN ASSISTANT

## 2023-02-06 PROCEDURE — 3075F SYST BP GE 130 - 139MM HG: CPT | Mod: CPTII,S$GLB,, | Performed by: PHYSICIAN ASSISTANT

## 2023-02-06 PROCEDURE — G0008 ADMIN INFLUENZA VIRUS VAC: HCPCS | Mod: S$GLB,,, | Performed by: PHYSICIAN ASSISTANT

## 2023-02-06 NOTE — PROGRESS NOTES
Subjective:       Patient ID: Alexi Noguera is a 62 y.o. male.    Chief Complaint: Annual Exam    HPI  Lab tests current and stable   HbA1c last wk 5.9  Pt. Feels well  Review of Systems   Constitutional: Negative.  Negative for activity change, appetite change, chills, diaphoresis, fatigue, fever and unexpected weight change.   HENT: Negative.     Eyes: Negative.    Respiratory: Negative.  Negative for cough and shortness of breath.    Cardiovascular: Negative.  Negative for chest pain and leg swelling.   Gastrointestinal: Negative.    Endocrine: Negative.    Genitourinary: Negative.    Musculoskeletal: Negative.    Integumentary:  Negative for rash. Negative.   Neurological: Negative.        Objective:      Physical Exam  Vitals reviewed.   Constitutional:       General: He is not in acute distress.     Appearance: Normal appearance. He is obese. He is not ill-appearing, toxic-appearing or diaphoretic.   HENT:      Head: Normocephalic and atraumatic.      Right Ear: Tympanic membrane, ear canal and external ear normal. There is no impacted cerumen.      Left Ear: Tympanic membrane, ear canal and external ear normal. There is no impacted cerumen.      Nose: Nose normal.      Mouth/Throat:      Mouth: Mucous membranes are moist.      Pharynx: Oropharynx is clear. No oropharyngeal exudate or posterior oropharyngeal erythema.   Eyes:      General: No scleral icterus.     Conjunctiva/sclera: Conjunctivae normal.   Neck:      Vascular: No carotid bruit.   Cardiovascular:      Rate and Rhythm: Normal rate and regular rhythm.      Pulses: Normal pulses.      Heart sounds: Normal heart sounds. No murmur heard.    No friction rub. No gallop.   Pulmonary:      Effort: Pulmonary effort is normal. No respiratory distress.      Breath sounds: Normal breath sounds. No stridor. No wheezing, rhonchi or rales.   Abdominal:      General: Abdomen is flat. Bowel sounds are normal. There is no distension.      Palpations: Abdomen is  soft. There is no mass.      Tenderness: There is no abdominal tenderness. There is no guarding or rebound.      Hernia: No hernia is present.   Musculoskeletal:         General: No swelling.      Cervical back: Normal range of motion and neck supple. No rigidity or tenderness.      Right lower leg: No edema.      Left lower leg: No edema.   Lymphadenopathy:      Cervical: No cervical adenopathy.   Skin:     General: Skin is warm and dry.      Findings: No rash.   Neurological:      General: No focal deficit present.      Mental Status: He is alert and oriented to person, place, and time.       Assessment:       Problem List Items Addressed This Visit       Obesity (BMI 30-39.9)    Essential hypertension    Hyperlipidemia    Carcinoma, renal cell, left    Pre-diabetes - Primary     Other Visit Diagnoses       Periodic health assessment, general screening, adult        Immunization deficiency                  Plan:       Alexi was seen today for annual exam.    Diagnoses and all orders for this visit:    Pre-diabetes    Obesity (BMI 30-39.9)    Carcinoma, renal cell, left    Mixed hyperlipidemia    Essential hypertension    Periodic health assessment, general screening, adult    Immunization deficiency  -     Influenza - Quadrivalent *Preferred* (6 months+) (PF)     Discussed diet  Discussed exercise

## 2023-06-07 ENCOUNTER — TELEPHONE (OUTPATIENT)
Dept: HEMATOLOGY/ONCOLOGY | Facility: CLINIC | Age: 63
End: 2023-06-07
Payer: MEDICAID

## 2023-06-07 ENCOUNTER — PATIENT MESSAGE (OUTPATIENT)
Dept: HEMATOLOGY/ONCOLOGY | Facility: CLINIC | Age: 63
End: 2023-06-07
Payer: MEDICAID

## 2023-06-07 DIAGNOSIS — C64.2 CARCINOMA, RENAL CELL, LEFT: Primary | ICD-10-CM

## 2023-06-07 NOTE — TELEPHONE ENCOUNTER
----- Message from Gary Vizcaino sent at 6/7/2023  9:40 AM CDT -----  Name Of Caller: Alexi        Provider Name: Acosta Pham        Does patient feel the need to be seen today? no        Relationship to the Pt?: patient        Contact Preference?: [phone]        What is the nature of the call?: Patient states that he needs to speak with someone in the office in regards to getting lab orders (CBC & CMP) placed in the system today so that he can get those completed before his appointment on tomorrow at 9am. The fax number is [phone] lab at [facility]

## 2023-06-08 ENCOUNTER — OFFICE VISIT (OUTPATIENT)
Dept: HEMATOLOGY/ONCOLOGY | Facility: CLINIC | Age: 63
End: 2023-06-08
Payer: MEDICARE

## 2023-06-08 VITALS
SYSTOLIC BLOOD PRESSURE: 159 MMHG | BODY MASS INDEX: 35.15 KG/M2 | WEIGHT: 265.19 LBS | DIASTOLIC BLOOD PRESSURE: 89 MMHG | HEART RATE: 72 BPM | HEIGHT: 73 IN | OXYGEN SATURATION: 97 % | TEMPERATURE: 98 F | RESPIRATION RATE: 16 BRPM

## 2023-06-08 DIAGNOSIS — C64.2 CARCINOMA, RENAL CELL, LEFT: Primary | ICD-10-CM

## 2023-06-08 DIAGNOSIS — I26.99 OTHER ACUTE PULMONARY EMBOLISM WITHOUT ACUTE COR PULMONALE: ICD-10-CM

## 2023-06-08 DIAGNOSIS — I10 ESSENTIAL HYPERTENSION: ICD-10-CM

## 2023-06-08 PROCEDURE — 1101F PT FALLS ASSESS-DOCD LE1/YR: CPT | Mod: CPTII,S$GLB,, | Performed by: INTERNAL MEDICINE

## 2023-06-08 PROCEDURE — 3008F PR BODY MASS INDEX (BMI) DOCUMENTED: ICD-10-PCS | Mod: CPTII,S$GLB,, | Performed by: INTERNAL MEDICINE

## 2023-06-08 PROCEDURE — 3044F PR MOST RECENT HEMOGLOBIN A1C LEVEL <7.0%: ICD-10-PCS | Mod: CPTII,S$GLB,, | Performed by: INTERNAL MEDICINE

## 2023-06-08 PROCEDURE — 3288F FALL RISK ASSESSMENT DOCD: CPT | Mod: CPTII,S$GLB,, | Performed by: INTERNAL MEDICINE

## 2023-06-08 PROCEDURE — 4010F ACE/ARB THERAPY RXD/TAKEN: CPT | Mod: CPTII,S$GLB,, | Performed by: INTERNAL MEDICINE

## 2023-06-08 PROCEDURE — 3044F HG A1C LEVEL LT 7.0%: CPT | Mod: CPTII,S$GLB,, | Performed by: INTERNAL MEDICINE

## 2023-06-08 PROCEDURE — 3079F DIAST BP 80-89 MM HG: CPT | Mod: CPTII,S$GLB,, | Performed by: INTERNAL MEDICINE

## 2023-06-08 PROCEDURE — 99999 PR PBB SHADOW E&M-EST. PATIENT-LVL III: CPT | Mod: PBBFAC,,, | Performed by: INTERNAL MEDICINE

## 2023-06-08 PROCEDURE — 3077F SYST BP >= 140 MM HG: CPT | Mod: CPTII,S$GLB,, | Performed by: INTERNAL MEDICINE

## 2023-06-08 PROCEDURE — 99214 OFFICE O/P EST MOD 30 MIN: CPT | Mod: S$GLB,,, | Performed by: INTERNAL MEDICINE

## 2023-06-08 PROCEDURE — 4010F PR ACE/ARB THEARPY RXD/TAKEN: ICD-10-PCS | Mod: CPTII,S$GLB,, | Performed by: INTERNAL MEDICINE

## 2023-06-08 PROCEDURE — 3077F PR MOST RECENT SYSTOLIC BLOOD PRESSURE >= 140 MM HG: ICD-10-PCS | Mod: CPTII,S$GLB,, | Performed by: INTERNAL MEDICINE

## 2023-06-08 PROCEDURE — 3079F PR MOST RECENT DIASTOLIC BLOOD PRESSURE 80-89 MM HG: ICD-10-PCS | Mod: CPTII,S$GLB,, | Performed by: INTERNAL MEDICINE

## 2023-06-08 PROCEDURE — 1101F PR PT FALLS ASSESS DOC 0-1 FALLS W/OUT INJ PAST YR: ICD-10-PCS | Mod: CPTII,S$GLB,, | Performed by: INTERNAL MEDICINE

## 2023-06-08 PROCEDURE — 99999 PR PBB SHADOW E&M-EST. PATIENT-LVL III: ICD-10-PCS | Mod: PBBFAC,,, | Performed by: INTERNAL MEDICINE

## 2023-06-08 PROCEDURE — 99214 PR OFFICE/OUTPT VISIT, EST, LEVL IV, 30-39 MIN: ICD-10-PCS | Mod: S$GLB,,, | Performed by: INTERNAL MEDICINE

## 2023-06-08 PROCEDURE — 3008F BODY MASS INDEX DOCD: CPT | Mod: CPTII,S$GLB,, | Performed by: INTERNAL MEDICINE

## 2023-06-08 PROCEDURE — 3288F PR FALLS RISK ASSESSMENT DOCUMENTED: ICD-10-PCS | Mod: CPTII,S$GLB,, | Performed by: INTERNAL MEDICINE

## 2023-06-08 NOTE — PROGRESS NOTES
ONCOLOGY FOLLOW UP VISIT.     Reason for visit: Surveillance for stage III RCC s/p resection  Cancer/Stage/TNM:   Cancer Staging  Carcinoma, renal cell, left  Staging form: Kidney, AJCC 8th Edition  - Clinical stage from 9/16/2019: Stage III (cT3a, cN0, cM0) - Unsigned     Interval History:   Patient returns to clinic today for review of imaging. Today patient reports no new symptoms.    ROS:  Review of Systems   Constitutional:  Negative for activity change, appetite change, chills, fatigue, fever and unexpected weight change.   HENT:  Negative for mouth sores, nosebleeds and sore throat.    Eyes:  Negative for visual disturbance.   Respiratory:  Negative for cough, shortness of breath and wheezing.    Cardiovascular:  Negative for chest pain, palpitations and leg swelling.   Gastrointestinal:  Negative for abdominal distention, abdominal pain, blood in stool and diarrhea.   Endocrine: Negative for cold intolerance and heat intolerance.   Genitourinary:  Negative for dysuria, flank pain and frequency.   Musculoskeletal:  Negative for arthralgias, back pain, joint swelling and myalgias.   Skin:  Negative for color change, rash and wound.   Neurological:  Negative for dizziness, light-headedness and headaches.   Hematological:  Negative for adenopathy. Does not bruise/bleed easily.   Psychiatric/Behavioral:  The patient is not nervous/anxious.     A complete 12-point review of systems was reviewed and is negative except as mentioned above.     Past Medical History:   Past Medical History:   Diagnosis Date    Cancer of kidney     Left    Hyperlipidemia     Hypertension         Allergies:   Review of patient's allergies indicates:   Allergen Reactions    Lisinopril Other (See Comments)     Dry cough        Medications:   Current Outpatient Medications   Medication Sig Dispense Refill    amLODIPine (NORVASC) 2.5 MG tablet TAKE 1 TABLET(2.5 MG) BY MOUTH EVERY DAY 90 tablet 1    ciprofloxacin HCl (CIPRO) 500 MG tablet  "Take 1 tablet (500 mg total) by mouth 2 (two) times daily. 14 tablet 0    losartan (COZAAR) 50 MG tablet TAKE 1 TABLET(50 MG) BY MOUTH EVERY DAY 90 tablet 3    pravastatin (PRAVACHOL) 40 MG tablet TAKE 1 TABLET(40 MG) BY MOUTH EVERY EVENING 90 tablet 1     No current facility-administered medications for this visit.        Physical Exam:   BP (!) 159/89 (BP Location: Left arm, Patient Position: Sitting, BP Method: Medium (Automatic))   Pulse 72   Temp 98 °F (36.7 °C) (Oral)   Resp 16   Ht 6' 1" (1.854 m)   Wt 120.3 kg (265 lb 3.4 oz)   SpO2 97%   BMI 34.99 kg/m²      ECOG Performance Status: (foot note - ECOG PS provided by Eastern Cooperative Oncology Group) 0 - Asymptomatic    Physical Exam  Vitals and nursing note reviewed.   Constitutional:       General: He is not in acute distress.     Appearance: Normal appearance. He is well-developed.   HENT:      Head: Normocephalic and atraumatic.   Eyes:      Conjunctiva/sclera: Conjunctivae normal.      Pupils: Pupils are equal, round, and reactive to light.   Pulmonary:      Effort: Pulmonary effort is normal. No respiratory distress.   Abdominal:      General: There is no distension.      Palpations: Abdomen is soft.      Tenderness: There is no abdominal tenderness.   Musculoskeletal:         General: No swelling. Normal range of motion.      Cervical back: Normal range of motion and neck supple.   Skin:     General: Skin is warm and dry.   Neurological:      General: No focal deficit present.      Mental Status: He is alert and oriented to person, place, and time.   Psychiatric:         Mood and Affect: Mood normal.         Behavior: Behavior normal.         Thought Content: Thought content normal.         Judgment: Judgment normal.       Labs:   Recent Results (from the past 48 hour(s))   Creatinine Serum, ASAP    Collection Time: 06/07/23  8:32 AM   Result Value Ref Range    Creatinine 1.4 0.5 - 1.4 mg/dL    eGFR 55.8 (A) >60 mL/min/1.73 m^2   CBC W/ AUTO " DIFFERENTIAL    Collection Time: 06/07/23 10:01 AM   Result Value Ref Range    WBC 6.68 3.90 - 12.70 K/uL    RBC 5.01 4.60 - 6.20 M/uL    Hemoglobin 15.1 14.0 - 18.0 g/dL    Hematocrit 46.4 40.0 - 54.0 %    MCV 93 82 - 98 fL    MCH 30.1 27.0 - 31.0 pg    MCHC 32.5 32.0 - 36.0 g/dL    RDW 12.8 11.5 - 14.5 %    Platelets 249 150 - 450 K/uL    MPV 8.9 (L) 9.2 - 12.9 fL    Immature Granulocytes 0.1 0.0 - 0.5 %    Gran # (ANC) 4.0 1.8 - 7.7 K/uL    Immature Grans (Abs) 0.01 0.00 - 0.04 K/uL    Lymph # 2.1 1.0 - 4.8 K/uL    Mono # 0.4 0.3 - 1.0 K/uL    Eos # 0.1 0.0 - 0.5 K/uL    Baso # 0.02 0.00 - 0.20 K/uL    nRBC 0 0 /100 WBC    Gran % 60.3 38.0 - 73.0 %    Lymph % 31.7 18.0 - 48.0 %    Mono % 6.3 4.0 - 15.0 %    Eosinophil % 1.3 0.0 - 8.0 %    Basophil % 0.3 0.0 - 1.9 %    Differential Method Automated    CMP    Collection Time: 06/07/23 10:01 AM   Result Value Ref Range    Sodium 138 136 - 145 mmol/L    Potassium 4.4 3.5 - 5.1 mmol/L    Chloride 103 95 - 110 mmol/L    CO2 26 23 - 29 mmol/L    Glucose 99 70 - 110 mg/dL    BUN 25 (H) 8 - 23 mg/dL    Creatinine 1.4 0.5 - 1.4 mg/dL    Calcium 9.2 8.7 - 10.5 mg/dL    Total Protein 7.6 6.0 - 8.4 g/dL    Albumin 3.9 3.5 - 5.2 g/dL    Total Bilirubin 0.5 0.1 - 1.0 mg/dL    Alkaline Phosphatase 49 (L) 55 - 135 U/L    AST 15 10 - 40 U/L    ALT 16 10 - 44 U/L    Anion Gap 9 8 - 16 mmol/L    eGFR 55.8 (A) >60 mL/min/1.73 m^2        Imaging: I have personally reviewed patient's CT scans imaging showing TIM[facility]  CT Chest Abdomen Pelvis With Contrast  Narrative: EXAMINATION:  CT CHEST ABDOMEN PELVIS WITH CONTRAST (XPD)    CLINICAL HISTORY:  Kidney cancer, monitor;Malignant neoplasm of left kidney, except renal pelvis    TECHNIQUE:  Postcontrast images were obtained through the chest, abdomen, and pelvis per protocol.  Coronal and sagittal images were reviewed.  100 cc intravenous contrast was administered.    COMPARISON:  CT chest abdomen pelvis dated  06/07/2023    FINDINGS:  Chest:    Structures at the base of the neck are unremarkable.  No intrathoracic adenopathy.  No pleural or pericardial effusion.  The trachea and mainstem bronchi are clear.    Lungs are symmetrically expanded with minimal subpleural emphysema at the right apex.  Mild subpleural changes at the peripheral left lobe and mild atelectasis at the right base.  Additional mild linear scarring or atelectasis at the inferior lingula.  Scattered subcentimeter pulmonary nodules which appear similar.  For reference 0.4 cm right lower lobe nodule (series 7, image 219).  No suspicious new nodule or mass.    Abdomen and pelvis:    No focal liver lesion.  Cholelithiasis with 1.5 cm attenuating stone.  The spleen, pancreas, a and adrenal glands appear normal.  Stable right renal cortical hypodensity, too small to characterize (series 11, image 71).  No hydronephrosis.  Similar postsurgical appearance of prior left nephrectomy with stable scarring at the surgical bed.  Urinary bladder is unremarkable.  Prostate is mildly enlarged.    The GI tract and appendix are normal in caliber.  No significant free fluid or pathologic adenopathy.  Few shotty retroperitoneal nodes, unchanged.  Left greater than right fat containing inguinal hernias.    No aggressive osseous lesion.  Minimal grade 1 anterolisthesis at L5-S1 with L5 pars defects.  Impression: Similar postsurgical change of left nephrectomy with stable scarring at the surgical bed.  No convincing evidence for local recurrent or metastatic disease.    Stable subcentimeter pulmonary nodules.    Cholelithiasis, L5 pars defects, and additional findings as above.    There are no measurable lesions per RECIST criteria.    Electronically signed by: Leighton Medrano  Date:    06/07/2023  Time:    11:25              Diagnoses:       1. Carcinoma, renal cell, left          Assessment and Plan:         1. Stage III RCC:   -Sites of Disease: L kidney, L renal vein    Discussed with patient that cervical lesion is unlikely metastasis from RCC. Repeat MRI showed stability. Now s/p nephrectomy. Controversial benefit with adjuvant Sutent and significant side effects. Surgery was 12/10/2019. Continue surveillance, CT CAP q 3 month x 1 year (12/2020), then q 4 month x 1 year (12/2021), then q 6-12 month for years 3-5 (12/2024).  - CT CAP without evidence of disease. Surveillance CT scan in 12/2023, then again 12/2024, then imaging surveillance completed.    2. HTN  Managed by PCP, currently on Norvasc.      he will return to clinic in 6 months, but knows to call in the interim if symptoms change or should a problem arise.      MDM includes:    - Acute or chronic illness or injury that poses a threat to life or bodily function  - Independent review and explanation of 2 results from unique tests  - Discussion of management and ordering 2 unique tests  - Extensive discussion of treatment and management    Acosta Pham M.D.  Hematology/Oncology Attending  Director [facility] Program  [facility]            Med Onc Chart Routing      Follow up with physician 6 months. RTC 12/6 at 9:30 AM   Follow up with YANDEL    Infusion scheduling note    Injection scheduling note    Labs CBC and CMP   Scheduling:  Preferred lab:  Lab interval:  Day before clinic at [facility] site   Imaging CT chest abdomen pelvis   Day before clinic at [facility] site   Pharmacy appointment    Other referrals

## 2023-10-13 ENCOUNTER — TELEPHONE (OUTPATIENT)
Dept: HEPATOLOGY | Facility: CLINIC | Age: 63
End: 2023-10-13
Payer: MEDICARE

## 2023-10-13 NOTE — TELEPHONE ENCOUNTER
----- Message from Iesha Antonio NP sent at 10/13/2023  4:24 PM CDT -----  Please let him know that I am not currently prescribing any of his meds. These are likely coming from primary care; he should reach out to them if he needs any refills. Thanks.    ----- Message -----  From: Maci Burroughs MA  Sent: 10/13/2023   4:18 PM CDT  To: Iesha Antonio NP      ----- Message -----  From: Cristina Obrien  Sent: 10/13/2023   2:51 PM CDT  To: [facility] Staff    Type: Needs Medical Advice  Who Called:  pt     Best Call Back Number: [phone]    Additional Information: pt calling in regards to wanting to know if dr will fill his prescriptions before his appt in December please advise

## 2023-10-16 ENCOUNTER — TELEPHONE (OUTPATIENT)
Dept: FAMILY MEDICINE | Facility: CLINIC | Age: 63
End: 2023-10-16
Payer: MEDICARE

## 2023-10-16 DIAGNOSIS — I10 ESSENTIAL HYPERTENSION: ICD-10-CM

## 2023-10-16 DIAGNOSIS — E78.5 HYPERLIPIDEMIA, UNSPECIFIED HYPERLIPIDEMIA TYPE: ICD-10-CM

## 2023-10-16 RX ORDER — PRAVASTATIN SODIUM 40 MG/1
40 TABLET ORAL NIGHTLY
Qty: 90 TABLET | Refills: 1 | Status: CANCELLED | OUTPATIENT
Start: 2023-10-16

## 2023-10-16 RX ORDER — AMLODIPINE BESYLATE 2.5 MG/1
2.5 TABLET ORAL DAILY
Qty: 30 TABLET | Refills: 2 | Status: CANCELLED | OUTPATIENT
Start: 2023-10-16 | End: 2024-01-14

## 2023-10-20 DIAGNOSIS — E78.5 HYPERLIPIDEMIA, UNSPECIFIED HYPERLIPIDEMIA TYPE: Primary | ICD-10-CM

## 2023-10-20 DIAGNOSIS — I10 ESSENTIAL HYPERTENSION: ICD-10-CM

## 2023-10-20 DIAGNOSIS — R73.03 PRE-DIABETES: ICD-10-CM

## 2023-10-24 ENCOUNTER — OFFICE VISIT (OUTPATIENT)
Dept: FAMILY MEDICINE | Facility: CLINIC | Age: 63
End: 2023-10-24
Payer: MEDICARE

## 2023-10-24 VITALS
BODY MASS INDEX: 35.09 KG/M2 | HEIGHT: 73 IN | DIASTOLIC BLOOD PRESSURE: 76 MMHG | TEMPERATURE: 98 F | SYSTOLIC BLOOD PRESSURE: 120 MMHG | HEART RATE: 67 BPM | OXYGEN SATURATION: 97 % | WEIGHT: 264.75 LBS

## 2023-10-24 DIAGNOSIS — J06.9 UPPER RESPIRATORY TRACT INFECTION, UNSPECIFIED TYPE: ICD-10-CM

## 2023-10-24 DIAGNOSIS — R05.9 COUGH, UNSPECIFIED TYPE: Primary | ICD-10-CM

## 2023-10-24 DIAGNOSIS — E78.5 HYPERLIPIDEMIA, UNSPECIFIED HYPERLIPIDEMIA TYPE: ICD-10-CM

## 2023-10-24 DIAGNOSIS — I10 ESSENTIAL HYPERTENSION: ICD-10-CM

## 2023-10-24 PROCEDURE — 1101F PT FALLS ASSESS-DOCD LE1/YR: CPT | Mod: CPTII,S$GLB,, | Performed by: NURSE PRACTITIONER

## 2023-10-24 PROCEDURE — 3078F PR MOST RECENT DIASTOLIC BLOOD PRESSURE < 80 MM HG: ICD-10-PCS | Mod: CPTII,S$GLB,, | Performed by: NURSE PRACTITIONER

## 2023-10-24 PROCEDURE — 1101F PR PT FALLS ASSESS DOC 0-1 FALLS W/OUT INJ PAST YR: ICD-10-PCS | Mod: CPTII,S$GLB,, | Performed by: NURSE PRACTITIONER

## 2023-10-24 PROCEDURE — 1160F RVW MEDS BY RX/DR IN RCRD: CPT | Mod: CPTII,S$GLB,, | Performed by: NURSE PRACTITIONER

## 2023-10-24 PROCEDURE — 99999 PR PBB SHADOW E&M-EST. PATIENT-LVL IV: CPT | Mod: PBBFAC,,, | Performed by: NURSE PRACTITIONER

## 2023-10-24 PROCEDURE — 1159F PR MEDICATION LIST DOCUMENTED IN MEDICAL RECORD: ICD-10-PCS | Mod: CPTII,S$GLB,, | Performed by: NURSE PRACTITIONER

## 2023-10-24 PROCEDURE — 3288F FALL RISK ASSESSMENT DOCD: CPT | Mod: CPTII,S$GLB,, | Performed by: NURSE PRACTITIONER

## 2023-10-24 PROCEDURE — 1159F MED LIST DOCD IN RCRD: CPT | Mod: CPTII,S$GLB,, | Performed by: NURSE PRACTITIONER

## 2023-10-24 PROCEDURE — 3008F PR BODY MASS INDEX (BMI) DOCUMENTED: ICD-10-PCS | Mod: CPTII,S$GLB,, | Performed by: NURSE PRACTITIONER

## 2023-10-24 PROCEDURE — 99213 OFFICE O/P EST LOW 20 MIN: CPT | Mod: S$GLB,,, | Performed by: NURSE PRACTITIONER

## 2023-10-24 PROCEDURE — 3078F DIAST BP <80 MM HG: CPT | Mod: CPTII,S$GLB,, | Performed by: NURSE PRACTITIONER

## 2023-10-24 PROCEDURE — 1160F PR REVIEW ALL MEDS BY PRESCRIBER/CLIN PHARMACIST DOCUMENTED: ICD-10-PCS | Mod: CPTII,S$GLB,, | Performed by: NURSE PRACTITIONER

## 2023-10-24 PROCEDURE — 4010F PR ACE/ARB THEARPY RXD/TAKEN: ICD-10-PCS | Mod: CPTII,S$GLB,, | Performed by: NURSE PRACTITIONER

## 2023-10-24 PROCEDURE — 3288F PR FALLS RISK ASSESSMENT DOCUMENTED: ICD-10-PCS | Mod: CPTII,S$GLB,, | Performed by: NURSE PRACTITIONER

## 2023-10-24 PROCEDURE — 3074F SYST BP LT 130 MM HG: CPT | Mod: CPTII,S$GLB,, | Performed by: NURSE PRACTITIONER

## 2023-10-24 PROCEDURE — 3044F PR MOST RECENT HEMOGLOBIN A1C LEVEL <7.0%: ICD-10-PCS | Mod: CPTII,S$GLB,, | Performed by: NURSE PRACTITIONER

## 2023-10-24 PROCEDURE — 99999 PR PBB SHADOW E&M-EST. PATIENT-LVL IV: ICD-10-PCS | Mod: PBBFAC,,, | Performed by: NURSE PRACTITIONER

## 2023-10-24 PROCEDURE — 99213 PR OFFICE/OUTPT VISIT, EST, LEVL III, 20-29 MIN: ICD-10-PCS | Mod: S$GLB,,, | Performed by: NURSE PRACTITIONER

## 2023-10-24 PROCEDURE — 3074F PR MOST RECENT SYSTOLIC BLOOD PRESSURE < 130 MM HG: ICD-10-PCS | Mod: CPTII,S$GLB,, | Performed by: NURSE PRACTITIONER

## 2023-10-24 PROCEDURE — 4010F ACE/ARB THERAPY RXD/TAKEN: CPT | Mod: CPTII,S$GLB,, | Performed by: NURSE PRACTITIONER

## 2023-10-24 PROCEDURE — 3008F BODY MASS INDEX DOCD: CPT | Mod: CPTII,S$GLB,, | Performed by: NURSE PRACTITIONER

## 2023-10-24 PROCEDURE — 3044F HG A1C LEVEL LT 7.0%: CPT | Mod: CPTII,S$GLB,, | Performed by: NURSE PRACTITIONER

## 2023-10-24 RX ORDER — AZITHROMYCIN 250 MG/1
TABLET, FILM COATED ORAL
Qty: 6 TABLET | Refills: 0 | Status: SHIPPED | OUTPATIENT
Start: 2023-10-24 | End: 2023-10-29

## 2023-10-24 RX ORDER — AMLODIPINE BESYLATE 2.5 MG/1
2.5 TABLET ORAL DAILY
Qty: 90 TABLET | Refills: 3 | Status: SHIPPED | OUTPATIENT
Start: 2023-10-24

## 2023-10-24 RX ORDER — GUAIFENESIN 600 MG/1
1200 TABLET, EXTENDED RELEASE ORAL 2 TIMES DAILY PRN
Qty: 90 TABLET | Refills: 1 | Status: SHIPPED | OUTPATIENT
Start: 2023-10-24

## 2023-10-24 RX ORDER — FLUTICASONE PROPIONATE 50 MCG
1 SPRAY, SUSPENSION (ML) NASAL DAILY
Qty: 16 G | Refills: 0 | Status: SHIPPED | OUTPATIENT
Start: 2023-10-24 | End: 2023-12-22

## 2023-10-24 RX ORDER — PRAVASTATIN SODIUM 40 MG/1
40 TABLET ORAL NIGHTLY
Qty: 90 TABLET | Refills: 3 | Status: SHIPPED | OUTPATIENT
Start: 2023-10-24

## 2023-10-24 RX ORDER — METHYLPREDNISOLONE 4 MG/1
TABLET ORAL
Qty: 21 EACH | Refills: 0 | Status: SHIPPED | OUTPATIENT
Start: 2023-10-24 | End: 2023-11-14

## 2023-10-24 RX ORDER — PROMETHAZINE HYDROCHLORIDE AND DEXTROMETHORPHAN HYDROBROMIDE 6.25; 15 MG/5ML; MG/5ML
5 SYRUP ORAL EVERY 6 HOURS PRN
Qty: 118 ML | Refills: 0 | Status: SHIPPED | OUTPATIENT
Start: 2023-10-24 | End: 2023-11-03

## 2023-10-24 NOTE — PROGRESS NOTES
Subjective:       Patient ID: Alexi Noguera is a 63 y.o. male.    Chief Complaint: Annual Exam     HPI w  62 y/o male patient with medical problems listed below presents for medication refill and for sore throat, chest congestion, productive cough with white to greenish phlegm approximately for 10 days. Denies recent travel. States her son was sick with sore throat first. He states sore throat went away but still has congestion and cough. Denies headache, dizziness, chest pain, sob, nausea, abdominal pain, fever, chills, generalized body ache.     Labs reviewed from 10/2023    Patient Active Problem List   Diagnosis    Obesity (BMI 30-39.9)    Essential hypertension    Stem cell donor    Hyperlipidemia    Carcinoma, renal cell, left    Left renal mass    Infected surgical wound    Meningioma, recurrent of spine    Pulmonary embolism    Pre-diabetes    NAFLD (nonalcoholic fatty liver disease)      Review of patient's allergies indicates:   Allergen Reactions    Lisinopril Other (See Comments)     Dry cough     Past Surgical History:   Procedure Laterality Date    LYSIS OF ADHESIONS N/A 12/9/2019    Procedure: LYSIS, ADHESIONS;  Surgeon: Tr Jorgensen MD;  Location: [facility] OR [address] [place] [address];  Service: General;  Laterality: N/A;    NEPHRECTOMY Left 12/9/2019    Procedure: Nephrectomy OPEN;  Surgeon: José Manuel Buchanan MD;  Location: [facility] OR [address] [place] [address];  Service: Urology;  Laterality: Left;  4hrs GEN WITH REGIONAL    THROMBECTOMY OF VENA CAVA  12/9/2019    Procedure: THROMBECTOMY, VENA CAVA;  Surgeon: José Manuel Buchanan MD;  [facility];  Service: Urology;;    THROMBECTOMY OF VENA CAVA  12/9/2019    Procedure: THROMBECTOMY, VENA CAVA;  Surgeon: KIRSTEN Atkins III, MD;  Location: [facility] OR [address] [place] [address];  Service: Peripheral Vascular;;        Current Outpatient Medications:     losartan (COZAAR) 50 MG tablet, TAKE 1 TABLET(50 MG) BY MOUTH EVERY DAY, Disp: 90 tablet, Rfl: 3    amLODIPine (NORVASC) 2.5 MG tablet, Take  1 tablet (2.5 mg total) by mouth once daily., Disp: 90 tablet, Rfl: 3    azithromycin (Z-JUAN CARLOS) 250 MG tablet, Take 2 tablets by mouth on day 1; Take 1 tablet by mouth on days 2-5, Disp: 6 tablet, Rfl: 0    ciprofloxacin HCl (CIPRO) 500 MG tablet, Take 1 tablet (500 mg total) by mouth 2 (two) times daily. (Patient not taking: Reported on 10/24/2023), Disp: 14 tablet, Rfl: 0    fluticasone propionate (FLONASE) 50 mcg/actuation nasal spray, 1 spray (50 mcg total) by Each Nostril route once daily., Disp: 16 g, Rfl: 0    guaiFENesin (MUCINEX) 600 mg 12 hr tablet, Take 2 tablets (1,200 mg total) by mouth 2 (two) times daily as needed for Congestion., Disp: 90 tablet, Rfl: 1    methylPREDNISolone (MEDROL DOSEPACK) 4 mg tablet, use as directed, Disp: 21 each, Rfl: 0    pravastatin (PRAVACHOL) 40 MG tablet, Take 1 tablet (40 mg total) by mouth every evening., Disp: 90 tablet, Rfl: 3    promethazine-dextromethorphan (PROMETHAZINE-DM) 6.25-15 mg/5 mL Syrp, Take 5 mLs by mouth every 6 (six) hours as needed (cough)., Disp: 118 mL, Rfl: 0    Lab Results   Component Value Date    WBC 8.24 09/27/2023    HGB 15.6 09/27/2023    HCT 46.7 09/27/2023     09/27/2023    CHOL 157 10/20/2023    TRIG 125 10/20/2023    HDL 39 (L) 10/20/2023    ALT 16 06/07/2023    AST 15 06/07/2023     09/27/2023    K 5.0 09/27/2023     09/27/2023    CREATININE 1.3 09/27/2023    BUN 17 09/27/2023    CO2 26 09/27/2023    TSH 0.986 10/20/2023    PSA 1.8 07/25/2022    INR 0.9 12/15/2019    HGBA1C 6.0 (H) 10/20/2023     Review of Systems   Constitutional:  Negative for chills and fever.   HENT:  Positive for congestion. Negative for postnasal drip, rhinorrhea, sinus pain and sore throat.    Respiratory:  Positive for cough. Negative for chest tightness and shortness of breath.    Cardiovascular:  Negative for chest pain and palpitations.   Gastrointestinal:  Negative for abdominal pain.   Neurological:  Negative for dizziness and headaches.      "  Objective:   /76 (BP Location: Left arm, Patient Position: Sitting, BP Method: Large (Manual))   Pulse 67   Temp 97.9 °F (36.6 °C) (Oral)   Ht 6' 1" (1.854 m)   Wt 120.1 kg (264 lb 12.4 oz)   SpO2 97%   BMI 34.93 kg/m²         Physical Exam  Vitals reviewed.   Constitutional:       General: He is not in acute distress.     Appearance: Normal appearance.   Cardiovascular:      Rate and Rhythm: Normal rate and regular rhythm.      Pulses: Normal pulses.      Heart sounds: Normal heart sounds.   Pulmonary:      Effort: Pulmonary effort is normal.      Breath sounds: Normal breath sounds.   Chest:      Chest wall: No deformity, swelling or edema.   Abdominal:      General: Abdomen is flat. Bowel sounds are normal.      Palpations: Abdomen is soft.      Tenderness: There is no abdominal tenderness.   Musculoskeletal:      Cervical back: Normal range of motion.   Neurological:      Mental Status: He is oriented to person, place, and time.         Assessment:       1. Cough, unspecified type    2. Essential hypertension    3. Hyperlipidemia, unspecified hyperlipidemia type    4. Upper respiratory tract infection, unspecified type        Plan:       1. Essential hypertension  - Controlled and continue with current regimen   - amLODIPine (NORVASC) 2.5 MG tablet; Take 1 tablet (2.5 mg total) by mouth once daily.  Dispense: 90 tablet; Refill: 3    2. Hyperlipidemia, unspecified hyperlipidemia type  - pravastatin (PRAVACHOL) 40 MG tablet; Take 1 tablet (40 mg total) by mouth every evening.  Dispense: 90 tablet; Refill: 3    3. Cough, unspecified type  - guaiFENesin (MUCINEX) 600 mg 12 hr tablet; Take 2 tablets (1,200 mg total) by mouth 2 (two) times daily as needed for Congestion.  Dispense: 90 tablet; Refill: 1  - promethazine-dextromethorphan (PROMETHAZINE-DM) 6.25-15 mg/5 mL Syrp; Take 5 mLs by mouth every 6 (six) hours as needed (cough).  Dispense: 118 mL; Refill: 0    4. Upper respiratory tract infection, " unspecified type  - guaiFENesin (MUCINEX) 600 mg 12 hr tablet; Take 2 tablets (1,200 mg total) by mouth 2 (two) times daily as needed for Congestion.  Dispense: 90 tablet; Refill: 1  - promethazine-dextromethorphan (PROMETHAZINE-DM) 6.25-15 mg/5 mL Syrp; Take 5 mLs by mouth every 6 (six) hours as needed (cough).  Dispense: 118 mL; Refill: 0  - azithromycin (Z-JUAN CARLOS) 250 MG tablet; Take 2 tablets by mouth on day 1; Take 1 tablet by mouth on days 2-5  Dispense: 6 tablet; Refill: 0  - methylPREDNISolone (MEDROL DOSEPACK) 4 mg tablet; use as directed  Dispense: 21 each; Refill: 0  - fluticasone propionate (FLONASE) 50 mcg/actuation nasal spray; 1 spray (50 mcg total) by Each Nostril route once daily.  Dispense: 16 g; Refill: 0  - X-Ray Chest PA And Lateral; Future     Patient with be reevaluated in  2 weeks  or sooner altagracia Mata NP

## 2023-11-07 ENCOUNTER — OFFICE VISIT (OUTPATIENT)
Dept: FAMILY MEDICINE | Facility: CLINIC | Age: 63
End: 2023-11-07
Payer: MEDICARE

## 2023-11-07 VITALS
TEMPERATURE: 98 F | BODY MASS INDEX: 35.64 KG/M2 | DIASTOLIC BLOOD PRESSURE: 90 MMHG | HEART RATE: 70 BPM | HEIGHT: 73 IN | SYSTOLIC BLOOD PRESSURE: 124 MMHG | OXYGEN SATURATION: 98 % | WEIGHT: 268.94 LBS

## 2023-11-07 DIAGNOSIS — E78.2 MIXED HYPERLIPIDEMIA: ICD-10-CM

## 2023-11-07 DIAGNOSIS — I10 ESSENTIAL HYPERTENSION: ICD-10-CM

## 2023-11-07 DIAGNOSIS — J06.9 UPPER RESPIRATORY TRACT INFECTION, UNSPECIFIED TYPE: Primary | ICD-10-CM

## 2023-11-07 DIAGNOSIS — K76.0 NAFLD (NONALCOHOLIC FATTY LIVER DISEASE): ICD-10-CM

## 2023-11-07 DIAGNOSIS — Z23 ENCOUNTER FOR VACCINATION: ICD-10-CM

## 2023-11-07 DIAGNOSIS — R73.03 PRE-DIABETES: ICD-10-CM

## 2023-11-07 DIAGNOSIS — N28.89 LEFT RENAL MASS: ICD-10-CM

## 2023-11-07 PROCEDURE — 99999 PR PBB SHADOW E&M-EST. PATIENT-LVL IV: CPT | Mod: PBBFAC,,, | Performed by: NURSE PRACTITIONER

## 2023-11-07 PROCEDURE — 99214 OFFICE O/P EST MOD 30 MIN: CPT | Mod: PBBFAC,PO | Performed by: NURSE PRACTITIONER

## 2023-11-07 PROCEDURE — 90694 VACC AIIV4 NO PRSRV 0.5ML IM: CPT | Mod: PBBFAC,PO

## 2023-11-07 PROCEDURE — 99999 PR PBB SHADOW E&M-EST. PATIENT-LVL IV: ICD-10-PCS | Mod: PBBFAC,,, | Performed by: NURSE PRACTITIONER

## 2023-11-07 PROCEDURE — 90471 IMMUNIZATION ADMIN: CPT | Mod: PBBFAC,PO

## 2023-11-07 NOTE — PROGRESS NOTES
Subjective:       Patient ID: Alexi Noguera is a 63 y.o. male.    Chief Complaint: Follow-up     HPI   64 y/o male patient with medical problems listed below presents for 2 weeks follow up of URI. States he feels much better and no acute complaints reported.     Labs reviewed from 10/2023    Patient Active Problem List   Diagnosis    Obesity (BMI 30-39.9)    Essential hypertension    Stem cell donor    Hyperlipidemia    Carcinoma, renal cell, left    Left renal mass    Infected surgical wound    Meningioma, recurrent of spine    Pulmonary embolism    Pre-diabetes    NAFLD (nonalcoholic fatty liver disease)      Review of patient's allergies indicates:   Allergen Reactions    Lisinopril Other (See Comments)     Dry cough     Past Surgical History:   Procedure Laterality Date    LYSIS OF ADHESIONS N/A 12/9/2019    Procedure: LYSIS, ADHESIONS;  Surgeon: Tr Jorgensen MD;  Location: [facility] OR [facility];  Service: General;  Laterality: N/A;    NEPHRECTOMY Left 12/9/2019    Procedure: Nephrectomy OPEN;  Surgeon: José Manuel Buchanan MD;  Location: [facility] OR [facility];  Service: Urology;  Laterality: Left;  4hrs GEN WITH REGIONAL    THROMBECTOMY OF VENA CAVA  12/9/2019    Procedure: THROMBECTOMY, VENA CAVA;  Surgeon: José Manuel Buchanan MD;  Location: [facility] OR 2ND FLR;  Service: Urology;;    THROMBECTOMY OF VENA CAVA  12/9/2019    Procedure: THROMBECTOMY, VENA CAVA;  Surgeon: KIRSTEN Atkins III, MD;  Location: [facility] OR 2ND FLR;  Service: Peripheral Vascular;;        Current Outpatient Medications:     amLODIPine (NORVASC) 2.5 MG tablet, Take 1 tablet (2.5 mg total) by mouth once daily., Disp: 90 tablet, Rfl: 3    fluticasone propionate (FLONASE) 50 mcg/actuation nasal spray, 1 spray (50 mcg total) by Each Nostril route once daily., Disp: 16 g, Rfl: 0    losartan (COZAAR) 50 MG tablet, TAKE 1 TABLET(50 MG) BY MOUTH EVERY DAY, Disp: 90 tablet, Rfl: 3    pravastatin (PRAVACHOL) 40 MG tablet, Take 1 tablet (40 mg total) by mouth  "every evening., Disp: 90 tablet, Rfl: 3    ciprofloxacin HCl (CIPRO) 500 MG tablet, Take 1 tablet (500 mg total) by mouth 2 (two) times daily. (Patient not taking: Reported on 10/24/2023), Disp: 14 tablet, Rfl: 0    guaiFENesin (MUCINEX) 600 mg 12 hr tablet, Take 2 tablets (1,200 mg total) by mouth 2 (two) times daily as needed for Congestion. (Patient not taking: Reported on 11/7/2023), Disp: 90 tablet, Rfl: 1    methylPREDNISolone (MEDROL DOSEPACK) 4 mg tablet, use as directed (Patient not taking: Reported on 11/7/2023), Disp: 21 each, Rfl: 0    Review of Systems   Constitutional:  Negative for chills and fever.   Respiratory:  Negative for cough, chest tightness and shortness of breath.    Cardiovascular:  Negative for chest pain and palpitations.   Gastrointestinal:  Negative for abdominal pain.   Neurological:  Negative for dizziness and headaches.       Objective:   BP (!) 124/90 (BP Location: Left arm, Patient Position: Sitting, BP Method: Large (Manual))   Pulse 70   Temp 98.1 °F (36.7 °C) (Oral)   Ht 6' 1" (1.854 m)   Wt 122 kg (268 lb 15.4 oz)   SpO2 98%   BMI 35.49 kg/m²         Physical Exam  Vitals reviewed.   Constitutional:       General: He is not in acute distress.     Appearance: Normal appearance.   Cardiovascular:      Rate and Rhythm: Normal rate and regular rhythm.      Pulses: Normal pulses.      Heart sounds: Normal heart sounds.   Pulmonary:      Effort: Pulmonary effort is normal.      Breath sounds: Normal breath sounds.   Chest:      Chest wall: No deformity, swelling or edema.   Abdominal:      General: Abdomen is flat. Bowel sounds are normal.      Palpations: Abdomen is soft.      Tenderness: There is no abdominal tenderness.   Musculoskeletal:      Cervical back: Normal range of motion.   Neurological:      Mental Status: He is oriented to person, place, and time.         Assessment:       1. Upper respiratory tract infection, unspecified type    2. Pre-diabetes    3. NAFLD " (nonalcoholic fatty liver disease)    4. Mixed hyperlipidemia    5. Essential hypertension    6. Encounter for vaccination    7. Left renal mass        Plan:       1. Pre-diabetes  - Continue with diet and exercise     2. NAFLD (nonalcoholic fatty liver disease)  - Continue to follow up with hepatology     3. Mixed hyperlipidemia  - Patient is on statin     4. Essential hypertension  - Stable and continue with current regimen     5. Encounter for vaccination  - Influenza (FLUAD) - Quadrivalent (Adjuvanted) *Preferred* (65+) (PF)    6. Left renal mass  - Continue with hematology oncology surveillance     7. Upper respiratory tract infection, unspecified type  - Resolved     Patient with be reevaluated in 6 months or sooner altagracia Mata NP

## 2023-12-06 ENCOUNTER — PROCEDURE VISIT (OUTPATIENT)
Dept: HEPATOLOGY | Facility: CLINIC | Age: 63
End: 2023-12-06
Payer: MEDICAID

## 2023-12-06 ENCOUNTER — TELEPHONE (OUTPATIENT)
Dept: HEPATOLOGY | Facility: CLINIC | Age: 63
End: 2023-12-06

## 2023-12-06 ENCOUNTER — OFFICE VISIT (OUTPATIENT)
Dept: HEPATOLOGY | Facility: CLINIC | Age: 63
End: 2023-12-06
Payer: MEDICARE

## 2023-12-06 VITALS
BODY MASS INDEX: 35.97 KG/M2 | HEIGHT: 73 IN | BODY MASS INDEX: 35.97 KG/M2 | HEIGHT: 73 IN | WEIGHT: 271.38 LBS | WEIGHT: 271.38 LBS

## 2023-12-06 DIAGNOSIS — E66.9 OBESITY (BMI 30-39.9): ICD-10-CM

## 2023-12-06 DIAGNOSIS — R73.03 PRE-DIABETES: ICD-10-CM

## 2023-12-06 DIAGNOSIS — K76.0 NAFLD (NONALCOHOLIC FATTY LIVER DISEASE): ICD-10-CM

## 2023-12-06 DIAGNOSIS — E78.2 MIXED HYPERLIPIDEMIA: ICD-10-CM

## 2023-12-06 DIAGNOSIS — K76.0 NAFLD (NONALCOHOLIC FATTY LIVER DISEASE): Primary | ICD-10-CM

## 2023-12-06 PROCEDURE — 3044F HG A1C LEVEL LT 7.0%: CPT | Mod: CPTII,S$GLB,, | Performed by: NURSE PRACTITIONER

## 2023-12-06 PROCEDURE — 3008F BODY MASS INDEX DOCD: CPT | Mod: CPTII,S$GLB,, | Performed by: NURSE PRACTITIONER

## 2023-12-06 PROCEDURE — 99212 OFFICE O/P EST SF 10 MIN: CPT | Mod: PBBFAC | Performed by: NURSE PRACTITIONER

## 2023-12-06 PROCEDURE — 3008F PR BODY MASS INDEX (BMI) DOCUMENTED: ICD-10-PCS | Mod: CPTII,S$GLB,, | Performed by: NURSE PRACTITIONER

## 2023-12-06 PROCEDURE — 3288F FALL RISK ASSESSMENT DOCD: CPT | Mod: CPTII,S$GLB,, | Performed by: NURSE PRACTITIONER

## 2023-12-06 PROCEDURE — 1101F PR PT FALLS ASSESS DOC 0-1 FALLS W/OUT INJ PAST YR: ICD-10-PCS | Mod: CPTII,S$GLB,, | Performed by: NURSE PRACTITIONER

## 2023-12-06 PROCEDURE — 1159F MED LIST DOCD IN RCRD: CPT | Mod: CPTII,S$GLB,, | Performed by: NURSE PRACTITIONER

## 2023-12-06 PROCEDURE — 99999 PR PBB SHADOW E&M-EST. PATIENT-LVL II: CPT | Mod: PBBFAC,,, | Performed by: NURSE PRACTITIONER

## 2023-12-06 PROCEDURE — 99214 PR OFFICE/OUTPT VISIT, EST, LEVL IV, 30-39 MIN: ICD-10-PCS | Mod: S$GLB,,, | Performed by: NURSE PRACTITIONER

## 2023-12-06 PROCEDURE — 4010F PR ACE/ARB THEARPY RXD/TAKEN: ICD-10-PCS | Mod: CPTII,S$GLB,, | Performed by: NURSE PRACTITIONER

## 2023-12-06 PROCEDURE — 91200 LIVER ELASTOGRAPHY: CPT | Mod: PBBFAC | Performed by: NURSE PRACTITIONER

## 2023-12-06 PROCEDURE — 1101F PT FALLS ASSESS-DOCD LE1/YR: CPT | Mod: CPTII,S$GLB,, | Performed by: NURSE PRACTITIONER

## 2023-12-06 PROCEDURE — 99999 PR PBB SHADOW E&M-EST. PATIENT-LVL II: ICD-10-PCS | Mod: PBBFAC,,, | Performed by: NURSE PRACTITIONER

## 2023-12-06 PROCEDURE — 1159F PR MEDICATION LIST DOCUMENTED IN MEDICAL RECORD: ICD-10-PCS | Mod: CPTII,S$GLB,, | Performed by: NURSE PRACTITIONER

## 2023-12-06 PROCEDURE — 4010F ACE/ARB THERAPY RXD/TAKEN: CPT | Mod: CPTII,S$GLB,, | Performed by: NURSE PRACTITIONER

## 2023-12-06 PROCEDURE — 76981 USE PARENCHYMA: CPT | Mod: S$GLB,,, | Performed by: NURSE PRACTITIONER

## 2023-12-06 PROCEDURE — 3288F PR FALLS RISK ASSESSMENT DOCUMENTED: ICD-10-PCS | Mod: CPTII,S$GLB,, | Performed by: NURSE PRACTITIONER

## 2023-12-06 PROCEDURE — 76981 FIBROSCAN NEW ORLEANS (VIBRATION CONTROLLED TRANSIENT ELASTOGRAPHY): ICD-10-PCS | Mod: S$GLB,,, | Performed by: NURSE PRACTITIONER

## 2023-12-06 PROCEDURE — 3044F PR MOST RECENT HEMOGLOBIN A1C LEVEL <7.0%: ICD-10-PCS | Mod: CPTII,S$GLB,, | Performed by: NURSE PRACTITIONER

## 2023-12-06 PROCEDURE — 99214 OFFICE O/P EST MOD 30 MIN: CPT | Mod: S$GLB,,, | Performed by: NURSE PRACTITIONER

## 2023-12-06 NOTE — PROCEDURES
FibroScan Vale (Vibration Controlled Transient Elastography)    Date/Time: 12/6/2023 8:15 AM    Performed by: Iesha Antonio NP  Authorized by: Iesha Antonio NP    Diagnosis:  NAFLD    Probe:  XL    Universal Protocol: Patient's identity, procedure and site were verified, confirmatory pause was performed.  Discussed procedure including risks and potential complications.  Questions answered.  Patient verbalizes understanding and wishes to proceed with VCTE.     Procedure: After providing explanations of the procedure, patient was placed in the supine position with right arm in maximum abduction to allow optimal exposure of right lateral abdomen.  Patient was briefly assessed, Testing was performed in the mid-axillary location, 50Hz Shear Wave pulses were applied and the resulting Shear Wave and Propagation Speed detected with a 3.5 MHz ultrasonic signal, using the FibroScan probe, Skin to liver capsule distance and liver parenchyma were accessed during the entire examination with the FibroScan probe, Patient was instructed to breathe normally and to abstain from sudden movements during the procedure, allowing for random measurements of liver stiffness. At least 10 Shear Waves were produced, Individual measurements of each Shear Wave were calculated.  Patient tolerated the procedure well with no complications.  Meets discharge criteria as was dismissed.  Rates pain 0 out of 10.  Patient will follow up with ordering provider to review results.    Findings  Median liver stiffness score:  4.1  CAP Reading: dB/m:  321    IQR/med %:  2  Interpretation  Fibrosis interpretation is based on medial liver stiffness - Kilopascal (kPa).    Fibrosis Stage:  F 0-1  Steatosis interpretation is based on controlled attenuation parameter - (dB/m).    Steatosis Grade:  S3

## 2023-12-06 NOTE — TELEPHONE ENCOUNTER
----- Message from Iesha Antonio NP sent at 12/6/2023 10:17 AM CST -----  Please enter recall for f/u visit in 2 years with Fibroscan.       Recall placed

## 2023-12-06 NOTE — PROGRESS NOTES
[facility] - Established Patient    Last Clinic Visit: 12/6/22    Chief Complaint: Follow-up for fatty liver        HISTORY     This is a 63 y.o. male with PMH noted below, here for follow-up of fatty liver.    He was initially referred to hepatology in 2016 for positive hepatitis C Ab though HCV RNA undetected x 2, indicating no current/chronic HCV infection.        Hepatic steatosis first noted on CT 7/9/21.   He has frequent imaging for h/o renal CA- s/p nephrectomy.     He has normal liver enzymes/LFTs.    Serologic workup has been negative for hemochromatosis and viral hepatitis.    Fibrosis staging:   Fibroscan 12/1/21 = F0-F1 (kPa 4.4), S3  Fibroscan 12/6/23 = F0-F1 (kPa 4.1), S3     Interval history:  Feels well today, no concerns.   Denies symptoms of hepatic decompensation including jaundice, ascites, cognitive problems to suggest hepatic encephalopathy, or GI bleeding.     Updates on risk factors for fatty liver:  Weight -- Body mass index is 35.81 kg/m².     Weight relatively unchanged from last visit               Drinks sugary beverages often      Dyslipidemia -- well controlled on statin                            Insulin resistance / diabetes -- last HgbA1c 6.0        Alcohol use -- none in 30 years    Health Maintenance:  - Most recent imaging: CT 6/2023 without focal hepatic lesion  - Hepatitis A & B vaccination: +immunity      Past medical history, surgical history, problem list, family history, social history, allergies: Reviewed and updated in the appropriate section of the electronic medical record.      Current Outpatient Medications   Medication Sig Dispense Refill    amLODIPine (NORVASC) 2.5 MG tablet Take 1 tablet (2.5 mg total) by mouth once daily. 90 tablet 3    fluticasone propionate (FLONASE) 50 mcg/actuation nasal spray 1 spray (50 mcg total) by Each Nostril route once daily. 16 g 0    losartan (COZAAR) 50 MG tablet TAKE 1 TABLET(50 MG) BY MOUTH EVERY DAY 90 tablet 3     pravastatin (PRAVACHOL) 40 MG tablet Take 1 tablet (40 mg total) by mouth every evening. 90 tablet 3    ciprofloxacin HCl (CIPRO) 500 MG tablet Take 1 tablet (500 mg total) by mouth 2 (two) times daily. (Patient not taking: Reported on 10/24/2023) 14 tablet 0    guaiFENesin (MUCINEX) 600 mg 12 hr tablet Take 2 tablets (1,200 mg total) by mouth 2 (two) times daily as needed for Congestion. (Patient not taking: Reported on 11/7/2023) 90 tablet 1     No current facility-administered medications for this visit.     Medication list reviewed and updated.      Review of Systems - as per HPI  Constitutional: Negative for unexpected weight change.   Respiratory: Negative for shortness of breath.    Cardiovascular: Negative for leg swelling.  Gastrointestinal: Negative for abdominal distention or abdominal pain. Negative for melena or hematemesis.  Musculoskeletal: Negative for myalgias.    Skin: Negative for jaundice or itching.  Neurological: Negative for confusion or slowed mentation. Negative for tremors.   Hematological: Does not bruise/bleed easily.       Physical Exam   Constitutional: No distress. Alert and oriented.  Eyes: No scleral icterus.   Pulmonary/Chest: Respiratory effort normal. No respiratory distress.   Abdominal: No distension, no ascites appreciated.   Extremities: No edema.   Neurological: No tremor.   Skin: No jaundice.   Psychiatric: Normal mood and affect. Speech, behavior, and thought content normal.         LABS & DIAGNOSTIC STUDIES     I have personally reviewed pertinent laboratory findings:    Lab Results   Component Value Date    ALT 18 12/05/2023    AST 16 12/05/2023    ALKPHOS 51 (L) 12/05/2023    BILITOT 0.5 12/05/2023    ALBUMIN 3.9 12/05/2023    INR 0.9 12/15/2019       Lab Results   Component Value Date    WBC 7.05 12/05/2023    HGB 15.7 12/05/2023    HCT 48.2 12/05/2023    MCV 94 12/05/2023     12/05/2023       Lab Results   Component Value Date     12/05/2023    K 5.0  "12/05/2023    BUN 19 12/05/2023    CREATININE 1.4 12/05/2023    ESTGFRAFRICA >60.0 07/25/2022    EGFRNONAA 57.7 (A) 07/25/2022       Lab Results   Component Value Date    FERRITIN 170 11/12/2021    FESATURATED 19 (L) 11/12/2021    HEPBSAG Negative 11/12/2021    HEPBCAB Positive (A) 11/12/2021    HEPCAB Positive (A) 04/30/2016    HCVRNAQUANTP <12 08/22/2016    HEPAIGM Negative 02/24/2017       No results found for: "AFP"    I have personally reviewed the following result reports:  CT - 6/7/23      ASSESSMENT & PLAN     63 y.o. male with:    1. NAFLD  -- Liver enzymes remain normal   -- Fibroscan continues to show severe steatosis but reassuring, no-minimal fibrosis (F0-F1). Repeat in 2 years.   -- Recommend labs to monitor liver enzymes/LFTs 1-2 times per year with PCP  -- Will continue imaging surveillance with Oncology     Reminded that the only treatment for fatty liver is weight loss, maintaining good control of metabolic risk factors (blood pressure, cholesterol, and blood sugar), and moderating alcohol use.      2. Obesity with Body mass index is 35.81 kg/m²., HLD, pre-diabetes  -- Reviewed weight loss strategies including dietary changes. Recommend low carbohydrate/sugar, high protein/fiber diet. Advised to avoid sugary beverages.  -- May benefit from GLP-1s for both weight loss and blood sugar control; recommend discussing with PCP. Can also refer to bariatric med.      *See AVS for patient education and instructions.      Return to clinic in 2 years with Fibroscan.      Thank you for allowing me to participate in the care of Alexi HANG Kidd-C  Hepatology        Duration of encounter: 30 min  This includes face to face time and non-face to face time preparing to see the patient (eg, review of tests), obtaining and/or reviewing separately obtained history, documenting clinical information in the electronic or other health record, independently interpreting results and communicating " results to the patient/family/caregiver, or care coordination.

## 2023-12-06 NOTE — PATIENT INSTRUCTIONS
Repeat Fibroscan and visit in 2 years  Can discuss meds for weight loss and blood sugar with PCP (GLP-1s: ozempic, mounjaro, wegovy). Can also send you to medical weight loss clinic if interested in a referral.  Recommend low carbohydrate, low sugar diet. High protein diet.

## 2023-12-07 ENCOUNTER — OFFICE VISIT (OUTPATIENT)
Dept: HEMATOLOGY/ONCOLOGY | Facility: CLINIC | Age: 63
End: 2023-12-07
Payer: MEDICAID

## 2023-12-07 VITALS
HEIGHT: 73 IN | RESPIRATION RATE: 16 BRPM | TEMPERATURE: 98 F | DIASTOLIC BLOOD PRESSURE: 79 MMHG | HEART RATE: 72 BPM | BODY MASS INDEX: 36.11 KG/M2 | WEIGHT: 272.5 LBS | SYSTOLIC BLOOD PRESSURE: 125 MMHG | OXYGEN SATURATION: 96 %

## 2023-12-07 DIAGNOSIS — C64.2 CARCINOMA, RENAL CELL, LEFT: Primary | ICD-10-CM

## 2023-12-07 DIAGNOSIS — I10 ESSENTIAL HYPERTENSION: ICD-10-CM

## 2023-12-07 DIAGNOSIS — D32.1 MENINGIOMA, RECURRENT OF SPINE: ICD-10-CM

## 2023-12-07 PROCEDURE — 99999 PR PBB SHADOW E&M-EST. PATIENT-LVL III: ICD-10-PCS | Mod: PBBFAC,,, | Performed by: INTERNAL MEDICINE

## 2023-12-07 PROCEDURE — 1101F PT FALLS ASSESS-DOCD LE1/YR: CPT | Mod: CPTII,S$GLB,, | Performed by: INTERNAL MEDICINE

## 2023-12-07 PROCEDURE — 99999 PR PBB SHADOW E&M-EST. PATIENT-LVL III: CPT | Mod: PBBFAC,,, | Performed by: INTERNAL MEDICINE

## 2023-12-07 PROCEDURE — 3074F SYST BP LT 130 MM HG: CPT | Mod: CPTII,S$GLB,, | Performed by: INTERNAL MEDICINE

## 2023-12-07 PROCEDURE — 3078F PR MOST RECENT DIASTOLIC BLOOD PRESSURE < 80 MM HG: ICD-10-PCS | Mod: CPTII,S$GLB,, | Performed by: INTERNAL MEDICINE

## 2023-12-07 PROCEDURE — 3044F PR MOST RECENT HEMOGLOBIN A1C LEVEL <7.0%: ICD-10-PCS | Mod: CPTII,S$GLB,, | Performed by: INTERNAL MEDICINE

## 2023-12-07 PROCEDURE — 1101F PR PT FALLS ASSESS DOC 0-1 FALLS W/OUT INJ PAST YR: ICD-10-PCS | Mod: CPTII,S$GLB,, | Performed by: INTERNAL MEDICINE

## 2023-12-07 PROCEDURE — 3288F PR FALLS RISK ASSESSMENT DOCUMENTED: ICD-10-PCS | Mod: CPTII,S$GLB,, | Performed by: INTERNAL MEDICINE

## 2023-12-07 PROCEDURE — 1159F MED LIST DOCD IN RCRD: CPT | Mod: CPTII,S$GLB,, | Performed by: INTERNAL MEDICINE

## 2023-12-07 PROCEDURE — 99214 OFFICE O/P EST MOD 30 MIN: CPT | Mod: S$GLB,,, | Performed by: INTERNAL MEDICINE

## 2023-12-07 PROCEDURE — 4010F ACE/ARB THERAPY RXD/TAKEN: CPT | Mod: CPTII,S$GLB,, | Performed by: INTERNAL MEDICINE

## 2023-12-07 PROCEDURE — 3044F HG A1C LEVEL LT 7.0%: CPT | Mod: CPTII,S$GLB,, | Performed by: INTERNAL MEDICINE

## 2023-12-07 PROCEDURE — 1159F PR MEDICATION LIST DOCUMENTED IN MEDICAL RECORD: ICD-10-PCS | Mod: CPTII,S$GLB,, | Performed by: INTERNAL MEDICINE

## 2023-12-07 PROCEDURE — 1160F PR REVIEW ALL MEDS BY PRESCRIBER/CLIN PHARMACIST DOCUMENTED: ICD-10-PCS | Mod: CPTII,S$GLB,, | Performed by: INTERNAL MEDICINE

## 2023-12-07 PROCEDURE — 99214 PR OFFICE/OUTPT VISIT, EST, LEVL IV, 30-39 MIN: ICD-10-PCS | Mod: S$GLB,,, | Performed by: INTERNAL MEDICINE

## 2023-12-07 PROCEDURE — 3008F PR BODY MASS INDEX (BMI) DOCUMENTED: ICD-10-PCS | Mod: CPTII,S$GLB,, | Performed by: INTERNAL MEDICINE

## 2023-12-07 PROCEDURE — 99213 OFFICE O/P EST LOW 20 MIN: CPT | Mod: PBBFAC | Performed by: INTERNAL MEDICINE

## 2023-12-07 PROCEDURE — 3078F DIAST BP <80 MM HG: CPT | Mod: CPTII,S$GLB,, | Performed by: INTERNAL MEDICINE

## 2023-12-07 PROCEDURE — 3008F BODY MASS INDEX DOCD: CPT | Mod: CPTII,S$GLB,, | Performed by: INTERNAL MEDICINE

## 2023-12-07 PROCEDURE — 3074F PR MOST RECENT SYSTOLIC BLOOD PRESSURE < 130 MM HG: ICD-10-PCS | Mod: CPTII,S$GLB,, | Performed by: INTERNAL MEDICINE

## 2023-12-07 PROCEDURE — 3288F FALL RISK ASSESSMENT DOCD: CPT | Mod: CPTII,S$GLB,, | Performed by: INTERNAL MEDICINE

## 2023-12-07 PROCEDURE — 1160F RVW MEDS BY RX/DR IN RCRD: CPT | Mod: CPTII,S$GLB,, | Performed by: INTERNAL MEDICINE

## 2023-12-07 PROCEDURE — 4010F PR ACE/ARB THEARPY RXD/TAKEN: ICD-10-PCS | Mod: CPTII,S$GLB,, | Performed by: INTERNAL MEDICINE

## 2023-12-07 NOTE — PROGRESS NOTES
ONCOLOGY FOLLOW UP VISIT.     Reason for visit: Surveillance for stage III RCC s/p resection  Cancer/Stage/TNM:   Cancer Staging  Carcinoma, renal cell, left  Staging form: Kidney, AJCC 8th Edition  - Clinical stage from 9/16/2019: Stage III (cT3a, cN0, cM0) - Unsigned     Interval History:   Patient returns to clinic today for review of imaging. Today patient reports no new symptoms.    ROS:  Review of Systems   Constitutional:  Negative for activity change, appetite change, chills, fatigue, fever and unexpected weight change.   HENT:  Negative for mouth sores, nosebleeds and sore throat.    Eyes:  Negative for visual disturbance.   Respiratory:  Negative for cough, shortness of breath and wheezing.    Cardiovascular:  Negative for chest pain, palpitations and leg swelling.   Gastrointestinal:  Negative for abdominal distention, abdominal pain, blood in stool and diarrhea.   Endocrine: Negative for cold intolerance and heat intolerance.   Genitourinary:  Negative for dysuria, flank pain and frequency.   Musculoskeletal:  Negative for arthralgias, back pain, joint swelling and myalgias.   Skin:  Negative for color change, rash and wound.   Neurological:  Negative for dizziness, light-headedness and headaches.   Hematological:  Negative for adenopathy. Does not bruise/bleed easily.   Psychiatric/Behavioral:  The patient is not nervous/anxious.       A complete 12-point review of systems was reviewed and is negative except as mentioned above.     Past Medical History:   Past Medical History:   Diagnosis Date    Cancer of kidney     Left    Hyperlipidemia     Hypertension         Allergies:   Review of patient's allergies indicates:   Allergen Reactions    Lisinopril Other (See Comments)     Dry cough        Medications:   Current Outpatient Medications   Medication Sig Dispense Refill    amLODIPine (NORVASC) 2.5 MG tablet Take 1 tablet (2.5 mg total) by mouth once daily. 90 tablet 3    fluticasone propionate (FLONASE)  "50 mcg/actuation nasal spray 1 spray (50 mcg total) by Each Nostril route once daily. 16 g 0    losartan (COZAAR) 50 MG tablet TAKE 1 TABLET(50 MG) BY MOUTH EVERY DAY 90 tablet 3    pravastatin (PRAVACHOL) 40 MG tablet Take 1 tablet (40 mg total) by mouth every evening. 90 tablet 3    ciprofloxacin HCl (CIPRO) 500 MG tablet Take 1 tablet (500 mg total) by mouth 2 (two) times daily. (Patient not taking: Reported on 10/24/2023) 14 tablet 0    guaiFENesin (MUCINEX) 600 mg 12 hr tablet Take 2 tablets (1,200 mg total) by mouth 2 (two) times daily as needed for Congestion. (Patient not taking: Reported on 11/7/2023) 90 tablet 1     No current facility-administered medications for this visit.        Physical Exam:   /79 (BP Location: Left arm, Patient Position: Sitting, BP Method: Large (Automatic))   Pulse 72   Temp 97.7 °F (36.5 °C) (Oral)   Resp 16   Ht 6' 1" (1.854 m)   Wt 123.6 kg (272 lb 7.8 oz)   SpO2 96%   BMI 35.95 kg/m²      ECOG Performance Status: (foot note - ECOG PS provided by Eastern Cooperative Oncology Group) 0 - Asymptomatic    Physical Exam  Vitals and nursing note reviewed.   Constitutional:       General: He is not in acute distress.     Appearance: Normal appearance. He is well-developed.   HENT:      Head: Normocephalic and atraumatic.   Eyes:      Conjunctiva/sclera: Conjunctivae normal.      Pupils: Pupils are equal, round, and reactive to light.   Pulmonary:      Effort: Pulmonary effort is normal. No respiratory distress.   Abdominal:      General: There is no distension.      Palpations: Abdomen is soft.      Tenderness: There is no abdominal tenderness.   Musculoskeletal:         General: No swelling. Normal range of motion.      Cervical back: Normal range of motion and neck supple.   Skin:     General: Skin is warm and dry.   Neurological:      General: No focal deficit present.      Mental Status: He is alert and oriented to person, place, and time.   Psychiatric:         Mood " and Affect: Mood normal.         Behavior: Behavior normal.         Thought Content: Thought content normal.         Judgment: Judgment normal.         Labs:   No results found for this or any previous visit (from the past 48 hour(s)).       Imaging: I have personally reviewed patient's CT scans imaging showing TIMUR  CT Chest Abdomen Pelvis With IV Contrast (XPD) NO Oral Contrast  Narrative: EXAMINATION:  CT CHEST ABDOMEN PELVIS WITH IV CONTRAST (XPD)    CLINICAL HISTORY:  Kidney cancer, monitor;Malignant neoplasm of left kidney, except renal pelvis    TECHNIQUE:  Postcontrast images were obtained through the chest, abdomen, and pelvis per protocol (with multiphase images through the abdomen and pelvis).  Coronal and sagittal images were reviewed.  100 cc intravenous contrast was administered.    COMPARISON:  CT chest abdomen pelvis dated 06/07/2023    FINDINGS:  Chest: Structures at the base of the neck are unremarkable.  No intrathoracic adenopathy.  No anterior pericardial or pleural effusion.  Minimal aortic arch and mild coronary calcific atherosclerosis.  The trachea is clear.    Lungs are symmetrically expanded.  Minimal subpleural emphysema at the right apex.  Scattered mild bibasilar changes suggesting atelectasis and/or scarring.  Similar linear scarring at the inferior lingula.  Stable scattered subcentimeter pulmonary nodules including reference 0.5 cm right lower lobe nodule (series 7, image 236).  No suspicious new nodule or mass.    Abdomen and pelvis:    No focal liver lesion.  Cholelithiasis with similar 1.5 cm attenuating stone.  The spleen, pancreas, and adrenal glands appear normal.  Stable right renal cortical hypodensity, too small to characterize (series 11, image 66).  No hydronephrosis.  Surgically absent left kidney with stable appearance and scarring at the surgical bed.    The urinary bladder is unremarkable.  Prostate is mildly enlarged.  The GI tract is normal in caliber.  Appendix is  normal.  No significant free fluid or pathologic adenopathy.  Scattered mild aortoiliac atherosclerosis.  Left greater than right fat containing inguinal hernias.    Mild spine DJD with L5 pars defects.  No aggressive osseous lesion.  Impression: Prior postsurgical change of left nephrectomy with stable scarring at the surgical bed.  No convincing evidence for local recurrent or metastatic disease.    Stable subcentimeter pulmonary nodules.    Cholelithiasis and additional findings as above.    There are no measurable lesions per RECIST criteria.    Electronically signed by: Leighton Medrano  Date:    12/06/2023  Time:    17:16              Diagnoses:       1. Carcinoma, renal cell, left    2. Essential hypertension    3. Meningioma, recurrent of spine            Assessment and Plan:         1. Stage III RCC:   -Sites of Disease: L kidney, L renal vein   Discussed with patient that cervical lesion is unlikely metastasis from RCC. Repeat MRI showed stability. Now s/p nephrectomy. Controversial benefit with adjuvant Sutent and significant side effects. Surgery was 12/10/2019. Continue surveillance, CT CAP q 3 month x 1 year (12/2020), then q 4 month x 1 year (12/2021), then q 6-12 month for years 3-5 (12/2024).  - CT CAP without evidence of disease. Surveillance CT scan in 12/2024, then imaging surveillance completed.    2. HTN  Managed by PCP, currently on Norvasc.    3. Needs f/u with Neurosurgery. Will order repeat cervical spine MRI to re-evaluate.     he will return to clinic in 12 months, but knows to call in the interim if symptoms change or should a problem arise.      Patient was also seen and examined by Dr. Pham. Patient is in agreement with the proposed treatment plan. All questions were answered to the patient's satisfaction. Pt knows to call clinic if anything is needed before the next clinic visit.    Pat Dobson, MSN, APRN, FNP-C  Hematology and Medical Oncology  Nurse Practitioner to Dr. Shane  Ankit  Nurse Practitioner, [facility]    I have reviewed the notes, assessments, and/or procedures performed by Pat DÍAZ, as above.  I have personally interviewed and examined the patient at the beside, and rounded with Pat. I concur with her assessment and plan and the documentation of Alexi Noguera.    MDM includes:    - Acute or chronic illness or injury that poses a threat to life or bodily function  - Independent review and explanation of 2 results from unique tests  - Discussion of management and ordering 2 unique tests  - Extensive discussion of treatment and management    Acosta Pham M.D.  Hematology/Oncology Attending  Director [facility] Program  [facility]            Med Onc Chart Routing      Follow up with physician 1 year. review imaging   Follow up with YANDEL    Infusion scheduling note    Injection scheduling note    Labs CBC and CMP   Scheduling:  Preferred lab:  Lab interval:     Imaging CT chest abdomen pelvis   in 1 year   Pharmacy appointment    Other referrals

## 2023-12-07 NOTE — Clinical Note
Needs to f/u with Dr. Lebron. Needed f/u 2 years ago with repeat imaging that did not happen. Oncology ordered cervical spine MRI but needs f/u with Dr. Lebron after imaging is complete.   Claudia - please coordinate scheduling of MRI with Dr. Lebron's team.   Thanks

## 2023-12-08 ENCOUNTER — PATIENT MESSAGE (OUTPATIENT)
Dept: NEUROSURGERY | Facility: CLINIC | Age: 63
End: 2023-12-08
Payer: MEDICARE

## 2023-12-08 ENCOUNTER — TELEPHONE (OUTPATIENT)
Dept: NEUROSURGERY | Facility: CLINIC | Age: 63
End: 2023-12-08
Payer: MEDICARE

## 2023-12-08 NOTE — TELEPHONE ENCOUNTER
----- Message from Claudia Lea sent at 12/8/2023  8:45 AM CST -----  Regarding: RE: Needs to f/u with Dr. Lebron. Needed f/u 2 years ago with repeat imaging that did not happen. Oncology ordered cervical spine MRI but needs f/u with Dr. Lebron after imaging is complete.  Good morningJune patient MRI is schedule for 1/26. Can we work him in a few days after? Dr. Pham has openings that Monday 1/29.  ----- Message -----  From: June Asencio RN  Sent: 12/7/2023   5:59 PM CST  To: Anna Anglin NP; Christen Miner PA-C; #  Subject: RE: Needs to f/u with Dr. Lebron. Needed f/#    Please let us know when the MRI is scheduled.  We are booked the rest of dec and dr. Lebron is out last 2 weeks of dec. Also Booked until mid January, so please advise when the MRI is scheduled.  thanks  ----- Message -----  From: Claudia Lea  Sent: 12/7/2023   4:34 PM CST  To: Vi Becker Staff  Subject: Needs to f/u with Dr. Lebron. Needed f/u 2 #    Needs to f/u with Dr. Lebron. Needed f/u 2 years ago with repeat imaging that did not happen. Oncology ordered cervical spine MRI but needs f/u with Dr. Lebron after imaging is complete.     Claudia - please coordinate scheduling of MRI with Dr. Lebron's team.     Thanks         Good evening, can someone please help assist with coordinating this patient schedule with  and MRI. Let know me  availability and I will work with  so this patient can be schedule.        Thanks  Claudia

## 2023-12-08 NOTE — TELEPHONE ENCOUNTER
P/c to pt. L/m on v/m with md appcasie  Future Appointments   Date Time Provider Department Center   1/5/2024 10:15 AM [facility] [id number] [facility]   1/11/2024 10:30 AM Eugenio Lebron MD [facility] NEUROS8 [place] [facility]   5/7/2024  9:00 AM Maylin Glaser NP [facility] MED [place]

## 2023-12-20 DIAGNOSIS — J06.9 UPPER RESPIRATORY TRACT INFECTION, UNSPECIFIED TYPE: ICD-10-CM

## 2023-12-20 NOTE — TELEPHONE ENCOUNTER
Refill Routing Note   Medication(s) are not appropriate for processing by [facility] for the following reason(s):        Non-participating provider    [facility] action(s):  Route               Appointments  past 12m or future 3m with PCP    Date Provider   Last Visit   11/7/2023 Maylin Glaser NP   Next Visit   5/7/2024 Maylin Glaser NP   ED visits in past 90 days: 0        Note composed:5:52 PM 12/20/2023

## 2023-12-22 RX ORDER — FLUTICASONE PROPIONATE 50 MCG
1 SPRAY, SUSPENSION (ML) NASAL DAILY PRN
Qty: 16 G | Refills: 1 | Status: SHIPPED | OUTPATIENT
Start: 2023-12-22

## 2024-01-03 ENCOUNTER — TELEPHONE (OUTPATIENT)
Dept: HEMATOLOGY/ONCOLOGY | Facility: CLINIC | Age: 64
End: 2024-01-03
Payer: MEDICARE

## 2024-01-03 ENCOUNTER — PATIENT MESSAGE (OUTPATIENT)
Dept: HEMATOLOGY/ONCOLOGY | Facility: CLINIC | Age: 64
End: 2024-01-03
Payer: MEDICARE

## 2024-01-03 NOTE — TELEPHONE ENCOUNTER
I attempted to contact the pt re his upcoming MRI. The location has been changed to [facility]. Date is 1/5 at 2 pm. Lm on his vm re the aforementioned.

## 2024-01-10 ENCOUNTER — TELEPHONE (OUTPATIENT)
Dept: HEMATOLOGY/ONCOLOGY | Facility: CLINIC | Age: 64
End: 2024-01-10
Payer: MEDICARE

## 2024-01-11 ENCOUNTER — OFFICE VISIT (OUTPATIENT)
Dept: NEUROSURGERY | Facility: CLINIC | Age: 64
End: 2024-01-11
Payer: MEDICARE

## 2024-01-11 ENCOUNTER — OFFICE VISIT (OUTPATIENT)
Dept: HEMATOLOGY/ONCOLOGY | Facility: CLINIC | Age: 64
End: 2024-01-11
Payer: MEDICARE

## 2024-01-11 VITALS
SYSTOLIC BLOOD PRESSURE: 121 MMHG | HEIGHT: 73 IN | RESPIRATION RATE: 14 BRPM | DIASTOLIC BLOOD PRESSURE: 80 MMHG | BODY MASS INDEX: 36.44 KG/M2 | OXYGEN SATURATION: 98 % | HEART RATE: 75 BPM | TEMPERATURE: 98 F | WEIGHT: 274.94 LBS

## 2024-01-11 VITALS — SYSTOLIC BLOOD PRESSURE: 134 MMHG | DIASTOLIC BLOOD PRESSURE: 86 MMHG | HEART RATE: 75 BPM

## 2024-01-11 DIAGNOSIS — D32.1 MENINGIOMA, RECURRENT OF SPINE: Primary | ICD-10-CM

## 2024-01-11 DIAGNOSIS — C64.2 CARCINOMA, RENAL CELL, LEFT: Primary | ICD-10-CM

## 2024-01-11 PROCEDURE — 99499 UNLISTED E&M SERVICE: CPT | Mod: S$GLB,,, | Performed by: INTERNAL MEDICINE

## 2024-01-11 PROCEDURE — 99205 OFFICE O/P NEW HI 60 MIN: CPT | Mod: S$GLB,,, | Performed by: PHYSICIAN ASSISTANT

## 2024-01-11 PROCEDURE — 1101F PT FALLS ASSESS-DOCD LE1/YR: CPT | Mod: CPTII,S$GLB,, | Performed by: PHYSICIAN ASSISTANT

## 2024-01-11 PROCEDURE — 3075F SYST BP GE 130 - 139MM HG: CPT | Mod: CPTII,S$GLB,, | Performed by: PHYSICIAN ASSISTANT

## 2024-01-11 PROCEDURE — 1126F AMNT PAIN NOTED NONE PRSNT: CPT | Mod: CPTII,S$GLB,, | Performed by: PHYSICIAN ASSISTANT

## 2024-01-11 PROCEDURE — 3288F FALL RISK ASSESSMENT DOCD: CPT | Mod: CPTII,S$GLB,, | Performed by: PHYSICIAN ASSISTANT

## 2024-01-11 PROCEDURE — 1159F MED LIST DOCD IN RCRD: CPT | Mod: CPTII,S$GLB,, | Performed by: PHYSICIAN ASSISTANT

## 2024-01-11 PROCEDURE — 99999 PR PBB SHADOW E&M-EST. PATIENT-LVL II: CPT | Mod: PBBFAC,,, | Performed by: PHYSICIAN ASSISTANT

## 2024-01-11 PROCEDURE — 3079F DIAST BP 80-89 MM HG: CPT | Mod: CPTII,S$GLB,, | Performed by: PHYSICIAN ASSISTANT

## 2024-01-11 NOTE — PROGRESS NOTES
CHIEF COMPLAINT:  Spine surgical consult    I, Ondinamarlene Epstein, attest that this documentation has been prepared under the direction and in the presence of Eugenio Lebron MD.    HPI:  Alexi Noguera is a 63 y.o.  male with a PMHx of essential HTN, HLD, PE, meningioma (recurrent of spine) and kidney cancer, who is referred by Pat Dobson NP, for spine surgical consult for asymptomatic C2 meningioma. Today pt reports that he is feeling fine and remains asymptomatic. Patient denies any neck pain, weakness, paraesthesia, numbness, gait imbalance, hand clumsiness, b/b incontinence.       Review of patient's allergies indicates:   Allergen Reactions    Lisinopril Other (See Comments)     Dry cough       Past Medical History:   Diagnosis Date    Cancer of kidney     Left    Hyperlipidemia     Hypertension      Past Surgical History:   Procedure Laterality Date    LYSIS OF ADHESIONS N/A 12/9/2019    Procedure: LYSIS, ADHESIONS;  Surgeon: Tr Jorgensen MD;  Location: [facility] OR [address] [place] [address];  Service: General;  Laterality: N/A;    NEPHRECTOMY Left 12/9/2019    Procedure: Nephrectomy OPEN;  Surgeon: José Manuel Buchanan MD;  Location: [facility] OR [address] [place] [address];  Service: Urology;  Laterality: Left;  4hrs GEN WITH REGIONAL    THROMBECTOMY OF VENA CAVA  12/9/2019    Procedure: THROMBECTOMY, VENA CAVA;  Surgeon: José Manuel Buchanan MD;  Location: [facility] OR [address] [place] [address];  Service: Urology;;    THROMBECTOMY OF VENA CAVA  12/9/2019    Procedure: THROMBECTOMY, VENA CAVA;  Surgeon: KIRSTEN Atkins III, MD;  Location: [facility] OR [address] [place] [address];  Service: Peripheral Vascular;;     Family History   Problem Relation Age of Onset    No Known Problems Mother     No Known Problems Father     No Known Problems Sister     Heart disease Brother     Diabetes Paternal Aunt     Heart disease Paternal Uncle     Cirrhosis Neg Hx      Social History     Tobacco Use    Smoking status: Never    Smokeless tobacco: Never   Substance Use Topics    Alcohol use: No      Alcohol/week: 0.0 standard drinks of alcohol    Drug use: No        Review of Systems   Musculoskeletal:  Negative for gait problem and neck pain.   Neurological:  Negative for weakness and numbness.        (-) Paraesthesia.    All other systems reviewed and are negative.      OBJECTIVE:   Vital Signs:  Pulse: 75 (01/11/24 1009)  BP: 134/86 (01/11/24 1009)    Physical Exam:    Vital signs: All nursing notes and vital signs reviewed -- afebrile, vital signs stable.  Constitutional: Patient sitting comfortably in chair. Appears well developed and well nourished.  Skin: Exposed areas are intact without abnormal markings, rashes or other lesions.  HEENT: Normocephalic. Normal conjunctivae.  Cardiovascular: Normal rate and regular rhythm.  Respiratory: Chest wall rises and falls symmetrically, without signs of respiratory distress.  Abdomen: Soft and non-tender.  Extremities: Warm and without edema. Calves supple, non-tender.  Psych/Behavior: Normal affect.    Neurological:    Mental status: Alert and oriented. Conversational and appropriate.       Cranial Nerves: VFF to confrontation. PERRL. EOMI without nystagmus. Facial STLT normal and symmetric. Strong, symmetric muscles of mastication. Facial strength full and symmetric. Hearing equal bilaterally to finger rub. Palate and uvula rise and fall normally in midline. Shoulder shrug 5/5 strength. Tongue midline.     Motor:    Upper:  Deltoids Triceps Biceps WE WF     R 5/5 5/5 5/5 5/5 5/5 5/5    L 5/5 5/5 5/5 5/5 5/5 5/5      Lower:  HF KE KF DF PF EHL    R 5/5 5/5 5/5 5/5 5/5 5/5    L 5/5 5/5 5/5 5/5 5/5 5/5     Sensory: Intact sensation to light touch in all extremities. Romberg negative.    Reflexes:          DTR: 2+ symmetrically throughout.     Bernal's: Negative.     Clonus: Negative.    Cerebellar: Finger-to-nose and rapid alternating movements normal. Gait stable, fluid.    Spine:    Posture: Head well aligned over pelvis in front and side views.  No focal or  global spinal deformity visible on inspection. Shoulders and hips even. No obvious leg length discrepancy. No scapula winging.    Bending: Full ROM with forward, back and lateral bending. No rib prominence with forward bend.    Cervical:      ROM: Full with flexion, extension, lateral rotation and ear-to-shoulder bend.      Midline TTP: Negative.    Thoracic:     Midline TTP: Negative    Lumbar:     Midline TTP: Negative      Diagnostic Results:  All imaging was independently reviewed by me.    MRI cervical spine w/wo contrast 1/5/2024:  - 1.9 x 1.7 x 1.3 cm extramedullary intradural lesion in the left anterolateral aspect of the spinal canal at the C2 level, enhancing dural tail, mass effect on left hemicord    ASSESSMENT/PLAN:     Alexi Noguera presents for evaluation of asymptomatic extramedullar intradural lesion at C2, likely meningioma. It has increased in size from 1.3 cm in 6/2020 to 1.9 cm in 1/2024. Patient was seen today by Dr. Lebron. Although he remains asymptomatic, recommending C1-2 laminectomy for tumor resection given the progressive increase in size and mass effect on the cord. All risks, benefits, alternatives, indications and methods were reviewed in detail by the patient and surgeon. All questions were answered. Patient wishes to continue surveillance at this time. Risks of declining surgical intervention at this time were discussed, patient v/u. If he does want to pursue surgery in the future, will need to obtain a CTA head and neck with 3D print based on those results prior to surgery. We discussed concerning signs and symptoms that would prompt return to clinic or urgent medical attention, patient v/u. All questions answered. Encouraged to call the clinic with questions/concerns prior to the next visit. F/u in 6 months with repeat MRI.       Christen Miner PA-C  Neurosurgery  [facility]

## 2024-01-11 NOTE — TELEPHONE ENCOUNTER
Message rec'd this morning stating that the pt's insurance is not contracted here. I called the pt and he reports having new insurance. I instructed him to bring those cards with him. He verbalized an understanding.

## 2024-04-19 DIAGNOSIS — J06.9 UPPER RESPIRATORY TRACT INFECTION, UNSPECIFIED TYPE: ICD-10-CM

## 2024-04-19 RX ORDER — FLUTICASONE PROPIONATE 50 MCG
SPRAY, SUSPENSION (ML) NASAL
Qty: 16 G | Refills: 1 | Status: SHIPPED | OUTPATIENT
Start: 2024-04-19

## 2024-05-07 ENCOUNTER — OFFICE VISIT (OUTPATIENT)
Dept: FAMILY MEDICINE | Facility: CLINIC | Age: 64
End: 2024-05-07
Payer: MEDICAID

## 2024-05-07 VITALS
WEIGHT: 270.75 LBS | DIASTOLIC BLOOD PRESSURE: 80 MMHG | SYSTOLIC BLOOD PRESSURE: 128 MMHG | HEART RATE: 73 BPM | OXYGEN SATURATION: 96 % | HEIGHT: 73 IN | TEMPERATURE: 99 F | BODY MASS INDEX: 35.88 KG/M2

## 2024-05-07 DIAGNOSIS — K76.0 NAFLD (NONALCOHOLIC FATTY LIVER DISEASE): ICD-10-CM

## 2024-05-07 DIAGNOSIS — E66.9 OBESITY (BMI 30-39.9): ICD-10-CM

## 2024-05-07 DIAGNOSIS — E78.2 MIXED HYPERLIPIDEMIA: ICD-10-CM

## 2024-05-07 DIAGNOSIS — D32.1 MENINGIOMA, RECURRENT OF SPINE: ICD-10-CM

## 2024-05-07 DIAGNOSIS — R73.03 PRE-DIABETES: ICD-10-CM

## 2024-05-07 DIAGNOSIS — R05.9 COUGH, UNSPECIFIED TYPE: ICD-10-CM

## 2024-05-07 DIAGNOSIS — I10 ESSENTIAL HYPERTENSION: Primary | ICD-10-CM

## 2024-05-07 PROCEDURE — 4010F ACE/ARB THERAPY RXD/TAKEN: CPT | Mod: CPTII,S$GLB,, | Performed by: NURSE PRACTITIONER

## 2024-05-07 PROCEDURE — 99214 OFFICE O/P EST MOD 30 MIN: CPT | Mod: PBBFAC,PO | Performed by: NURSE PRACTITIONER

## 2024-05-07 PROCEDURE — 3079F DIAST BP 80-89 MM HG: CPT | Mod: CPTII,S$GLB,, | Performed by: NURSE PRACTITIONER

## 2024-05-07 PROCEDURE — 1101F PT FALLS ASSESS-DOCD LE1/YR: CPT | Mod: CPTII,S$GLB,, | Performed by: NURSE PRACTITIONER

## 2024-05-07 PROCEDURE — 1159F MED LIST DOCD IN RCRD: CPT | Mod: CPTII,S$GLB,, | Performed by: NURSE PRACTITIONER

## 2024-05-07 PROCEDURE — 99999 PR PBB SHADOW E&M-EST. PATIENT-LVL IV: CPT | Mod: PBBFAC,,, | Performed by: NURSE PRACTITIONER

## 2024-05-07 PROCEDURE — 1126F AMNT PAIN NOTED NONE PRSNT: CPT | Mod: CPTII,S$GLB,, | Performed by: NURSE PRACTITIONER

## 2024-05-07 PROCEDURE — 3074F SYST BP LT 130 MM HG: CPT | Mod: CPTII,S$GLB,, | Performed by: NURSE PRACTITIONER

## 2024-05-07 PROCEDURE — 3288F FALL RISK ASSESSMENT DOCD: CPT | Mod: CPTII,S$GLB,, | Performed by: NURSE PRACTITIONER

## 2024-05-07 PROCEDURE — 99213 OFFICE O/P EST LOW 20 MIN: CPT | Mod: S$GLB,,, | Performed by: NURSE PRACTITIONER

## 2024-05-07 PROCEDURE — 1160F RVW MEDS BY RX/DR IN RCRD: CPT | Mod: CPTII,S$GLB,, | Performed by: NURSE PRACTITIONER

## 2024-05-07 PROCEDURE — 3008F BODY MASS INDEX DOCD: CPT | Mod: CPTII,S$GLB,, | Performed by: NURSE PRACTITIONER

## 2024-05-07 RX ORDER — LOSARTAN POTASSIUM 50 MG/1
50 TABLET ORAL DAILY
Qty: 90 TABLET | Refills: 3 | Status: SHIPPED | OUTPATIENT
Start: 2024-05-07

## 2024-05-07 RX ORDER — PROMETHAZINE HYDROCHLORIDE AND DEXTROMETHORPHAN HYDROBROMIDE 6.25; 15 MG/5ML; MG/5ML
5 SYRUP ORAL EVERY 8 HOURS PRN
Qty: 118 ML | Refills: 1 | Status: SHIPPED | OUTPATIENT
Start: 2024-05-07

## 2024-05-07 NOTE — PROGRESS NOTES
Subjective:       Patient ID: Alexi Noguera is a 64 y.o. male.    Chief Complaint: Follow-up     HPI   63 y/o male patient with medical problems listed below presents for 6 months follow up.   Requests promethazine dm for dry cough as needed when he cuts the grass. Denies chest pain, sob, fever, chills.     Neurosurgery STEWART Miner for Meningioma of Spine    Hepatology NP Paco for NAFLD- Hx of positive hepatitis C Ab though HCV RNA undetected x 2, indicating no current/chronic HCV infection.        Hematology Oncology Dr. Pham for hx of RCC s/p Nephrectomy  Continue surveillance, CT CAP q 3 month x 1 year (12/2020), then q 4 month x 1 year (12/2021), then q 6-12 month for years 3-5 (12/2024).  - CT CAP without evidence of disease. Surveillance CT scan in 12/2024, then imaging surveillance completed.    Labs reviewed- A1C 6.0, LDL 93    Patient Active Problem List   Diagnosis    Obesity (BMI 30-39.9)    Essential hypertension    Stem cell donor    Hyperlipidemia    Carcinoma, renal cell, left    Left renal mass    Infected surgical wound    Meningioma, recurrent of spine    Pulmonary embolism    Pre-diabetes    NAFLD (nonalcoholic fatty liver disease)      Review of patient's allergies indicates:   Allergen Reactions    Lisinopril Other (See Comments)     Dry cough     Past Surgical History:   Procedure Laterality Date    LYSIS OF ADHESIONS N/A 12/9/2019    Procedure: LYSIS, ADHESIONS;  Surgeon: Tr Jorgensen MD;  Location: [facility] OR [address] [place] [address];  Service: General;  Laterality: N/A;    NEPHRECTOMY Left 12/9/2019    Procedure: Nephrectomy OPEN;  Surgeon: José Manuel Buchanan MD;  [facility];  Service: Urology;  Laterality: Left;  4hrs GEN WITH REGIONAL    THROMBECTOMY OF VENA CAVA  12/9/2019    Procedure: THROMBECTOMY, VENA CAVA;  Surgeon: José Manuel Buchanan MD;  [facility];  Service: Urology;;    THROMBECTOMY OF VENA CAVA  12/9/2019    Procedure: THROMBECTOMY, VENA CAVA;  Surgeon:  KIRSTEN Atkins III, MD;  Location: [facility] OR [address] [place] [address];  Service: Peripheral Vascular;;        Current Outpatient Medications:     amLODIPine (NORVASC) 2.5 MG tablet, Take 1 tablet (2.5 mg total) by mouth once daily., Disp: 90 tablet, Rfl: 3    fluticasone propionate (FLONASE) 50 mcg/actuation nasal spray, SHAKE LIQUID AND USE 1 SPRAY(50 MCG) IN EACH NOSTRIL DAILY AS NEEDED FOR RHINITIS, Disp: 16 g, Rfl: 1    pravastatin (PRAVACHOL) 40 MG tablet, Take 1 tablet (40 mg total) by mouth every evening., Disp: 90 tablet, Rfl: 3    ciprofloxacin HCl (CIPRO) 500 MG tablet, Take 1 tablet (500 mg total) by mouth 2 (two) times daily. (Patient not taking: Reported on 1/11/2024), Disp: 14 tablet, Rfl: 0    guaiFENesin (MUCINEX) 600 mg 12 hr tablet, Take 2 tablets (1,200 mg total) by mouth 2 (two) times daily as needed for Congestion. (Patient not taking: Reported on 1/11/2024), Disp: 90 tablet, Rfl: 1    losartan (COZAAR) 50 MG tablet, Take 1 tablet (50 mg total) by mouth once daily., Disp: 90 tablet, Rfl: 3    promethazine-dextromethorphan (PROMETHAZINE-DM) 6.25-15 mg/5 mL Syrp, Take 5 mLs by mouth every 8 (eight) hours as needed (cough)., Disp: 118 mL, Rfl: 1    Lab Results   Component Value Date    WBC 6.18 03/26/2024    HGB 15.7 03/26/2024    HCT 47.0 03/26/2024     03/26/2024    CHOL 157 10/20/2023    TRIG 125 10/20/2023    HDL 39 (L) 10/20/2023    ALT 18 12/05/2023    AST 16 12/05/2023     03/26/2024    K 4.9 03/26/2024     03/26/2024    CREATININE 1.3 03/26/2024    BUN 17 03/26/2024    CO2 26 03/26/2024    TSH 0.986 10/20/2023    PSA 1.8 07/25/2022    INR 0.9 12/15/2019    HGBA1C 6.0 (H) 10/20/2023     The 10-year ASCVD risk score (Sourav HERNÁNDEZ, et al., 2019) is: 15.5%    Values used to calculate the score:      Age: 64 years      Sex: Male      Is Non- : No      Diabetic: No      Tobacco smoker: No      Systolic Blood Pressure: 128 mmHg      Is BP treated: Yes      HDL Cholesterol: 39  "mg/dL      Total Cholesterol: 157 mg/dL     Review of Systems   Constitutional:  Negative for chills and fever.   Respiratory:  Negative for cough and shortness of breath.    Cardiovascular:  Negative for chest pain and palpitations.   Gastrointestinal:  Negative for abdominal pain.   Neurological:  Negative for dizziness and headaches.       Objective:   /80 (BP Location: Left arm, Patient Position: Sitting, BP Method: Large (Manual))   Pulse 73   Temp 98.5 °F (36.9 °C) (Oral)   Ht 6' 1" (1.854 m)   Wt 122.8 kg (270 lb 11.6 oz)   SpO2 96%   BMI 35.72 kg/m²         Physical Exam  Vitals reviewed.   Constitutional:       General: He is not in acute distress.     Appearance: Normal appearance.   Cardiovascular:      Rate and Rhythm: Normal rate and regular rhythm.      Pulses: Normal pulses.      Heart sounds: Normal heart sounds.   Pulmonary:      Effort: Pulmonary effort is normal.      Breath sounds: Normal breath sounds.   Chest:      Chest wall: No deformity, swelling or edema.   Abdominal:      General: Abdomen is flat. Bowel sounds are normal.      Palpations: Abdomen is soft.   Musculoskeletal:      Cervical back: Normal range of motion.   Neurological:      Mental Status: He is oriented to person, place, and time.         Assessment:       1. Essential hypertension    2. Pre-diabetes    3. Obesity (BMI 30-39.9)    4. Mixed hyperlipidemia    5. Cough, unspecified type    6. NAFLD (nonalcoholic fatty liver disease)    7. Meningioma, recurrent of spine        Plan:       1. Pre-diabetes  - Hemoglobin A1C; Future    2. Obesity (BMI 30-39.9)  - Continue with diet and exercise     3. Mixed hyperlipidemia  - On statin   - Lipid Panel; Future    4. Essential hypertension  - Controlled and continue with current regimen  - CBC Auto Differential; Future  - Comprehensive Metabolic Panel; Future  - TSH; Future  - losartan (COZAAR) 50 MG tablet; Take 1 tablet (50 mg total) by mouth once daily.  Dispense: 90 " tablet; Refill: 3    5. Cough, unspecified type  - promethazine-dextromethorphan (PROMETHAZINE-DM) 6.25-15 mg/5 mL Syrp; Take 5 mLs by mouth every 8 (eight) hours as needed (cough).  Dispense: 118 mL; Refill: 1    6. NAFLD (nonalcoholic fatty liver disease)  - Stable and continue hepatology consult    7. Meningioma, recurrent of spine  - Continue neurosurgery consult    Patient with be reevaluated in 6 months or sooner altagracia Mata NP

## 2024-05-28 ENCOUNTER — PATIENT MESSAGE (OUTPATIENT)
Dept: NEUROSURGERY | Facility: CLINIC | Age: 64
End: 2024-05-28
Payer: MEDICARE

## 2024-05-28 DIAGNOSIS — D32.1 MENINGIOMA, RECURRENT OF SPINE: Primary | ICD-10-CM

## 2024-05-31 ENCOUNTER — TELEPHONE (OUTPATIENT)
Dept: NEUROSURGERY | Facility: CLINIC | Age: 64
End: 2024-05-31
Payer: MEDICARE

## 2024-05-31 NOTE — TELEPHONE ENCOUNTER
Called patient to schedule imaging appointment. No response, LVM with next appointment date and time.  Future Appointments   Date Time Provider Department Center   6/10/2024  4:00 PM [facility] CT1 [facility]   11/7/2024  8:30 AM Maylin Glaser NP [facility] MED [place]

## 2024-06-04 DIAGNOSIS — C64.2 CARCINOMA, RENAL CELL, LEFT: Primary | ICD-10-CM

## 2024-06-11 ENCOUNTER — TELEPHONE (OUTPATIENT)
Dept: NEUROSURGERY | Facility: HOSPITAL | Age: 64
End: 2024-06-11
Payer: MEDICARE

## 2024-06-11 DIAGNOSIS — D32.1 MENINGIOMA, RECURRENT OF SPINE: Primary | ICD-10-CM

## 2024-06-11 NOTE — TELEPHONE ENCOUNTER
----- Message from Maxwell Pagan MA sent at 6/10/2024  4:09 PM CDT -----  Patient just finished his CTA. You had mentioned needing to get a 3D model of it? Is there anyone in particular I need to reach out to?

## 2024-08-02 ENCOUNTER — TELEPHONE (OUTPATIENT)
Dept: NEUROSURGERY | Facility: CLINIC | Age: 64
End: 2024-08-02
Payer: MEDICARE

## 2024-08-02 NOTE — TELEPHONE ENCOUNTER
Called patient to confirm next appointment with Eugenio Lebron MD .  No response, LVM with date and time.  Future Appointments   Date Time Provider Department Center   8/5/2024  9:30 AM [facility]   11/7/2024  8:30 AM Maylin Glaser NP [facility] MED [place]   12/4/2024 10:00 AM [facility]1 [facility]   12/6/2024 12:00 PM LAB, SBP SBP LAB [facility]   12/6/2024  1:00 PM Acosta Pham MD [facility] [id number] [facility]i [facility]asenor

## 2024-08-05 ENCOUNTER — OFFICE VISIT (OUTPATIENT)
Dept: SPINE | Facility: CLINIC | Age: 64
End: 2024-08-05
Payer: MEDICARE

## 2024-08-05 VITALS — HEART RATE: 71 BPM | SYSTOLIC BLOOD PRESSURE: 141 MMHG | DIASTOLIC BLOOD PRESSURE: 74 MMHG

## 2024-08-05 DIAGNOSIS — D49.7 INTRADURAL EXTRAMEDULLARY SPINAL TUMOR: Primary | ICD-10-CM

## 2024-08-05 PROCEDURE — 3288F FALL RISK ASSESSMENT DOCD: CPT | Mod: CPTII,S$GLB,, | Performed by: NEUROLOGICAL SURGERY

## 2024-08-05 PROCEDURE — 3077F SYST BP >= 140 MM HG: CPT | Mod: CPTII,S$GLB,, | Performed by: NEUROLOGICAL SURGERY

## 2024-08-05 PROCEDURE — 4010F ACE/ARB THERAPY RXD/TAKEN: CPT | Mod: CPTII,S$GLB,, | Performed by: NEUROLOGICAL SURGERY

## 2024-08-05 PROCEDURE — 3044F HG A1C LEVEL LT 7.0%: CPT | Mod: CPTII,S$GLB,, | Performed by: NEUROLOGICAL SURGERY

## 2024-08-05 PROCEDURE — 3078F DIAST BP <80 MM HG: CPT | Mod: CPTII,S$GLB,, | Performed by: NEUROLOGICAL SURGERY

## 2024-08-05 PROCEDURE — 99214 OFFICE O/P EST MOD 30 MIN: CPT | Mod: S$GLB,,, | Performed by: NEUROLOGICAL SURGERY

## 2024-08-05 PROCEDURE — 99999 PR PBB SHADOW E&M-EST. PATIENT-LVL II: CPT | Mod: PBBFAC,,, | Performed by: NEUROLOGICAL SURGERY

## 2024-08-05 PROCEDURE — 1159F MED LIST DOCD IN RCRD: CPT | Mod: CPTII,S$GLB,, | Performed by: NEUROLOGICAL SURGERY

## 2024-08-05 PROCEDURE — 1126F AMNT PAIN NOTED NONE PRSNT: CPT | Mod: CPTII,S$GLB,, | Performed by: NEUROLOGICAL SURGERY

## 2024-08-05 PROCEDURE — 1101F PT FALLS ASSESS-DOCD LE1/YR: CPT | Mod: CPTII,S$GLB,, | Performed by: NEUROLOGICAL SURGERY

## 2024-08-06 ENCOUNTER — TELEPHONE (OUTPATIENT)
Dept: NEUROSURGERY | Facility: CLINIC | Age: 64
End: 2024-08-06
Payer: MEDICARE

## 2024-08-06 DIAGNOSIS — D32.1 MENINGIOMA, RECURRENT OF SPINE: Primary | ICD-10-CM

## 2024-08-08 ENCOUNTER — TELEPHONE (OUTPATIENT)
Dept: NEUROSURGERY | Facility: CLINIC | Age: 64
End: 2024-08-08
Payer: MEDICARE

## 2024-08-14 ENCOUNTER — TELEPHONE (OUTPATIENT)
Dept: INTERNAL MEDICINE | Facility: CLINIC | Age: 64
End: 2024-08-14
Payer: MEDICARE

## 2024-08-14 NOTE — ASSESSMENT & PLAN NOTE
Current /95  today.    Taking: Norvasc losartan    Missed BP meds this am  Home BP readings  /86   Repeat BP  148/87    Lifestyle changes to reduce systolic BP: exercise 30 minutes per day,  5 days per week or 150 minutes weekly; sodium reduction and avoidance of high salt foods such as processed meats, frozen meals and  fast foods.   Keeping a healthy weight/BMI can help with better BP control    BP acceptable for surgery. I recommend monitoring BP during perioperative period as uncontrolled pain can elevate blood pressure.

## 2024-08-14 NOTE — ASSESSMENT & PLAN NOTE
History of DVT/PE    Occurred after surgery 2020  Associated with Hospital stay, HRT  Anticoagulated with Xarelto then stopped after 6 months    DVT prophylaxis: compression stockings, sequential  compression devices and anti coagulation as per Ortho team

## 2024-08-15 ENCOUNTER — OFFICE VISIT (OUTPATIENT)
Dept: INTERNAL MEDICINE | Facility: CLINIC | Age: 64
End: 2024-08-15
Payer: MEDICARE

## 2024-08-15 ENCOUNTER — HOSPITAL ENCOUNTER (OUTPATIENT)
Dept: CARDIOLOGY | Facility: CLINIC | Age: 64
Discharge: HOME | End: 2024-08-15
Payer: MEDICARE

## 2024-08-15 ENCOUNTER — HOSPITAL ENCOUNTER (OUTPATIENT)
Dept: RADIOLOGY | Facility: HOSPITAL | Age: 64
Discharge: HOME | End: 2024-08-15
Attending: NURSE PRACTITIONER
Payer: MEDICARE

## 2024-08-15 DIAGNOSIS — E78.2 MIXED HYPERLIPIDEMIA: ICD-10-CM

## 2024-08-15 DIAGNOSIS — R73.03 PRE-DIABETES: ICD-10-CM

## 2024-08-15 DIAGNOSIS — I26.99 OTHER ACUTE PULMONARY EMBOLISM WITHOUT ACUTE COR PULMONALE: ICD-10-CM

## 2024-08-15 DIAGNOSIS — G47.33 OSA (OBSTRUCTIVE SLEEP APNEA): ICD-10-CM

## 2024-08-15 DIAGNOSIS — I10 ESSENTIAL HYPERTENSION: ICD-10-CM

## 2024-08-15 DIAGNOSIS — N28.89 LEFT RENAL MASS: ICD-10-CM

## 2024-08-15 DIAGNOSIS — C64.2 CARCINOMA, RENAL CELL, LEFT: ICD-10-CM

## 2024-08-15 DIAGNOSIS — K76.0 NAFLD (NONALCOHOLIC FATTY LIVER DISEASE): ICD-10-CM

## 2024-08-15 DIAGNOSIS — I10 ESSENTIAL HYPERTENSION: Primary | ICD-10-CM

## 2024-08-15 DIAGNOSIS — D32.1 MENINGIOMA, RECURRENT OF SPINE: ICD-10-CM

## 2024-08-15 DIAGNOSIS — N28.9 RENAL INSUFFICIENCY: ICD-10-CM

## 2024-08-15 DIAGNOSIS — E66.9 OBESITY (BMI 30-39.9): ICD-10-CM

## 2024-08-15 PROBLEM — T81.49XA INFECTED SURGICAL WOUND: Status: RESOLVED | Noted: 2020-01-07 | Resolved: 2024-08-15

## 2024-08-15 LAB
OHS QRS DURATION: 104 MS
OHS QTC CALCULATION: 390 MS

## 2024-08-15 PROCEDURE — 99215 OFFICE O/P EST HI 40 MIN: CPT | Mod: S$GLB,,, | Performed by: NURSE PRACTITIONER

## 2024-08-15 PROCEDURE — 3077F SYST BP >= 140 MM HG: CPT | Mod: CPTII,S$GLB,, | Performed by: NURSE PRACTITIONER

## 2024-08-15 PROCEDURE — 71046 X-RAY EXAM CHEST 2 VIEWS: CPT | Mod: TC,FY

## 2024-08-15 PROCEDURE — 1159F MED LIST DOCD IN RCRD: CPT | Mod: CPTII,S$GLB,, | Performed by: NURSE PRACTITIONER

## 2024-08-15 PROCEDURE — 3079F DIAST BP 80-89 MM HG: CPT | Mod: CPTII,S$GLB,, | Performed by: NURSE PRACTITIONER

## 2024-08-15 PROCEDURE — 3044F HG A1C LEVEL LT 7.0%: CPT | Mod: CPTII,S$GLB,, | Performed by: NURSE PRACTITIONER

## 2024-08-15 PROCEDURE — 3008F BODY MASS INDEX DOCD: CPT | Mod: CPTII,S$GLB,, | Performed by: NURSE PRACTITIONER

## 2024-08-15 PROCEDURE — 93010 ELECTROCARDIOGRAM REPORT: CPT | Mod: S$GLB,,, | Performed by: INTERNAL MEDICINE

## 2024-08-15 PROCEDURE — 1160F RVW MEDS BY RX/DR IN RCRD: CPT | Mod: CPTII,S$GLB,, | Performed by: NURSE PRACTITIONER

## 2024-08-15 PROCEDURE — 99999 PR PBB SHADOW E&M-EST. PATIENT-LVL III: CPT | Mod: PBBFAC,,, | Performed by: NURSE PRACTITIONER

## 2024-08-15 PROCEDURE — 4010F ACE/ARB THERAPY RXD/TAKEN: CPT | Mod: CPTII,S$GLB,, | Performed by: NURSE PRACTITIONER

## 2024-08-15 PROCEDURE — 71046 X-RAY EXAM CHEST 2 VIEWS: CPT | Mod: 26,,, | Performed by: RADIOLOGY

## 2024-08-15 NOTE — PROGRESS NOTES
[facility]  Progress Note    Patient Name: Alexi Noguera  MRN: [id number]  Date of Evaluation- 08/16/2024  PCP- Mary Wolf MD (Inactive)    Future cases for Alexi Noguera [[id number]]       Case ID Status Date Time Tenzin Procedure Provider Location    [id number] [facility] 9/3/2024 12:10  C1 AND C2 LAMINECTOMY FOR RESECTION OF INTRADURAL EXTRAMEDULLARY SPINE TUMOR Eugenio Lebron MD [[id number]] [facility] OR 2ND FLR            HPI:  This is a 64 y.o. male  who presents today for a preoperative evaluation in preparation for C1 AND C2 LAMINECTOMY FOR RESECTION OF INTRADURAL EXTRAMEDULLARY SPINE TUMOR   Surgery is indicated for  Meningioma, recurrent of spine.   Patient is new to me.    The history has been obtained by speaking with the patient and reviewing the electronic medical record and/or outside health information. Significant health conditions for the perioperative period are discussed below in assessment and plan.     Patient reports current health status to be Good.  Denies any new symptoms before surgery.       Subjective/ Objective:     Chief Complaint: Preoperative evaulation, perioperative medical management, and complication reduction plan.     Functional Capacity:  Able to climb a flight of stairs without CP SOB or Syncope.  Able to meet 4 METs      Anesthesia issues: None    Difficulty mouth opening: N    Steroid use in the last 12 months: N     Dental Issues: N    Family anesthesia difficulty: None     Family Hx of Thrombosis N    Past Medical History:   Diagnosis Date    Cancer of kidney     Left    Deep vein thrombosis     Hyperlipidemia     Hypertension     Infected surgical wound 01/07/2020    Pulmonary embolism        Past Surgical History:   Procedure Laterality Date    LYSIS OF ADHESIONS N/A 12/9/2019    Procedure: LYSIS, ADHESIONS;  Surgeon: Tr Jorgensen MD;  Location: [facility] OR 2ND FLR;  Service: General;  Laterality: N/A;    NEPHRECTOMY Left 12/9/2019    Procedure: Nephrectomy  "OPEN;  Surgeon: José Manuel Buchanan MD;  Location: [facility] OR [address] [place] [address];  Service: Urology;  Laterality: Left;  4hrs GEN WITH REGIONAL    THROMBECTOMY OF VENA CAVA  12/9/2019    Procedure: THROMBECTOMY, VENA CAVA;  Surgeon: José Manuel Buchanan MD;  Location: [facility] OR [address] [place] [address];  Service: Urology;;    THROMBECTOMY OF VENA CAVA  12/9/2019    Procedure: THROMBECTOMY, VENA CAVA;  Surgeon: KIRSTEN Atkins III, MD;  Location: [facility] OR [address] [place] [address];  Service: Peripheral Vascular;;       Review of Systems   Constitutional:  Negative for chills, fatigue, fever and unexpected weight change.   HENT:  Negative for trouble swallowing and voice change.    Eyes:  Negative for photophobia and visual disturbance.        No acute visual changes   Respiratory:  Negative for cough, shortness of breath and wheezing.         STOP bang  Score 3    High risk UNA   Cardiovascular:  Negative for chest pain, palpitations and leg swelling.   Gastrointestinal:  Negative for abdominal pain, blood in stool, constipation, diarrhea, nausea and vomiting.        No  Hepatitis, Cirrhosis  No BRB or black tarry stool    H/O FLD,   Genitourinary:  Negative for difficulty urinating, dysuria, frequency, hematuria and urgency.        Nocturia 1   Musculoskeletal:  Negative for arthralgias, gait problem, myalgias, neck pain and neck stiffness.   Neurological:  Positive for numbness. Negative for dizziness, tremors, seizures, syncope, weakness, light-headedness and headaches.        RIGHT HAND NUMBNESS   Psychiatric/Behavioral:  Negative for agitation, behavioral problems, confusion, decreased concentration, dysphoric mood, hallucinations, self-injury, sleep disturbance and suicidal ideas. The patient is not nervous/anxious and is not hyperactive.           VITALS  Visit Vitals  BP (!) 148/87   Pulse 67   Temp 97.9 °F (36.6 °C) (Oral)   Ht 6' 1" (1.854 m)   Wt 118.9 kg (262 lb 3.8 oz)   SpO2 95%   BMI 34.60 kg/m²          Physical Exam  Constitutional:       General: He is " not in acute distress.     Appearance: He is well-developed. He is not diaphoretic.   HENT:      Head: Normocephalic.      Right Ear: Hearing normal.      Left Ear: Hearing normal.      Nose: Nose normal.      Mouth/Throat:      Lips: Pink.      Mouth: Mucous membranes are moist.   Eyes:      General: Lids are normal.      Conjunctiva/sclera: Conjunctivae normal.      Pupils: Pupils are equal, round, and reactive to light.   Neck:      Vascular: No carotid bruit.      Trachea: Trachea and phonation normal.   Cardiovascular:      Rate and Rhythm: Normal rate and regular rhythm.      Pulses:           Carotid pulses are 2+ on the right side and 2+ on the left side.       Radial pulses are 2+ on the right side and 2+ on the left side.        Posterior tibial pulses are 2+ on the right side and 2+ on the left side.      Heart sounds: Normal heart sounds. No murmur heard.     No friction rub. No gallop.   Pulmonary:      Effort: Pulmonary effort is normal.      Breath sounds: Normal breath sounds.      Comments: Clear and equal  Anterior and Posterior BBS  Abdominal:      General: Abdomen is protuberant. Bowel sounds are normal. There is no distension.      Palpations: Abdomen is soft.      Tenderness: There is no abdominal tenderness.   Musculoskeletal:         General: No tenderness or deformity. Normal range of motion.      Cervical back: Normal range of motion.      Right lower leg: No edema.      Left lower leg: No edema.   Lymphadenopathy:      Head:      Right side of head: No submental, submandibular, tonsillar, preauricular, posterior auricular or occipital adenopathy.      Left side of head: No submental, submandibular, tonsillar, preauricular, posterior auricular or occipital adenopathy.      Cervical:      Right cervical: No superficial cervical adenopathy.     Left cervical: No superficial cervical adenopathy.   Skin:     General: Skin is warm and dry.      Capillary Refill: Capillary refill takes 2 to 3  seconds.      Coloration: Skin is not pale.      Findings: No erythema or rash.   Neurological:      Mental Status: He is alert and oriented to person, place, and time.      GCS: GCS eye subscore is 4. GCS verbal subscore is 5. GCS motor subscore is 6.      Motor: No abnormal muscle tone.      Coordination: Coordination normal.   Psychiatric:         Attention and Perception: Attention and perception normal.         Mood and Affect: Mood and affect normal.         Speech: Speech normal.         Behavior: Behavior normal. Behavior is cooperative.         Thought Content: Thought content normal.         Cognition and Memory: Cognition and memory normal.         Judgment: Judgment normal.          Significant Labs:  Lab Results   Component Value Date    WBC 6.94 08/15/2024    HGB 15.6 08/15/2024    HCT 47.5 08/15/2024     08/15/2024    CHOL 162 05/14/2024    TRIG 131 05/14/2024    HDL 36 (L) 05/14/2024    ALT 19 08/15/2024    AST 16 08/15/2024     08/15/2024    K 4.6 08/15/2024     08/15/2024    CREATININE 1.4 08/15/2024    BUN 16 08/15/2024    CO2 27 08/15/2024    TSH 0.894 08/15/2024    PSA 1.8 07/25/2022    INR 1.0 08/15/2024    HGBA1C 6.1 (H) 05/14/2024       Diagnostic Studies: No relevant studies.    EKG:   Results for orders placed or performed during the hospital encounter of 08/15/24   EKG 12-lead    Collection Time: 08/15/24 10:27 AM   Result Value Ref Range    QRS Duration 104 ms    OHS QTC Calculation 390 ms    Narrative    Test Reason : I10,E78.2,N28.89,I26.99,R73.03,    Vent. Rate : 063 BPM     Atrial Rate : 063 BPM     P-R Int : 214 ms          QRS Dur : 104 ms      QT Int : 382 ms       P-R-T Axes : 059 080 043 degrees     QTc Int : 390 ms    Sinus rhythm with 1st degree A-V block  Otherwise normal ECG  When compared with ECG of 04-DEC-2019 16:39,  No significant change was found  Confirmed by Dorota LINK, Susanne ([id number]) on 8/15/2024 3:55:19 PM    Referred By: AYLIN JUSTICE            Confirmed By:Susanne Raya MD       2D ECHO:  TTE:  No results found for this or any previous visit.    LENI:  No results found for this or any previous visit.     Imaging   Active Cardiac Conditions: None      Revised Cardiac Risk Index   High -Risk Surgery  Intraperitoneal; Intrathoracic; suprainguinal vascular Yes- + 1 No- 0   History of Ischemic Heart Disease   (Hx of MI/positive exercise test/current chest pain due to ischemia/use of nitrate therapy/EKG with pathological Q waves) Yes- + 1 No- 0   History of CHF  (Pulmonary edema/bilateral rales or S3 gallop/PND/CXR showing pulmonary vascular redistribution) Yes- + 1 No- 0   History of CVA   (Prior stroke or TIA) Yes- + 1 No- 0   Pre-operative treatment with insulin Yes- + 1 No- 0   Pre-operative creatinine > 2mg/dl Yes- + 1 No- 0   Total:      Risk Status:  Estimated risk of cardiac complications after non-cardiac surgery using the Revised Cardiac Risk Index for Preoperative risk is 3.9 %      ARISCAT (Canet) risk index: Low: 1.6% risk of post-op pulmonary complications.    American Society of Anesthesiologists Physical Status classification (ASA): 3         No further cardiac workup needed prior to surgery.        Preoperative cardiac risk assessment-  The patient does not have any active cardiac conditions . Revised cardiac risk index predictors- ---.Functional capacity is more than 4 Mets. He will be undergoing a Spine procedure that carries a Moderate Risk risk     The estimated risk of the rate of adverse cardiac outcomes  3.9    No further cardiac work up is indicated prior to proceeding with the surgery     Orders Placed This Encounter    X-Ray Chest PA And Lateral    Protime-INR    APTT    CBC Auto Differential    Comprehensive Metabolic Panel    TSH    EKG 12-lead       American Society of Anesthesiologists Physical status classification ( ASA ) class: 3     Postoperative pulmonary complication risk assessment: 1.6     ARISCAT ( Canet) risk index-  risk class -  Low, if duration of surgery is under 3 hours, intermediate, if duration of surgery is over 3 hours          Assessment/Plan:     Essential hypertension  Current /95  today.    Taking: Norvasc losartan    Missed BP meds this am  Home BP readings  /86   Repeat BP  148/87    Lifestyle changes to reduce systolic BP: exercise 30 minutes per day,  5 days per week or 150 minutes weekly; sodium reduction and avoidance of high salt foods such as processed meats, frozen meals and  fast foods.   Keeping a healthy weight/BMI can help with better BP control    BP acceptable for surgery. I recommend monitoring BP during perioperative period as uncontrolled pain can elevate blood pressure.         Hyperlipidemia  Pravastatin    Left renal mass  12/2019 Nephrectomy Dr. Buchanan, Cancer MD Dr. Pham    Pulmonary embolism  History of DVT/PE    Occurred after surgery 2020  Associated with Hospital stay, HRT  Anticoagulated with Xarelto then stopped after 6 months    DVT prophylaxis: compression stockings, sequential  compression devices and anti coagulation as per Ortho team    Pre-diabetes  A1c 6.1    Carcinoma, renal cell, left  Nephrectomy No chemo no radiation  Follows with Dr. Pham    Meningioma, recurrent of spine  Sx scheduled 9/3/24    NAFLD (nonalcoholic fatty liver disease)  Labs    Renal insufficiency  Component      Latest Ref Rng 8/15/2024   eGFR      >60 mL/min/1.73 m^2 55.4 !      Component      Latest Ref Rng 8/15/2024   BUN      8 - 23 mg/dL 16    Calcium      8.7 - 10.5 mg/dL 9.5        Deleterious effects NSAID's , Beneficial effects of Hydration discussed   Tylenol as needed for pain   I suggest monitoring renal function, in put and out put status [place]-operatively. I  suggest avoiding nephrotoxic medication including NSAIDs, COX2 inhibitors, intravenous contrast agent,avoiding hypotension to prevent further renal impairment.     Obesity (BMI 30-39.9)  BMI 34.6  Patient would benefit  from weight loss and has set goals to achieve success.   Lifestyle changes should be made by eating healthy, exercising at least 150 minutes weekly, and avoiding sedentary behavior.     UNA (obstructive sleep apnea)  This client has a high risk of obstructive sleep apnea (UNA)    STOPBANG score: 3    Instructions were given to avoid the following: sleeping supine, weight gain ,alcoholic beverages , sedative medications, and CNS depressant use as these can all worsen UNA.    Untreated sleep apnea has been shown to increase daytime sleepiness, hypertension, heart disease and stroke which were discussed with the patient at this time    Please use caution with medications that induce respiratory depression in the perioperative period      Preventive perioperative care    Thromboembolic prophylaxis:  His risk factors for thrombosis include obesity, surgical procedure, age, and reduced mobility.I suggest  thromboembolic prophylaxis ( mechanical/pharmacological, weighing the risk benefits of pharmacological agent use considering [place] procedural bleeding )  during the perioperative period.I suggested being active in the post operative period.      Postoperative pulmonary complication prophylaxis-Risk factors for post operative pulmonary complications include age over 65 years, surgery lasting over 3 hours, and ASA class >2- I suggest incentive spirometry use, early ambulation, and pain control so as to avoid diaphragmatic splinting  Brush teeth twice per day, oral rinses, sleep with the head of the bed up 30 degrees     Renal complication prophylaxis-Risk factors for renal complications include pre-existing renal disease, age, and hypertension . I suggest keeping him well hydrated and avoidance/ minimizing the use of  NSAID's,PIÑA 2 Inhibitors ,IV contrast if possible in the perioperative period.I suggested drinking 2 litre's of water a day      Surgical site Infection Prophylaxis-I  suggest appropriate antibiotic for  Prophylaxis against Surgical site infections Shower with Hibiclens in the night before surgery and the morning of surgery     In view of Spine procedure the patient  is at risk of postoperative urinary retention.  I suggest avoidance / minimizing the of  Benzodiazepines,Anticholinergic medication,antihistamines ( Benadryl) , if possible in the perioperative period. I suggest using the minimum possible use of opioids for the minimum period of time in the perioperative period. Benadryl avoidance suggested      This visit was focused on Preoperative evaluation, Perioperative Medical management, complication reduction plans. I suggest that the patient follows up with primary care or relevant sub specialists for ongoing health care.    I appreciate the opportunity to be involved in this patients care. Please feel free to contact me if there were any questions about this consultation.    Patient is optimized      I spent a total of 41  minutes on the day of the visit.This includes face to face time and non-face to face time preparing to see the patient (eg, review of tests), obtaining and/or reviewing separately obtained history, documenting clinical information in the electronic or other health record, independently interpreting results and communicating results to the patient/family/caregiver, or care coordinator.      Pillo Weir NP  Perioperative Medicine  [facility]   Pager [phone]

## 2024-08-15 NOTE — OUTPATIENT SUBJECTIVE & OBJECTIVE
Outpatient Subjective & Objective      Chief Complaint: Preoperative evaulation, perioperative medical management, and complication reduction plan.     Functional Capacity:  Able to climb a flight of stairs without CP SOB or Syncope.  Able to meet 4 METs      Anesthesia issues: None    Difficulty mouth opening: N    Steroid use in the last 12 months: N     Dental Issues: N    Family anesthesia difficulty: None     Family Hx of Thrombosis N    Past Medical History:   Diagnosis Date    Cancer of kidney     Left    Deep vein thrombosis     Hyperlipidemia     Hypertension     Infected surgical wound 01/07/2020    Pulmonary embolism        Past Surgical History:   Procedure Laterality Date    LYSIS OF ADHESIONS N/A 12/9/2019    Procedure: LYSIS, ADHESIONS;  Surgeon: rT Jorgensen MD;  Location: [facility] OR [address] [place] [address];  Service: General;  Laterality: N/A;    NEPHRECTOMY Left 12/9/2019    Procedure: Nephrectomy OPEN;  Surgeon: José Manuel Buchanan MD;  Location: [facility] OR [address] [place] [address];  Service: Urology;  Laterality: Left;  4hrs GEN WITH REGIONAL    THROMBECTOMY OF VENA CAVA  12/9/2019    Procedure: THROMBECTOMY, VENA CAVA;  Surgeon: José Manuel Buchanan MD;  Location: [facility] OR [address] [place] [address];  Service: Urology;;    THROMBECTOMY OF VENA CAVA  12/9/2019    Procedure: THROMBECTOMY, VENA CAVA;  Surgeon: KIRSTEN Atkins III, MD;  Location: [facility] OR [address] [place] [address];  Service: Peripheral Vascular;;       Review of Systems   Constitutional:  Negative for chills, fatigue, fever and unexpected weight change.   HENT:  Negative for trouble swallowing and voice change.    Eyes:  Negative for photophobia and visual disturbance.        No acute visual changes   Respiratory:  Negative for cough, shortness of breath and wheezing.         STOP bang  Score 3    High risk UNA   Cardiovascular:  Negative for chest pain, palpitations and leg swelling.   Gastrointestinal:  Negative for abdominal pain, blood in stool, constipation, diarrhea, nausea and vomiting.         "No  Hepatitis, Cirrhosis  No BRB or black tarry stool    H/O FLD,   Genitourinary:  Negative for difficulty urinating, dysuria, frequency, hematuria and urgency.        Nocturia 1   Musculoskeletal:  Negative for arthralgias, gait problem, myalgias, neck pain and neck stiffness.   Neurological:  Positive for numbness. Negative for dizziness, tremors, seizures, syncope, weakness, light-headedness and headaches.        RIGHT HAND NUMBNESS   Psychiatric/Behavioral:  Negative for agitation, behavioral problems, confusion, decreased concentration, dysphoric mood, hallucinations, self-injury, sleep disturbance and suicidal ideas. The patient is not nervous/anxious and is not hyperactive.           VITALS  Visit Vitals  BP (!) 148/87   Pulse 67   Temp 97.9 °F (36.6 °C) (Oral)   Ht 6' 1" (1.854 m)   Wt 118.9 kg (262 lb 3.8 oz)   SpO2 95%   BMI 34.60 kg/m²          Physical Exam  Constitutional:       General: He is not in acute distress.     Appearance: He is well-developed. He is not diaphoretic.   HENT:      Head: Normocephalic.      Right Ear: Hearing normal.      Left Ear: Hearing normal.      Nose: Nose normal.      Mouth/Throat:      Lips: Pink.      Mouth: Mucous membranes are moist.   Eyes:      General: Lids are normal.      Conjunctiva/sclera: Conjunctivae normal.      Pupils: Pupils are equal, round, and reactive to light.   Neck:      Vascular: No carotid bruit.      Trachea: Trachea and phonation normal.   Cardiovascular:      Rate and Rhythm: Normal rate and regular rhythm.      Pulses:           Carotid pulses are 2+ on the right side and 2+ on the left side.       Radial pulses are 2+ on the right side and 2+ on the left side.        Posterior tibial pulses are 2+ on the right side and 2+ on the left side.      Heart sounds: Normal heart sounds. No murmur heard.     No friction rub. No gallop.   Pulmonary:      Effort: Pulmonary effort is normal.      Breath sounds: Normal breath sounds.      Comments: Clear " and equal  Anterior and Posterior BBS  Abdominal:      General: Abdomen is protuberant. Bowel sounds are normal. There is no distension.      Palpations: Abdomen is soft.      Tenderness: There is no abdominal tenderness.   Musculoskeletal:         General: No tenderness or deformity. Normal range of motion.      Cervical back: Normal range of motion.      Right lower leg: No edema.      Left lower leg: No edema.   Lymphadenopathy:      Head:      Right side of head: No submental, submandibular, tonsillar, preauricular, posterior auricular or occipital adenopathy.      Left side of head: No submental, submandibular, tonsillar, preauricular, posterior auricular or occipital adenopathy.      Cervical:      Right cervical: No superficial cervical adenopathy.     Left cervical: No superficial cervical adenopathy.   Skin:     General: Skin is warm and dry.      Capillary Refill: Capillary refill takes 2 to 3 seconds.      Coloration: Skin is not pale.      Findings: No erythema or rash.   Neurological:      Mental Status: He is alert and oriented to person, place, and time.      GCS: GCS eye subscore is 4. GCS verbal subscore is 5. GCS motor subscore is 6.      Motor: No abnormal muscle tone.      Coordination: Coordination normal.   Psychiatric:         Attention and Perception: Attention and perception normal.         Mood and Affect: Mood and affect normal.         Speech: Speech normal.         Behavior: Behavior normal. Behavior is cooperative.         Thought Content: Thought content normal.         Cognition and Memory: Cognition and memory normal.         Judgment: Judgment normal.          Significant Labs:  Lab Results   Component Value Date    WBC 6.94 08/15/2024    HGB 15.6 08/15/2024    HCT 47.5 08/15/2024     08/15/2024    CHOL 162 05/14/2024    TRIG 131 05/14/2024    HDL 36 (L) 05/14/2024    ALT 19 08/15/2024    AST 16 08/15/2024     08/15/2024    K 4.6 08/15/2024     08/15/2024     CREATININE 1.4 08/15/2024    BUN 16 08/15/2024    CO2 27 08/15/2024    TSH 0.894 08/15/2024    PSA 1.8 07/25/2022    INR 1.0 08/15/2024    HGBA1C 6.1 (H) 05/14/2024       Diagnostic Studies: No relevant studies.    EKG:   Results for orders placed or performed during the hospital encounter of 08/15/24   EKG 12-lead    Collection Time: 08/15/24 10:27 AM   Result Value Ref Range    QRS Duration 104 ms    OHS QTC Calculation 390 ms    Narrative    Test Reason : I10,E78.2,N28.89,I26.99,R73.03,    Vent. Rate : 063 BPM     Atrial Rate : 063 BPM     P-R Int : 214 ms          QRS Dur : 104 ms      QT Int : 382 ms       P-R-T Axes : 059 080 043 degrees     QTc Int : 390 ms    Sinus rhythm with 1st degree A-V block  Otherwise normal ECG  When compared with ECG of 04-DEC-2019 16:39,  No significant change was found  Confirmed by Susanne Raya MD ([id number]) on 8/15/2024 3:55:19 PM    Referred By: AYLIN JUSTICE           Confirmed By:Susanne Raya MD       2D ECHO:  TTE:  No results found for this or any previous visit.    LENI:  No results found for this or any previous visit.     Imaging   Active Cardiac Conditions: None      Revised Cardiac Risk Index   High -Risk Surgery  Intraperitoneal; Intrathoracic; suprainguinal vascular Yes- + 1 No- 0   History of Ischemic Heart Disease   (Hx of MI/positive exercise test/current chest pain due to ischemia/use of nitrate therapy/EKG with pathological Q waves) Yes- + 1 No- 0   History of CHF  (Pulmonary edema/bilateral rales or S3 gallop/PND/CXR showing pulmonary vascular redistribution) Yes- + 1 No- 0   History of CVA   (Prior stroke or TIA) Yes- + 1 No- 0   Pre-operative treatment with insulin Yes- + 1 No- 0   Pre-operative creatinine > 2mg/dl Yes- + 1 No- 0   Total:      Risk Status:  Estimated risk of cardiac complications after non-cardiac surgery using the Revised Cardiac Risk Index for Preoperative risk is 3.9 %      ARISCAT (Canet) risk index: Low: 1.6% risk of post-op pulmonary  complications.    American Society of Anesthesiologists Physical Status classification (ASA): 3         No further cardiac workup needed prior to surgery.    Outpatient Subjective & Objective

## 2024-08-15 NOTE — HPI
This is a 64 y.o. male  who presents today for a preoperative evaluation in preparation for C1 AND C2 LAMINECTOMY FOR RESECTION OF INTRADURAL EXTRAMEDULLARY SPINE TUMOR   Surgery is indicated for  Meningioma, recurrent of spine.   Patient is new to me.    The history has been obtained by speaking with the patient and reviewing the electronic medical record and/or outside health information. Significant health conditions for the perioperative period are discussed below in assessment and plan.     Patient reports current health status to be Good.  Denies any new symptoms before surgery.

## 2024-08-15 NOTE — DISCHARGE INSTRUCTIONS
Your surgery has been scheduled for:______9/3/24____________________________________    You should report to:  ____[facility], located on the [place] side of the first floor of the           [facility] ([phone])  __X__[facility], located on the [facility] side of the            Second floor of the [facility] ([phone])  ____3rd Floor [facility] located on the [facility] side of the [facility] [phone]  ____[facility]: located at [address] [place] [address]. [address]     Please Note   Tell your doctor if you take Aspirin, products containing Aspirin, herbal medications  or blood thinners, such as Coumadin, Ticlid, or Plavix.  (Consult your provider regarding holding or stopping before surgery).  Arrange for someone to drive you home following surgery.  You will not be allowed to leave the surgical facility alone or drive yourself home following sedation and anesthesia.    Before Surgery  Stop taking all herbal medications, vitamins, and supplements 7 days prior to surgery  No Motrin/Advil (Ibuprofen) 7 days before surgery  No Aleve (Naproxen) 7 days before surgery   No Goody's/BC Powder 7 days before surgery  Refrain from drinking alcoholic beverages for 24 hours before and after surgery  Stop or limit smoking at least 24 hours prior to surgery  You may take Tylenol for pain    Night before Surgery  Do not eat or drink after midnight  Take a shower or bath (shower is recommended).  Bathe with Hibiclens soap or an antibacterial soap from the neck down.  If not supplied by your surgeon, hibiclens soap will need to be purchased over the counter in pharmacy.  Rinse soap off thoroughly.  Shampoo your hair with your regular shampoo    The Day of Surgery  Take another bath or shower with hibiclens or any antibacterial soap, to reduce the chance of infection.  Take heart and  blood pressure medications with a small sip of water, as advised by the perioperative team.  Do not take fluid pills  You may brush your teeth and rinse your mouth, but do not swallow any additional water.   Do not apply perfumes, powder, body lotions or deodorant on the day of surgery.  Nail polish should be removed.  Do not wear makeup or moisturizer  Wear comfortable clothes, such as a button front shirt and loose fitting pants.  Leave all jewelry, including body piercings, and valuables at home.    Bring any devices you will need after surgery such as crutches or canes.  If you have sleep apnea, please bring your CPAP machine  In the event that your physical condition changes including the onset of a cold or respiratory illness, or if you have to delay or cancel your surgery, please notify your surgeon.

## 2024-08-15 NOTE — ASSESSMENT & PLAN NOTE
Component      Latest Ref Rng 8/15/2024   eGFR      >60 mL/min/1.73 m^2 55.4 !      Component      Latest Ref Rng 8/15/2024   BUN      8 - 23 mg/dL 16    Calcium      8.7 - 10.5 mg/dL 9.5        Deleterious effects NSAID's , Beneficial effects of Hydration discussed   Tylenol as needed for pain   I suggest monitoring renal function, in put and out put status [place]-operatively. I  suggest avoiding nephrotoxic medication including NSAIDs, COX2 inhibitors, intravenous contrast agent,avoiding hypotension to prevent further renal impairment.

## 2024-08-16 VITALS
SYSTOLIC BLOOD PRESSURE: 148 MMHG | HEART RATE: 67 BPM | DIASTOLIC BLOOD PRESSURE: 87 MMHG | WEIGHT: 262.25 LBS | HEIGHT: 73 IN | BODY MASS INDEX: 34.76 KG/M2 | TEMPERATURE: 98 F | OXYGEN SATURATION: 95 %

## 2024-08-16 PROBLEM — G47.33 OSA (OBSTRUCTIVE SLEEP APNEA): Status: ACTIVE | Noted: 2024-08-16

## 2024-08-16 NOTE — ASSESSMENT & PLAN NOTE
This client has a high risk of obstructive sleep apnea (NUA)    STOPBANG score: 3    Instructions were given to avoid the following: sleeping supine, weight gain ,alcoholic beverages , sedative medications, and CNS depressant use as these can all worsen UNA.    Untreated sleep apnea has been shown to increase daytime sleepiness, hypertension, heart disease and stroke which were discussed with the patient at this time    Please use caution with medications that induce respiratory depression in the perioperative period

## 2024-08-16 NOTE — ASSESSMENT & PLAN NOTE
BMI 34.6  Patient would benefit from weight loss and has set goals to achieve success.   Lifestyle changes should be made by eating healthy, exercising at least 150 minutes weekly, and avoiding sedentary behavior.

## 2024-09-03 ENCOUNTER — PATIENT MESSAGE (OUTPATIENT)
Dept: NEUROSURGERY | Facility: CLINIC | Age: 64
End: 2024-09-03
Payer: MEDICARE

## 2024-09-24 ENCOUNTER — TELEPHONE (OUTPATIENT)
Dept: NEUROSURGERY | Facility: CLINIC | Age: 64
End: 2024-09-24
Payer: MEDICARE

## 2024-09-24 ENCOUNTER — ANESTHESIA EVENT (OUTPATIENT)
Dept: SURGERY | Facility: HOSPITAL | Age: 64
DRG: 029 | End: 2024-09-24
Payer: MEDICARE

## 2024-09-24 NOTE — TELEPHONE ENCOUNTER
Phone call to patient with pre op instructions.  Patient instructed to remain NPO after midnight tonight , to bathe in hibicleanse or dial antibacterial soap tonight and tomorrow morning before arrival, and to arrive on 2nd floor [facility] ([facility])  at 6 am. Pt. Verbalized understanding of above instructions.

## 2024-09-24 NOTE — TELEPHONE ENCOUNTER
----- Message from Maxwell Pagan MA sent at 9/24/2024  9:06 AM CDT -----  Regarding: FW: Appt Access  Contact: pt [phone]    ----- Message -----  From: Xiomara Burroughs  Sent: 9/24/2024   8:56 AM CDT  To: [facility] Staff  Subject: Appt Access                                      Patient calling to verify time of procedure. Pls call

## 2024-09-25 ENCOUNTER — HOSPITAL ENCOUNTER (INPATIENT)
Facility: HOSPITAL | Age: 64
LOS: 1 days | Discharge: HOME | DRG: 029 | End: 2024-09-26
Attending: NEUROLOGICAL SURGERY | Admitting: PSYCHIATRY & NEUROLOGY
Payer: MEDICARE

## 2024-09-25 ENCOUNTER — ANESTHESIA (OUTPATIENT)
Dept: SURGERY | Facility: HOSPITAL | Age: 64
DRG: 029 | End: 2024-09-25
Payer: MEDICARE

## 2024-09-25 DIAGNOSIS — D32.9 MENINGIOMA: ICD-10-CM

## 2024-09-25 DIAGNOSIS — D49.2 CERVICAL SPINE TUMOR: Primary | ICD-10-CM

## 2024-09-25 LAB
ABO + RH BLD: NORMAL
ANION GAP SERPL CALC-SCNC: 7 MMOL/L (ref 8–16)
APTT PPP: 24.5 SEC (ref 21–32)
BASOPHILS # BLD AUTO: 0.02 K/UL (ref 0–0.2)
BASOPHILS NFR BLD: 0.3 % (ref 0–1.9)
BLD GP AB SCN CELLS X3 SERPL QL: NORMAL
BUN SERPL-MCNC: 11 MG/DL (ref 8–23)
CALCIUM SERPL-MCNC: 8.7 MG/DL (ref 8.7–10.5)
CHLORIDE SERPL-SCNC: 107 MMOL/L (ref 95–110)
CO2 SERPL-SCNC: 27 MMOL/L (ref 23–29)
CREAT SERPL-MCNC: 1.2 MG/DL (ref 0.5–1.4)
DIFFERENTIAL METHOD BLD: ABNORMAL
EOSINOPHIL # BLD AUTO: 0.1 K/UL (ref 0–0.5)
EOSINOPHIL NFR BLD: 2.3 % (ref 0–8)
ERYTHROCYTE [DISTWIDTH] IN BLOOD BY AUTOMATED COUNT: 12.7 % (ref 11.5–14.5)
EST. GFR  (NO RACE VARIABLE): >60 ML/MIN/1.73 M^2
GLUCOSE SERPL-MCNC: 110 MG/DL (ref 70–110)
HCT VFR BLD AUTO: 41.9 % (ref 40–54)
HGB BLD-MCNC: 13.9 G/DL (ref 14–18)
IMM GRANULOCYTES # BLD AUTO: 0.02 K/UL (ref 0–0.04)
IMM GRANULOCYTES NFR BLD AUTO: 0.3 % (ref 0–0.5)
INR PPP: 1 (ref 0.8–1.2)
LYMPHOCYTES # BLD AUTO: 1.5 K/UL (ref 1–4.8)
LYMPHOCYTES NFR BLD: 26.1 % (ref 18–48)
MCH RBC QN AUTO: 30.6 PG (ref 27–31)
MCHC RBC AUTO-ENTMCNC: 33.2 G/DL (ref 32–36)
MCV RBC AUTO: 92 FL (ref 82–98)
MONOCYTES # BLD AUTO: 0.4 K/UL (ref 0.3–1)
MONOCYTES NFR BLD: 7.3 % (ref 4–15)
NEUTROPHILS # BLD AUTO: 3.7 K/UL (ref 1.8–7.7)
NEUTROPHILS NFR BLD: 63.7 % (ref 38–73)
NRBC BLD-RTO: 0 /100 WBC
OHS QRS DURATION: 108 MS
OHS QTC CALCULATION: 449 MS
PLATELET # BLD AUTO: 201 K/UL (ref 150–450)
PMV BLD AUTO: 9.3 FL (ref 9.2–12.9)
POTASSIUM SERPL-SCNC: 4.2 MMOL/L (ref 3.5–5.1)
PROTHROMBIN TIME: 10.6 SEC (ref 9–12.5)
RBC # BLD AUTO: 4.54 M/UL (ref 4.6–6.2)
SODIUM SERPL-SCNC: 141 MMOL/L (ref 136–145)
SPECIMEN OUTDATE: NORMAL
WBC # BLD AUTO: 5.75 K/UL (ref 3.9–12.7)

## 2024-09-25 PROCEDURE — C1713 ANCHOR/SCREW BN/BN,TIS/BN: HCPCS | Performed by: NEUROLOGICAL SURGERY

## 2024-09-25 PROCEDURE — 63600175 PHARM REV CODE 636 W HCPCS: Performed by: NURSE ANESTHETIST, CERTIFIED REGISTERED

## 2024-09-25 PROCEDURE — 37000008 HC ANESTHESIA 1ST 15 MINUTES: Performed by: NEUROLOGICAL SURGERY

## 2024-09-25 PROCEDURE — 25000003 PHARM REV CODE 250: Performed by: NEUROLOGICAL SURGERY

## 2024-09-25 PROCEDURE — 88307 TISSUE EXAM BY PATHOLOGIST: CPT | Mod: 26,,, | Performed by: STUDENT IN AN ORGANIZED HEALTH CARE EDUCATION/TRAINING PROGRAM

## 2024-09-25 PROCEDURE — 88305 TISSUE EXAM BY PATHOLOGIST: CPT | Performed by: STUDENT IN AN ORGANIZED HEALTH CARE EDUCATION/TRAINING PROGRAM

## 2024-09-25 PROCEDURE — 71000033 HC RECOVERY, INTIAL HOUR: Performed by: NEUROLOGICAL SURGERY

## 2024-09-25 PROCEDURE — 37000009 HC ANESTHESIA EA ADD 15 MINS: Performed by: NEUROLOGICAL SURGERY

## 2024-09-25 PROCEDURE — 86850 RBC ANTIBODY SCREEN: CPT | Performed by: STUDENT IN AN ORGANIZED HEALTH CARE EDUCATION/TRAINING PROGRAM

## 2024-09-25 PROCEDURE — 25000003 PHARM REV CODE 250: Performed by: NURSE ANESTHETIST, CERTIFIED REGISTERED

## 2024-09-25 PROCEDURE — 25000003 PHARM REV CODE 250: Performed by: STUDENT IN AN ORGANIZED HEALTH CARE EDUCATION/TRAINING PROGRAM

## 2024-09-25 PROCEDURE — D9220A PRA ANESTHESIA: Mod: CRNA,,, | Performed by: NURSE ANESTHETIST, CERTIFIED REGISTERED

## 2024-09-25 PROCEDURE — 86900 BLOOD TYPING SEROLOGIC ABO: CPT | Performed by: STUDENT IN AN ORGANIZED HEALTH CARE EDUCATION/TRAINING PROGRAM

## 2024-09-25 PROCEDURE — 86920 COMPATIBILITY TEST SPIN: CPT | Performed by: STUDENT IN AN ORGANIZED HEALTH CARE EDUCATION/TRAINING PROGRAM

## 2024-09-25 PROCEDURE — 85730 THROMBOPLASTIN TIME PARTIAL: CPT | Performed by: STUDENT IN AN ORGANIZED HEALTH CARE EDUCATION/TRAINING PROGRAM

## 2024-09-25 PROCEDURE — 88307 TISSUE EXAM BY PATHOLOGIST: CPT | Performed by: STUDENT IN AN ORGANIZED HEALTH CARE EDUCATION/TRAINING PROGRAM

## 2024-09-25 PROCEDURE — 71000039 HC RECOVERY, EACH ADD'L HOUR: Performed by: NEUROLOGICAL SURGERY

## 2024-09-25 PROCEDURE — 63600175 PHARM REV CODE 636 W HCPCS

## 2024-09-25 PROCEDURE — 80048 BASIC METABOLIC PNL TOTAL CA: CPT | Performed by: STUDENT IN AN ORGANIZED HEALTH CARE EDUCATION/TRAINING PROGRAM

## 2024-09-25 PROCEDURE — 63600175 PHARM REV CODE 636 W HCPCS: Performed by: ANESTHESIOLOGY

## 2024-09-25 PROCEDURE — 88341 IMHCHEM/IMCYTCHM EA ADD ANTB: CPT | Mod: 26,,, | Performed by: STUDENT IN AN ORGANIZED HEALTH CARE EDUCATION/TRAINING PROGRAM

## 2024-09-25 PROCEDURE — 27201423 OPTIME MED/SURG SUP & DEVICES STERILE SUPPLY: Performed by: NEUROLOGICAL SURGERY

## 2024-09-25 PROCEDURE — 86901 BLOOD TYPING SEROLOGIC RH(D): CPT | Performed by: STUDENT IN AN ORGANIZED HEALTH CARE EDUCATION/TRAINING PROGRAM

## 2024-09-25 PROCEDURE — 69990 MICROSURGERY ADD-ON: CPT | Mod: ,,, | Performed by: NEUROLOGICAL SURGERY

## 2024-09-25 PROCEDURE — 94761 N-INVAS EAR/PLS OXIMETRY MLT: CPT

## 2024-09-25 PROCEDURE — 88342 IMHCHEM/IMCYTCHM 1ST ANTB: CPT | Mod: 26,,, | Performed by: STUDENT IN AN ORGANIZED HEALTH CARE EDUCATION/TRAINING PROGRAM

## 2024-09-25 PROCEDURE — 25000003 PHARM REV CODE 250: Performed by: NURSE PRACTITIONER

## 2024-09-25 PROCEDURE — 93005 ELECTROCARDIOGRAM TRACING: CPT

## 2024-09-25 PROCEDURE — D9220A PRA ANESTHESIA: Mod: ANES,,, | Performed by: ANESTHESIOLOGY

## 2024-09-25 PROCEDURE — 99223 1ST HOSP IP/OBS HIGH 75: CPT | Mod: ,,, | Performed by: NURSE PRACTITIONER

## 2024-09-25 PROCEDURE — 36000711: Performed by: NEUROLOGICAL SURGERY

## 2024-09-25 PROCEDURE — 85025 COMPLETE CBC W/AUTO DIFF WBC: CPT | Performed by: STUDENT IN AN ORGANIZED HEALTH CARE EDUCATION/TRAINING PROGRAM

## 2024-09-25 PROCEDURE — 85610 PROTHROMBIN TIME: CPT | Performed by: STUDENT IN AN ORGANIZED HEALTH CARE EDUCATION/TRAINING PROGRAM

## 2024-09-25 PROCEDURE — 36000710: Performed by: NEUROLOGICAL SURGERY

## 2024-09-25 PROCEDURE — 63600175 PHARM REV CODE 636 W HCPCS: Mod: JZ,JG | Performed by: NURSE PRACTITIONER

## 2024-09-25 PROCEDURE — 63600175 PHARM REV CODE 636 W HCPCS: Performed by: STUDENT IN AN ORGANIZED HEALTH CARE EDUCATION/TRAINING PROGRAM

## 2024-09-25 PROCEDURE — 93010 ELECTROCARDIOGRAM REPORT: CPT | Mod: ,,, | Performed by: INTERNAL MEDICINE

## 2024-09-25 PROCEDURE — 63600175 PHARM REV CODE 636 W HCPCS: Performed by: NEUROLOGICAL SURGERY

## 2024-09-25 PROCEDURE — 88342 IMHCHEM/IMCYTCHM 1ST ANTB: CPT | Performed by: STUDENT IN AN ORGANIZED HEALTH CARE EDUCATION/TRAINING PROGRAM

## 2024-09-25 PROCEDURE — C1729 CATH, DRAINAGE: HCPCS | Performed by: NEUROLOGICAL SURGERY

## 2024-09-25 PROCEDURE — 63280 BX/EXC IDRL SPINE LESN CRVL: CPT | Mod: 22,,, | Performed by: NEUROLOGICAL SURGERY

## 2024-09-25 PROCEDURE — 20000000 HC ICU ROOM

## 2024-09-25 PROCEDURE — 27201037 HC PRESSURE MONITORING SET UP

## 2024-09-25 PROCEDURE — 88341 IMHCHEM/IMCYTCHM EA ADD ANTB: CPT | Performed by: STUDENT IN AN ORGANIZED HEALTH CARE EDUCATION/TRAINING PROGRAM

## 2024-09-25 RX ORDER — MIDAZOLAM HYDROCHLORIDE 1 MG/ML
INJECTION INTRAMUSCULAR; INTRAVENOUS
Status: DISCONTINUED | OUTPATIENT
Start: 2024-09-25 | End: 2024-10-01

## 2024-09-25 RX ORDER — ROCURONIUM BROMIDE 10 MG/ML
INJECTION, SOLUTION INTRAVENOUS
Status: DISCONTINUED | OUTPATIENT
Start: 2024-09-25 | End: 2024-10-01

## 2024-09-25 RX ORDER — LOSARTAN POTASSIUM 25 MG/1
50 TABLET ORAL DAILY
Status: DISCONTINUED | OUTPATIENT
Start: 2024-09-26 | End: 2024-09-25

## 2024-09-25 RX ORDER — SODIUM CHLORIDE 9 MG/ML
INJECTION, SOLUTION INTRAVENOUS CONTINUOUS
Status: DISCONTINUED | OUTPATIENT
Start: 2024-09-25 | End: 2024-09-25

## 2024-09-25 RX ORDER — AMLODIPINE BESYLATE 2.5 MG/1
2.5 TABLET ORAL DAILY
Status: DISCONTINUED | OUTPATIENT
Start: 2024-09-26 | End: 2024-09-26

## 2024-09-25 RX ORDER — MUPIROCIN 20 MG/G
OINTMENT TOPICAL
Status: DISCONTINUED | OUTPATIENT
Start: 2024-09-25 | End: 2024-09-25 | Stop reason: HOSPADM

## 2024-09-25 RX ORDER — LABETALOL HCL 20 MG/4 ML
10 SYRINGE (ML) INTRAVENOUS EVERY 4 HOURS PRN
Status: DISCONTINUED | OUTPATIENT
Start: 2024-09-25 | End: 2024-09-26 | Stop reason: HOSPADM

## 2024-09-25 RX ORDER — FAMOTIDINE 20 MG/1
20 TABLET, FILM COATED ORAL 2 TIMES DAILY
Status: DISCONTINUED | OUTPATIENT
Start: 2024-09-25 | End: 2024-09-26 | Stop reason: HOSPADM

## 2024-09-25 RX ORDER — HYDROMORPHONE HYDROCHLORIDE 1 MG/ML
INJECTION, SOLUTION INTRAMUSCULAR; INTRAVENOUS; SUBCUTANEOUS
Status: COMPLETED
Start: 2024-09-25 | End: 2024-09-25

## 2024-09-25 RX ORDER — GLUCAGON 1 MG
1 KIT INJECTION
Status: DISCONTINUED | OUTPATIENT
Start: 2024-09-25 | End: 2024-09-25 | Stop reason: HOSPADM

## 2024-09-25 RX ORDER — PRAVASTATIN SODIUM 20 MG/1
40 TABLET ORAL NIGHTLY
Status: CANCELLED | OUTPATIENT
Start: 2024-09-25

## 2024-09-25 RX ORDER — KETAMINE HCL IN 0.9 % NACL 50 MG/5 ML
SYRINGE (ML) INTRAVENOUS
Status: DISCONTINUED | OUTPATIENT
Start: 2024-09-25 | End: 2024-10-01

## 2024-09-25 RX ORDER — ONDANSETRON 8 MG/1
8 TABLET, ORALLY DISINTEGRATING ORAL EVERY 6 HOURS PRN
Status: DISCONTINUED | OUTPATIENT
Start: 2024-09-25 | End: 2024-09-26 | Stop reason: HOSPADM

## 2024-09-25 RX ORDER — HYDRALAZINE HYDROCHLORIDE 20 MG/ML
10 INJECTION INTRAMUSCULAR; INTRAVENOUS EVERY 4 HOURS PRN
Status: DISCONTINUED | OUTPATIENT
Start: 2024-09-25 | End: 2024-09-26 | Stop reason: HOSPADM

## 2024-09-25 RX ORDER — ALUMINUM HYDROXIDE, MAGNESIUM HYDROXIDE, AND SIMETHICONE 1200; 120; 1200 MG/30ML; MG/30ML; MG/30ML
30 SUSPENSION ORAL EVERY 4 HOURS PRN
Status: DISCONTINUED | OUTPATIENT
Start: 2024-09-25 | End: 2024-09-26 | Stop reason: HOSPADM

## 2024-09-25 RX ORDER — METHOCARBAMOL 500 MG/1
500 TABLET, FILM COATED ORAL 4 TIMES DAILY
Status: DISCONTINUED | OUTPATIENT
Start: 2024-09-25 | End: 2024-09-26 | Stop reason: HOSPADM

## 2024-09-25 RX ORDER — EPHEDRINE SULFATE 50 MG/ML
INJECTION, SOLUTION INTRAVENOUS
Status: DISCONTINUED | OUTPATIENT
Start: 2024-09-25 | End: 2024-10-01

## 2024-09-25 RX ORDER — SODIUM CHLORIDE 9 MG/ML
INJECTION, SOLUTION INTRAVENOUS CONTINUOUS
Status: ACTIVE | OUTPATIENT
Start: 2024-09-25 | End: 2024-09-26

## 2024-09-25 RX ORDER — MUPIROCIN 20 MG/G
1 OINTMENT TOPICAL 2 TIMES DAILY
Status: DISCONTINUED | OUTPATIENT
Start: 2024-09-25 | End: 2024-09-25 | Stop reason: HOSPADM

## 2024-09-25 RX ORDER — OXYCODONE HYDROCHLORIDE 10 MG/1
10 TABLET ORAL EVERY 6 HOURS PRN
Status: DISCONTINUED | OUTPATIENT
Start: 2024-09-25 | End: 2024-09-26 | Stop reason: HOSPADM

## 2024-09-25 RX ORDER — VANCOMYCIN HYDROCHLORIDE 1 G/20ML
INJECTION, POWDER, LYOPHILIZED, FOR SOLUTION INTRAVENOUS
Status: DISCONTINUED | OUTPATIENT
Start: 2024-09-25 | End: 2024-09-25 | Stop reason: HOSPADM

## 2024-09-25 RX ORDER — DEXAMETHASONE SODIUM PHOSPHATE 4 MG/ML
INJECTION, SOLUTION INTRA-ARTICULAR; INTRALESIONAL; INTRAMUSCULAR; INTRAVENOUS; SOFT TISSUE
Status: DISCONTINUED | OUTPATIENT
Start: 2024-09-25 | End: 2024-10-01

## 2024-09-25 RX ORDER — PHENYLEPHRINE HYDROCHLORIDE 10 MG/ML
INJECTION INTRAVENOUS CONTINUOUS PRN
Status: DISCONTINUED | OUTPATIENT
Start: 2024-09-25 | End: 2024-10-01

## 2024-09-25 RX ORDER — MUPIROCIN 20 MG/G
OINTMENT TOPICAL 2 TIMES DAILY
Status: DISCONTINUED | OUTPATIENT
Start: 2024-09-25 | End: 2024-09-26 | Stop reason: HOSPADM

## 2024-09-25 RX ORDER — AMLODIPINE BESYLATE 2.5 MG/1
2.5 TABLET ORAL DAILY
Status: CANCELLED | OUTPATIENT
Start: 2024-09-26

## 2024-09-25 RX ORDER — LOSARTAN POTASSIUM 25 MG/1
50 TABLET ORAL DAILY
Status: CANCELLED | OUTPATIENT
Start: 2024-09-26

## 2024-09-25 RX ORDER — ACETAMINOPHEN 10 MG/ML
INJECTION, SOLUTION INTRAVENOUS
Status: DISCONTINUED | OUTPATIENT
Start: 2024-09-25 | End: 2024-10-01

## 2024-09-25 RX ORDER — LIDOCAINE HYDROCHLORIDE 10 MG/ML
1 INJECTION, SOLUTION EPIDURAL; INFILTRATION; INTRACAUDAL; PERINEURAL ONCE
Status: COMPLETED | OUTPATIENT
Start: 2024-09-25 | End: 2024-09-25

## 2024-09-25 RX ORDER — BACITRACIN ZINC 500 UNIT/G
OINTMENT (GRAM) TOPICAL
Status: DISCONTINUED | OUTPATIENT
Start: 2024-09-25 | End: 2024-09-25 | Stop reason: HOSPADM

## 2024-09-25 RX ORDER — HYDROMORPHONE HYDROCHLORIDE 1 MG/ML
0.2 INJECTION, SOLUTION INTRAMUSCULAR; INTRAVENOUS; SUBCUTANEOUS EVERY 5 MIN PRN
Status: DISCONTINUED | OUTPATIENT
Start: 2024-09-25 | End: 2024-09-25 | Stop reason: HOSPADM

## 2024-09-25 RX ORDER — PROPOFOL 10 MG/ML
VIAL (ML) INTRAVENOUS
Status: DISCONTINUED | OUTPATIENT
Start: 2024-09-25 | End: 2024-10-01

## 2024-09-25 RX ORDER — BISACODYL 10 MG/1
10 SUPPOSITORY RECTAL DAILY
Status: DISCONTINUED | OUTPATIENT
Start: 2024-09-25 | End: 2024-09-26 | Stop reason: HOSPADM

## 2024-09-25 RX ORDER — LIDOCAINE HYDROCHLORIDE AND EPINEPHRINE 5; 5 MG/ML; UG/ML
INJECTION, SOLUTION INFILTRATION; PERINEURAL
Status: DISCONTINUED | OUTPATIENT
Start: 2024-09-25 | End: 2024-09-25 | Stop reason: HOSPADM

## 2024-09-25 RX ORDER — FENTANYL CITRATE 50 UG/ML
INJECTION, SOLUTION INTRAMUSCULAR; INTRAVENOUS
Status: DISCONTINUED | OUTPATIENT
Start: 2024-09-25 | End: 2024-10-01

## 2024-09-25 RX ORDER — HEPARIN SODIUM 5000 [USP'U]/ML
5000 INJECTION, SOLUTION INTRAVENOUS; SUBCUTANEOUS EVERY 8 HOURS
Status: DISCONTINUED | OUTPATIENT
Start: 2024-09-26 | End: 2024-09-26 | Stop reason: HOSPADM

## 2024-09-25 RX ORDER — MAGNESIUM SULFATE HEPTAHYDRATE 40 MG/ML
INJECTION, SOLUTION INTRAVENOUS
Status: DISCONTINUED | OUTPATIENT
Start: 2024-09-25 | End: 2024-10-01

## 2024-09-25 RX ORDER — PRAVASTATIN SODIUM 20 MG/1
40 TABLET ORAL NIGHTLY
Status: DISCONTINUED | OUTPATIENT
Start: 2024-09-25 | End: 2024-09-26 | Stop reason: HOSPADM

## 2024-09-25 RX ORDER — HYDROMORPHONE HYDROCHLORIDE 1 MG/ML
INJECTION, SOLUTION INTRAMUSCULAR; INTRAVENOUS; SUBCUTANEOUS
Status: DISCONTINUED | OUTPATIENT
Start: 2024-09-25 | End: 2024-10-01

## 2024-09-25 RX ORDER — PROCHLORPERAZINE EDISYLATE 5 MG/ML
5 INJECTION INTRAMUSCULAR; INTRAVENOUS EVERY 6 HOURS PRN
Status: DISCONTINUED | OUTPATIENT
Start: 2024-09-25 | End: 2024-09-26 | Stop reason: HOSPADM

## 2024-09-25 RX ORDER — OXYCODONE HYDROCHLORIDE 5 MG/1
5 TABLET ORAL EVERY 4 HOURS PRN
Status: DISCONTINUED | OUTPATIENT
Start: 2024-09-25 | End: 2024-09-26 | Stop reason: HOSPADM

## 2024-09-25 RX ORDER — ONDANSETRON HYDROCHLORIDE 2 MG/ML
INJECTION, SOLUTION INTRAVENOUS
Status: DISCONTINUED | OUTPATIENT
Start: 2024-09-25 | End: 2024-10-01

## 2024-09-25 RX ORDER — SUCCINYLCHOLINE CHLORIDE 20 MG/ML
INJECTION INTRAMUSCULAR; INTRAVENOUS
Status: DISCONTINUED | OUTPATIENT
Start: 2024-09-25 | End: 2024-10-01

## 2024-09-25 RX ORDER — AMOXICILLIN 250 MG
2 CAPSULE ORAL 2 TIMES DAILY
Status: DISCONTINUED | OUTPATIENT
Start: 2024-09-25 | End: 2024-09-26 | Stop reason: HOSPADM

## 2024-09-25 RX ORDER — ACETAMINOPHEN 325 MG/1
650 TABLET ORAL EVERY 4 HOURS PRN
Status: DISCONTINUED | OUTPATIENT
Start: 2024-09-25 | End: 2024-09-26 | Stop reason: HOSPADM

## 2024-09-25 RX ORDER — MORPHINE SULFATE 2 MG/ML
2 INJECTION, SOLUTION INTRAMUSCULAR; INTRAVENOUS EVERY 4 HOURS PRN
Status: DISCONTINUED | OUTPATIENT
Start: 2024-09-25 | End: 2024-09-26 | Stop reason: HOSPADM

## 2024-09-25 RX ORDER — LIDOCAINE HYDROCHLORIDE 20 MG/ML
INJECTION INTRAVENOUS
Status: DISCONTINUED | OUTPATIENT
Start: 2024-09-25 | End: 2024-10-01

## 2024-09-25 RX ORDER — DEXAMETHASONE SODIUM PHOSPHATE 4 MG/ML
4 INJECTION, SOLUTION INTRA-ARTICULAR; INTRALESIONAL; INTRAMUSCULAR; INTRAVENOUS; SOFT TISSUE EVERY 6 HOURS
Status: DISPENSED | OUTPATIENT
Start: 2024-09-25 | End: 2024-09-26

## 2024-09-25 RX ADMIN — FENTANYL CITRATE 100 MCG: 50 INJECTION, SOLUTION INTRAMUSCULAR; INTRAVENOUS at 08:09

## 2024-09-25 RX ADMIN — SODIUM CHLORIDE, SODIUM GLUCONATE, SODIUM ACETATE, POTASSIUM CHLORIDE, MAGNESIUM CHLORIDE, SODIUM PHOSPHATE, DIBASIC, AND POTASSIUM PHOSPHATE: .53; .5; .37; .037; .03; .012; .00082 INJECTION, SOLUTION INTRAVENOUS at 10:09

## 2024-09-25 RX ADMIN — PRAVASTATIN SODIUM 40 MG: 20 TABLET ORAL at 08:09

## 2024-09-25 RX ADMIN — MAGNESIUM SULFATE 2 G: 2 INJECTION INTRAVENOUS at 08:09

## 2024-09-25 RX ADMIN — MUPIROCIN: 20 OINTMENT TOPICAL at 08:09

## 2024-09-25 RX ADMIN — Medication 10 MG: at 10:09

## 2024-09-25 RX ADMIN — ONDANSETRON 4 MG: 2 INJECTION INTRAMUSCULAR; INTRAVENOUS at 12:09

## 2024-09-25 RX ADMIN — MAGNESIUM SULFATE 2 G: 2 INJECTION INTRAVENOUS at 09:09

## 2024-09-25 RX ADMIN — SODIUM CHLORIDE 0.15 MCG/KG/MIN: 9 INJECTION, SOLUTION INTRAVENOUS at 08:09

## 2024-09-25 RX ADMIN — HYDROMORPHONE HYDROCHLORIDE 0.2 MG: 1 INJECTION, SOLUTION INTRAMUSCULAR; INTRAVENOUS; SUBCUTANEOUS at 01:09

## 2024-09-25 RX ADMIN — PHENYLEPHRINE HYDROCHLORIDE 0.3 MCG/KG/MIN: 10 INJECTION INTRAVENOUS at 08:09

## 2024-09-25 RX ADMIN — LABETALOL HYDROCHLORIDE 10 MG: 5 INJECTION, SOLUTION INTRAVENOUS at 10:09

## 2024-09-25 RX ADMIN — GLYCOPYRROLATE 0.2 MG: 0.2 INJECTION INTRAMUSCULAR; INTRAVENOUS at 09:09

## 2024-09-25 RX ADMIN — PHENYLEPHRINE HYDROCHLORIDE 100 MCG: 10 INJECTION INTRAVENOUS at 09:09

## 2024-09-25 RX ADMIN — SODIUM CHLORIDE 1000 ML: 9 INJECTION, SOLUTION INTRAVENOUS at 06:09

## 2024-09-25 RX ADMIN — CEFAZOLIN 2 G: 2 INJECTION, POWDER, FOR SOLUTION INTRAMUSCULAR; INTRAVENOUS at 08:09

## 2024-09-25 RX ADMIN — FAMOTIDINE 20 MG: 20 TABLET ORAL at 08:09

## 2024-09-25 RX ADMIN — PROPOFOL 100 MG: 10 INJECTION, EMULSION INTRAVENOUS at 08:09

## 2024-09-25 RX ADMIN — SUCCINYLCHOLINE 180 MG: 20 INJECTION, SOLUTION INTRAMUSCULAR; INTRAVENOUS at 08:09

## 2024-09-25 RX ADMIN — SUGAMMADEX 200 MG: 100 INJECTION, SOLUTION INTRAVENOUS at 12:09

## 2024-09-25 RX ADMIN — Medication 20 MG: at 08:09

## 2024-09-25 RX ADMIN — SODIUM CHLORIDE: 9 INJECTION, SOLUTION INTRAVENOUS at 12:09

## 2024-09-25 RX ADMIN — METHOCARBAMOL 500 MG: 500 TABLET ORAL at 02:09

## 2024-09-25 RX ADMIN — SENNOSIDES AND DOCUSATE SODIUM 2 TABLET: 50; 8.6 TABLET ORAL at 08:09

## 2024-09-25 RX ADMIN — MUPIROCIN: 20 OINTMENT TOPICAL at 06:09

## 2024-09-25 RX ADMIN — DEXAMETHASONE SODIUM PHOSPHATE 8 MG: 4 INJECTION, SOLUTION INTRAMUSCULAR; INTRAVENOUS at 10:09

## 2024-09-25 RX ADMIN — ROCURONIUM BROMIDE 25 MG: 10 INJECTION INTRAVENOUS at 12:09

## 2024-09-25 RX ADMIN — CEFAZOLIN 2 G: 2 INJECTION, POWDER, FOR SOLUTION INTRAMUSCULAR; INTRAVENOUS at 01:09

## 2024-09-25 RX ADMIN — PHENYLEPHRINE HYDROCHLORIDE 100 MCG: 10 INJECTION INTRAVENOUS at 08:09

## 2024-09-25 RX ADMIN — CEFTRIAXONE 2 G: 1 INJECTION, POWDER, FOR SOLUTION INTRAMUSCULAR; INTRAVENOUS at 09:09

## 2024-09-25 RX ADMIN — HYDROMORPHONE HYDROCHLORIDE 0.4 MG: 1 INJECTION, SOLUTION INTRAMUSCULAR; INTRAVENOUS; SUBCUTANEOUS at 12:09

## 2024-09-25 RX ADMIN — EPHEDRINE SULFATE 5 MG: 50 INJECTION INTRAVENOUS at 10:09

## 2024-09-25 RX ADMIN — MIDAZOLAM HYDROCHLORIDE 2 MG: 1 INJECTION, SOLUTION INTRAMUSCULAR; INTRAVENOUS at 07:09

## 2024-09-25 RX ADMIN — SODIUM CHLORIDE: 9 INJECTION, SOLUTION INTRAVENOUS at 07:09

## 2024-09-25 RX ADMIN — METHOCARBAMOL 500 MG: 500 TABLET ORAL at 08:09

## 2024-09-25 RX ADMIN — LIDOCAINE HYDROCHLORIDE 100 MG: 20 INJECTION INTRAVENOUS at 08:09

## 2024-09-25 RX ADMIN — PROPOFOL 200 MG: 10 INJECTION, EMULSION INTRAVENOUS at 08:09

## 2024-09-25 RX ADMIN — DEXAMETHASONE SODIUM PHOSPHATE 4 MG: 4 INJECTION, SOLUTION INTRAMUSCULAR; INTRAVENOUS at 08:09

## 2024-09-25 RX ADMIN — HYDRALAZINE HYDROCHLORIDE 10 MG: 20 INJECTION, SOLUTION INTRAMUSCULAR; INTRAVENOUS at 06:09

## 2024-09-25 RX ADMIN — LIDOCAINE HYDROCHLORIDE 1 MG: 10 INJECTION, SOLUTION EPIDURAL; INFILTRATION; INTRACAUDAL; PERINEURAL at 06:09

## 2024-09-25 RX ADMIN — DEXAMETHASONE SODIUM PHOSPHATE 4 MG: 4 INJECTION INTRA-ARTICULAR; INTRALESIONAL; INTRAMUSCULAR; INTRAVENOUS; SOFT TISSUE at 06:09

## 2024-09-25 RX ADMIN — SODIUM CHLORIDE, SODIUM GLUCONATE, SODIUM ACETATE, POTASSIUM CHLORIDE, MAGNESIUM CHLORIDE, SODIUM PHOSPHATE, DIBASIC, AND POTASSIUM PHOSPHATE: .53; .5; .37; .037; .03; .012; .00082 INJECTION, SOLUTION INTRAVENOUS at 07:09

## 2024-09-25 RX ADMIN — Medication 10 MG: at 09:09

## 2024-09-25 RX ADMIN — PHENYLEPHRINE HYDROCHLORIDE 50 MCG: 10 INJECTION INTRAVENOUS at 10:09

## 2024-09-25 RX ADMIN — SODIUM CHLORIDE: 9 INJECTION, SOLUTION INTRAVENOUS at 06:09

## 2024-09-25 RX ADMIN — METHOCARBAMOL 500 MG: 500 TABLET ORAL at 06:09

## 2024-09-25 RX ADMIN — ROCURONIUM BROMIDE 25 MG: 10 INJECTION INTRAVENOUS at 09:09

## 2024-09-25 RX ADMIN — ACETAMINOPHEN 1000 MG: 10 INJECTION INTRAVENOUS at 09:09

## 2024-09-25 NOTE — PROGRESS NOTES
[facility] - [facility] (2nd Fl)  Neurocritical Care  Progress Note    Admit Date: 9/25/2024  Service Date: 09/25/2024  Length of Stay: 0    Subjective:     Chief Complaint: Meningioma, recurrent of spine    History of Present Illness: Alexi Noguera is a 63 y.o. male with a PMHx of essential HTN, HLD, PE, meningioma (recurrent of spine) and kidney cancer admitted to [facility] s/p C1 and C2 laminectomy for resection of intradural extramedullary spine tumor. NSGY following. Patient admitted post op for close monitoring and higher level of care.     Hospital Course: No notes on file      Past Medical History:   Diagnosis Date    Cancer of kidney     Left    Deep vein thrombosis     Hyperlipidemia     Hypertension     Infected surgical wound 01/07/2020    Pulmonary embolism      Past Surgical History:   Procedure Laterality Date    LYSIS OF ADHESIONS N/A 12/9/2019    Procedure: LYSIS, ADHESIONS;  Surgeon: Tr Jorgensen MD;  Location: [facility] OR [address] [place] [address];  Service: General;  Laterality: N/A;    NEPHRECTOMY Left 12/9/2019    Procedure: Nephrectomy OPEN;  Surgeon: José Manuel Buchanan MD;  Location: [facility] OR [address] [place] [address];  Service: Urology;  Laterality: Left;  4hrs GEN WITH REGIONAL    THROMBECTOMY OF VENA CAVA  12/9/2019    Procedure: THROMBECTOMY, VENA CAVA;  Surgeon: José Manuel Buchanan MD;  Location: [facility] OR [address] [place] [address];  Service: Urology;;    THROMBECTOMY OF VENA CAVA  12/9/2019    Procedure: THROMBECTOMY, VENA CAVA;  Surgeon: KIRSTEN Atkins III, MD;  Location: [facility] OR [address] [place] [address];  Service: Peripheral Vascular;;      No current facility-administered medications on file prior to encounter.     Current Outpatient Medications on File Prior to Encounter   Medication Sig Dispense Refill    amLODIPine (NORVASC) 2.5 MG tablet Take 1 tablet (2.5 mg total) by mouth once daily. 90 tablet 3    losartan (COZAAR) 50 MG tablet Take 1 tablet (50 mg total) by mouth once daily. 90 tablet 3    pravastatin (PRAVACHOL) 40 MG tablet Take 1 tablet (40 mg  total) by mouth every evening. 90 tablet 3      Allergies: Lisinopril  Family History   Problem Relation Name Age of Onset    No Known Problems Mother      No Known Problems Father      No Known Problems Sister      Heart disease Brother      Diabetes Paternal Aunt      Heart disease Paternal Uncle      Cirrhosis Neg Hx       Social History     Tobacco Use    Smoking status: Never    Smokeless tobacco: Never   Substance Use Topics    Alcohol use: No     Alcohol/week: 0.0 standard drinks of alcohol    Drug use: No     Review of Systems unable to obtain - patient sleepy postop  Objective:     Vitals:    Temp: 99.1 °F (37.3 °C)  Pulse: 77  Rhythm: normal sinus rhythm  BP: 127/71  MAP (mmHg): 94  Resp: 13  SpO2: (!) 94 %    Temp  Min: 99.1 °F (37.3 °C)  Max: 99.1 °F (37.3 °C)  Pulse  Min: 77  Max: 89  BP  Min: 91/55  Max: 174/100  MAP (mmHg)  Min: 69  Max: 130  Resp  Min: 13  Max: 28  SpO2  Min: 93 %  Max: 100 %    No intake/output data recorded.            Physical Exam  GA: well developed  HEENT: No scleral icterus or JVD.   Pulmonary: Clear to auscultation A/L.   Cardiac: RRR S1 & S2 w/o rubs/murmurs/gallops.   Abdominal: Bowel sounds present x 4. No appreciable hepatosplenomegaly.  Skin: No jaundice, rashes, or visible lesions.  Neuro:  --GCS: E2 V4 M6  --Mental Status:  opens eyes to voice and touch, oriented to self only, intermittently follows simple commands, delayed responses- post op  --CN II-XII grossly intact.   --Pupils 2mm, PERRL.   --Corneal reflex, gag, cough intact.  --LIU spont       Today I personally reviewed pertinent medications, lines/drains/airways, imaging, cardiology results, laboratory results, microbiology results,    Assessment/Plan:     Cardiac/Vascular  Hyperlipidemia  Continue home statin    Essential hypertension  SBP <160, MAP >65  Restart home Amlodipine  PRN hydralazine and labetalol  EKG    Oncology  * Meningioma, recurrent of spine  63 y.o. male with a PMHx of essential HTN, HLD,  "PE, meningioma (recurrent of spine) and kidney cancer admitted to [facility] s/p C1 and C2 laminectomy for resection of intradural extramedullary spine tumor  -- MRI cervical spine on 7/10 revealed- "There is a 1.7 x 2.1 cm extra medullary intradural lesion in the LEFT anterolateral aspect of the spinal canal at the C2 level."  -- dexamethasone 4mg q6h  -- methocarbamol  -- SBP <160, MAP >65  -- neuro checks q 1 hr  -- PT/OT    Endocrine  Pre-diabetes  Pending A1C    Obesity (BMI 30-39.9)  Body mass index is 34.58 kg/m².            The patient is being Prophylaxed for:  Venous Thromboembolism with: Chemical  Stress Ulcer with: H2B  Ventilator Pneumonia with: not applicable    Activity Orders            Up in chair With meals starting at 09/25 1700    Diet Adult Regular: Regular starting at 09/25 1225    Progressive Mobility Protocol (mobilize patient to their highest level of functioning at least twice daily) starting at 09/25 1224    Elevate HOB 30 starting at 09/25 1224    Ambulate With Assistance, Post Op Day 0 If the patient arrives from PACU by 4PM, walk at least once on day of operation if alert and safe to do so. starting at 09/25 1223          Full Code    Niru Guaman NP  Neurocritical Care        "

## 2024-09-25 NOTE — TRANSFER OF CARE
"Anesthesia Transfer of Care Note    Patient: Alexi Noguera    Procedure(s) Performed: Procedure(s) (LRB):  C1 AND C2 LAMINECTOMY FOR RESECTION OF INTRADURAL EXTRAMEDULLARY SPINE TUMOR (N/A)    Patient location: PACU    Anesthesia Type: general    Transport from OR: Transported from OR on 6-10 L/min O2 by face mask with adequate spontaneous ventilation    Post pain: adequate analgesia    Post assessment: no apparent anesthetic complications and tolerated procedure well    Post vital signs: stable    Level of consciousness: awake    Nausea/Vomiting: no nausea/vomiting    Complications: none    Transfer of care protocol was followed      Last vitals: Visit Vitals  /71   Pulse 86   Temp 37.3 °C (99.1 °F) (Temporal)   Resp 16   Ht 6' 1" (1.854 m)   Wt 118.9 kg (262 lb 2 oz)   SpO2 (!) 93%   BMI 34.58 kg/m²     "

## 2024-09-25 NOTE — ANESTHESIA PREPROCEDURE EVALUATION
09/25/2024  Alexi Noguera is a 64 y.o., male.    Anesthesia Evaluation    I have reviewed the Patient Summary Reports.     I have reviewed the Nursing Notes. I have reviewed the NPO Status.   I have reviewed the Medications.     Review of Systems  Anesthesia Hx:             Denies Family Hx of Anesthesia complications.    Denies Personal Hx of Anesthesia complications.                    Social:  Non-Smoker, No Alcohol Use       Cardiovascular:     Hypertension, poorly controlled           hyperlipidemia    History of DVT/PE                         Renal/:  Chronic Renal Disease renal calculi               Hepatic/GI:        NAFLD          Neurological:           Meningioma                                Physical Exam  General:  Well nourished and Obesity       Airway/Jaw/Neck:  Airway Findings: Mouth Opening: Normal     Tongue: Normal      General Airway Assessment: Adult      Mallampati: II   TM Distance: Normal, at least 6 cm   Jaw/Neck Findings:     Neck ROM: Normal ROM          Dental:  Dental Findings: In tact      Chest/Lungs:  Chest/Lungs Findings:  Clear to auscultation, Normal Respiratory Rate       Heart/Vascular:  Heart Findings: Rate: Normal  Rhythm: Regular Rhythm                        Mental Status:  Mental Status Findings:  Cooperative, Alert and Oriented         Anesthesia Plan  Type of Anesthesia, risks & benefits discussed:  Anesthesia Type:  general    Patient's Preference:   Plan Factors:          Intra-op Monitoring Plan: standard ASA monitors and arterial line  Intra-op Monitoring Plan Comments:   Post Op Pain Control Plan: IV/PO Opioids PRN and per primary service following discharge from PACU  Post Op Pain Control Plan Comments:     Induction:   IV  Beta Blocker:         Informed Consent: Informed consent signed with the Patient and all parties understand the risks and agree with  anesthesia plan.  All questions answered.  Anesthesia consent signed with patient.  ASA Score: 3     Day of Surgery Review of History & Physical:        Anesthesia Plan Notes: TIVA        Ready For Surgery From Anesthesia Perspective.             Physical Exam  General: Well nourished and Obesity    Airway:  Mallampati: II   Mouth Opening: Normal  TM Distance: Normal, at least 6 cm  Tongue: Normal  Neck ROM: Normal ROM    Dental:  In tact    Chest/Lungs:  Clear to auscultation, Normal Respiratory Rate    Heart:  Rate: Normal  Rhythm: Regular Rhythm        Anesthesia Plan  Type of Anesthesia, risks & benefits discussed:    Anesthesia Type: general  Intra-op Monitoring Plan: standard ASA monitors and arterial line  Post Op Pain Control Plan: IV/PO Opioids PRN and per primary service following discharge from PACU  Induction:  IV  Informed Consent: Informed consent signed with the Patient and all parties understand the risks and agree with anesthesia plan.  All questions answered.   ASA Score: 3  Anesthesia Plan Notes: TIVA    Ready For Surgery From Anesthesia Perspective.     .   Quality 226: Preventive Care And Screening: Tobacco Use: Screening And Cessation Intervention: Patient screened for tobacco use and is an ex/non-smoker Detail Level: Detailed

## 2024-09-25 NOTE — H&P
[facility] - [facility] (2nd Fl)  Neurocritical Care  History & Physical    Admit Date: 9/25/2024  Service Date: 09/25/2024  Length of Stay: 0    Subjective:     Chief Complaint: Meningioma, recurrent of spine    History of Present Illness: Alexi Noguera is a 63 y.o. male with a PMHx of essential HTN, HLD, PE, meningioma (recurrent of spine) and kidney cancer admitted to [facility] s/p C1 and C2 laminectomy for resection of intradural extramedullary spine tumor. NSGY following. Patient admitted post op for close monitoring and higher level of care.       Past Medical History:   Diagnosis Date    Cancer of kidney     Left    Deep vein thrombosis     Hyperlipidemia     Hypertension     Infected surgical wound 01/07/2020    Pulmonary embolism      Past Surgical History:   Procedure Laterality Date    LYSIS OF ADHESIONS N/A 12/9/2019    Procedure: LYSIS, ADHESIONS;  Surgeon: Tr Jorgensen MD;  Location: [facility] OR [address] [place] [address];  Service: General;  Laterality: N/A;    NEPHRECTOMY Left 12/9/2019    Procedure: Nephrectomy OPEN;  Surgeon: José Manuel Buchanan MD;  Location: [facility] OR [address] [place] [address];  Service: Urology;  Laterality: Left;  4hrs GEN WITH REGIONAL    THROMBECTOMY OF VENA CAVA  12/9/2019    Procedure: THROMBECTOMY, VENA CAVA;  Surgeon: José Manuel Buchanan MD;  Location: [facility] OR [address] [place] [address];  Service: Urology;;    THROMBECTOMY OF VENA CAVA  12/9/2019    Procedure: THROMBECTOMY, VENA CAVA;  Surgeon: KIRSTEN Atkins III, MD;  Location: [facility] OR [address] [place] [address];  Service: Peripheral Vascular;;      No current facility-administered medications on file prior to encounter.     Current Outpatient Medications on File Prior to Encounter   Medication Sig Dispense Refill    amLODIPine (NORVASC) 2.5 MG tablet Take 1 tablet (2.5 mg total) by mouth once daily. 90 tablet 3    losartan (COZAAR) 50 MG tablet Take 1 tablet (50 mg total) by mouth once daily. 90 tablet 3    pravastatin (PRAVACHOL) 40 MG tablet Take 1 tablet (40 mg total) by mouth every evening. 90  tablet 3      Allergies: Lisinopril  Family History   Problem Relation Name Age of Onset    No Known Problems Mother      No Known Problems Father      No Known Problems Sister      Heart disease Brother      Diabetes Paternal Aunt      Heart disease Paternal Uncle      Cirrhosis Neg Hx       Social History     Tobacco Use    Smoking status: Never    Smokeless tobacco: Never   Substance Use Topics    Alcohol use: No     Alcohol/week: 0.0 standard drinks of alcohol    Drug use: No     Review of Systems  Unable to obtain post-op - still sedated  Objective:     Vitals:    Temp: 99.1 °F (37.3 °C)  Pulse: 78  Rhythm: normal sinus rhythm  BP: 124/72  MAP (mmHg): 93  Resp: 12  SpO2: 100 %    Temp  Min: 99.1 °F (37.3 °C)  Max: 99.1 °F (37.3 °C)  Pulse  Min: 77  Max: 89  BP  Min: 91/55  Max: 174/100  MAP (mmHg)  Min: 69  Max: 130  Resp  Min: 12  Max: 28  SpO2  Min: 93 %  Max: 100 %    No intake/output data recorded.            Physical Exam  GA: well developed  HEENT: No scleral icterus or JVD.   Pulmonary: Clear to auscultation A/L.   Cardiac: RRR S1 & S2 w/o rubs/murmurs/gallops.   Abdominal: Bowel sounds present x 4. No appreciable hepatosplenomegaly.  Skin: No jaundice, rashes, or visible lesions.  Neuro:  --GCS: E2 V4 M6  --Mental Status:  opens eyes to voice and touch, oriented to self only, intermittently follows simple commands, delayed responses- post op  --CN II-XII grossly intact.   --Pupils 2mm, PERRL.   --Corneal reflex, gag, cough intact.  --LIU spont    Today I personally reviewed pertinent medications, lines/drains/airways, imaging, cardiology results, laboratory results, microbiology results,         Assessment/Plan:     Cardiac/Vascular  Hyperlipidemia  Continue home statin    Essential hypertension  SBP <160, MAP >65  Restart home Amlodipine  PRN hydralazine and labetalol  EKG    Endocrine  Obesity (BMI 30-39.9)  Body mass index is 34.58 kg/m².            The patient is being Prophylaxed for:  Venous  Thromboembolism with: Mechanical  Stress Ulcer with: H2B  Ventilator Pneumonia with: not applicable    Activity Orders            Up in chair With meals starting at 09/25 1700    Diet Adult Regular: Regular starting at 09/25 1225    Progressive Mobility Protocol (mobilize patient to their highest level of functioning at least twice daily) starting at 09/25 1224    Elevate HOB 30 starting at 09/25 1224    Ambulate With Assistance, Post Op Day 0 If the patient arrives from PACU by 4PM, walk at least once on day of operation if alert and safe to do so. starting at 09/25 1223          Full Code    Niru Guaman NP  Neurocritical Care

## 2024-09-25 NOTE — ANESTHESIA PROCEDURE NOTES
Intubation    Date/Time: 9/25/2024 8:07 AM    Performed by: Leonides Meraz  Authorized by: Leopold, Rhonda G, MD    Intubation:     Induction:  Intravenous    Intubated:  Postinduction    Mask Ventilation:  Easy mask    Attempts:  1    Attempted By:  Student    Method of Intubation:  Video laryngoscopy    Blade:  Landers 3    Laryngeal View Grade: Grade I - full view of cords      Difficult Airway Encountered?: No      Complications:  None    Airway Device:  Oral endotracheal tube    Airway Device Size:  7.5    Style/Cuff Inflation:  Cuffed (inflated to minimal occlusive pressure)    Tube secured:  23    Secured at:  The lips    Placement Verified By:  Capnometry    Complicating Factors:  None    Findings Post-Intubation:  BS equal bilateral and atraumatic/condition of teeth unchanged

## 2024-09-25 NOTE — H&P
"HPI  The following HPI is per Dr. Lebron' note on 8/5/24:    "Asymptomatic meningioma in the neck      I, Sabrina Pagan, attest that this documentation has been prepared under the direction and in the presence of Eugenio Lebron MD.     HPI from 1/11/2024 (Christen Miner PA-C):  Alexi Noguera is a 63 y.o.  male with a PMHx of essential HTN, HLD, PE, meningioma (recurrent of spine) and kidney cancer, who is referred by Pat Dobson NP, for spine surgical consult for asymptomatic C2 meningioma. Today pt reports that he is feeling fine and remains asymptomatic. Patient denies any neck pain, weakness, paraesthesia, numbness, gait imbalance, hand clumsiness, b/b incontinence.      Interval Hx:   Today he reports that he is in no pain and has no new symptoms to report. He's here to discuss surgery after getting updated imaging. He reports that he's currently in remission from his renal cell cancer and his last visit with his oncologist is in December. He denies any breathing or swallowing difficulty.      Patient denies any other complaints at this time.           Review of patient's allergies indicates:   Allergen Reactions    Lisinopril Other (See Comments)       Dry cough              Past Medical History:   Diagnosis Date    Cancer of kidney       Left    Hyperlipidemia      Hypertension              Past Surgical History:   Procedure Laterality Date    LYSIS OF ADHESIONS N/A 12/9/2019     Procedure: LYSIS, ADHESIONS;  Surgeon: Tr Jorgensen MD;  Location: [facility] OR [address] [place] [address];  Service: General;  Laterality: N/A;    NEPHRECTOMY Left 12/9/2019     Procedure: Nephrectomy OPEN;  Surgeon: José Manuel Buchanan MD;  Location: [facility] OR [address] [place] [address];  Service: Urology;  Laterality: Left;  4hrs GEN WITH REGIONAL    THROMBECTOMY OF VENA CAVA   12/9/2019     Procedure: THROMBECTOMY, VENA CAVA;  Surgeon: José Manuel Buchanan MD;  Location: [facility] OR [address] [place] [address];  Service: Urology;;    THROMBECTOMY OF VENA CAVA   12/9/2019     " Procedure: THROMBECTOMY, VENA CAVA;  Surgeon: KIRSTEN Atkins III, MD;  Location: [facility] OR [address] [place] [address];  Service: Peripheral Vascular;;             Family History   Problem Relation Name Age of Onset    No Known Problems Mother        No Known Problems Father        No Known Problems Sister        Heart disease Brother        Diabetes Paternal Aunt        Heart disease Paternal Uncle        Cirrhosis Neg Hx          Social History   Social History            Tobacco Use    Smoking status: Never    Smokeless tobacco: Never   Substance Use Topics    Alcohol use: No       Alcohol/week: 0.0 standard drinks of alcohol    Drug use: No            Review of Systems   All other systems reviewed and are negative.        OBJECTIVE:   Vital Signs:     Physical Exam:     Vital signs: All nursing notes and vital signs reviewed -- afebrile, vital signs stable.  Constitutional: Patient sitting comfortably in chair. Appears well developed and well nourished.  Skin: Exposed areas are intact without abnormal markings, rashes or other lesions.  HEENT: Normocephalic. Normal conjunctivae.  Cardiovascular: Normal rate and regular rhythm.  Respiratory: Chest wall rises and falls symmetrically, without signs of respiratory distress.  Abdomen: Soft and non-tender.  Extremities: Warm and without edema. Calves supple, non-tender.  Psych/Behavior: Normal affect.     Neurological:     Mental status: Alert and oriented. Conversational and appropriate.       Cranial Nerves: VFF to confrontation. PERRL. EOMI without nystagmus. Facial STLT normal and symmetric. Strong, symmetric muscles of mastication. Facial strength full and symmetric. Hearing equal bilaterally to finger rub. Palate and uvula rise and fall normally in midline. Shoulder shrug 5/5 strength. Tongue midline.      Motor:     Upper:   Deltoids Triceps Biceps WE WF      R 5/5 5/5 5/5 5/5 5/5 5/5     L 5/5 5/5 5/5 5/5 5/5 5/5      Lower:   HF KE KF DF PF EHL     R 5/5 5/5 5/5 5/5 5/5 5/5      L 5/5 5/5 5/5 5/5 5/5 5/5      Sensory: Intact sensation to light touch in all extremities. Romberg negative.     Reflexes:          DTR: 2+ symmetrically throughout.     Bernal's: Negative.     Babinski's: Negative.     Clonus: Negative.     Cerebellar: Finger-to-nose and rapid alternating movements normal. Gait stable, fluid.     Spine:     Posture: Head well aligned over pelvis in front and side views.  No focal or global spinal deformity visible on inspection. Shoulders and hips even. No obvious leg length discrepancy. No scapula winging.     Bending: Full ROM with forward, back and lateral bending. No rib prominence with forward bend.     Cervical:      ROM: Full with flexion, extension, lateral rotation and ear-to-shoulder bend.      Midline TTP: Negative.     Spurling's test: Negative.     Lhermitte's: Negative.     Thoracic:     Midline TTP: Negative     Lumbar:     Midline TTP: Negative     Straight Leg Test: Negative     Crossed Straight Leg Test: Negative     Sciatic notch tenderness: Negative.     Other:     SI joint TTP: Negative.     Greater trochanter TTP: Negative.     Tenderness with external/internal hip rotation: Negative.     Diagnostic Results:  All imaging was independently reviewed by me.     MRI cervical spine, dated 7/10/2024:  1. Stable meningioma versus January.      CTA head and neck, dated 6/10/2024:  1. Partially calcified.   2. Left dominant vert with the right vert possibly ending in PICA.   3. Left sided fetal PCA.      ASSESSMENT/PLAN:      Alexi Noguera has an enlarging intradural extramedullary spinal tumor, likely meningioma, with significant compression of the left cervicomedullary cord. CTA reveals a left dominant vertebral artery with a right vertebral artery ending in PICA, as well as a left fetal PCA. GIven its size, growth and cord compression I recommend surgical resection or debulking via a C1-2 laminectomy. I have reviewed the natural history of the lesion and the  "R/B/A/I/M of the surgical option extensively, highlighting the risk of paralysis, stroke and death, and he wishes to proceed with surgery.     The patient understands and agrees with the plan of care. All questions were answered.      1. C1-2 laminectomy for resection of intradural extramedullary spinal tumor"    In addendum to Dr. Lebron' clinic note, the patient describes stable symptomology, has been NPO since midnight, and has not taken any anti-plt/coag medications in the last 72 hours.    A&P    --Patient evaluated prior to surgery  --All diagnostics and imaging reviewed  --Patient NPO since MN  --No anti-coag/plt medication in the last 72h  --Patient consented and all questions answered. New blood consent signed. Surgical consent in media tab. Site marked.  --Further reccs to follow s/p surgery    Radha Lopez MD  Neurosurgery  [facility]      "

## 2024-09-25 NOTE — HPI
Alexi Noguera is a 63 y.o. male with a PMHx of essential HTN, HLD, PE, meningioma (recurrent of spine) and kidney cancer admitted to [facility] s/p C1 and C2 laminectomy for resection of intradural extramedullary spine tumor. NSGY following. Patient admitted post op for close monitoring and higher level of care.

## 2024-09-25 NOTE — ASSESSMENT & PLAN NOTE
"63 y.o. male with a PMHx of essential HTN, HLD, PE, meningioma (recurrent of spine) and kidney cancer admitted to [facility] s/p C1 and C2 laminectomy for resection of intradural extramedullary spine tumor  -- MRI cervical spine on 7/10 revealed- "There is a 1.7 x 2.1 cm extra medullary intradural lesion in the LEFT anterolateral aspect of the spinal canal at the C2 level."  -- dexamethasone 4mg q6h  -- methocarbamol  -- SBP <160, MAP >65  -- neuro checks q 1 hr  -- PT/OT  "

## 2024-09-25 NOTE — BRIEF OP NOTE
[facility] - Surgery ([facility])  Surgery Department  Operative Note    SUMMARY     Date of Procedure: 9/25/2024     Procedure: Procedure(s) (LRB):  C1 AND C2 LAMINECTOMY FOR RESECTION OF INTRADURAL EXTRAMEDULLARY SPINE TUMOR (N/A)     Surgeons and Role:     * Eugenio Lebron MD - Primary     * Radha Lopez MD - Resident - Assisting        Pre-Operative Diagnosis: Meningioma, recurrent of spine [D32.1]    Post-Operative Diagnosis: Post-Op Diagnosis Codes:     * Meningioma, recurrent of spine [D32.1]    Anesthesia: General    Operative Findings (including complications, if any): C1/2 laminectomy for resection of intradural extramedullary mass, watertight dural closure, subfascial HV drain, closed with staples    Description of Technical Procedures: see full op note        Estimated Blood Loss (EBL): 50 mL           Implants:   Implant Name Type Inv. Item Serial No.  Lot No. LRB No. Used Action   PINS SKULL ADULT LÓPEZ - [id number]  PINS SKULL ADULT LÓPEZ  [id number] N/A 3 Implanted and Explanted   TACHOSIL-FIBRIN SEALANT PATCH   [id number]  [id number] N/A 1 Implanted       Specimens:   Specimen (24h ago, onward)       Start     Ordered    09/25/24 1122  Specimen to Pathology, Surgery Neurosurgery  Once        Comments: Pre-op Diagnosis: Meningioma, recurrent of spine [D32.1]Procedure(s):C1 AND C2 LAMINECTOMY FOR RESECTION OF INTRADURAL EXTRAMEDULLARY SPINE TUMOR Number of specimens: 1Name of specimens: 1. INTRADURAL TUMOR--PERM     References:    Click here for ordering Quick Tip   Question Answer Comment   Procedure Type: Neurosurgery    Release to patient Immediate        09/25/24 1122                            Condition: Stable    Disposition: PACU - hemodynamically stable. Pending ICU bed    Attestation: Op Note Attestation: The attending physician was present for the entire procedure.

## 2024-09-25 NOTE — SUBJECTIVE & OBJECTIVE
Past Medical History:   Diagnosis Date    Cancer of kidney     Left    Deep vein thrombosis     Hyperlipidemia     Hypertension     Infected surgical wound 01/07/2020    Pulmonary embolism      Past Surgical History:   Procedure Laterality Date    LYSIS OF ADHESIONS N/A 12/9/2019    Procedure: LYSIS, ADHESIONS;  Surgeon: Tr Jorgensen MD;  Location: [facility] OR [address] [place] [address];  Service: General;  Laterality: N/A;    NEPHRECTOMY Left 12/9/2019    Procedure: Nephrectomy OPEN;  Surgeon: José Manuel Buchanan MD;  Location: [facility] OR [address] [place] [address];  Service: Urology;  Laterality: Left;  4hrs GEN WITH REGIONAL    THROMBECTOMY OF VENA CAVA  12/9/2019    Procedure: THROMBECTOMY, VENA CAVA;  Surgeon: José Manuel Buchanan MD;  Location: [facility] OR [address] [place] [address];  Service: Urology;;    THROMBECTOMY OF VENA CAVA  12/9/2019    Procedure: THROMBECTOMY, VENA CAVA;  Surgeon: KIRSTEN Atkins III, MD;  Location: [facility] OR [address] [place] [address];  Service: Peripheral Vascular;;      No current facility-administered medications on file prior to encounter.     Current Outpatient Medications on File Prior to Encounter   Medication Sig Dispense Refill    amLODIPine (NORVASC) 2.5 MG tablet Take 1 tablet (2.5 mg total) by mouth once daily. 90 tablet 3    losartan (COZAAR) 50 MG tablet Take 1 tablet (50 mg total) by mouth once daily. 90 tablet 3    pravastatin (PRAVACHOL) 40 MG tablet Take 1 tablet (40 mg total) by mouth every evening. 90 tablet 3      Allergies: Lisinopril  Family History   Problem Relation Name Age of Onset    No Known Problems Mother      No Known Problems Father      No Known Problems Sister      Heart disease Brother      Diabetes Paternal Aunt      Heart disease Paternal Uncle      Cirrhosis Neg Hx       Social History     Tobacco Use    Smoking status: Never    Smokeless tobacco: Never   Substance Use Topics    Alcohol use: No     Alcohol/week: 0.0 standard drinks of alcohol    Drug use: No     Review of Systems  Unable to obtain post-op - still  sedated  Objective:     Vitals:    Temp: 99.1 °F (37.3 °C)  Pulse: 78  Rhythm: normal sinus rhythm  BP: 124/72  MAP (mmHg): 93  Resp: 12  SpO2: 100 %    Temp  Min: 99.1 °F (37.3 °C)  Max: 99.1 °F (37.3 °C)  Pulse  Min: 77  Max: 89  BP  Min: 91/55  Max: 174/100  MAP (mmHg)  Min: 69  Max: 130  Resp  Min: 12  Max: 28  SpO2  Min: 93 %  Max: 100 %    No intake/output data recorded.            Physical Exam  GA: well developed  HEENT: No scleral icterus or JVD.   Pulmonary: Clear to auscultation A/L.   Cardiac: RRR S1 & S2 w/o rubs/murmurs/gallops.   Abdominal: Bowel sounds present x 4. No appreciable hepatosplenomegaly.  Skin: No jaundice, rashes, or visible lesions.  Neuro:  --GCS: E2 V4 M6  --Mental Status:  opens eyes to voice and touch, oriented to self only, intermittently follows simple commands, delayed responses- post op  --CN II-XII grossly intact.   --Pupils 2mm, PERRL.   --Corneal reflex, gag, cough intact.  --LIU spont    Today I personally reviewed pertinent medications, lines/drains/airways, imaging, cardiology results, laboratory results, microbiology results,

## 2024-09-25 NOTE — NURSING TRANSFER
Nursing Transfer Note      9/25/2024   5:55 PM    Nurse giving handoff:sofia jung   Nurse receiving handoff:sofia yepez     Reason patient is being transferred: post procedure     Transfer To: [facility]    Transfer via bed    Transfer with cardiac monitoring    Transported by rn     Order for Tele Monitor? Yes    Additional Lines: [place] Catheter    Medicines sent: continuous fluids     Any special needs or follow-up needed: routine     Patient belongings transferred with patient: Yes    Chart send with patient: Yes    Notified: spouse    Patient reassessed at: 1800

## 2024-09-25 NOTE — SUBJECTIVE & OBJECTIVE
Past Medical History:   Diagnosis Date    Cancer of kidney     Left    Deep vein thrombosis     Hyperlipidemia     Hypertension     Infected surgical wound 01/07/2020    Pulmonary embolism      Past Surgical History:   Procedure Laterality Date    LYSIS OF ADHESIONS N/A 12/9/2019    Procedure: LYSIS, ADHESIONS;  Surgeon: Tr Jorgensen MD;  Location: [facility] OR [address] [place] [address];  Service: General;  Laterality: N/A;    NEPHRECTOMY Left 12/9/2019    Procedure: Nephrectomy OPEN;  Surgeon: José Manuel Buchanan MD;  Location: [facility] OR [address] [place] [address];  Service: Urology;  Laterality: Left;  4hrs GEN WITH REGIONAL    THROMBECTOMY OF VENA CAVA  12/9/2019    Procedure: THROMBECTOMY, VENA CAVA;  Surgeon: José Manuel Buchanan MD;  Location: [facility] OR [address] [place] [address];  Service: Urology;;    THROMBECTOMY OF VENA CAVA  12/9/2019    Procedure: THROMBECTOMY, VENA CAVA;  Surgeon: KIRSTEN Atkins III, MD;  Location: [facility] OR [address] [place] [address];  Service: Peripheral Vascular;;      No current facility-administered medications on file prior to encounter.     Current Outpatient Medications on File Prior to Encounter   Medication Sig Dispense Refill    amLODIPine (NORVASC) 2.5 MG tablet Take 1 tablet (2.5 mg total) by mouth once daily. 90 tablet 3    losartan (COZAAR) 50 MG tablet Take 1 tablet (50 mg total) by mouth once daily. 90 tablet 3    pravastatin (PRAVACHOL) 40 MG tablet Take 1 tablet (40 mg total) by mouth every evening. 90 tablet 3      Allergies: Lisinopril  Family History   Problem Relation Name Age of Onset    No Known Problems Mother      No Known Problems Father      No Known Problems Sister      Heart disease Brother      Diabetes Paternal Aunt      Heart disease Paternal Uncle      Cirrhosis Neg Hx       Social History     Tobacco Use    Smoking status: Never    Smokeless tobacco: Never   Substance Use Topics    Alcohol use: No     Alcohol/week: 0.0 standard drinks of alcohol    Drug use: No     Review of Systems unable to obtain - patient sleepy  postop  Objective:     Vitals:    Temp: 99.1 °F (37.3 °C)  Pulse: 77  Rhythm: normal sinus rhythm  BP: 127/71  MAP (mmHg): 94  Resp: 13  SpO2: (!) 94 %    Temp  Min: 99.1 °F (37.3 °C)  Max: 99.1 °F (37.3 °C)  Pulse  Min: 77  Max: 89  BP  Min: 91/55  Max: 174/100  MAP (mmHg)  Min: 69  Max: 130  Resp  Min: 13  Max: 28  SpO2  Min: 93 %  Max: 100 %    No intake/output data recorded.            Physical Exam  GA: well developed  HEENT: No scleral icterus or JVD.   Pulmonary: Clear to auscultation A/L.   Cardiac: RRR S1 & S2 w/o rubs/murmurs/gallops.   Abdominal: Bowel sounds present x 4. No appreciable hepatosplenomegaly.  Skin: No jaundice, rashes, or visible lesions.  Neuro:  --GCS: E2 V4 M6  --Mental Status:  opens eyes to voice and touch, oriented to self only, intermittently follows simple commands, delayed responses- post op  --CN II-XII grossly intact.   --Pupils 2mm, PERRL.   --Corneal reflex, gag, cough intact.  --LIU spont       Today I personally reviewed pertinent medications, lines/drains/airways, imaging, cardiology results, laboratory results, microbiology results,

## 2024-09-25 NOTE — PLAN OF CARE
Post Op Check    The patient was seen at bedside following the procedure. The patient was still waking up from anesthesia but grossly at his neurological baseline, pain was reasonably controlled in the post-operative period, and all questions and/or complaints were directly addressed at bedside by the NSGY resident. All post-operative management orders have been placed and the patient was signed out to bedside RN and NCC provider.    Dr Lebron updated    Radha Lopez MD  Neurosurgery  [facility]

## 2024-09-26 VITALS
RESPIRATION RATE: 24 BRPM | DIASTOLIC BLOOD PRESSURE: 83 MMHG | HEIGHT: 73 IN | TEMPERATURE: 98 F | BODY MASS INDEX: 34.74 KG/M2 | SYSTOLIC BLOOD PRESSURE: 160 MMHG | HEART RATE: 93 BPM | OXYGEN SATURATION: 100 % | WEIGHT: 262.13 LBS

## 2024-09-26 PROBLEM — D32.9 MENINGIOMA: Status: ACTIVE | Noted: 2020-02-06

## 2024-09-26 LAB
ALBUMIN SERPL BCP-MCNC: 3.1 G/DL (ref 3.5–5.2)
ALP SERPL-CCNC: 48 U/L (ref 55–135)
ALT SERPL W/O P-5'-P-CCNC: 13 U/L (ref 10–44)
ANION GAP SERPL CALC-SCNC: 11 MMOL/L (ref 8–16)
AST SERPL-CCNC: 16 U/L (ref 10–40)
BASOPHILS # BLD AUTO: 0.01 K/UL (ref 0–0.2)
BASOPHILS NFR BLD: 0.1 % (ref 0–1.9)
BILIRUB SERPL-MCNC: 0.3 MG/DL (ref 0.1–1)
BUN SERPL-MCNC: 14 MG/DL (ref 8–23)
CALCIUM SERPL-MCNC: 8.1 MG/DL (ref 8.7–10.5)
CHLORIDE SERPL-SCNC: 111 MMOL/L (ref 95–110)
CO2 SERPL-SCNC: 17 MMOL/L (ref 23–29)
CREAT SERPL-MCNC: 1.2 MG/DL (ref 0.5–1.4)
DIFFERENTIAL METHOD BLD: ABNORMAL
EOSINOPHIL # BLD AUTO: 0 K/UL (ref 0–0.5)
EOSINOPHIL NFR BLD: 0 % (ref 0–8)
ERYTHROCYTE [DISTWIDTH] IN BLOOD BY AUTOMATED COUNT: 12.6 % (ref 11.5–14.5)
EST. GFR  (NO RACE VARIABLE): >60 ML/MIN/1.73 M^2
ESTIMATED AVG GLUCOSE: 120 MG/DL (ref 68–131)
GLUCOSE SERPL-MCNC: 158 MG/DL (ref 70–110)
HBA1C MFR BLD: 5.8 % (ref 4–5.6)
HCT VFR BLD AUTO: 40.3 % (ref 40–54)
HGB BLD-MCNC: 13.5 G/DL (ref 14–18)
IMM GRANULOCYTES # BLD AUTO: 0.07 K/UL (ref 0–0.04)
IMM GRANULOCYTES NFR BLD AUTO: 0.5 % (ref 0–0.5)
LYMPHOCYTES # BLD AUTO: 0.8 K/UL (ref 1–4.8)
LYMPHOCYTES NFR BLD: 5.6 % (ref 18–48)
MAGNESIUM SERPL-MCNC: 2.2 MG/DL (ref 1.6–2.6)
MCH RBC QN AUTO: 30.4 PG (ref 27–31)
MCHC RBC AUTO-ENTMCNC: 33.5 G/DL (ref 32–36)
MCV RBC AUTO: 91 FL (ref 82–98)
MONOCYTES # BLD AUTO: 0.3 K/UL (ref 0.3–1)
MONOCYTES NFR BLD: 2.3 % (ref 4–15)
NEUTROPHILS # BLD AUTO: 12.5 K/UL (ref 1.8–7.7)
NEUTROPHILS NFR BLD: 91.5 % (ref 38–73)
NRBC BLD-RTO: 0 /100 WBC
PHOSPHATE SERPL-MCNC: 1.7 MG/DL (ref 2.7–4.5)
PLATELET # BLD AUTO: 231 K/UL (ref 150–450)
PMV BLD AUTO: 9.1 FL (ref 9.2–12.9)
POTASSIUM SERPL-SCNC: 4.2 MMOL/L (ref 3.5–5.1)
PROT SERPL-MCNC: 6.4 G/DL (ref 6–8.4)
RBC # BLD AUTO: 4.44 M/UL (ref 4.6–6.2)
SODIUM SERPL-SCNC: 139 MMOL/L (ref 136–145)
WBC # BLD AUTO: 13.64 K/UL (ref 3.9–12.7)

## 2024-09-26 PROCEDURE — 63600175 PHARM REV CODE 636 W HCPCS: Performed by: STUDENT IN AN ORGANIZED HEALTH CARE EDUCATION/TRAINING PROGRAM

## 2024-09-26 PROCEDURE — 25000003 PHARM REV CODE 250

## 2024-09-26 PROCEDURE — 00BW0ZZ EXCISION OF CERVICAL SPINAL CORD, OPEN APPROACH: ICD-10-PCS | Performed by: NEUROLOGICAL SURGERY

## 2024-09-26 PROCEDURE — 63600175 PHARM REV CODE 636 W HCPCS: Mod: JZ,JG | Performed by: NURSE PRACTITIONER

## 2024-09-26 PROCEDURE — 94761 N-INVAS EAR/PLS OXIMETRY MLT: CPT

## 2024-09-26 PROCEDURE — 84100 ASSAY OF PHOSPHORUS: CPT | Performed by: NURSE PRACTITIONER

## 2024-09-26 PROCEDURE — 4A1004G MONITORING OF CENTRAL NERVOUS ELECTRICAL ACTIVITY, INTRAOPERATIVE, OPEN APPROACH: ICD-10-PCS | Performed by: NEUROLOGICAL SURGERY

## 2024-09-26 PROCEDURE — 83735 ASSAY OF MAGNESIUM: CPT | Performed by: NURSE PRACTITIONER

## 2024-09-26 PROCEDURE — 99233 SBSQ HOSP IP/OBS HIGH 50: CPT | Mod: GC,,, | Performed by: PSYCHIATRY & NEUROLOGY

## 2024-09-26 PROCEDURE — 85025 COMPLETE CBC W/AUTO DIFF WBC: CPT | Performed by: NURSE PRACTITIONER

## 2024-09-26 PROCEDURE — 80053 COMPREHEN METABOLIC PANEL: CPT | Performed by: NURSE PRACTITIONER

## 2024-09-26 PROCEDURE — 25000003 PHARM REV CODE 250: Performed by: NURSE PRACTITIONER

## 2024-09-26 PROCEDURE — 83036 HEMOGLOBIN GLYCOSYLATED A1C: CPT | Performed by: NURSE PRACTITIONER

## 2024-09-26 PROCEDURE — 97162 PT EVAL MOD COMPLEX 30 MIN: CPT

## 2024-09-26 PROCEDURE — 25000003 PHARM REV CODE 250: Performed by: STUDENT IN AN ORGANIZED HEALTH CARE EDUCATION/TRAINING PROGRAM

## 2024-09-26 PROCEDURE — 97166 OT EVAL MOD COMPLEX 45 MIN: CPT

## 2024-09-26 RX ORDER — AMLODIPINE BESYLATE 2.5 MG/1
2.5 TABLET ORAL ONCE
Status: COMPLETED | OUTPATIENT
Start: 2024-09-26 | End: 2024-09-26

## 2024-09-26 RX ORDER — SODIUM,POTASSIUM PHOSPHATES 280-250MG
2 POWDER IN PACKET (EA) ORAL EVERY 4 HOURS PRN
Status: DISCONTINUED | OUTPATIENT
Start: 2024-09-26 | End: 2024-09-26 | Stop reason: HOSPADM

## 2024-09-26 RX ORDER — OXYCODONE AND ACETAMINOPHEN 7.5; 325 MG/1; MG/1
1 TABLET ORAL EVERY 4 HOURS PRN
Qty: 60 TABLET | Refills: 0 | Status: SHIPPED | OUTPATIENT
Start: 2024-09-26

## 2024-09-26 RX ORDER — METHOCARBAMOL 500 MG/1
500 TABLET, FILM COATED ORAL 4 TIMES DAILY
Qty: 40 TABLET | Refills: 0 | Status: SHIPPED | OUTPATIENT
Start: 2024-09-26 | End: 2024-10-06

## 2024-09-26 RX ORDER — AMLODIPINE BESYLATE 5 MG/1
5 TABLET ORAL DAILY
Status: DISCONTINUED | OUTPATIENT
Start: 2024-09-27 | End: 2024-09-26 | Stop reason: HOSPADM

## 2024-09-26 RX ADMIN — DEXAMETHASONE SODIUM PHOSPHATE 4 MG: 4 INJECTION INTRA-ARTICULAR; INTRALESIONAL; INTRAMUSCULAR; INTRAVENOUS; SOFT TISSUE at 05:09

## 2024-09-26 RX ADMIN — BISACODYL 10 MG: 10 SUPPOSITORY RECTAL at 08:09

## 2024-09-26 RX ADMIN — METHOCARBAMOL 500 MG: 500 TABLET ORAL at 08:09

## 2024-09-26 RX ADMIN — ACETAMINOPHEN 650 MG: 325 TABLET ORAL at 11:09

## 2024-09-26 RX ADMIN — CEFAZOLIN 2 G: 2 INJECTION, POWDER, FOR SOLUTION INTRAMUSCULAR; INTRAVENOUS at 04:09

## 2024-09-26 RX ADMIN — LABETALOL HYDROCHLORIDE 10 MG: 5 INJECTION, SOLUTION INTRAVENOUS at 04:09

## 2024-09-26 RX ADMIN — AMLODIPINE BESYLATE 2.5 MG: 2.5 TABLET ORAL at 11:09

## 2024-09-26 RX ADMIN — MUPIROCIN: 20 OINTMENT TOPICAL at 08:09

## 2024-09-26 RX ADMIN — FAMOTIDINE 20 MG: 20 TABLET ORAL at 08:09

## 2024-09-26 RX ADMIN — DEXAMETHASONE SODIUM PHOSPHATE 4 MG: 4 INJECTION INTRA-ARTICULAR; INTRALESIONAL; INTRAMUSCULAR; INTRAVENOUS; SOFT TISSUE at 12:09

## 2024-09-26 RX ADMIN — SENNOSIDES AND DOCUSATE SODIUM 2 TABLET: 50; 8.6 TABLET ORAL at 08:09

## 2024-09-26 RX ADMIN — AMLODIPINE BESYLATE 2.5 MG: 2.5 TABLET ORAL at 08:09

## 2024-09-26 RX ADMIN — POTASSIUM & SODIUM PHOSPHATES POWDER PACK 280-160-250 MG 2 PACKET: 280-160-250 PACK at 10:09

## 2024-09-26 NOTE — DISCHARGE SUMMARY
[facility] - Neuro Critical Care  Neurosurgery  Discharge Summary      Patient Name: Alexi Noguera  MRN: [id number]  Admission Date: 9/25/2024  Hospital Length of Stay: 1 days  Discharge Date and Time:  09/26/2024 12:08 PM  Attending Physician: Anna Sanders MD   Discharging Provider: Radha Martinez MD  Primary Care Provider: Mary Wolf MD (Inactive)    HPI:   HPI from 1/11/2024 (Christen Miner PA-C):  Alexi Noguera is a 63 y.o.  male with a PMHx of essential HTN, HLD, PE, meningioma (recurrent of spine) and kidney cancer, who is referred by Pat Dobson NP, for spine surgical consult for asymptomatic C2 meningioma. Today pt reports that he is feeling fine and remains asymptomatic. Patient denies any neck pain, weakness, paraesthesia, numbness, gait imbalance, hand clumsiness, b/b incontinence.      Interval Hx:   Today he reports that he is in no pain and has no new symptoms to report. He's here to discuss surgery after getting updated imaging. He reports that he's currently in remission from his renal cell cancer and his last visit with his oncologist is in December. He denies any breathing or swallowing difficulty.      Patient denies any other complaints at this time.    Procedure(s) (LRB):  C1 AND C2 LAMINECTOMY FOR RESECTION OF INTRADURAL EXTRAMEDULLARY SPINE TUMOR (N/A)     Hospital Course:   9/26: NAEON. Pain controlled, minimal from drain, will remove today. Plan for discharge home following therapy evals. Dressing c/d/I.     The above is purely a summary of documentation by other providers. This discharge summary is in no way a substitute for medical documentation that has been provided throughout the stay by care-giving providers. Please reference all documentation in the electronic medical record should any questions or concerns arise.      Goals of Care Treatment Preferences:  Code Status: Full Code      Consults: PT, OT    Significant Diagnostic Studies: N/A    Pending Diagnostic  Studies:       Procedure Component Value Units Date/Time    Specimen to Pathology, Surgery Neurosurgery [[id number]] Collected: 09/25/24 1122    Order Status: Sent Lab Status: In process Updated: 09/25/24 1718    Specimen: Tissue           Final Active Diagnoses:    Diagnosis Date Noted POA    PRINCIPAL PROBLEM:  Meningioma, recurrent of spine [D32.1] 02/06/2020 Yes    Pre-diabetes [R73.03] 11/01/2021 Yes    Hyperlipidemia [E78.5] 07/20/2018 Yes    Obesity (BMI 30-39.9) [E66.9] 05/05/2016 Yes    Essential hypertension [I10] 05/05/2016 Yes      Problems Resolved During this Admission:      Discharged Condition: stable     Disposition: Home or Self Care    Follow Up:   Follow-up Information       June Asencio RN Follow up on 10/9/2024.    Why: For wound re-check             Anna Anglin NP Follow up on 11/7/2024.    Specialty: Neurosurgery  Contact information:  [address] [place] [id number]  [phone]                           Patient Instructions:      Notify your health care provider if you experience any of the following:  temperature >100.4     Notify your health care provider if you experience any of the following:  persistent nausea and vomiting or diarrhea     Notify your health care provider if you experience any of the following:  severe uncontrolled pain     Notify your health care provider if you experience any of the following:  redness, tenderness, or signs of infection (pain, swelling, redness, odor or green/yellow discharge around incision site)     Notify your health care provider if you experience any of the following:  difficulty breathing or increased cough     Notify your health care provider if you experience any of the following:  severe persistent headache     Notify your health care provider if you experience any of the following:  worsening rash     Notify your health care provider if you experience any of the following:  persistent dizziness, light-headedness, or visual  disturbances     Notify your health care provider if you experience any of the following:  increased confusion or weakness     Medications:  Reconciled Home Medications:     Continue stool softener while on pain meds       Medication List        START taking these medications      methocarbamoL 500 MG Tab  Commonly known as: ROBAXIN  Take 1 tablet (500 mg total) by mouth 4 (four) times daily. for 10 days     oxyCODONE-acetaminophen 7.5-325 mg per tablet  Commonly known as: PERCOCET  Take 1 tablet by mouth every 4 (four) hours as needed for Pain.            CONTINUE taking these medications      amLODIPine 2.5 MG tablet  Commonly known as: NORVASC  Take 1 tablet (2.5 mg total) by mouth once daily.     losartan 50 MG tablet  Commonly known as: COZAAR  Take 1 tablet (50 mg total) by mouth once daily.     pravastatin 40 MG tablet  Commonly known as: PRAVACHOL  Take 1 tablet (40 mg total) by mouth every evening.              Radha Martinez MD  Neurosurgery  [facility] - Neuro Critical [facility]

## 2024-09-26 NOTE — HPI
HPI from 1/11/2024 (Christen Miner PA-C):  Alexi Noguera is a 63 y.o.  male with a PMHx of essential HTN, HLD, PE, meningioma (recurrent of spine) and kidney cancer, who is referred by Pat Dobson NP, for spine surgical consult for asymptomatic C2 meningioma. Today pt reports that he is feeling fine and remains asymptomatic. Patient denies any neck pain, weakness, paraesthesia, numbness, gait imbalance, hand clumsiness, b/b incontinence.      Interval Hx:   Today he reports that he is in no pain and has no new symptoms to report. He's here to discuss surgery after getting updated imaging. He reports that he's currently in remission from his renal cell cancer and his last visit with his oncologist is in December. He denies any breathing or swallowing difficulty.      Patient denies any other complaints at this time.

## 2024-09-26 NOTE — ASSESSMENT & PLAN NOTE
Body mass index is Body mass index is 34.58 kg/m².. Morbid obesity complicates all aspects of disease management from diagnostic modalities to treatment. Weight loss encouraged and health benefits explained to patient.

## 2024-09-26 NOTE — PLAN OF CARE
[facility] - Neuro [facility] Care  Discharge Final Note    Primary Care Provider: Mary Wolf MD (Inactive)    Expected Discharge Date: 9/26/2024    Final Discharge Note (most recent)       Final Note - 09/26/24 1442          Final Note    Assessment Type Final Discharge Note (P)      Anticipated Discharge Disposition Home or Self Care (P)      What phone number can be called within the next 1-3 days to see how you are doing after discharge? [phone] (P)         Post-Acute Status    Discharge Delays None known at this time (P)                      Important Message from Medicare             Contact Info       June Asencio RN        Next Steps: Follow up on 10/9/2024    Instructions: For wound re-check    Anna Anglin NP   Specialty: Neurosurgery    [address]  [place] [address]   Phone: [phone]       Next Steps: Follow up on 11/7/2024          Future Appointments   Date Time Provider Department Center   10/9/2024 10:00 AM [facility]   11/7/2024  8:30 AM Maylin Glaser NP [facility] MED [place]   11/7/2024  1:30 PM Anna Anglin NP [facility] NEUROS8 [facility]   12/4/2024 10:00 AM [facility] CT1 [facility] CTSCAN [facility]   12/6/2024 12:00 PM LAB, [facility] LAB [facility]   12/6/2024  1:00 PM Acosta Pham MD [facility] HEMONC3 [facility]       Patient discharged with family, no discharge needs at the moment per medical team.      Discharge Plan A and Plan B have been determined by review of patient's clinical status, future medical and therapeutic needs, and coverage/benefits for post-acute care in coordination with multidisciplinary team members.    Gabriella Louie MSW, LCSW  [facility]  Case Management Dept.

## 2024-09-26 NOTE — SUBJECTIVE & OBJECTIVE
Interval History:    See hospital Course    Review of Systems   Constitutional:  Negative for chills and fever.   Eyes:  Negative for photophobia and visual disturbance.   Respiratory:  Negative for chest tightness and shortness of breath.    Cardiovascular:  Negative for chest pain and leg swelling.   Neurological:  Negative for dizziness, tremors, weakness and headaches.       Objective:     Vitals:  Temp: 98.2 °F (36.8 °C)  Pulse: 93  Rhythm: normal sinus rhythm  BP: (!) 160/83  MAP (mmHg): 114  Resp: (!) 24  SpO2: 100 %    Temp  Min: 97.1 °F (36.2 °C)  Max: 99 °F (37.2 °C)  Pulse  Min: 75  Max: 96  BP  Min: 125/73  Max: 169/88  MAP (mmHg)  Min: 91  Max: 115  Resp  Min: 11  Max: 28  SpO2  Min: 93 %  Max: 100 %    09/25 0701 - 09/26 0700  In: 2842.8 [I.V.:543.9]  Out: 4485 [Urine:4435]            Physical Exam  Constitutional:       Appearance: Normal appearance.   HENT:      Head: Normocephalic and atraumatic.   Cardiovascular:      Rate and Rhythm: Normal rate and regular rhythm.   Abdominal:      General: Abdomen is flat.      Palpations: Abdomen is soft.   Musculoskeletal:         General: Normal range of motion.      Cervical back: Normal range of motion and neck supple.   Skin:     Capillary Refill: Capillary refill takes less than 2 seconds.   Neurological:      Mental Status: He is alert and oriented to person, place, and time.      Comments: General Appearance: Not in acute hemodynamic distress  Mental Status Exam: awake, alert, aware, oriented, no aphasia, no dysarthria  Cranial Nerves:  EOM full, pupils are equal and reactive to light bilaterally, symmetric facial sensory, symmetric facial motor,   Motor: no drift in the UE/LE. Power is 5/5 in both UE/LE. Normal tone.  Sensory: Symmetric to LT in all 4 limbs               Diet  No diet orders on file  No diet orders on file

## 2024-09-26 NOTE — SUBJECTIVE & OBJECTIVE
Interval History:  NAEON. Pain controlled, minimal from drain, will remove today. Plan for discharge home following therapy evals. Dressing c/d/I.     Medications:  Continuous Infusions:  Scheduled Meds:   [START ON 9/27/2024] amLODIPine  5 mg Oral Daily    bisacodyL  10 mg Rectal Daily    ceFAZolin (Ancef) IV (PEDS and ADULTS)  2 g Intravenous Q8H    famotidine  20 mg Oral BID    heparin (porcine)  5,000 Units Subcutaneous Q8H    methocarbamoL  500 mg Oral QID    mupirocin   Nasal BID    pravastatin  40 mg Oral QHS    senna-docusate 8.6-50 mg  2 tablet Oral BID     PRN Meds:  Current Facility-Administered Medications:     acetaminophen, 650 mg, Oral, Q4H PRN    aluminum-magnesium hydroxide-simethicone, 30 mL, Oral, Q4H PRN    hydrALAZINE, 10 mg, Intravenous, Q4H PRN    labetalol, 10 mg, Intravenous, Q4H PRN    morphine, 2 mg, Intravenous, Q4H PRN    ondansetron, 8 mg, Oral, Q6H PRN    oxyCODONE, 5 mg, Oral, Q4H PRN    oxyCODONE, 10 mg, Oral, Q6H PRN    potassium, sodium phosphates, 2 packet, Per NG tube, Q4H PRN    prochlorperazine, 5 mg, Intravenous, Q6H PRN     Review of Systems  Objective:     Weight: 118.9 kg (262 lb 2 oz)  Body mass index is 34.58 kg/m².  Vital Signs (Most Recent):  Temp: 98 °F (36.7 °C) (09/26/24 0701)  Pulse: 93 (09/26/24 1115)  Resp: (!) 24 (09/26/24 1115)  BP: (!) 160/83 (09/26/24 1115)  SpO2: 100 % (09/26/24 1115) Vital Signs (24h Range):  Temp:  [97.1 °F (36.2 °C)-99 °F (37.2 °C)] 98 °F (36.7 °C)  Pulse:  [75-96] 93  Resp:  [11-28] 24  SpO2:  [93 %-100 %] 100 %  BP: ()/(55-88) 160/83  Arterial Line BP: (105-172)/() 106/85     Date 09/26/24 0700 - 09/27/24 0659   Shift 9310-9151 0259-9549 5191-7831 24 Hour Total   INTAKE   P.O. 240   240   I.V.(mL/kg) 100(0.8)   100(0.8)   Shift Total(mL/kg) 340(2.9)   340(2.9)   OUTPUT   Shift Total(mL/kg)       Weight (kg) 118.9 118.9 118.9 118.9                               Physical Exam         Neurosurgery Physical Exam  General: no  distress  Head: Non-traumatic, normocephalic  Eyes: Pupils equal, EOMi  Neck: Supple, normal ROM, no tenderness to palpation  CVS: Normal rate and rhythm, distal pulses present  Pulm: Symmetric expansion, no respiratory distress  GI: Abdomen nondistended, nontender  MSK: Moves all extremities without restriction, atraumatic  Skin: Dressing c/d/i  Psych: Normal thought content and cognition  Neuro: AOx3, GCS E4V5M6  CNII-XII: Intact on fine exam, PERRL, EOMi, facial sensation preserved, no facial asymmetry, tongue/uvula/palate midline, shoulder shrug equal, No pronator drift  Extremities:  Motor:  Upper Extremity:                                  Deltoids        Triceps        Biceps        WE        WF                Interosseous           R        5/5              5/5              5/5            5/5         5/5         5/5                5/5           L        5/5              5/5              5/5            5/5         5/5         5/5                5/5  Lower Extremity:                                      HF        KE        KF        DF        PF        EHL           R       5/5        5/5        5/5        5/5        5/5        5/5           L       5/5        5/5        5/5        5/5        5/5        5/5    Reflexes:     DTR: 2+ and symmetrically throughout      Sensory:      Sensorium intact throughout, no sensory level present, [place]-anal sensation intact    Coordination:      Coordination intact throughout        Significant Labs:  Recent Labs   Lab 09/25/24 0657 09/26/24 0418    158*    139   K 4.2 4.2    111*   CO2 27 17*   BUN 11 14   CREATININE 1.2 1.2   CALCIUM 8.7 8.1*   MG  --  2.2     Recent Labs   Lab 09/25/24 0657 09/26/24 0418   WBC 5.75 13.64*   HGB 13.9* 13.5*   HCT 41.9 40.3    231     Recent Labs   Lab 09/25/24 0657   INR 1.0   APTT 24.5     Microbiology Results (last 7 days)       ** No results found for the last 168 hours. **          All pertinent labs  from the last 24 hours have been reviewed.    Significant Diagnostics:  I have reviewed all pertinent imaging results/findings within the past 24 hours.

## 2024-09-26 NOTE — ASSESSMENT & PLAN NOTE
64M with enlarging C1/2 meningioma presenting 9/25 for elective resection, admitted to ICU post op, recovering well:    --Patient admitted to NSGY on telemetry      -q1h neurochecks in ICU, q2h neurochecks in stepdown, q4h neurochecks on floor  --MAP goals >65  --Dex 4q6 post op, to stop at discharge  --Drain to be removed this AM  --PT/OT/OOB - pending evals  --Tolerating PO, voiding spon  --Pain control with bowel reg  --PPI  --TEDs/SCDs/SQH  --Continue to monitor clinically, notify NSGY immediately with any changes in neuro status    Dispo: anticipate home today following therapy evals    Closed with lisa    D/w Dr. Lebron

## 2024-09-26 NOTE — SUBJECTIVE & OBJECTIVE
Interval History:  ***>4 elements OR status of 3 inpatient conditions    Review of Systems  ***2 systems OR Unable to obtain a complete ROS due to level of consciousness.  Objective:     Vitals:  Temp: 98.2 °F (36.8 °C)  Pulse: 93  Rhythm: normal sinus rhythm  BP: (!) 160/83  MAP (mmHg): 114  Resp: (!) 24  SpO2: 100 %    Temp  Min: 97.1 °F (36.2 °C)  Max: 99 °F (37.2 °C)  Pulse  Min: 75  Max: 96  BP  Min: 125/73  Max: 169/88  MAP (mmHg)  Min: 91  Max: 115  Resp  Min: 11  Max: 28  SpO2  Min: 93 %  Max: 100 %    09/25 0701 - 09/26 0700  In: 2842.8 [I.V.:543.9]  Out: 4485 [Urine:4435]            Physical Exam  ***Unable to test {Select Exam:68078} due to level of consciousness.       Medications:  ContinuousScheduledPRN  Today I personally reviewed pertinent {Select Patient Info Reviewed:41465} notably:    Diet  No diet orders on file  No diet orders on file    ***

## 2024-09-26 NOTE — PT/OT/SLP PROGRESS
Speech Language Pathology      Alexi Noguera  MRN: [id number]    SLP evaluation orders received and reviewed with MD team and RN. MD confirmed SLP evaluation not warranted and orders discontinued per review of Pt with MD.     9/26/2024

## 2024-09-26 NOTE — PROGRESS NOTES
[facility] - Neuro Critical Care  Neurosurgery  Progress Note    Subjective:     History of Present Illness: HPI from 1/11/2024 (Christen Miner PA-C):  Alexi Noguera is a 63 y.o.  male with a PMHx of essential HTN, HLD, PE, meningioma (recurrent of spine) and kidney cancer, who is referred by Pat Dobson NP, for spine surgical consult for asymptomatic C2 meningioma. Today pt reports that he is feeling fine and remains asymptomatic. Patient denies any neck pain, weakness, paraesthesia, numbness, gait imbalance, hand clumsiness, b/b incontinence.      Interval Hx:   Today he reports that he is in no pain and has no new symptoms to report. He's here to discuss surgery after getting updated imaging. He reports that he's currently in remission from his renal cell cancer and his last visit with his oncologist is in December. He denies any breathing or swallowing difficulty.      Patient denies any other complaints at this time.    Post-Op Info:  Procedure(s) (LRB):  C1 AND C2 LAMINECTOMY FOR RESECTION OF INTRADURAL EXTRAMEDULLARY SPINE TUMOR (N/A)   1 Day Post-Op   Interval History:  NAEON. Pain controlled, minimal from drain, will remove today. Plan for discharge home following therapy evals. Dressing c/d/I.     Medications:  Continuous Infusions:  Scheduled Meds:   [START ON 9/27/2024] amLODIPine  5 mg Oral Daily    bisacodyL  10 mg Rectal Daily    ceFAZolin (Ancef) IV (PEDS and ADULTS)  2 g Intravenous Q8H    famotidine  20 mg Oral BID    heparin (porcine)  5,000 Units Subcutaneous Q8H    methocarbamoL  500 mg Oral QID    mupirocin   Nasal BID    pravastatin  40 mg Oral QHS    senna-docusate 8.6-50 mg  2 tablet Oral BID     PRN Meds:  Current Facility-Administered Medications:     acetaminophen, 650 mg, Oral, Q4H PRN    aluminum-magnesium hydroxide-simethicone, 30 mL, Oral, Q4H PRN    hydrALAZINE, 10 mg, Intravenous, Q4H PRN    labetalol, 10 mg, Intravenous, Q4H PRN    morphine, 2 mg, Intravenous, Q4H PRN     ondansetron, 8 mg, Oral, Q6H PRN    oxyCODONE, 5 mg, Oral, Q4H PRN    oxyCODONE, 10 mg, Oral, Q6H PRN    potassium, sodium phosphates, 2 packet, Per NG tube, Q4H PRN    prochlorperazine, 5 mg, Intravenous, Q6H PRN     Review of Systems  Objective:     Weight: 118.9 kg (262 lb 2 oz)  Body mass index is 34.58 kg/m².  Vital Signs (Most Recent):  Temp: 98 °F (36.7 °C) (09/26/24 0701)  Pulse: 93 (09/26/24 1115)  Resp: (!) 24 (09/26/24 1115)  BP: (!) 160/83 (09/26/24 1115)  SpO2: 100 % (09/26/24 1115) Vital Signs (24h Range):  Temp:  [97.1 °F (36.2 °C)-99 °F (37.2 °C)] 98 °F (36.7 °C)  Pulse:  [75-96] 93  Resp:  [11-28] 24  SpO2:  [93 %-100 %] 100 %  BP: ()/(55-88) 160/83  Arterial Line BP: (105-172)/() 106/85     Date 09/26/24 0700 - 09/27/24 0659   Shift 6609-1549 7560-7538 1215-2241 24 Hour Total   INTAKE   P.O. 240   240   I.V.(mL/kg) 100(0.8)   100(0.8)   Shift Total(mL/kg) 340(2.9)   340(2.9)   OUTPUT   Shift Total(mL/kg)       Weight (kg) 118.9 118.9 118.9 118.9                               Physical Exam         Neurosurgery Physical Exam  General: no distress  Head: Non-traumatic, normocephalic  Eyes: Pupils equal, EOMi  Neck: Supple, normal ROM, no tenderness to palpation  CVS: Normal rate and rhythm, distal pulses present  Pulm: Symmetric expansion, no respiratory distress  GI: Abdomen nondistended, nontender  MSK: Moves all extremities without restriction, atraumatic  Skin: Dressing c/d/i  Psych: Normal thought content and cognition  Neuro: AOx3, GCS E4V5M6  CNII-XII: Intact on fine exam, PERRL, EOMi, facial sensation preserved, no facial asymmetry, tongue/uvula/palate midline, shoulder shrug equal, No pronator drift  Extremities:  Motor:  Upper Extremity:                                  Deltoids        Triceps        Biceps        WE        WF                Interosseous           R        5/5 5/5 5/5 5/5 5/5 5/5 5/5            L        5/5              5/5              5/5            5/5         5/5         5/5                5/5  Lower Extremity:                                      HF        KE        KF        DF        PF        EHL           R       5/5        5/5        5/5        5/5        5/5        5/5           L       5/5        5/5        5/5        5/5        5/5        5/5    Reflexes:     DTR: 2+ and symmetrically throughout      Sensory:      Sensorium intact throughout, no sensory level present, [place]-anal sensation intact    Coordination:      Coordination intact throughout        Significant Labs:  Recent Labs   Lab 09/25/24  0657 09/26/24  0418    158*    139   K 4.2 4.2    111*   CO2 27 17*   BUN 11 14   CREATININE 1.2 1.2   CALCIUM 8.7 8.1*   MG  --  2.2     Recent Labs   Lab 09/25/24 0657 09/26/24 0418   WBC 5.75 13.64*   HGB 13.9* 13.5*   HCT 41.9 40.3    231     Recent Labs   Lab 09/25/24 0657   INR 1.0   APTT 24.5     Microbiology Results (last 7 days)       ** No results found for the last 168 hours. **          All pertinent labs from the last 24 hours have been reviewed.    Significant Diagnostics:  I have reviewed all pertinent imaging results/findings within the past 24 hours.  Assessment/Plan:     * Meningioma, recurrent of spine  64M with enlarging C1/2 meningioma presenting 9/25 for elective resection, admitted to ICU post op, recovering well:    --Patient admitted to NSGY on telemetry      -q1h neurochecks in ICU, q2h neurochecks in stepdown, q4h neurochecks on floor  --MAP goals >65  --Dex 4q6 post op, to stop at discharge  --Drain to be removed this AM  --PT/OT/OOB - pending evals  --Tolerating PO, voiding spon  --Pain control with bowel reg  --PPI  --TEDs/SCDs/SQH  --Continue to monitor clinically, notify NSGY immediately with any changes in neuro status    Dispo: anticipate home today following therapy evals    Closed with lisa    D/w Dr. Vi Garcia  MD Juan  Neurosurgery  [facility] - Neuro Critical Care

## 2024-09-26 NOTE — PROGRESS NOTES
[facility] - Neuro Critical Care  Neurocritical Care  Progress Note    Admit Date: 9/25/2024  Service Date: 09/26/2024  Length of Stay: 1    Subjective:     Chief Complaint: Meningioma    History of Present Illness: Alexi Noguera is a 63 y.o. male with a PMHx of essential HTN, HLD, PE, meningioma (recurrent of spine) and kidney cancer admitted to [facility] s/p C1 and C2 laminectomy for resection of intradural extramedullary spine tumor. NSGY following. Patient admitted post op for close monitoring and higher level of care.     Hospital Course: 09/26/2024 s/p Mass resection a the level of C1-C2. No focal neurological deficit. Mild leukocytosis present on labs, most likely 2/2 steroid use. SBP above goal of <160, increased amlodipine to 5 mg.     Interval History:    See hospital Course    Review of Systems   Constitutional:  Negative for chills and fever.   Eyes:  Negative for photophobia and visual disturbance.   Respiratory:  Negative for chest tightness and shortness of breath.    Cardiovascular:  Negative for chest pain and leg swelling.   Neurological:  Negative for dizziness, tremors, weakness and headaches.       Objective:     Vitals:  Temp: 98.2 °F (36.8 °C)  Pulse: 93  Rhythm: normal sinus rhythm  BP: (!) 160/83  MAP (mmHg): 114  Resp: (!) 24  SpO2: 100 %    Temp  Min: 97.1 °F (36.2 °C)  Max: 99 °F (37.2 °C)  Pulse  Min: 75  Max: 96  BP  Min: 125/73  Max: 169/88  MAP (mmHg)  Min: 91  Max: 115  Resp  Min: 11  Max: 28  SpO2  Min: 93 %  Max: 100 %    09/25 0701 - 09/26 0700  In: 2842.8 [I.V.:543.9]  Out: 4485 [Urine:4435]            Physical Exam  Constitutional:       Appearance: Normal appearance.   HENT:      Head: Normocephalic and atraumatic.   Cardiovascular:      Rate and Rhythm: Normal rate and regular rhythm.   Abdominal:      General: Abdomen is flat.      Palpations: Abdomen is soft.   Musculoskeletal:         General: Normal range of motion.      Cervical back: Normal range of motion and neck supple.  "  Skin:     Capillary Refill: Capillary refill takes less than 2 seconds.   Neurological:      Mental Status: He is alert and oriented to person, place, and time.      Comments: General Appearance: Not in acute hemodynamic distress  Mental Status Exam: awake, alert, aware, oriented, no aphasia, no dysarthria  Cranial Nerves:  EOM full, pupils are equal and reactive to light bilaterally, symmetric facial sensory, symmetric facial motor,   Motor: no drift in the UE/LE. Power is 5/5 in both UE/LE. Normal tone.  Sensory: Symmetric to LT in all 4 limbs               Diet  No diet orders on file  No diet orders on file        Assessment/Plan:     Cardiac/Vascular  Hyperlipidemia  Continue home statin    Essential hypertension  SBP <160, MAP >65  Amlodipine increased to 5 mg  PRN hydralazine and labetalol  EKG    Oncology  * Meningioma  63 y.o. male with a PMHx of essential HTN, HLD, PE, meningioma (recurrent of spine) and kidney cancer admitted to [facility] s/p C1 and C2 laminectomy for resection of intradural extramedullary spine tumor  -- MRI cervical spine on 7/10 revealed- "There is a 1.7 x 2.1 cm extra medullary intradural lesion in the LEFT anterolateral aspect of the spinal canal at the C2 level."  -- dexamethasone 4mg q6h  -- methocarbamol  -- SBP <160, MAP >65  -- neuro checks q 1 hr  -- PT/OT    Endocrine  Pre-diabetes  A1C 5.8     Obesity (BMI 30-39.9)    Body mass index is Body mass index is 34.58 kg/m².. Morbid obesity complicates all aspects of disease management from diagnostic modalities to treatment. Weight loss encouraged and health benefits explained to patient.              The patient is being Prophylaxed for:  Venous Thromboembolism with: Not Applicable   Stress Ulcer with: Not Applicable   Ventilator Pneumonia with: not applicable    Activity Orders            None          Prior    Sole Burch MD PGY-1  Neurocritical Care  [facility] - Neuro Critical Care      "

## 2024-09-26 NOTE — HOSPITAL COURSE
09/26/2024 s/p Mass resection a the level of C1-C2. No focal neurological deficit. Mild leukocytosis present on labs, most likely 2/2 steroid use. SBP above goal of <160, increased amlodipine to 5 mg.

## 2024-09-26 NOTE — PT/OT/SLP EVAL
Occupational Therapy   Evaluation and Discharge Note  Co-Treatment with PT     Name: Alexi Noguera  MRN: [id number]  Admitting Diagnosis: Meningioma, recurrent of spine  Recent Surgery: Procedure(s) (LRB):  C1 AND C2 LAMINECTOMY FOR RESECTION OF INTRADURAL EXTRAMEDULLARY SPINE TUMOR (N/A) 1 Day Post-Op    Recommendations:     Discharge Recommendations: No Therapy Indicated  Discharge Equipment Recommendations: none  Barriers to discharge:  None    Assessment:     Alexi Noguera is a 64 y.o. male with a medical diagnosis of Meningioma, recurrent of spine. At this time, patient is functioning at their prior level of function and does not require further acute OT services.     Plan:     During this hospitalization, patient does not require further acute OT services.  Please re-consult if situation changes.    Plan of Care Reviewed with: patient    Subjective     Chief Complaint: None at this time   Patient/Family Comments/goals: DC    Occupational Profile:  Living Environment: Patient lives with son and DIL in [facility] with threshold entry WIS   Previous level of function: Independent   Roles and Routines: Unknwon   Equipment Used at home: none  Assistance upon Discharge: Family     Pain/Comfort:  Pain Rating 1: 7/10  Location - Side 1: Bilateral  Location - Orientation 1: posterior  Location 1: neck  Pain Addressed 1: Reposition, Distraction  Pain Rating Post-Intervention 1: 7/10    Patients cultural, spiritual, Oriental orthodox conflicts given the current situation: no    Objective:     Communicated with: RN prior to session.  Patient found in chair with telemetry, blood pressure cuff, pulse ox (continuous) upon OT entry to room.    General Precautions: Standard, fall  Orthopedic Precautions: N/A  Braces: N/A  Respiratory Status: Room air     Occupational Performance:    Bed Mobility:    Patient in chair     Functional Mobility/Transfers:  Patient completed Sit <> Stand Transfer with supervision  with  no assistive device    Functional Mobility: Patient compelted 30' with independence and no AD     Activities of Daily Living:  Upper Body Dressing: minimum assistance to don gown as robe   Lower Body Dressing: maximal assistance 2/2 neck incision tightness     Cognitive/Visual Perceptual:  Cognitive/Psychosocial Skills:     -       Oriented to: Person, Place, Time, and Situation   -       Follows Commands/attention:Follows multistep  commands  -       Communication: clear/fluent  -       Safety awareness/insight to disability: intact   Visual/Perceptual:      -Intact      Physical Exam:  Sensation:    -       Intact  Upper Extremity Range of Motion:     -       Right Upper Extremity: WFL  -       Left Upper Extremity: WFL  Upper Extremity Strength:    -       Right Upper Extremity: WFL  -       Left Upper Extremity: WFL   Strength:    -       Right Upper Extremity: WFL  -       Left Upper Extremity: WFL  Fine Motor Coordination:    -       Intact    Treatment & Education:  Provided education regarding role of OT, POC, & discharge recommendations.  Pt with no further questions/concerns at this time. OT provided education on home recommendations and fall prevention in preparation for D/C.     Patient left supine with all lines intact and call button in reach    GOALS:   Multidisciplinary Problems       Occupational Therapy Goals       Not on file                    History:     Past Medical History:   Diagnosis Date    Cancer of kidney     Left    Deep vein thrombosis     Hyperlipidemia     Hypertension     Infected surgical wound 01/07/2020    Pulmonary embolism          Past Surgical History:   Procedure Laterality Date    LYSIS OF ADHESIONS N/A 12/9/2019    Procedure: LYSIS, ADHESIONS;  Surgeon: Tr Jorgensen MD;  Location: [facility] OR [address] [place] [address];  Service: General;  Laterality: N/A;    NEPHRECTOMY Left 12/9/2019    Procedure: Nephrectomy OPEN;  Surgeon: José Manuel Buchanan MD;  Location: [facility] OR [address] [place] [address];  Service: Urology;   Laterality: Left;  4hrs GEN WITH REGIONAL    POSTERIOR CERVICAL LAMINECTOMY N/A 9/25/2024    Procedure: C1 AND C2 LAMINECTOMY FOR RESECTION OF INTRADURAL EXTRAMEDULLARY SPINE TUMOR;  Surgeon: Eugenio Lebron MD;  Location: [facility] OR [address] [place] [address];  Service: Neurosurgery;  Laterality: N/A;  C1 AND C2 LAMINECTOMY FOR RESECTION OF INTRADURAL EXTRAMEDULLARY SPINE TUMOR  anesthesia: general  nerve mon: EMG/SEP/MEP/ D-waves  radiology: C-arm  bed: [facility] slider with gel rolls  bed position: head to anesthe    THROMBECTOMY OF VENA CAVA  12/9/2019    Procedure: THROMBECTOMY, VENA CAVA;  Surgeon: José Manuel Buchanan MD;  Location: [facility] OR [address] [place] [address];  Service: Urology;;    THROMBECTOMY OF VENA CAVA  12/9/2019    Procedure: THROMBECTOMY, VENA CAVA;  Surgeon: KIRSTEN Atkins III, MD;  Location: [facility] OR [address] [place] [address];  Service: Peripheral Vascular;;       Time Tracking:     OT Date of Treatment: 09/26/24  OT Start Time: 0812  OT Stop Time: 0820  OT Total Time (min): 8 min    Billable Minutes:Evaluation 8    9/26/2024

## 2024-09-26 NOTE — PLAN OF CARE
[facility] - Neuro Critical Care  Initial Discharge Assessment       Primary Care Provider: Mary Wolf MD (Inactive)    Admission Diagnosis: Meningioma, recurrent of spine [D32.1]  Cervical spine tumor [D49.2]    Admission Date: 9/25/2024  Expected Discharge Date:     Transition of Care Barriers: None    Payor: [facility] MCARE / Plan: [facility] DUAL COMPLETE HMO SNP / Product Type: Medicare Advantage /     Extended Emergency Contact Information  Primary Emergency Contact: Alexi Noguera Jr  Address: [address] [place]           [place] [address] [place]  Mobile Phone: [phone]  Relation: Son  Preferred language: English   needed? No  Secondary Emergency Contact: Annie Mariscal  Address: [address] [place] [address] [facility]  Home Phone: [phone]  Work Phone: [phone]  Mobile Phone: [phone]  Relation: Significant other    Discharge Plan A: Home with family  Discharge Plan B: Home      [id number] - [place] - [address] AT [facility] [address]  [address] [address]  Phone: [phone] Fax: [phone]      Initial Assessment (most recent)       Adult Discharge Assessment - 09/26/24 1058          Discharge Assessment    Assessment Type Discharge Planning Assessment     Confirmed/corrected address, phone number and insurance Yes     Confirmed Demographics Correct on Facesheet     Source of Information patient     Communicated ANGIE with patient/caregiver Yes     Reason For Admission Meningioma, recurrent of spine     People in Home significant other     Do you expect to return to your current living situation? Yes     Prior to hospitilization cognitive status: Alert/Oriented     Current cognitive status: Alert/Oriented     Walking or Climbing Stairs Difficulty no     Dressing/Bathing Difficulty no     Home Layout Able to live on 1st floor     Equipment  Currently Used at Home none     Readmission within 30 days? No     Patient currently being followed by outpatient case management? No     Do you currently have service(s) that help you manage your care at home? No     Do you take prescription medications? Yes     Do you have prescription coverage? Yes     Coverage Payor: [facility] DUAL COMPLETE O SNP -     Do you have any problems affording any of your prescribed medications? No     Is the patient taking medications as prescribed? yes     Who is going to help you get home at discharge? SO Annie ( Christina) Mariscal  [phone]     How do you get to doctors appointments? car, drives self     Are you on dialysis? No     Do you take coumadin? No     Discharge Plan A Home with family     Discharge Plan B Home     DME Needed Upon Discharge  none     Discharge Plan discussed with: Patient     Transition of Care Barriers None        Physical Activity    On average, how many days per week do you engage in moderate to strenuous exercise (like a brisk walk)? 0 days     On average, how many minutes do you engage in exercise at this level? 0 min        Financial Resource Strain    How hard is it for you to pay for the very basics like food, housing, medical care, and heating? Not hard at all        Housing Stability    In the last 12 months, was there a time when you were not able to pay the mortgage or rent on time? No     At any time in the past 12 months, were you homeless or living in a shelter (including now)? No        Food Insecurity    Within the past 12 months, you worried that your food would run out before you got the money to buy more. Never true     Within the past 12 months, the food you bought just didn't last and you didn't have money to get more. Never true        Stress    Do you feel stress - tense, restless, nervous, or anxious, or unable to sleep at night because your mind is troubled all the time - these days? Not at all         Alcohol Use    Q1: How often do you have a drink containing alcohol? Never     Q2: How many drinks containing alcohol do you have on a typical day when you are drinking? Patient does not drink     Q3: How often do you have six or more drinks on one occasion? Never        Utilities    In the past 12 months has the electric, gas, oil, or water company threatened to shut off services in your home? No        Health Literacy    How often do you need to have someone help you when you read instructions, pamphlets, or other written material from your doctor or pharmacy? Never        OTHER    Name(s) of People in Home Annie Vasquez Davey                 The SW met with the patient at bedside. The SW placed name and contact information on the blackboard in the patient's room. Use preferred pharmacy / bedside delivery for any necessary medications at the time of discharge. The patient is independent with all ADLs. The patient is not on Dialysis or Coumadin. The Patient does not use DME or home oxygen, The patient's SO Christina will provide assistance to the patient upon discharge. The patient's SO Christina will provide transportation upon discharge. SW will continue to follow for course of hospitalization.  A discharge planning booklet was given to the pt.        Discharge Plan A and Plan B have been determined by review of patient's clinical status, future medical and therapeutic needs, and coverage/benefits for post-acute care in coordination with multidisciplinary team members.              Gabriella Louie MSW, ZAIDAW  [facility]  Case Management Dept.

## 2024-09-26 NOTE — NURSING
Patient arrived to [facility] from PACU (name of hospital or home) by (mode of transportation & company name)     Report received from: PACU RNNuris    Type of stroke/diagnosis:      Current symptoms:  none    Skin Assessment done: yes  Wounds noted:  *If wounds noted, was Wound Care consulted? N  *If wounds noted, LDA placed?   Skin Assessment Verified by:  Etta Hong Completed? yes    Patient Belongings on Admit: (be specific) :none    NCC notified: name of person notified: Niru BYRNE

## 2024-09-26 NOTE — PLAN OF CARE
"Nursing Care Plan    POC reviewed with Alexi Noguera and family at 2000. Patient and Family verbalized understanding. Questions and concerns addressed. No acute events overnight. Pt progressing toward goals. Will continue to monitor. See below and flowsheets for full assessment and VS info.     Working Diagnosis:  S/P C1-2 lami for menigioma removal    CODE Status: FULL    Contact Percautions:  None    ICU Days:  1    NAEON    Is this a stroke patient? no    NEURO:  Ward Coma Scale  Best Eye Response: 4-->(E4) spontaneous  Best Motor Response: 6-->(M6) obeys commands  Best Verbal Response: 5-->(V5) oriented  Nicola Coma Scale Score: 15  Assessment Qualifiers: patient not sedated/intubated  Pupil PERRLA: yes    Deficits: none  ICP:  NA  Drains:  Accordian drain to surgical wound posterior neck     RASS: Goal = 0  RASS score:  CAM-ICU: Negative    Restraints: none                      24 hr Temp:  [97.1 °F (36.2 °C)-99.1 °F (37.3 °C)]     CV:   Rhythm: normal sinus rhythm  BP goals:   SBP < 160  MAP > 65    Lines: PIV x 2; Tamera removed.   Gtts:  NS @ 100    PULM:          Oxygen: RA  Vent:N/A  ABG: N/A  SBT/SAT:  N/A    GI/:        Last Bowel Movement: 09/25/24       Intake/Output Summary (Last 24 hours) at 9/26/2024 0514  Last data filed at 9/26/2024 0501  Gross per 24 hour   Intake 2842.83 ml   Output 1485 ml   Net 1357.83 ml          Diet: REG  TF (Goal/Rate): N/A  Fluid Restriction:  none  Supplement: none    [place]: Removed  I/O (shift/total): pending  Electrolytes: see below    Labs/Accuchecks:  Recent Labs   Lab 09/25/24  0657 09/26/24  0418   WBC 5.75 13.64*   RBC 4.54* 4.44*   HGB 13.9* 13.5*   HCT 41.9 40.3     --       Recent Labs   Lab 09/26/24  0418      K 4.2   CO2 17*   *   BUN 14   CREATININE 1.2   ALKPHOS 48*   ALT 13   AST 16   BILITOT 0.3      Recent Labs   Lab 09/25/24  0657   INR 1.0   APTT 24.5    No results for input(s): "CPK", "CPKMB", "TROPONINI", "MB" in the last 168 " hours.    Electrolytes: No replacement orders  Accuchecks: none  Long Acting Insulin: None  Short Acting Insulin:  none    Path pending: none    ID:   Tmax: 97.8  WBC: 13.64 (on steroids)  Culture (Last 5 days):   Antibiotics: Ancef 2 g q8h    RAD:  Last Rad:  none    Wounds: surgical wound    PPX  BR: yes  GI: yes  DVT (Rx/ Mech):  SQH    Family: Wife at bedside.     Nurses Note -- 4 Eyes    Is there altered skin present?  No    Chart reviewed and plan discussed with provider.     NURSING PLAN:     - No issues over night. Slept well.   - No neuro deficits appreciated  - Minimal output from surgical drain  - Tamera and larsen removed.   - Pt ready for PT and ambulation.

## 2024-09-26 NOTE — DISCHARGE INSTRUCTIONS
Please follow ONLY the instructions that are checked below.    Activity Restrictions:  [x]  Return to work will be determined on an individual basis.  [x]  No lifting greater than 10 pounds.  [x]  Avoid bending and twisting the area of your surgery more than 45 degrees from neutral position in any direction.  [x]  No driving or operating machinery:  [x]  until cleared by your surgeon.  [x]  while taking narcotic pain medications or muscle relaxants.  []  No cervical collar, soft collar, or lumbar brace required.  []  Wear your brace at all times. You may be given an extra brace or soft collar to wear when showering.  [x]  Wear your brace at all times except when flat in bed.  []  Wear brace for comfort only.  [x]  Increase ambulation over the next 2 weeks so that you are walking 2 miles per day at 2 weeks post-operatively.  [x]  Walk on paved surfaces only. It is okay to walk up and down stairs while holding onto a side rail.  [x]  No sexual activity for 2-3 weeks.    Discharge Medication/Follow-up:  [x]  Please refer to discharge medication reconciliation form.  [x]  Do not take ANY non-steroidal anti-inflammatory drugs (NSAIDS), including the following: ibuprofen, naprosyn, Aleve, Advil, Indocin, Mobic, or Celebrex for:  [x]  4 weeks  []  8 weeks  []  6 months  [x]  Prescriptions for appropriate medication will be given upon discharge.   [x]  Pain control:   Percocet 7.5-325mg every 4-6 hours as needed for pain          [x]  Muscle relaxer:   Robaxin 500mg 4x per day as needed for muscle spasm         [x]  Take docusate (Colace 100 mg): take one capsule a day as needed for constipation. You can get this over the counter.  [x]  Follow-up appointment:  [x]  10-14 days post-op for wound check by physician assistant/nurse  [x]  4-6 weeks with MD:  [x]  with x-rays  []  without x-rays  [x]  An appointment will be mailed to you.    Wound Care:  [x]  Remove dressing or bandaid in  1  days.  [x]  No bandage required. Keep  your incision open to the air.  [x]  You may shower on the 2nd day after your surgery. Have the force of water hit you opposite from the incision. Pat the incision dry after your shower; do not scrub the incision.  [x]  You cannot take a bath until 8 weeks after surgery.  []  Apply bacitracin to incision twice a day for    more days.    Call your doctor or go to the Emergency Room for any signs of infection, including: increased redness, drainage, pain, or fever (temperature >=101.5 for 24 hours). Call your doctor or go to the Emergency Room if there are any localized neurological changes; problems with speech, vision, numbness, tingling, weakness, or severe headache; or for other concerns.    Special Instructions:  [x]  No use of tobacco products.  [x]  Diet: Please eat a regular diet as tolerated.  []  Other diet:              Specific physician instructions:           Physicians need 3 days' notice for pain medicine to be refilled. Pain medicine will only be refilled between 8 AM and 5 PM, Monday through Friday, due to Food and Drug Administration regulation of documentation.    If you have any questions about this form, please call [phone].    Form No. 53326 (Revised 10/31/2013)

## 2024-09-26 NOTE — PT/OT/SLP EVAL
"Physical Therapy Evaluation and Discharge Note    Patient Name:  Alexi Noguera   MRN:  [id number]    Co-evaluation with OT performed due to patient complexity and deficits, requiring two skilled therapists to appropriately and safely assess patient's strength and endurance while facilitating functional tasks in addition to accommodating for patient's activity tolerance.    Recommendations:     Discharge Recommendations: No Therapy Indicated  Discharge Equipment Recommendations: none   Barriers to discharge: None    Assessment:     Alexi Noguera is a 64 y.o. male admitted with a medical diagnosis of Meningioma, recurrent of spine. At this time, patient is functioning at their prior level of function and does not require further acute PT services. Patient performed all mobility and transfers with no assistance, no new acute deficits noted.      Recent Surgery: Procedure(s) (LRB):  C1 AND C2 LAMINECTOMY FOR RESECTION OF INTRADURAL EXTRAMEDULLARY SPINE TUMOR (N/A) 1 Day Post-Op    Plan:     During this hospitalization, patient does not require further acute PT services.  Please re-consult if situation changes.      Subjective     Chief Complaint: neck pain  Patient/Family Comments/goals: "I'm feeling good"   Pain/Comfort:  Pain Rating 1: 7/10  Location - Side 1: Bilateral  Location - Orientation 1: posterior  Location 1: neck  Pain Addressed 1: Reposition, Distraction  Pain Rating Post-Intervention 1: 7/10    Patients cultural, spiritual, Restorationist conflicts given the current situation: no    Living Environment:  Patient lives with son and DIL in [facility] with threshold step to enter. WIS.  Prior to admission, patients level of function was independent .  Equipment used at home: none.  DME owned (not currently used): none.  Upon discharge, patient will have assistance from family.    Objective:     Communicated with RN prior to session.  Patient found up in chair with telemetry, blood pressure cuff, pulse ox (continuous) " upon PT entry to room.    General Precautions: Standard, fall    Orthopedic Precautions:N/A   Braces: N/A  Respiratory Status: Room air    Exams:  Sensation:    -       Intact  RLE ROM: WNL  RLE Strength: WNL  LLE ROM: WNL  LLE Strength: WNL  MMT: Knee extension, ankle DF/PF      Functional Mobility:  Bed Mobility: patient received in chair   Transfers:     Sit to Stand:  supervision with no AD  Gait: patient ambulated 30' with independence no AD  Balance: good  SBA: stooping to ground for objects, marches    AM-PAC 6 CLICK MOBILITY  Total Score:24       Treatment and Education:  Education: patient educated on POC and discharge from therapy, safety with mobility upon return home, discharge recommendations and equipment recommendations. Patient verbalized understanding of all topics.     AM-PAC 6 CLICK MOBILITY  Total Score:24     Patient left up in chair with all lines intact and call button in reach. RN notified.     GOALS:   Multidisciplinary Problems       Physical Therapy Goals       Not on file              Multidisciplinary Problems (Resolved)          Problem: Physical Therapy    Goal Priority Disciplines Outcome Goal Variances Interventions   Physical Therapy Goal   (Resolved)     PT, PT/OT Met     Description: Patient evaluated and discharged, no goals or care plan created.                         History:     Past Medical History:   Diagnosis Date    Cancer of kidney     Left    Deep vein thrombosis     Hyperlipidemia     Hypertension     Infected surgical wound 01/07/2020    Pulmonary embolism        Past Surgical History:   Procedure Laterality Date    LYSIS OF ADHESIONS N/A 12/9/2019    Procedure: LYSIS, ADHESIONS;  Surgeon: Tr Jorgensen MD;  Location: [facility] OR [address] [place] [address];  Service: General;  Laterality: N/A;    NEPHRECTOMY Left 12/9/2019    Procedure: Nephrectomy OPEN;  Surgeon: José Manuel Buchanan MD;  Location: [facility] OR [address] [place] [address];  Service: Urology;  Laterality: Left;  4hrs GEN WITH REGIONAL     POSTERIOR CERVICAL LAMINECTOMY N/A 9/25/2024    Procedure: C1 AND C2 LAMINECTOMY FOR RESECTION OF INTRADURAL EXTRAMEDULLARY SPINE TUMOR;  Surgeon: Eugenio Lebron MD;  Location: [facility] OR [address] [place] [address];  Service: Neurosurgery;  Laterality: N/A;  C1 AND C2 LAMINECTOMY FOR RESECTION OF INTRADURAL EXTRAMEDULLARY SPINE TUMOR  anesthesia: general  nerve mon: EMG/SEP/MEP/ D-waves  radiology: C-arm  bed: [facility] slider with gel rolls  bed position: head to anesthe    THROMBECTOMY OF VENA CAVA  12/9/2019    Procedure: THROMBECTOMY, VENA CAVA;  Surgeon: José Manuel Buchanan MD;  Location: [address];  Service: Urology;;    THROMBECTOMY OF VENA CAVA  12/9/2019    Procedure: THROMBECTOMY, VENA CAVA;  Surgeon: KIRSTEN Atkins III, MD;  Location: [address];  Service: Peripheral Vascular;;       Time Tracking:     PT Received On: 09/26/24  PT Start Time: 0813     PT Stop Time: 0820  PT Total Time (min): 7 min     Billable Minutes: Evaluation 7 minutes      09/26/2024

## 2024-09-26 NOTE — HOSPITAL COURSE
9/26: MARTINEZ. Pain controlled, minimal from drain, will remove today. Plan for discharge home following therapy evals. Dressing c/d/I.

## 2024-09-27 ENCOUNTER — PATIENT MESSAGE (OUTPATIENT)
Dept: NEUROSURGERY | Facility: CLINIC | Age: 64
End: 2024-09-27
Payer: MEDICARE

## 2024-09-27 ENCOUNTER — TELEPHONE (OUTPATIENT)
Dept: NEUROSURGERY | Facility: CLINIC | Age: 64
End: 2024-09-27
Payer: MEDICARE

## 2024-09-27 ENCOUNTER — PATIENT MESSAGE (OUTPATIENT)
Dept: SPINE | Facility: CLINIC | Age: 64
End: 2024-09-27
Payer: MEDICARE

## 2024-09-27 NOTE — TELEPHONE ENCOUNTER
----- Message from Heather Anderson sent at 9/27/2024  9:23 AM CDT -----  Regarding: Advise  Contact: [phone]  Patient is calling to speak to CRISTIAN Watkins in regards to procedure he had done yesterday. Pt states that he needs to know if he needs to remove bandage from incision site. Please give pt a call to further discuss.

## 2024-09-27 NOTE — OP NOTE
DATE OF SURGERY: 9/25/24     PREOPERATIVE DIAGNOSIS:  1. 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     PROCEDURE PERFORMED:  1. C1 and C2 laminectomy for resection of intradural extramedullary spinal tumor  2. Use of operative microscope for microdissection  3. Use of intraoperative neuromonitoring with MEPs  4. Use of intraoperative flouroscopy     SURGEON: Eugenio Lebron M.D.    ASSISTANT: Radha Lopez M.D.     ANESTHESIA: GETA     ESTIMATED BLOOD LOSS: 200mL     COMPLICATIONS: None     DRAINS: One deep     SPECIMENS SENT: Spinal tumor     FINDINGS: None     INDICATIONS:     64M with a growing intradural tumor with spinal cord compression at C1-2 with early symptoms of myelopathy. I recommended the above procedure. I explained the risks, benefits, alternatives, indications and methods of the procedure in detail. The patient voiced understanding, and all questions were answered. No guarantees were made. The patient agreed to proceed as planned.    PROCEDURE:     The patient was brought into the operating room, where he was intubated and placed under general anesthesia without difficulty.  All lines were placed.  He was repositioned prone onto the operating room table with his head fixated in a Bristol headholder.   The skin was marked prepped and draped in the usual sterile fashion.  A timeout was performed prior to the procedure.  10 mL of lidocaine with epinephrine was injected into the skin.     A midline linear incision was made with a 10 blade from approximately C1 to C2.  Supra and subfascial dissection was carried out in the midline with Bovie electrocautery.  Subperiosteal dissection was carried out with Bovie electrocautery and Ng elevators to expose the posterior elements from C1 to C2.  Levels were confirmed on x-ray.  The laminectomy was then performed from C1 to C2, using the high speed drill, rongeurs and Kerrison punches.  The dura  was then inspected and found to be grossly normal.  The microscope was then brought into the field for microdissection.     A curvilinear dural opening was made with a 15 blade, and the dural leaflets were tacked up with 4-0 Nurolon sutures.  The arachnoid plane was opened with an 11 blade and tacked to the dura leaflets with hemoclips.  The intradural extramedullary tumor was seen ventrolateral to the left of the spinal cord and beneath the left vertebral artery. We divided the dentate ligaments with microscissors. We cauterized and incised the tumor capsule with an 11 blade. We debulked the tumor with ultrasonic apsiration. We then mobilized the tumor with microinstruments and removed it in its entirety.  At the end of the resection, a gross total resection was achieved.     The resection cavity was inspected, and hemostasis was achieved with bipolar electrocautery and FloSeal.  The subarachnoid space was copiously irrigated with sterile normal saline.  The dura was closed in a watertight fashion with a running Gortex suture.  Tachosil onlay and fibrin glue was placed over top as well. The microscope was taken out of the field.     The wound was copiously irrigated with normal saline. A watertight fascial closure was achieved with interrupted Vicryl sutures and a running Stratafix suture. A suprafascial Hemovac drain was placed and tunneled through a separate incision, where it was secured with a Vicryl suture. The soft tissues were closed in layers.  The skin was closed with staples.  A silver nitrate dressing was applied.     The patient appeared to tolerate the procedure well from a hemodynamic and neuromonitoring standpoint. MEPs were present and stable in all extremities throughout the case. He was repositioned supine onto the hospital bed. He was extubated and sent to the ICU in stable condition for recovery. I was present for all critical portions of the case, and at the end of the case all counts were  correct.     JUSTIFICATION OF MODIFIER 22:  This was a complex tumor resection in the intradural extramedullary space. It's location against the spinal cord and the solitary vertebral artery increased the inherent risks significantly. It required significantly increased preoperative planning and management, mental effort, technical skill, and time to perform every aspect of this procedure.

## 2024-09-28 ENCOUNTER — PATIENT MESSAGE (OUTPATIENT)
Dept: SPINE | Facility: CLINIC | Age: 64
End: 2024-09-28
Payer: MEDICARE

## 2024-09-29 LAB
BLD PROD TYP BPU: NORMAL
BLD PROD TYP BPU: NORMAL
BLOOD UNIT EXPIRATION DATE: NORMAL
BLOOD UNIT EXPIRATION DATE: NORMAL
BLOOD UNIT TYPE CODE: 6200
BLOOD UNIT TYPE CODE: 6200
BLOOD UNIT TYPE: NORMAL
BLOOD UNIT TYPE: NORMAL
CODING SYSTEM: NORMAL
CODING SYSTEM: NORMAL
CROSSMATCH INTERPRETATION: NORMAL
CROSSMATCH INTERPRETATION: NORMAL
DISPENSE STATUS: NORMAL
DISPENSE STATUS: NORMAL
NUM UNITS TRANS PACKED RBC: NORMAL
NUM UNITS TRANS PACKED RBC: NORMAL

## 2024-09-30 ENCOUNTER — PATIENT MESSAGE (OUTPATIENT)
Dept: SPINE | Facility: CLINIC | Age: 64
End: 2024-09-30
Payer: MEDICARE

## 2024-09-30 ENCOUNTER — TELEPHONE (OUTPATIENT)
Dept: NEUROSURGERY | Facility: CLINIC | Age: 64
End: 2024-09-30
Payer: MEDICARE

## 2024-09-30 DIAGNOSIS — Z98.890 S/P LAMINECTOMY: Primary | ICD-10-CM

## 2024-09-30 LAB
COMMENT: NORMAL
FINAL PATHOLOGIC DIAGNOSIS: NORMAL
GROSS: NORMAL
Lab: NORMAL
MICROSCOPIC EXAM: NORMAL

## 2024-10-01 ENCOUNTER — PATIENT MESSAGE (OUTPATIENT)
Dept: SPINE | Facility: CLINIC | Age: 64
End: 2024-10-01
Payer: MEDICARE

## 2024-10-01 NOTE — ANESTHESIA POSTPROCEDURE EVALUATION
Anesthesia Post Evaluation    Patient: Alexi Noguera    Procedure(s) Performed: Procedure(s) (LRB):  C1 AND C2 LAMINECTOMY FOR RESECTION OF INTRADURAL EXTRAMEDULLARY SPINE TUMOR (N/A)    Final Anesthesia Type: general      Patient location during evaluation: PACU  Patient participation: Yes- Able to Participate  Level of consciousness: awake  Post-procedure vital signs: reviewed and stable  Pain management: adequate  Airway patency: patent    PONV status at discharge: No PONV  Anesthetic complications: no      Cardiovascular status: blood pressure returned to baseline and stable  Respiratory status: unassisted and spontaneous ventilation  Hydration status: euvolemic  Follow-up not needed.              Vitals Value Taken Time   /83 09/26/24 1115   Temp 36.8 °C (98.2 °F) 09/26/24 1101   Pulse 94 09/26/24 1201   Resp 19 09/26/24 1201   SpO2 100 % 09/26/24 1201   Vitals shown include unfiled device data.      Event Time   Out of Recovery 18:19:00         Pain/Radha Score: No data recorded

## 2024-10-02 ENCOUNTER — PATIENT MESSAGE (OUTPATIENT)
Dept: SPINE | Facility: CLINIC | Age: 64
End: 2024-10-02
Payer: MEDICARE

## 2024-10-09 ENCOUNTER — CLINICAL SUPPORT (OUTPATIENT)
Dept: NEUROSURGERY | Facility: CLINIC | Age: 64
End: 2024-10-09
Payer: MEDICARE

## 2024-10-09 VITALS — TEMPERATURE: 98 F | DIASTOLIC BLOOD PRESSURE: 93 MMHG | SYSTOLIC BLOOD PRESSURE: 150 MMHG | HEART RATE: 75 BPM

## 2024-10-09 DIAGNOSIS — Z98.890 S/P LAMINECTOMY: Primary | ICD-10-CM

## 2024-10-09 PROCEDURE — 99999 PR PBB SHADOW E&M-EST. PATIENT-LVL II: CPT | Mod: PBBFAC,,,

## 2024-10-09 NOTE — PROGRESS NOTES
Alexi Noguera is a 64 y.o. male here for 2 week post op wound check here on 8th floor neurosurgery clinic    Surgery: C1-2 LAMI FOR TUMOR    Symptoms: none    DME:NONE    Brace: NONE    Pain: NONE    Medication Refills: NONE       Incision with  staples, DUNIA, well approximated, no redness, swelling or purulent drainage.  staples removed. Pt tolerated procedure well.    EDUCATION:    Instructed patient to keep incision DUNIA, no lotions, creams or bandages. OK to shower without water pressure to incision. No scrubbing. Pat dry.  No submurging incision in water (no bathing/swimming) until 8 weeks after surgery once wound is healed. Do not lift more than 10 pounds. Avoid bending or twisting in the area of your surgery more than 45 degrees from neutral position in any direction. Wear brace at all times when up out of bed except when showering or flat in bed. Increase ambulation as tolerated. You should be walking 2 miles/day. Walk on paved surfaces only, holding side rail when using stairs.  No sexual activity for 6 weeks after surgery.  No driving or operating heavy machinary : until cleared by surgeon  or while you are taking narcotic pain medication or muscle relaxant.  If you have had a fusion, do not take any non steroidal anti-inflammatory drugs (NSAIDS) Including the following: Ibuprofen, naprosyn, Aleve, Advil, Indocin, Mobic, or Celebrex for 6 weeks.  Take docusate (colace 100mg): take 1 capsule a day as needed for constipation. You can purchase over the counter.  Avoid use of tobacco products.    Call your doctor or go to the Emergency Room for any signs of infection including: increased redness, drainage, pain or fever (temperature of 101.5 or above for 24hrs). Call your doctor or go to the ER if there are any localized neurological changes, problems with speech, vision,numbness,tingling,weakness,severe headache or other concerns.  Above instructions reviewed with pt and copy of instructions given to pt along  with follow up appt date and time.    Physicians need 3 days' notice for pain medicine to be refilled. Pain medicine will only be refilled between 8am and 5pm Monday-Friday.    Patient verbalizes understanding. All questions answered. Pt. Encouraged to call or send message thru the portal for any additional concerns. Patient to followup with Dr. Lebron for 6 week followup with imaging.    Future Appointments   Date Time Provider Department Center   11/6/2024  8:45 AM [facility] MRI1 [facility] MRI . [facility]   11/7/2024  8:30 AM Maylin Glaser NP [facility] MED [place]   11/7/2024  2:30 PM Anna Anglin NP [facility]   12/4/2024 10:00 AM [facility] CT1 [facility] CTSCAN . [facility]   12/6/2024 12:00 PM LAB, [facility] LAB . [facility]   12/6/2024  1:00 PM [id number]

## 2024-10-15 DIAGNOSIS — I10 ESSENTIAL HYPERTENSION: ICD-10-CM

## 2024-10-15 DIAGNOSIS — E78.5 HYPERLIPIDEMIA, UNSPECIFIED HYPERLIPIDEMIA TYPE: ICD-10-CM

## 2024-10-16 RX ORDER — AMLODIPINE BESYLATE 2.5 MG/1
2.5 TABLET ORAL
Qty: 90 TABLET | Refills: 3 | Status: SHIPPED | OUTPATIENT
Start: 2024-10-16

## 2024-10-16 RX ORDER — PRAVASTATIN SODIUM 40 MG/1
40 TABLET ORAL NIGHTLY
Qty: 90 TABLET | Refills: 3 | Status: SHIPPED | OUTPATIENT
Start: 2024-10-16

## 2024-11-07 ENCOUNTER — OFFICE VISIT (OUTPATIENT)
Dept: NEUROSURGERY | Facility: CLINIC | Age: 64
End: 2024-11-07
Payer: MEDICARE

## 2024-11-07 VITALS — HEART RATE: 73 BPM | SYSTOLIC BLOOD PRESSURE: 135 MMHG | DIASTOLIC BLOOD PRESSURE: 89 MMHG

## 2024-11-07 DIAGNOSIS — Z98.890 S/P LAMINECTOMY: Primary | ICD-10-CM

## 2024-11-07 DIAGNOSIS — D32.1 MENINGIOMA, RECURRENT OF SPINE: ICD-10-CM

## 2024-11-07 PROCEDURE — 99999 PR PBB SHADOW E&M-EST. PATIENT-LVL III: CPT | Mod: PBBFAC,,, | Performed by: NURSE PRACTITIONER

## 2024-11-07 NOTE — PROGRESS NOTES
Neurosurgery  Established Patient    SUBJECTIVE:     History of Present Illness: Alexi Noguera is a 64 y.o. male s/p C1-2 laminectomy for resection of intradural extramedullary spinal tumor on 9/25/24 with Dr. Lebron; pathology meningioma, psammomatous subtype CNS WHO Grade 1. He is being seen in clinic today for his 6 week follow-up from the surgery. States that he is doing well since surgery. Denies fever, chills, or new neurological deficits.     Review of patient's allergies indicates:   Allergen Reactions    Lisinopril Other (See Comments)     Dry cough       Current Outpatient Medications   Medication Sig Dispense Refill    amLODIPine (NORVASC) 2.5 MG tablet TAKE 1 TABLET(2.5 MG) BY MOUTH EVERY DAY 90 tablet 3    losartan (COZAAR) 50 MG tablet Take 1 tablet (50 mg total) by mouth once daily. 90 tablet 3    pravastatin (PRAVACHOL) 40 MG tablet TAKE 1 TABLET(40 MG) BY MOUTH EVERY EVENING 90 tablet 3    oxyCODONE-acetaminophen (PERCOCET) 7.5-325 mg per tablet Take 1 tablet by mouth every 4 (four) hours as needed for Pain. (Patient not taking: Reported on 11/7/2024) 60 tablet 0     No current facility-administered medications for this visit.       Past Medical History:   Diagnosis Date    Cancer of kidney     Left    Deep vein thrombosis     Hyperlipidemia     Hypertension     Infected surgical wound 01/07/2020    Pulmonary embolism      Past Surgical History:   Procedure Laterality Date    LYSIS OF ADHESIONS N/A 12/09/2019    Procedure: LYSIS, ADHESIONS;  Surgeon: Tr Jorgensen MD;  Location: [facility] OR [address] [place] [address];  Service: General;  Laterality: N/A;    NEPHRECTOMY Left 12/09/2019    Procedure: Nephrectomy OPEN;  Surgeon: José Manuel Buchanan MD;  Location: [facility] OR [address] [place] [address];  Service: Urology;  Laterality: Left;  4hrs GEN WITH REGIONAL    POSTERIOR CERVICAL LAMINECTOMY N/A 09/25/2024    Procedure: C1 AND C2 LAMINECTOMY FOR RESECTION OF INTRADURAL EXTRAMEDULLARY SPINE TUMOR;  Surgeon: Eugenio Lebron MD;   [facility];  Service: Neurosurgery;  Laterality: N/A;  C1 AND C2 LAMINECTOMY FOR RESECTION OF INTRADURAL EXTRAMEDULLARY SPINE TUMOR  anesthesia: general  nerve mon: EMG/SEP/MEP/ D-waves  radiology: C-arm  bed: reg slider with gel rolls  bed position: head to anesthe    THROMBECTOMY OF VENA CAVA  12/09/2019    Procedure: THROMBECTOMY, VENA CAVA;  Surgeon: José Manuel Buchanan MD;  Location: [facility] OR [address] [place] [address];  Service: Urology;;    THROMBECTOMY OF VENA CAVA  12/09/2019    Procedure: THROMBECTOMY, VENA CAVA;  Surgeon: KIRSTEN Atkins III, MD;  Location: [facility] OR [address] [place] [address];  Service: Peripheral Vascular;;     Family History       Problem Relation (Age of Onset)    Diabetes Paternal Aunt    Heart disease Brother, Paternal Uncle    No Known Problems Mother, Father, Sister          Social History     Socioeconomic History    Marital status: Legally     Number of children: 2   Tobacco Use    Smoking status: Never    Smokeless tobacco: Never   Substance and Sexual Activity    Alcohol use: No     Alcohol/week: 0.0 standard drinks of alcohol    Drug use: No     Social Drivers of Health     Financial Resource Strain: Low Risk  (9/26/2024)    Overall Financial Resource Strain (CARDIA)     Difficulty of Paying Living Expenses: Not hard at all   Food Insecurity: No Food Insecurity (9/26/2024)    Hunger Vital Sign     Worried About Running Out of Food in the Last Year: Never true     Ran Out of Food in the Last Year: Never true   Physical Activity: Inactive (9/26/2024)    Exercise Vital Sign     Days of Exercise per Week: 0 days     Minutes of Exercise per Session: 0 min   Stress: No Stress Concern Present (9/26/2024)    [employer] - Occupational Stress Questionnaire     Feeling of Stress : Not at all   Housing Stability: Low Risk  (9/26/2024)    Housing Stability Vital Sign     Unable to Pay for Housing in the Last Year: No     Homeless in the Last Year: No       Review of  Systems    OBJECTIVE:     Vital Signs  Pulse: 73  BP: 135/89  Pain Score: 0-No pain  There is no height or weight on file to calculate BMI.    Neurosurgery Physical Exam  General: well developed, well nourished, no distress.   Head: normocephalic, atraumatic  Neurologic: Alert and oriented. Thought content appropriate.  GCS: Motor: 6/Verbal: 5/Eyes: 4 GCS Total: 15  Mental Status: Awake, Alert, Oriented x 4  Language: No aphasia  Speech: No dysarthria  Cranial nerves: face symmetric, tongue midline, CN II-XII grossly intact.   Eyes: pupils equal, round, reactive to light with accomodation, EOMI.   Pulmonary: normal respirations, no signs of respiratory distress  Abdomen: soft, non-distended  Skin: Skin is warm, dry and intact. Incision well-healed  Sensory: intact to light touch throughout  Motor Strength:Moves all extremities spontaneously with good tone.       Diagnostic Results:  I have personally reviewed the MRI cervical spine W WO dated 11/5/24 with Dr. Lebron. The imaging shows post-operative changes C1-C2. Gross total resection at that level. Thickened ventral dura at the operative level noted.     ASSESSMENT/PLAN:     Alexi Noguera is a 64 y.o. male s/p C1-2 laminectomy for resection of intradural extramedullary spinal tumor on 9/25/24 with Dr. Lebron; pathology meningioma, psammomatous subtype CNS WHO Grade 1. He was seen in clinic today for his 6 week follow-up from the surgery. He is doing well since. I would like the patient to follow-up in clinic with a repeat cervical MRI W WO in 1 year with Dr. Lebron. I have encouraged him to contact the clinic with any questions, concerns, or adverse clinical changes. He verbalized understanding.      BENJI Lagunas  Neurosurgery  [facility]. [facility].      Note dictated with voice recognition software, please excuse any grammatical errors.

## 2024-11-08 ENCOUNTER — TELEPHONE (OUTPATIENT)
Dept: HEMATOLOGY/ONCOLOGY | Facility: CLINIC | Age: 64
End: 2024-11-08
Payer: MEDICARE

## 2024-11-08 NOTE — PROGRESS NOTES
[facility]  Critical Care - Surgery  Progress Note    Patient Name: Alexi Noguera  MRN: [id number]  Admission Date: 12/9/2019  Hospital Length of Stay: 4 days  Code Status: Full Code  Attending Provider: José Manuel Buchanan MD  Primary Care Provider: Derrick Matthews MD   Principal Problem: Primary renal cell carcinoma of native left kidney    Subjective:     Hospital/ICU Course:  12/9 - Arrives to surgical ICU intubated on levo @ 0.3, CVC placed. Resuscitated with additional 7L of crystalloids.  12/11 - Overnight pt H&H dropped two points, received 2u pRBC.  Otherwise NAEON.  Pt remains extubated, now off of pressors.  Pt able to ambulate and sit up in chair at bedside.      Interval History/Significant Events: NAEON. Good response to lasix yesterday. Stepdown to floor today.    Follow-up For: Procedure(s) (LRB):  Nephrectomy OPEN (Left)  LYSIS, ADHESIONS (N/A)  THROMBECTOMY, VENA CAVA  THROMBECTOMY, VENA CAVA  REPAIR of IVC    Post-Operative Day: 4 Days Post-Op    Objective:     Vital Signs (Most Recent):  Temp: 98.4 °F (36.9 °C) (12/13/19 0700)  Pulse: 92 (12/13/19 1100)  Resp: 20 (12/13/19 1100)  BP: (!) 148/94 (12/13/19 1100)  SpO2: 98 % (12/13/19 1100) Vital Signs (24h Range):  Temp:  [98 °F (36.7 °C)-98.6 °F (37 °C)] 98.4 °F (36.9 °C)  Pulse:  [] 92  Resp:  [13-25] 20  SpO2:  [94 %-99 %] 98 %  BP: (117-197)/(60-97) 148/94  Arterial Line BP: (133-182)/(57-82) 136/65     Weight: 131.5 kg (289 lb 14.5 oz)  Body mass index is 38.25 kg/m².      Intake/Output Summary (Last 24 hours) at 12/13/2019 1119  Last data filed at 12/13/2019 0700  Gross per 24 hour   Intake 2481 ml   Output 4850 ml   Net -2369 ml       Physical Exam   Constitutional: He is oriented to person, place, and time. He appears well-developed and well-nourished.   HENT:   Head: Normocephalic and atraumatic.   Eyes: Pupils are equal, round, and reactive to light.   Neck: Neck supple. No tracheal deviation present. No thyromegaly  present.   Cardiovascular: Normal rate, regular rhythm and intact distal pulses.   No murmur heard.  Pulmonary/Chest: Effort normal and breath sounds normal. He has no decreased breath sounds. He has no wheezes.   Abdominal: Soft. He exhibits no distension and no mass.   Surgical incision vertical midline of abdomen, without drainage, fluctuance or ecchymosis.     Musculoskeletal: He exhibits edema.   Neurological: He is alert and oriented to person, place, and time.   Skin: Skin is warm and dry. He is not diaphoretic. No erythema. No pallor.   Psychiatric: He has a normal mood and affect. His behavior is normal.   Nursing note and vitals reviewed.        Lines/Drains/Airways     Central Venous Catheter Line                 Trialysis (Dialysis) Catheter 03/20/17 0850 right internal jugular 998 days         Percutaneous Central Line Insertion/Assessment - triple lumen  12/09/19 1830 right internal jugular 3 days          Epidural Line                 Perineural Analgesia/Anesthesia Assessment (using dermatomes) 12/09/19 0730 4 days         Perineural Analgesia/Anesthesia Assessment (using dermatomes) 12/09/19 0730 4 days          Peripheral Intravenous Line                 Peripheral IV - Single Lumen 12/13/19 0557 20 G Left Upper Arm less than 1 day                Significant Labs:    CBC/Anemia Profile:  Recent Labs   Lab 12/12/19  1400 12/13/19  0400 12/13/19  0800   WBC 10.85 9.75 10.20   HGB 7.5* 6.9* 7.3*   HCT 23.0* 21.4* 23.1*    173 166   MCV 89 91 91   RDW 14.9* 15.2* 15.1*        Chemistries:  Recent Labs   Lab 12/12/19  1400 12/12/19  2100 12/13/19  0400   * 136 136   K 3.5 3.4* 3.8    102 103   CO2 26 29 28   BUN 21 18 18   CREATININE 1.2 1.2 1.1   CALCIUM 7.8* 7.5* 7.3*   ALBUMIN 2.0* 1.9* 1.8*   PROT 5.2* 5.1* 5.1*   BILITOT 0.4 0.3 0.4   ALKPHOS 62 49* 60   ALT 24 23 22   AST 46* 40 36   MG 2.1 2.0 2.1   PHOS 2.6* 2.1* 2.4*       All pertinent labs within the past 24 hours have been  reviewed.      Significant Imaging:  I have reviewed all pertinent imaging results/findings within the past 24 hours.    Assessment/Plan:     * Primary renal cell carcinoma of native left kidney  60 yo man admitted to SICU s/p radical left nephrectomy and IVC thrombectomy complicated by significant EBL of 7L.      Neuro:  - Pain control: per APS  - AAO x4    Pulm:  - On 4L NC  - Wean O2 as tolerated  - Improved aeration CXR    CV:  - Off all pressors  - s/p fluid resuscitation with total 19L of crystalloids, 1L of albumin, and 4 units PRBC  - Normal lactate    Renal:  - s/p L nephrectomy with significant blood loss and fluid shift  - Trend BUN/Cr 18/1.1  - Monitor UOP, continues to be stable.    FEN/GI:  - Regular diet   - Replace lytes PRN    Hem:  - H/H stable  - No signs of active bleeding    ID:  - Afebrile, no leukocytosis  - Monitor for signs of infection    Endo:  - Accuchecks  - No issues      PPx:  - Famotidine  - SQH    Dispo:   - Stepdown to the floor today    Primary: Urology       Critical care was time spent personally by me on the following activities: development of treatment plan with patient or surrogate and bedside caregivers, discussions with consultants, evaluation of patient's response to treatment, examination of patient, ordering and performing treatments and interventions, ordering and review of laboratory studies, ordering and review of radiographic studies, pulse oximetry, re-evaluation of patient's condition.  This critical care time did not overlap with that of any other provider or involve time for any procedures.       Erin Kwon MD  Critical Care - Surgery  [facility]     Unknown

## 2024-12-13 ENCOUNTER — OFFICE VISIT (OUTPATIENT)
Dept: HEMATOLOGY/ONCOLOGY | Facility: CLINIC | Age: 64
End: 2024-12-13
Payer: MEDICARE

## 2024-12-13 VITALS
HEIGHT: 73 IN | OXYGEN SATURATION: 98 % | DIASTOLIC BLOOD PRESSURE: 89 MMHG | SYSTOLIC BLOOD PRESSURE: 146 MMHG | BODY MASS INDEX: 35.3 KG/M2 | TEMPERATURE: 98 F | RESPIRATION RATE: 18 BRPM | HEART RATE: 68 BPM | WEIGHT: 266.31 LBS

## 2024-12-13 DIAGNOSIS — D32.1 MENINGIOMA, RECURRENT OF SPINE: ICD-10-CM

## 2024-12-13 DIAGNOSIS — C64.2 CARCINOMA, RENAL CELL, LEFT: Primary | ICD-10-CM

## 2024-12-13 DIAGNOSIS — I10 ESSENTIAL HYPERTENSION: ICD-10-CM

## 2024-12-13 PROCEDURE — 99999 PR PBB SHADOW E&M-EST. PATIENT-LVL III: CPT | Mod: PBBFAC,,, | Performed by: INTERNAL MEDICINE

## 2024-12-13 NOTE — PROGRESS NOTES
ONCOLOGY FOLLOW UP VISIT.     Reason for visit: Surveillance for stage III RCC s/p resection  Cancer/Stage/TNM:   Cancer Staging  Carcinoma, renal cell, left  Staging form: Kidney, AJCC 8th Edition  - Clinical stage from 9/16/2019: Stage III (cT3a, cN0, cM0) - Unsigned     Interval History:   Patient returns to clinic today for review of imaging. Today patient reports no new symptoms. He successfully had surgical removal of meningioma in September    ROS:  Review of Systems   Constitutional:  Negative for activity change, appetite change, chills, fatigue, fever and unexpected weight change.   HENT:  Negative for mouth sores, nosebleeds and sore throat.    Eyes:  Negative for visual disturbance.   Respiratory:  Negative for cough, shortness of breath and wheezing.    Cardiovascular:  Negative for chest pain, palpitations and leg swelling.   Gastrointestinal:  Negative for abdominal distention, abdominal pain, blood in stool and diarrhea.   Endocrine: Negative for cold intolerance and heat intolerance.   Genitourinary:  Negative for dysuria, flank pain and frequency.   Musculoskeletal:  Negative for arthralgias, back pain, joint swelling and myalgias.   Skin:  Negative for color change, rash and wound.   Neurological:  Negative for dizziness, light-headedness and headaches.   Hematological:  Negative for adenopathy. Does not bruise/bleed easily.   Psychiatric/Behavioral:  The patient is not nervous/anxious.       A complete 12-point review of systems was reviewed and is negative except as mentioned above.     Past Medical History:   Past Medical History:   Diagnosis Date    Cancer of kidney     Left    Deep vein thrombosis     Hyperlipidemia     Hypertension     Infected surgical wound 01/07/2020    Pulmonary embolism         Allergies:   Review of patient's allergies indicates:   Allergen Reactions    Lisinopril Other (See Comments)     Dry cough        Medications:   Current Outpatient Medications  "  Medication Sig Dispense Refill    amLODIPine (NORVASC) 2.5 MG tablet TAKE 1 TABLET(2.5 MG) BY MOUTH EVERY DAY 90 tablet 3    losartan (COZAAR) 50 MG tablet Take 1 tablet (50 mg total) by mouth once daily. 90 tablet 3    pravastatin (PRAVACHOL) 40 MG tablet TAKE 1 TABLET(40 MG) BY MOUTH EVERY EVENING 90 tablet 3    oxyCODONE-acetaminophen (PERCOCET) 7.5-325 mg per tablet Take 1 tablet by mouth every 4 (four) hours as needed for Pain. (Patient not taking: Reported on 12/13/2024) 60 tablet 0     No current facility-administered medications for this visit.        Physical Exam:   BP (!) 146/89 (BP Location: Left arm, Patient Position: Sitting)   Pulse 68   Temp 97.7 °F (36.5 °C) (Oral)   Resp 18   Ht 6' 1" (1.854 m)   Wt 120.8 kg (266 lb 5.1 oz)   SpO2 98%   BMI 35.14 kg/m²      ECOG Performance Status: (foot note - ECOG PS provided by [employer]) 0 - Asymptomatic    Physical Exam  Vitals and nursing note reviewed.   Constitutional:       General: He is not in acute distress.     Appearance: Normal appearance. He is well-developed.   HENT:      Head: Normocephalic and atraumatic.   Eyes:      Conjunctiva/sclera: Conjunctivae normal.      Pupils: Pupils are equal, round, and reactive to light.   Pulmonary:      Effort: Pulmonary effort is normal. No respiratory distress.   Abdominal:      General: There is no distension.      Palpations: Abdomen is soft.      Tenderness: There is no abdominal tenderness.   Musculoskeletal:         General: No swelling. Normal range of motion.      Cervical back: Normal range of motion and neck supple.   Skin:     General: Skin is warm and dry.   Neurological:      General: No focal deficit present.      Mental Status: He is alert and oriented to person, place, and time.   Psychiatric:         Mood and Affect: Mood normal.         Behavior: Behavior normal.         Thought Content: Thought content normal.         Judgment: Judgment normal.       Labs: "   Recent Results (from the past 48 hours)   CBC W/ AUTO DIFFERENTIAL    Collection Time: 12/12/24  7:40 AM   Result Value Ref Range    WBC 6.02 3.90 - 12.70 K/uL    RBC 5.07 4.60 - 6.20 M/uL    Hemoglobin 15.5 14.0 - 18.0 g/dL    Hematocrit 46.7 40.0 - 54.0 %    MCV 92 82 - 98 fL    MCH 30.6 27.0 - 31.0 pg    MCHC 33.2 32.0 - 36.0 g/dL    RDW 13.0 11.5 - 14.5 %    Platelets 245 150 - 450 K/uL    MPV 9.0 (L) 9.2 - 12.9 fL    Immature Granulocytes 0.3 0.0 - 0.5 %    Gran # (ANC) 3.6 1.8 - 7.7 K/uL    Immature Grans (Abs) 0.02 0.00 - 0.04 K/uL    Lymph # 1.8 1.0 - 4.8 K/uL    Mono # 0.5 0.3 - 1.0 K/uL    Eos # 0.1 0.0 - 0.5 K/uL    Baso # 0.02 0.00 - 0.20 K/uL    nRBC 0 0 /100 WBC    Gran % 60.5 38.0 - 73.0 %    Lymph % 29.4 18.0 - 48.0 %    Mono % 7.8 4.0 - 15.0 %    Eosinophil % 1.7 0.0 - 8.0 %    Basophil % 0.3 0.0 - 1.9 %    Differential Method Automated    CMP    Collection Time: 12/12/24  7:40 AM   Result Value Ref Range    Sodium 139 136 - 145 mmol/L    Potassium 5.1 3.5 - 5.1 mmol/L    Chloride 105 95 - 110 mmol/L    CO2 27 23 - 29 mmol/L    Glucose 103 70 - 110 mg/dL    BUN 13 8 - 23 mg/dL    Creatinine 1.3 0.5 - 1.4 mg/dL    Calcium 9.6 8.7 - 10.5 mg/dL    Total Protein 7.6 6.0 - 8.4 g/dL    Albumin 3.9 3.5 - 5.2 g/dL    Total Bilirubin 0.5 0.1 - 1.0 mg/dL    Alkaline Phosphatase 47 40 - 150 U/L    AST 16 10 - 40 U/L    ALT 18 10 - 44 U/L    eGFR >60.0 >60 mL/min/1.73 m^2    Anion Gap 7 (L) 8 - 16 mmol/L          Imaging: I have personally reviewed patient's CT scans imaging showing [facility]  CT Chest Abdomen Pelvis With IV Contrast (XPD) Routine Oral Contrast  Narrative: EXAMINATION:  CT CHEST ABDOMEN PELVIS WITH IV CONTRAST (XPD)    CLINICAL HISTORY:  Kidney cancer, monitor; Malignant neoplasm of left kidney, except renal pelvis    TECHNIQUE:  Low dose axial images, sagittal and coronal reformations were obtained from the thoracic inlet to the pubic symphysis following the IV administration of 100 mL of  Omnipaque 350 and no oral contrast    COMPARISON:  December 2023    FINDINGS:  There are stable bilateral pulmonary nodules.  There is no significant mediastinal or hilar lymphadenopathy.  There is no significant pericardial or pleural effusion.  Coronary artery calcifications are noted.  The aorta is normal in caliber.    There are bilateral fat containing inguinal hernias larger on the left.    The liver demonstrates no mass.  The spleen is not enlarged.    The stomach, pancreas are unremarkable.  There is no adrenal mass.    The right kidney concentrates and excretes contrast satisfactorily.  Small hypodensities in the right kidney are too small to characterize but likely cysts.  There is a punctate right renal calculus.    There is surgical change of left nephrectomy.  Stranding in the nephrectomy bed is similar to prior imaging and likely surgical in nature with no concerning features.    The abdominal aorta tapers normally.    The gallbladder demonstrates gallstone.  No biliary ductal dilatation is present.    There is no significant periaortic lymphadenopathy.  No significant pelvic lymphadenopathy present.    The bladder is partially distended, grossly unremarkable.  Prostate is enlarged.    The bowel demonstrates no distension or adjacent inflammatory change.  Appendix is within normal limits.    The osseous structures show degenerative changes.  There are L5 pars defects with slight grade 1 anterolisthesis at L5-S1.  Impression: Left nephrectomy in this patient with a history of renal cell carcinoma.  There is no detrimental change with no finding to indicate recurrent or metastatic disease.    Pulmonary nodules are stable.    Additional stable findings as above.    Electronically signed by: Fay Jane MD  Date:    12/12/2024  Time:    09:20              Diagnoses:       1. Carcinoma, renal cell, left    2. Essential hypertension    3. Meningioma, recurrent of spine            Assessment and Plan:          1. Stage III RCC:   -Sites of Disease: L kidney, L renal vein   Discussed with patient that cervical lesion is unlikely metastasis from RCC. Repeat MRI showed stability. Now s/p nephrectomy. Controversial benefit with adjuvant Sutent and significant side effects. Surgery was 12/10/2019. Continue surveillance, CT CAP q 3 month x 1 year (12/2020), then q 4 month x 1 year (12/2021), then q 6-12 month for years 3-5 (12/2024).  - CT CAP without evidence of disease. He has now completed imaging surveillance and will move to survivorship clinic.    2. HTN  Managed by PCP, currently on Norvasc.    3. Now s/p successful resection    he will return to clinic in 12 months, but knows to call in the interim if symptoms change or should a problem arise.        MDM includes:    - Acute or chronic illness or injury that poses a threat to life or bodily function  - Independent review and explanation of 2 results from unique tests  - Discussion of management and ordering 2 unique tests  - Extensive discussion of treatment and management    Acosta Pham M.D.  Hematology/Oncology Attending  Director [facility]  [facility]            Med Onc Chart Routing      Follow up with physician    Follow up with YAN[facility] 1 year. for survivorship visit with labs   Infusion scheduling note    Injection scheduling note    Labs CBC and CMP   Scheduling:  Preferred lab:  Lab interval:     Imaging    Pharmacy appointment    Other referrals

## 2025-05-30 DIAGNOSIS — I10 ESSENTIAL HYPERTENSION: ICD-10-CM

## 2025-05-30 RX ORDER — LOSARTAN POTASSIUM 50 MG/1
TABLET ORAL
Qty: 90 TABLET | Refills: 0 | Status: SHIPPED | OUTPATIENT
Start: 2025-05-30

## (undated) DEVICE — SEE MEDLINE ITEM 152622

## (undated) DEVICE — ADHESIVE DERMABOND ADVANCED

## (undated) DEVICE — SUT CTD VICRYL UND BR CP-1

## (undated) DEVICE — DRESSING AQUACEL FOAM 4 X 12

## (undated) DEVICE — GOWN SURGICAL X-LARGE

## (undated) DEVICE — SYR 30CC LUER LOCK

## (undated) DEVICE — DRAPE STERI INSTRUMENT 1018

## (undated) DEVICE — SUT 2/0 54IN COATED VICRYL

## (undated) DEVICE — GOWN SURG 2XL DISP TIE BACK

## (undated) DEVICE — SUT SILK 2-0 STRANDS 30IN

## (undated) DEVICE — TAPE UMBILICAL 1/8X36IN WHITE

## (undated) DEVICE — LOOP VESSEL BLUE MAXI

## (undated) DEVICE — SUT 4/0 18IN NUROLON BLK B

## (undated) DEVICE — CORD BIPOLAR 12 FOOT

## (undated) DEVICE — DRAPE C-ARM ELAS CLIP 42X120IN

## (undated) DEVICE — SEE MEDLINE ITEM 146417

## (undated) DEVICE — SYR 50ML CATH TIP

## (undated) DEVICE — BLADE CLIPPER NEURO / MDL 4411

## (undated) DEVICE — BURR RND FLUT SFT TOUCH 2.0MM

## (undated) DEVICE — SUT PDS II O CTX MONO 60

## (undated) DEVICE — SUT VICRYL 2 0 CT 2

## (undated) DEVICE — NDL 22GA X1 1/2 REG BEVEL

## (undated) DEVICE — DRAPE INCISE IOBAN 2 23X17IN

## (undated) DEVICE — TRAY FOLEY 16FR INFECTION CONT

## (undated) DEVICE — SUT SILK 0 STRANDS 30IN BLK

## (undated) DEVICE — SEALER AQUAMANTYS 2.3 BIPOLAR

## (undated) DEVICE — SUT CTD VICRYL VIL BR UR-5

## (undated) DEVICE — SUT SILK 4-0 RB-1 30 BL BR

## (undated) DEVICE — SEE MEDLINE ITEM 157148

## (undated) DEVICE — TIP SONAPET IQ MICRO 12CM

## (undated) DEVICE — SUT VICRYL 4-0 RB1 27IN UD

## (undated) DEVICE — ELECTRODE REM PLYHSV RETURN 9

## (undated) DEVICE — SUT ETHILON 2-0 PSLX 30IN

## (undated) DEVICE — ROUTER TAPERED 2.3MM

## (undated) DEVICE — DRAPE ABDOMINAL TIBURON 14X11

## (undated) DEVICE — SEE MEDLINE ITEM 146313

## (undated) DEVICE — CLIP HEMO-LOK MLX LARGE LF

## (undated) DEVICE — SUT 0 54IN COATED VICRYL U

## (undated) DEVICE — SEE MEDLINE ITEM 156902

## (undated) DEVICE — SPONGE COTTON TRAY 4X4IN

## (undated) DEVICE — CART STAPLE RELD 45MM WHT

## (undated) DEVICE — DRESSING ADH ISLAND 3.6 X 14

## (undated) DEVICE — Device

## (undated) DEVICE — SUT STRATAFIX PDS PLUS VIO

## (undated) DEVICE — DRAPE STERI-DRAPE 1000 17X11IN

## (undated) DEVICE — KIT SURGIFLO HEMOSTATIC MATRIX

## (undated) DEVICE — SEALER LIGASURE MARYLAND 23CM

## (undated) DEVICE — PINS SKULL ADULT MAYFIELD
Type: IMPLANTABLE DEVICE | Site: CRANIAL | Status: NON-FUNCTIONAL
Removed: 2024-09-25

## (undated) DEVICE — PATCH SEALANT TACHOSIL 4.8X4.8

## (undated) DEVICE — KIT EVACUATOR 3-SPRING 1/8 DRN

## (undated) DEVICE — SEALANT ADHERUS AUTOSPRY DURAL

## (undated) DEVICE — BUR BONE CUT MICRO TPS 3X3.8MM

## (undated) DEVICE — APPLIER LIGACLIP MED 11IN

## (undated) DEVICE — SEALER LIGASURE IMPACT 18CM

## (undated) DEVICE — CASSETTE SONOPET IQ IRRIGATION

## (undated) DEVICE — SUT VICRYL PLUS 0 CT1 18IN

## (undated) DEVICE — SPONGE GAUZE 16PLY 4X4

## (undated) DEVICE — CLIP LIGACLIP XTRA TITANIUM

## (undated) DEVICE — APPLICATOR CHLORAPREP ORN 26ML

## (undated) DEVICE — ELECTRODE EXTENDED BLADE

## (undated) DEVICE — APPLIER ENDOSCOP HEMO-LOK MLX

## (undated) DEVICE — SUT MCRYL PLUS 4-0 PS2 27IN

## (undated) DEVICE — TUBE FRAZIER 5MM 2FT SOFT TIP

## (undated) DEVICE — DRAPE C-ARMOR EQUIPMENT COVER

## (undated) DEVICE — CHLORAPREP 10.5 ML APPLICATOR

## (undated) DEVICE — STAPLER INT LINEAR ARTC 3.5-45

## (undated) DEVICE — DRAPE LAP T SHT W/ INSTR PAD

## (undated) DEVICE — SPONGE LAP 18X18 PREWASHED

## (undated) DEVICE — TRAY NEURO OMC